# Patient Record
Sex: FEMALE | Race: WHITE | NOT HISPANIC OR LATINO
[De-identification: names, ages, dates, MRNs, and addresses within clinical notes are randomized per-mention and may not be internally consistent; named-entity substitution may affect disease eponyms.]

---

## 2018-05-07 PROBLEM — Z00.00 ENCOUNTER FOR PREVENTIVE HEALTH EXAMINATION: Status: ACTIVE | Noted: 2018-05-07

## 2018-06-06 ENCOUNTER — APPOINTMENT (OUTPATIENT)
Dept: ORTHOPEDIC SURGERY | Facility: CLINIC | Age: 63
End: 2018-06-06
Payer: COMMERCIAL

## 2018-06-06 VITALS
DIASTOLIC BLOOD PRESSURE: 85 MMHG | BODY MASS INDEX: 46.95 KG/M2 | WEIGHT: 265 LBS | HEART RATE: 109 BPM | SYSTOLIC BLOOD PRESSURE: 123 MMHG | HEIGHT: 63 IN

## 2018-06-06 DIAGNOSIS — Z87.39 PERSONAL HISTORY OF OTHER DISEASES OF THE MUSCULOSKELETAL SYSTEM AND CONNECTIVE TISSUE: ICD-10-CM

## 2018-06-06 DIAGNOSIS — Z78.9 OTHER SPECIFIED HEALTH STATUS: ICD-10-CM

## 2018-06-06 DIAGNOSIS — Z82.61 FAMILY HISTORY OF ARTHRITIS: ICD-10-CM

## 2018-06-06 PROCEDURE — 73502 X-RAY EXAM HIP UNI 2-3 VIEWS: CPT | Mod: RT

## 2018-06-06 PROCEDURE — 99204 OFFICE O/P NEW MOD 45 MIN: CPT

## 2018-08-08 ENCOUNTER — TRANSCRIPTION ENCOUNTER (OUTPATIENT)
Age: 63
End: 2018-08-08

## 2018-08-08 ENCOUNTER — OUTPATIENT (OUTPATIENT)
Dept: OUTPATIENT SERVICES | Facility: HOSPITAL | Age: 63
LOS: 1 days | End: 2018-08-08
Payer: COMMERCIAL

## 2018-08-08 VITALS
DIASTOLIC BLOOD PRESSURE: 80 MMHG | RESPIRATION RATE: 18 BRPM | WEIGHT: 268.96 LBS | TEMPERATURE: 96 F | HEART RATE: 96 BPM | SYSTOLIC BLOOD PRESSURE: 130 MMHG | HEIGHT: 62 IN

## 2018-08-08 DIAGNOSIS — Z01.818 ENCOUNTER FOR OTHER PREPROCEDURAL EXAMINATION: ICD-10-CM

## 2018-08-08 DIAGNOSIS — M25.559 PAIN IN UNSPECIFIED HIP: ICD-10-CM

## 2018-08-08 DIAGNOSIS — Z98.890 OTHER SPECIFIED POSTPROCEDURAL STATES: Chronic | ICD-10-CM

## 2018-08-08 DIAGNOSIS — E66.9 OBESITY, UNSPECIFIED: ICD-10-CM

## 2018-08-08 DIAGNOSIS — Z29.9 ENCOUNTER FOR PROPHYLACTIC MEASURES, UNSPECIFIED: ICD-10-CM

## 2018-08-08 LAB
ALBUMIN SERPL ELPH-MCNC: 4.1 G/DL — SIGNIFICANT CHANGE UP (ref 3.3–5.2)
ALP SERPL-CCNC: 73 U/L — SIGNIFICANT CHANGE UP (ref 40–120)
ALT FLD-CCNC: 7 U/L — SIGNIFICANT CHANGE UP
ANION GAP SERPL CALC-SCNC: 13 MMOL/L — SIGNIFICANT CHANGE UP (ref 5–17)
APTT BLD: 32.1 SEC — SIGNIFICANT CHANGE UP (ref 27.5–37.4)
AST SERPL-CCNC: 12 U/L — SIGNIFICANT CHANGE UP
BASOPHILS # BLD AUTO: 0 K/UL — SIGNIFICANT CHANGE UP (ref 0–0.2)
BASOPHILS NFR BLD AUTO: 0.2 % — SIGNIFICANT CHANGE UP (ref 0–2)
BILIRUB SERPL-MCNC: 0.2 MG/DL — LOW (ref 0.4–2)
BLD GP AB SCN SERPL QL: SIGNIFICANT CHANGE UP
BUN SERPL-MCNC: 17 MG/DL — SIGNIFICANT CHANGE UP (ref 8–20)
CALCIUM SERPL-MCNC: 9.8 MG/DL — SIGNIFICANT CHANGE UP (ref 8.6–10.2)
CHLORIDE SERPL-SCNC: 106 MMOL/L — SIGNIFICANT CHANGE UP (ref 98–107)
CO2 SERPL-SCNC: 27 MMOL/L — SIGNIFICANT CHANGE UP (ref 22–29)
CREAT SERPL-MCNC: 0.69 MG/DL — SIGNIFICANT CHANGE UP (ref 0.5–1.3)
EOSINOPHIL # BLD AUTO: 0.2 K/UL — SIGNIFICANT CHANGE UP (ref 0–0.5)
EOSINOPHIL NFR BLD AUTO: 2.7 % — SIGNIFICANT CHANGE UP (ref 0–6)
GLUCOSE SERPL-MCNC: 121 MG/DL — HIGH (ref 70–115)
HCT VFR BLD CALC: 38.7 % — SIGNIFICANT CHANGE UP (ref 37–47)
HGB BLD-MCNC: 11.8 G/DL — LOW (ref 12–16)
INR BLD: 1.04 RATIO — SIGNIFICANT CHANGE UP (ref 0.88–1.16)
LYMPHOCYTES # BLD AUTO: 1.5 K/UL — SIGNIFICANT CHANGE UP (ref 1–4.8)
LYMPHOCYTES # BLD AUTO: 17.5 % — LOW (ref 20–55)
MCHC RBC-ENTMCNC: 26.5 PG — LOW (ref 27–31)
MCHC RBC-ENTMCNC: 30.5 G/DL — LOW (ref 32–36)
MCV RBC AUTO: 87 FL — SIGNIFICANT CHANGE UP (ref 81–99)
MONOCYTES # BLD AUTO: 0.5 K/UL — SIGNIFICANT CHANGE UP (ref 0–0.8)
MONOCYTES NFR BLD AUTO: 5.6 % — SIGNIFICANT CHANGE UP (ref 3–10)
MRSA PCR RESULT.: SIGNIFICANT CHANGE UP
NEUTROPHILS # BLD AUTO: 6.2 K/UL — SIGNIFICANT CHANGE UP (ref 1.8–8)
NEUTROPHILS NFR BLD AUTO: 73.8 % — HIGH (ref 37–73)
PLATELET # BLD AUTO: 286 K/UL — SIGNIFICANT CHANGE UP (ref 150–400)
POTASSIUM SERPL-MCNC: 3.6 MMOL/L — SIGNIFICANT CHANGE UP (ref 3.5–5.3)
POTASSIUM SERPL-SCNC: 3.6 MMOL/L — SIGNIFICANT CHANGE UP (ref 3.5–5.3)
PROT SERPL-MCNC: 7.7 G/DL — SIGNIFICANT CHANGE UP (ref 6.6–8.7)
PROTHROM AB SERPL-ACNC: 11.5 SEC — SIGNIFICANT CHANGE UP (ref 9.8–12.7)
RBC # BLD: 4.45 M/UL — SIGNIFICANT CHANGE UP (ref 4.4–5.2)
RBC # FLD: 16.1 % — HIGH (ref 11–15.6)
S AUREUS DNA NOSE QL NAA+PROBE: SIGNIFICANT CHANGE UP
SODIUM SERPL-SCNC: 146 MMOL/L — HIGH (ref 135–145)
TYPE + AB SCN PNL BLD: SIGNIFICANT CHANGE UP
WBC # BLD: 8.5 K/UL — SIGNIFICANT CHANGE UP (ref 4.8–10.8)
WBC # FLD AUTO: 8.5 K/UL — SIGNIFICANT CHANGE UP (ref 4.8–10.8)

## 2018-08-08 PROCEDURE — 86850 RBC ANTIBODY SCREEN: CPT

## 2018-08-08 PROCEDURE — 85027 COMPLETE CBC AUTOMATED: CPT

## 2018-08-08 PROCEDURE — 85610 PROTHROMBIN TIME: CPT

## 2018-08-08 PROCEDURE — G0463: CPT

## 2018-08-08 PROCEDURE — 80053 COMPREHEN METABOLIC PANEL: CPT

## 2018-08-08 PROCEDURE — 85730 THROMBOPLASTIN TIME PARTIAL: CPT

## 2018-08-08 PROCEDURE — 86901 BLOOD TYPING SEROLOGIC RH(D): CPT

## 2018-08-08 PROCEDURE — 36415 COLL VENOUS BLD VENIPUNCTURE: CPT

## 2018-08-08 PROCEDURE — 87640 STAPH A DNA AMP PROBE: CPT

## 2018-08-08 PROCEDURE — 86900 BLOOD TYPING SEROLOGIC ABO: CPT

## 2018-08-08 PROCEDURE — 87641 MR-STAPH DNA AMP PROBE: CPT

## 2018-08-08 NOTE — H&P PST ADULT - NSANTHOSAYNRD_GEN_A_CORE
No. NAHOMI screening performed.  STOP BANG Legend: 0-2 = LOW Risk; 3-4 = INTERMEDIATE Risk; 5-8 = HIGH Risk

## 2018-08-08 NOTE — H&P PST ADULT - HISTORY OF PRESENT ILLNESS
63 year old female presents with c/o right hip pain / groin pain over past year. Went to pain management a year ago , right leg sciatica. no improvement . Injection in JUne 2018 to right hip with no relief. Takes motrin as needed with mild relief . uses walker and cane as needed. Pain with sleep and sitting to standing., xrays done in June and right hip replacement needed.

## 2018-08-08 NOTE — H&P PST ADULT - ASSESSMENT
pleasant 63 year old female presents with c/o right hip pain , scheduled for right hip replacement.  CAPRINI SCORE [CLOT]    AGE RELATED RISK FACTORS                                                       MOBILITY RELATED FACTORS  [ ] Age 41-60 years                                            (1 Point)                  [ ] Bed rest                                                        (1 Point)  [X ] Age: 61-74 years                                           (2 Points)                 [ ] Plaster cast                                                   (2 Points)  [ ] Age= 75 years                                              (3 Points)                 [ ] Bed bound for more than 72 hours                 (2 Points)    DISEASE RELATED RISK FACTORS                                               GENDER SPECIFIC FACTORS  [XX] Varicose veins                                               (1 Point)                  [ ] Post-partum < 6 weeks                                   (1 Point)             [ X] BMI > 25 Kg/m2                                            (1 Point)                  [ ] Hormonal therapy  or oral contraception          (1 Point)                 [ ] Sepsis (in the previous month)                        (1 Point)                  [ ] History of pregnancy complications                 (1 point)  [ ] Pneumonia or serious lung disease                                               [ ] Unexplained or recurrent                     (1 Point)           (in the previous month)                               (1 Point)  [ ] Abnormal pulmonary function test                     (1 Point)                 SURGERY RELATED RISK FACTORS  [ ] Acute myocardial infarction                              (1 Point)                 [ ]  Section                                             (1 Point)  [ ] Congestive heart failure (in the previous month)  (1 Point)               [ ] Minor surgery                                                  (1 Point)   [ ] Inflammatory bowel disease                             (1 Point)                 [ ] Arthroscopic surgery                                        (2 Points)  [ ] Central venous access                                      (2 Points)                [ ] General surgery lasting more than 45 minutes   (2 Points)       [ ] Stroke (in the previous month)                          (5 Points)               [X ] Elective arthroplasty                                         (5 Points)                                                                                                                                               HEMATOLOGY RELATED FACTORS                                                 TRAUMA RELATED RISK FACTORS  [ ] Prior episodes of VTE                                     (3 Points)                 [ ] Fracture of the hip, pelvis, or leg                       (5 Points)  [ ] Positive family history for VTE                         (3 Points)                 [ ] Acute spinal cord injury (in the previous month)  (5 Points)  [ ] Prothrombin 70124 A                                     (3 Points)                 [ ] Paralysis  (less than 1 month)                             (5 Points)  [ ] Factor V Leiden                                             (3 Points)                  [ ] Multiple Trauma within 1 month                        (5 Points)  [ ] Lupus anticoagulants                                     (3 Points)                                                           [ ] Anticardiolipin antibodies                               (3 Points)                                                       [ ] High homocysteine in the blood                      (3 Points)                                             [ ] Other congenital or acquired thrombophilia      (3 Points)                                                [ ] Heparin induced thrombocytopenia                  (3 Points)                                          Total Score [    10      ]

## 2018-08-08 NOTE — H&P PST ADULT - FAMILY HISTORY
Father  Still living? No  Family history of COPD (chronic obstructive pulmonary disease), Age at diagnosis: Age Unknown     Mother  Still living? Yes, Estimated age:   Family history of heart disease, Age at diagnosis: Age Unknown

## 2018-08-22 ENCOUNTER — TRANSCRIPTION ENCOUNTER (OUTPATIENT)
Age: 63
End: 2018-08-22

## 2018-08-23 ENCOUNTER — RESULT REVIEW (OUTPATIENT)
Age: 63
End: 2018-08-23

## 2018-08-23 ENCOUNTER — INPATIENT (INPATIENT)
Facility: HOSPITAL | Age: 63
LOS: 3 days | Discharge: NURSING, REHAB OR LONG-TERM CARE | DRG: 470 | End: 2018-08-27
Attending: ORTHOPAEDIC SURGERY | Admitting: ORTHOPAEDIC SURGERY
Payer: COMMERCIAL

## 2018-08-23 ENCOUNTER — TRANSCRIPTION ENCOUNTER (OUTPATIENT)
Age: 63
End: 2018-08-23

## 2018-08-23 ENCOUNTER — APPOINTMENT (OUTPATIENT)
Dept: ORTHOPEDIC SURGERY | Facility: HOSPITAL | Age: 63
End: 2018-08-23

## 2018-08-23 VITALS
HEIGHT: 63 IN | OXYGEN SATURATION: 100 % | WEIGHT: 266.1 LBS | SYSTOLIC BLOOD PRESSURE: 153 MMHG | TEMPERATURE: 98 F | DIASTOLIC BLOOD PRESSURE: 69 MMHG | HEART RATE: 117 BPM | RESPIRATION RATE: 17 BRPM

## 2018-08-23 DIAGNOSIS — Z98.890 OTHER SPECIFIED POSTPROCEDURAL STATES: Chronic | ICD-10-CM

## 2018-08-23 DIAGNOSIS — M16.11 UNILATERAL PRIMARY OSTEOARTHRITIS, RIGHT HIP: ICD-10-CM

## 2018-08-23 LAB
ABO RH CONFIRMATION: SIGNIFICANT CHANGE UP
GLUCOSE BLDC GLUCOMTR-MCNC: 107 MG/DL — HIGH (ref 70–99)
GLUCOSE BLDC GLUCOMTR-MCNC: 158 MG/DL — HIGH (ref 70–99)
GLUCOSE BLDC GLUCOMTR-MCNC: 99 MG/DL — SIGNIFICANT CHANGE UP (ref 70–99)

## 2018-08-23 PROCEDURE — 88304 TISSUE EXAM BY PATHOLOGIST: CPT | Mod: 26

## 2018-08-23 PROCEDURE — 73501 X-RAY EXAM HIP UNI 1 VIEW: CPT | Mod: 26,RT

## 2018-08-23 PROCEDURE — 27130 TOTAL HIP ARTHROPLASTY: CPT | Mod: RT

## 2018-08-23 PROCEDURE — 88311 DECALCIFY TISSUE: CPT | Mod: 26

## 2018-08-23 RX ORDER — OXYCODONE HYDROCHLORIDE 5 MG/1
10 TABLET ORAL EVERY 4 HOURS
Qty: 0 | Refills: 0 | Status: DISCONTINUED | OUTPATIENT
Start: 2018-08-24 | End: 2018-08-27

## 2018-08-23 RX ORDER — DOCUSATE SODIUM 100 MG
100 CAPSULE ORAL THREE TIMES A DAY
Qty: 0 | Refills: 0 | Status: DISCONTINUED | OUTPATIENT
Start: 2018-08-24 | End: 2018-08-27

## 2018-08-23 RX ORDER — ACETAMINOPHEN 500 MG
975 TABLET ORAL EVERY 8 HOURS
Qty: 0 | Refills: 0 | Status: COMPLETED | OUTPATIENT
Start: 2018-08-23 | End: 2018-08-24

## 2018-08-23 RX ORDER — ACETAMINOPHEN 500 MG
1000 TABLET ORAL ONCE
Qty: 0 | Refills: 0 | Status: DISCONTINUED | OUTPATIENT
Start: 2018-08-23 | End: 2018-08-23

## 2018-08-23 RX ORDER — CELECOXIB 200 MG/1
200 CAPSULE ORAL
Qty: 0 | Refills: 0 | Status: DISCONTINUED | OUTPATIENT
Start: 2018-08-24 | End: 2018-08-27

## 2018-08-23 RX ORDER — SENNA PLUS 8.6 MG/1
2 TABLET ORAL AT BEDTIME
Qty: 0 | Refills: 0 | Status: DISCONTINUED | OUTPATIENT
Start: 2018-08-23 | End: 2018-08-27

## 2018-08-23 RX ORDER — VANCOMYCIN HCL 1 G
1500 VIAL (EA) INTRAVENOUS ONCE
Qty: 0 | Refills: 0 | Status: COMPLETED | OUTPATIENT
Start: 2018-08-23 | End: 2018-08-23

## 2018-08-23 RX ORDER — ACETAMINOPHEN 500 MG
650 TABLET ORAL EVERY 6 HOURS
Qty: 0 | Refills: 0 | Status: DISCONTINUED | OUTPATIENT
Start: 2018-08-24 | End: 2018-08-27

## 2018-08-23 RX ORDER — ONDANSETRON 8 MG/1
4 TABLET, FILM COATED ORAL EVERY 6 HOURS
Qty: 0 | Refills: 0 | Status: DISCONTINUED | OUTPATIENT
Start: 2018-08-24 | End: 2018-08-27

## 2018-08-23 RX ORDER — GABAPENTIN 400 MG/1
600 CAPSULE ORAL ONCE
Qty: 0 | Refills: 0 | Status: COMPLETED | OUTPATIENT
Start: 2018-08-23 | End: 2018-08-23

## 2018-08-23 RX ORDER — OXYCODONE HYDROCHLORIDE 5 MG/1
5 TABLET ORAL EVERY 4 HOURS
Qty: 0 | Refills: 0 | Status: DISCONTINUED | OUTPATIENT
Start: 2018-08-24 | End: 2018-08-27

## 2018-08-23 RX ORDER — SODIUM CHLORIDE 9 MG/ML
1000 INJECTION, SOLUTION INTRAVENOUS
Qty: 0 | Refills: 0 | Status: DISCONTINUED | OUTPATIENT
Start: 2018-08-23 | End: 2018-08-24

## 2018-08-23 RX ORDER — OXYCODONE HYDROCHLORIDE 5 MG/1
20 TABLET ORAL ONCE
Qty: 0 | Refills: 0 | Status: DISCONTINUED | OUTPATIENT
Start: 2018-08-23 | End: 2018-08-23

## 2018-08-23 RX ORDER — ONDANSETRON 8 MG/1
4 TABLET, FILM COATED ORAL ONCE
Qty: 0 | Refills: 0 | Status: DISCONTINUED | OUTPATIENT
Start: 2018-08-23 | End: 2018-08-24

## 2018-08-23 RX ORDER — HYDROMORPHONE HYDROCHLORIDE 2 MG/ML
2 INJECTION INTRAMUSCULAR; INTRAVENOUS; SUBCUTANEOUS
Qty: 0 | Refills: 0 | Status: DISCONTINUED | OUTPATIENT
Start: 2018-08-23 | End: 2018-08-27

## 2018-08-23 RX ORDER — POLYETHYLENE GLYCOL 3350 17 G/17G
17 POWDER, FOR SOLUTION ORAL DAILY
Qty: 0 | Refills: 0 | Status: DISCONTINUED | OUTPATIENT
Start: 2018-08-24 | End: 2018-08-27

## 2018-08-23 RX ORDER — OXYCODONE HYDROCHLORIDE 5 MG/1
10 TABLET ORAL EVERY 12 HOURS
Qty: 0 | Refills: 0 | Status: DISCONTINUED | OUTPATIENT
Start: 2018-08-23 | End: 2018-08-27

## 2018-08-23 RX ORDER — VANCOMYCIN HCL 1 G
1500 VIAL (EA) INTRAVENOUS
Qty: 0 | Refills: 0 | Status: COMPLETED | OUTPATIENT
Start: 2018-08-23 | End: 2018-08-23

## 2018-08-23 RX ORDER — PANTOPRAZOLE SODIUM 20 MG/1
40 TABLET, DELAYED RELEASE ORAL DAILY
Qty: 0 | Refills: 0 | Status: DISCONTINUED | OUTPATIENT
Start: 2018-08-24 | End: 2018-08-27

## 2018-08-23 RX ORDER — CEFAZOLIN SODIUM 1 G
3000 VIAL (EA) INJECTION ONCE
Qty: 0 | Refills: 0 | Status: DISCONTINUED | OUTPATIENT
Start: 2018-08-23 | End: 2018-08-23

## 2018-08-23 RX ORDER — SODIUM CHLORIDE 9 MG/ML
1000 INJECTION INTRAMUSCULAR; INTRAVENOUS; SUBCUTANEOUS
Qty: 0 | Refills: 0 | Status: DISCONTINUED | OUTPATIENT
Start: 2018-08-23 | End: 2018-08-24

## 2018-08-23 RX ORDER — CEFAZOLIN SODIUM 1 G
2000 VIAL (EA) INJECTION
Qty: 0 | Refills: 0 | Status: COMPLETED | OUTPATIENT
Start: 2018-08-23 | End: 2018-08-24

## 2018-08-23 RX ORDER — ENOXAPARIN SODIUM 100 MG/ML
40 INJECTION SUBCUTANEOUS DAILY
Qty: 0 | Refills: 0 | Status: DISCONTINUED | OUTPATIENT
Start: 2018-08-24 | End: 2018-08-27

## 2018-08-23 RX ORDER — MAGNESIUM HYDROXIDE 400 MG/1
30 TABLET, CHEWABLE ORAL DAILY
Qty: 0 | Refills: 0 | Status: DISCONTINUED | OUTPATIENT
Start: 2018-08-24 | End: 2018-08-27

## 2018-08-23 RX ORDER — TRANEXAMIC ACID 100 MG/ML
1200 INJECTION, SOLUTION INTRAVENOUS ONCE
Qty: 0 | Refills: 0 | Status: DISCONTINUED | OUTPATIENT
Start: 2018-08-23 | End: 2018-08-23

## 2018-08-23 RX ORDER — CELECOXIB 200 MG/1
400 CAPSULE ORAL ONCE
Qty: 0 | Refills: 0 | Status: COMPLETED | OUTPATIENT
Start: 2018-08-23 | End: 2018-08-23

## 2018-08-23 RX ORDER — FENTANYL CITRATE 50 UG/ML
50 INJECTION INTRAVENOUS
Qty: 0 | Refills: 0 | Status: DISCONTINUED | OUTPATIENT
Start: 2018-08-23 | End: 2018-08-24

## 2018-08-23 RX ORDER — SODIUM CHLORIDE 9 MG/ML
3 INJECTION INTRAMUSCULAR; INTRAVENOUS; SUBCUTANEOUS EVERY 8 HOURS
Qty: 0 | Refills: 0 | Status: DISCONTINUED | OUTPATIENT
Start: 2018-08-23 | End: 2018-08-23

## 2018-08-23 RX ADMIN — Medication 100 MILLIGRAM(S): at 22:17

## 2018-08-23 RX ADMIN — GABAPENTIN 600 MILLIGRAM(S): 400 CAPSULE ORAL at 11:00

## 2018-08-23 RX ADMIN — Medication 300 MILLIGRAM(S): at 23:11

## 2018-08-23 RX ADMIN — OXYCODONE HYDROCHLORIDE 10 MILLIGRAM(S): 5 TABLET ORAL at 22:17

## 2018-08-23 RX ADMIN — Medication 975 MILLIGRAM(S): at 22:18

## 2018-08-23 RX ADMIN — OXYCODONE HYDROCHLORIDE 10 MILLIGRAM(S): 5 TABLET ORAL at 23:11

## 2018-08-23 RX ADMIN — Medication 300 MILLIGRAM(S): at 12:17

## 2018-08-23 RX ADMIN — CELECOXIB 400 MILLIGRAM(S): 200 CAPSULE ORAL at 11:00

## 2018-08-23 RX ADMIN — OXYCODONE HYDROCHLORIDE 20 MILLIGRAM(S): 5 TABLET ORAL at 11:00

## 2018-08-23 NOTE — DISCHARGE NOTE ADULT - HOSPITAL COURSE
The patient underwent a RIGHT POSTERIOR TOTAL HIP REPLACEMENT on 8/23/18. The patient received antibiotics consistent with SCIP guidelines. The patient underwent the procedure and had no intra-operative complications. Post-operatively, the patient was seen by medicine and PT. The patient received Lovenox for DVTP. The patient received pain medications per orthopedic pain management protocol and the pain was appropriately controlled. Patient was instructed on posterior total hip precautions by PT. The patient did not have any post-operative medical complications. The patient was discharged in stable condition.

## 2018-08-23 NOTE — DISCHARGE NOTE ADULT - ADDITIONAL INSTRUCTIONS
The patient will be seen in the office between 2-3 weeks for wound check. PLEASE CONTACT OFFICE TO ARRANGE FOLLOW-UP APPOINTMENT DATE. Tape will be removed at that time. Patient may shower after post-op day #5. The dressing is to be removed on 7. IF THE DRESSING BECOMES SOILED BEFORE THE REMOVAL DATE, CHANGE WITH A SIMILAR DRESSING. IF THE DRESSING BECOMES STAINED WITH DISCHARGE, CONTACT THE OFFICE FOR FURTHER DIRECTIONS.  The patient will contact the office if the wound becomes red, has increasing pain, develops bleeding or discharge, an injury occurs, or has other concerns. The patient will continue PT consistent with posterior total hip replacement protocol. The patient will continue to practice posterior total hip precautions for a minimum of 6 week. The patient will continue LOVENOX for 2 weeks and then begin ASPIRIN for blood clot prevention. Patient will take Duricef twice a day for 7 days for infection prevention. The patient will take OXYCODONE AND TYLENOL for pain control and titrate according to prescription and patient needs. The patient will take Colace while taking oxycodone to prevent narcotic associated constipation.  Additionally, increase water intake (drink at least 8 glasses of water daily) and try adding fiber to the diet by eating fruits, vegetables and foods that are rich in grains. If constipation is experienced, contact the medical/primary care provider to discuss further treatment options. The patient is FULL weight bearing. The patient will be seen in the office between 2-3 weeks for wound check. PLEASE CONTACT OFFICE TO ARRANGE FOLLOW-UP APPOINTMENT DATE. Tape will be removed at that time. Patient may shower after post-op day #5. The dressing is to be removed on 9/1/18. IF THE DRESSING BECOMES SOILED BEFORE THE REMOVAL DATE, CHANGE WITH A SIMILAR DRESSING. IF THE DRESSING BECOMES STAINED WITH DISCHARGE, CONTACT THE OFFICE FOR FURTHER DIRECTIONS.  The patient will contact the office if the wound becomes red, has increasing pain, develops bleeding or discharge, an injury occurs, or has other concerns. The patient will continue PT consistent with posterior total hip replacement protocol. The patient will continue to practice posterior total hip precautions for a minimum of 6 week. The patient will continue LOVENOX for 2 weeks and then begin ASPIRIN for blood clot prevention. Patient will take Duricef twice a day for 7 days for infection prevention. The patient will take OXYCODONE AND TYLENOL for pain control and titrate according to prescription and patient needs. The patient will take Colace while taking oxycodone to prevent narcotic associated constipation.  Additionally, increase water intake (drink at least 8 glasses of water daily) and try adding fiber to the diet by eating fruits, vegetables and foods that are rich in grains. If constipation is experienced, contact the medical/primary care provider to discuss further treatment options. The patient is FULL weight bearing.

## 2018-08-23 NOTE — PROGRESS NOTE ADULT - SUBJECTIVE AND OBJECTIVE BOX
Ortho Post Op Check    Name: MADHAVI JOHNSON    MR #: [id number]    Procedure: right total hip arthroplasty posterior  Surgeon: Nett    Pt comfortable without complaints, pain controlled  Denies CP, SOB, N/V, numbness/tingling     General Exam:  Vital Signs Last 24 Hrs  T(C): 36.7 (08-23-18 @ 19:15), Max: 36.7 (08-23-18 @ 19:15)  T(F): 98.1 (08-23-18 @ 19:15), Max: 98.1 (08-23-18 @ 19:15)  HR: 88 (08-23-18 @ 21:00) (88 - 99)  BP: 99/52 (08-23-18 @ 21:00) (87/44 - 127/74)  BP(mean): --  RR: 12 (08-23-18 @ 21:00) (12 - 21)  SpO2: 99% (08-23-18 @ 21:00) (94% - 100%)    General: Pt Alert and oriented, NAD, controlled pain.  Dressings C/D/I. No bleeding.  Pulses: 2+ dorsalis pedis pulse. Cap refill < 2 sec.  Sensation: Grossly intact to light touch without deficit.  Motor: + EHL/FHL/TA/GS    Post-op X-Ray:    Pelvis & hip films reviewed. Implants are in appropriate position. No fracture or dislocation noted. Patient is WBAT of the surgical extremity.     A/P: 63yFemale POD#0 s/p right total hip arthroplasty posterior   - Stable  - Pain Control  - DVT ppx: Lovenox  - Post op abx: Ancef, Vanco, Duricef  - PT eval done  - Weight bearing status: WBAT Ortho Post Op Check    Name: MADHAVI JOHNSON    MR #: [id number]    Procedure: right total hip arthroplasty posterior  Surgeon: Nett    Pt comfortable without complaints, pain controlled  Denies CP, SOB, N/V, numbness/tingling     General Exam:  Vital Signs Last 24 Hrs  T(C): 36.7 (08-23-18 @ 19:15), Max: 36.7 (08-23-18 @ 19:15)  T(F): 98.1 (08-23-18 @ 19:15), Max: 98.1 (08-23-18 @ 19:15)  HR: 88 (08-23-18 @ 21:00) (88 - 99)  BP: 99/52 (08-23-18 @ 21:00) (87/44 - 127/74)  BP(mean): --  RR: 12 (08-23-18 @ 21:00) (12 - 21)  SpO2: 99% (08-23-18 @ 21:00) (94% - 100%)    General: Pt Alert and oriented, NAD, controlled pain.  Dressings C/D/I. No bleeding.  Pulses: 2+ dorsalis pedis pulse. Cap refill < 2 sec.  Sensation: Grossly intact to light touch without deficit.  Motor: + EHL/FHL/TA/GS    Post-op X-Ray:    Pelvis & hip films reviewed. Implants are in appropriate position. No fracture or dislocation noted. Patient is WBAT of the surgical extremity.     A/P: 63yFemale POD#0 s/p right total hip arthroplasty posterior   - Stable  - Pain Control  - DVT ppx: Lovenox  - Post op abx: Ancef, Vanco, Duricef  - PT eval pending  - Weight bearing status: WBAT

## 2018-08-23 NOTE — DISCHARGE NOTE ADULT - MEDICATION SUMMARY - MEDICATIONS TO TAKE
I will START or STAY ON the medications listed below when I get home from the hospital:    oxyCODONE 5 mg oral tablet  -- 1 tab(s) by mouth every 4 hours, As needed, Mild Pain (1 - 3)  -- Indication: For Pain    oxyCODONE 10 mg oral tablet  -- 1 tab(s) by mouth every 4 hours, As needed, Moderate Pain (4 - 6)  -- Indication: For Pain    aspirin 325 mg oral tablet  -- 1 tab(s) by mouth 2 times a day   -- Take with food or milk.    -- Indication: For DVTP, to begin after completion of Lovenox    enoxaparin  -- 40 milligram(s) subcutaneous once a day  -- Indication: For DVTP    cefadroxil 500 mg oral capsule  -- 1 cap(s) by mouth 2 times a day  -- Indication: For infection prophylaxis

## 2018-08-23 NOTE — DISCHARGE NOTE ADULT - MEDICATION SUMMARY - MEDICATIONS TO STOP TAKING
I will STOP taking the medications listed below when I get home from the hospital:    Motrin 600 mg oral tablet  -- 1 tab(s) by mouth every 6 hours    traMADol 50 mg oral tablet  -- 1 tab(s) by mouth every 4 hours

## 2018-08-23 NOTE — DISCHARGE NOTE ADULT - CARE PLAN
Goal:	resume ADLs  Assessment and plan of treatment:	pain control, physical therapy Principal Discharge DX:	Primary osteoarthritis of right hip  Goal:	resume ADLs  Assessment and plan of treatment:	pain control, physical therapy

## 2018-08-23 NOTE — DISCHARGE NOTE ADULT - CARE PROVIDER_API CALL
Maninder Dumont (MD), Orthopaedic Surgery  [address]  [place] [address]  Phone: [phone]  Fax: [phone]

## 2018-08-23 NOTE — DISCHARGE NOTE ADULT - PATIENT PORTAL LINK FT
You can access the [facility] Patient Portal, offered by [facility], by registering with the following website: [email or web address]

## 2018-08-23 NOTE — BRIEF OPERATIVE NOTE - PROCEDURE
<<-----Click on this checkbox to enter Procedure JACQUES - total hip arthroplasty  08/23/2018    Active  MNETT

## 2018-08-24 DIAGNOSIS — I99.8 OTHER DISORDER OF CIRCULATORY SYSTEM: ICD-10-CM

## 2018-08-24 DIAGNOSIS — E66.01 MORBID (SEVERE) OBESITY DUE TO EXCESS CALORIES: ICD-10-CM

## 2018-08-24 DIAGNOSIS — M16.11 UNILATERAL PRIMARY OSTEOARTHRITIS, RIGHT HIP: ICD-10-CM

## 2018-08-24 LAB
ANION GAP SERPL CALC-SCNC: 10 MMOL/L — SIGNIFICANT CHANGE UP (ref 5–17)
BASOPHILS # BLD AUTO: 0 K/UL — SIGNIFICANT CHANGE UP (ref 0–0.2)
BASOPHILS NFR BLD AUTO: 0.1 % — SIGNIFICANT CHANGE UP (ref 0–2)
BUN SERPL-MCNC: 16 MG/DL — SIGNIFICANT CHANGE UP (ref 8–20)
CALCIUM SERPL-MCNC: 8.8 MG/DL — SIGNIFICANT CHANGE UP (ref 8.6–10.2)
CHLORIDE SERPL-SCNC: 101 MMOL/L — SIGNIFICANT CHANGE UP (ref 98–107)
CO2 SERPL-SCNC: 28 MMOL/L — SIGNIFICANT CHANGE UP (ref 22–29)
CREAT SERPL-MCNC: 0.54 MG/DL — SIGNIFICANT CHANGE UP (ref 0.5–1.3)
EOSINOPHIL # BLD AUTO: 0.1 K/UL — SIGNIFICANT CHANGE UP (ref 0–0.5)
EOSINOPHIL NFR BLD AUTO: 1.3 % — SIGNIFICANT CHANGE UP (ref 0–6)
GLUCOSE SERPL-MCNC: 116 MG/DL — HIGH (ref 70–115)
HCT VFR BLD CALC: 30.2 % — LOW (ref 37–47)
HGB BLD-MCNC: 9.2 G/DL — LOW (ref 12–16)
LYMPHOCYTES # BLD AUTO: 1 K/UL — SIGNIFICANT CHANGE UP (ref 1–4.8)
LYMPHOCYTES # BLD AUTO: 11.9 % — LOW (ref 20–55)
MCHC RBC-ENTMCNC: 26.5 PG — LOW (ref 27–31)
MCHC RBC-ENTMCNC: 30.5 G/DL — LOW (ref 32–36)
MCV RBC AUTO: 87 FL — SIGNIFICANT CHANGE UP (ref 81–99)
MONOCYTES # BLD AUTO: 0.6 K/UL — SIGNIFICANT CHANGE UP (ref 0–0.8)
MONOCYTES NFR BLD AUTO: 7.1 % — SIGNIFICANT CHANGE UP (ref 3–10)
NEUTROPHILS # BLD AUTO: 6.9 K/UL — SIGNIFICANT CHANGE UP (ref 1.8–8)
NEUTROPHILS NFR BLD AUTO: 79.3 % — HIGH (ref 37–73)
PLATELET # BLD AUTO: 208 K/UL — SIGNIFICANT CHANGE UP (ref 150–400)
POTASSIUM SERPL-MCNC: 3.9 MMOL/L — SIGNIFICANT CHANGE UP (ref 3.5–5.3)
POTASSIUM SERPL-SCNC: 3.9 MMOL/L — SIGNIFICANT CHANGE UP (ref 3.5–5.3)
RBC # BLD: 3.47 M/UL — LOW (ref 4.4–5.2)
RBC # FLD: 16.2 % — HIGH (ref 11–15.6)
SODIUM SERPL-SCNC: 139 MMOL/L — SIGNIFICANT CHANGE UP (ref 135–145)
WBC # BLD: 8.8 K/UL — SIGNIFICANT CHANGE UP (ref 4.8–10.8)
WBC # FLD AUTO: 8.8 K/UL — SIGNIFICANT CHANGE UP (ref 4.8–10.8)

## 2018-08-24 PROCEDURE — 99223 1ST HOSP IP/OBS HIGH 75: CPT

## 2018-08-24 RX ORDER — ENOXAPARIN SODIUM 100 MG/ML
40 INJECTION SUBCUTANEOUS
Qty: 0 | Refills: 0 | DISCHARGE
Start: 2018-08-24 | End: 2018-09-07

## 2018-08-24 RX ADMIN — Medication 975 MILLIGRAM(S): at 14:01

## 2018-08-24 RX ADMIN — CELECOXIB 200 MILLIGRAM(S): 200 CAPSULE ORAL at 18:16

## 2018-08-24 RX ADMIN — Medication 975 MILLIGRAM(S): at 22:15

## 2018-08-24 RX ADMIN — Medication 100 MILLIGRAM(S): at 13:32

## 2018-08-24 RX ADMIN — SODIUM CHLORIDE 100 MILLILITER(S): 9 INJECTION, SOLUTION INTRAVENOUS at 01:53

## 2018-08-24 RX ADMIN — SENNA PLUS 2 TABLET(S): 8.6 TABLET ORAL at 01:54

## 2018-08-24 RX ADMIN — OXYCODONE HYDROCHLORIDE 10 MILLIGRAM(S): 5 TABLET ORAL at 05:44

## 2018-08-24 RX ADMIN — Medication 975 MILLIGRAM(S): at 13:33

## 2018-08-24 RX ADMIN — Medication 100 MILLIGRAM(S): at 05:45

## 2018-08-24 RX ADMIN — Medication 975 MILLIGRAM(S): at 06:15

## 2018-08-24 RX ADMIN — Medication 975 MILLIGRAM(S): at 00:38

## 2018-08-24 RX ADMIN — Medication 975 MILLIGRAM(S): at 21:37

## 2018-08-24 RX ADMIN — Medication 500 MILLIGRAM(S): at 17:56

## 2018-08-24 RX ADMIN — OXYCODONE HYDROCHLORIDE 5 MILLIGRAM(S): 5 TABLET ORAL at 14:01

## 2018-08-24 RX ADMIN — PANTOPRAZOLE SODIUM 40 MILLIGRAM(S): 20 TABLET, DELAYED RELEASE ORAL at 13:32

## 2018-08-24 RX ADMIN — Medication 100 MILLIGRAM(S): at 21:37

## 2018-08-24 RX ADMIN — POLYETHYLENE GLYCOL 3350 17 GRAM(S): 17 POWDER, FOR SOLUTION ORAL at 13:32

## 2018-08-24 RX ADMIN — CELECOXIB 200 MILLIGRAM(S): 200 CAPSULE ORAL at 17:56

## 2018-08-24 RX ADMIN — Medication 500 MILLIGRAM(S): at 05:44

## 2018-08-24 RX ADMIN — OXYCODONE HYDROCHLORIDE 10 MILLIGRAM(S): 5 TABLET ORAL at 17:58

## 2018-08-24 RX ADMIN — OXYCODONE HYDROCHLORIDE 10 MILLIGRAM(S): 5 TABLET ORAL at 18:16

## 2018-08-24 RX ADMIN — ENOXAPARIN SODIUM 40 MILLIGRAM(S): 100 INJECTION SUBCUTANEOUS at 13:34

## 2018-08-24 RX ADMIN — OXYCODONE HYDROCHLORIDE 10 MILLIGRAM(S): 5 TABLET ORAL at 06:15

## 2018-08-24 RX ADMIN — Medication 975 MILLIGRAM(S): at 05:44

## 2018-08-24 RX ADMIN — OXYCODONE HYDROCHLORIDE 5 MILLIGRAM(S): 5 TABLET ORAL at 13:33

## 2018-08-24 RX ADMIN — SODIUM CHLORIDE 100 MILLILITER(S): 9 INJECTION INTRAMUSCULAR; INTRAVENOUS; SUBCUTANEOUS at 08:15

## 2018-08-24 NOTE — OCCUPATIONAL THERAPY INITIAL EVALUATION ADULT - ADDITIONAL COMMENTS
Pt has tub with curtain and grab bars  Pt owns a rollator, RW, commode, cane, and tub rail  Pt is right handed

## 2018-08-24 NOTE — CONSULT NOTE ADULT - SUBJECTIVE AND OBJECTIVE BOX
Patient is a 63y old  Female who presents with a chief complaint of hip pain   post op day # 1 , pain controlled in the hip       HPI:  63 year old female presents with c/o right hip pain / groin pain over past year. Went to pain management a year ago , right leg sciatica. no improvement . Injection in June 2018 to right hip with no relief. Takes motrin as needed with mild relief .She  uses walker and cane as needed. Pain with sleep and sitting to standing, xrays done in June and right hip replacement needed.       PAST MEDICAL & SURGICAL HISTORY:  Vascular insufficiency: left leg  Sciatic leg pain: right  Hip pain  Arthritis  History of tonsillectomy and adenoidectomy  H/O arthroscopy of left knee: 1994      Social History:  Tabacco - denies smoking   ETOH - no alcohol use   Illicit drug abuse - denies    FAMILY HISTORY:  Family history of heart disease (Mother)  Family history of COPD (chronic obstructive pulmonary disease) (Father)      Allergies    No Known Allergies    Intolerances        HOME MEDICATIONS : reviewed in the chart     REVIEW OF SYSTEMS:    CONSTITUTIONAL: No fever, weight loss, or fatigue  EYES: No eye pain, visual disturbances, or discharge  NECK: No pain or stiffness  RESPIRATORY: No cough, wheezing, chills or hemoptysis; No shortness of breath  CARDIOVASCULAR: No chest pain, palpitations, dizziness, or leg swelling  GASTROINTESTINAL: No abdominal or epigastric pain. No nausea, vomiting, or hematemesis; No diarrhea or constipation. No melena or hematochezia.  GENITOURINARY: No dysuria, frequency, hematuria, or incontinence  NEUROLOGICAL: No headaches, memory loss, loss of strength, numbness, or tremors  SKIN: No itching, burning, rashes, or lesions   LYMPH NODES: No enlarged glands  ENDOCRINE: No heat or cold intolerance; No hair loss  MUSCULOSKELETAL: hip pain   PSYCHIATRIC: No depression, anxiety, mood swings, or difficulty sleeping  HEME/LYMPH: No easy bruising, or bleeding gums  ALLERGY AND IMMUNOLOGIC: No hives or eczema    MEDICATIONS  (STANDING):  acetaminophen   Tablet. 975 milliGRAM(s) Oral every 8 hours  cefadroxil 500 milliGRAM(s) Oral two times a day  celecoxib 200 milliGRAM(s) Oral two times a day  docusate sodium 100 milliGRAM(s) Oral three times a day  enoxaparin Injectable 40 milliGRAM(s) SubCutaneous daily  oxyCODONE  ER Tablet 10 milliGRAM(s) Oral every 12 hours  pantoprazole    Tablet 40 milliGRAM(s) Oral daily  polyethylene glycol 3350 17 Gram(s) Oral daily  senna 2 Tablet(s) Oral at bedtime  sodium chloride 0.9%. 1000 milliLiter(s) (100 mL/Hr) IV Continuous <Continuous>    MEDICATIONS  (PRN):  acetaminophen   Tablet 650 milliGRAM(s) Oral every 6 hours PRN For Temp over 38.3 C (100.94 F)  aluminum hydroxide/magnesium hydroxide/simethicone Suspension 30 milliLiter(s) Oral four times a day PRN Indigestion  HYDROmorphone   Tablet 2 milliGRAM(s) Oral every 3 hours PRN Severe Pain (7 - 10)  magnesium hydroxide Suspension 30 milliLiter(s) Oral daily PRN Constipation  ondansetron Injectable 4 milliGRAM(s) IV Push every 6 hours PRN Nausea and/or Vomiting  oxyCODONE    IR 5 milliGRAM(s) Oral every 4 hours PRN Mild Pain (1 - 3)  oxyCODONE    IR 10 milliGRAM(s) Oral every 4 hours PRN Moderate Pain (4 - 6)      Vital Signs Last 24 Hrs  T(C): 36.3 (24 Aug 2018 05:23), Max: 36.8 (24 Aug 2018 01:00)  T(F): 97.3 (24 Aug 2018 05:23), Max: 98.2 (24 Aug 2018 01:00)  HR: 93 (24 Aug 2018 05:23) (78 - 117)  BP: 85/59 (24 Aug 2018 05:23) (85/59 - 153/69)  BP(mean): --  RR: 18 (24 Aug 2018 05:23) (12 - 21)  SpO2: 100% (24 Aug 2018 05:23) (94% - 100%)    PHYSICAL EXAM:    GENERAL: NAD, well-groomed, well-developed, obese   HEAD:  Atraumatic, Normocephalic  EYES: EOMI, PERRLA, conjunctiva and sclera clear  NECK: Supple, No JVD, Normal thyroid  NERVOUS SYSTEM:  Alert & Oriented X3, Good concentration; no motor deficit   CHEST/LUNG: CTA  b/l,  no rales, rhonchi, wheezing, or rubs  HEART: Regular rate and rhythm; No murmurs, rubs, or gallops  ABDOMEN: Soft, Nontender, Nondistended; Bowel sounds present  EXTREMITIES:  2+ Peripheral Pulses, No clubbing, cyanosis, or edema ,   SKIN: No rash    LABS:      preop labs reviewed             RADIOLOGY & ADDITIONAL STUDIES:

## 2018-08-24 NOTE — CONSULT NOTE ADULT - CONSULT REASON
s/p hip surgery on the R , morbid obesity   s/p surgery medical evaluation required   chart reviewed

## 2018-08-24 NOTE — PHYSICAL THERAPY INITIAL EVALUATION ADULT - GENERAL OBSERVATIONS, REHAB EVAL
Pt received lying in bed on 3 tower, (+) IV left UE, (+) SCDs bilateral LE's, NAD. Agreeable to PT evaluation.

## 2018-08-24 NOTE — PROGRESS NOTE ADULT - SUBJECTIVE AND OBJECTIVE BOX
63y Female s/p       T(C): 36.3 (08-24-18 @ 05:23), Max: 36.8 (08-24-18 @ 01:00)  HR: 93 (08-24-18 @ 05:23) (78 - 99)  BP: 85/59 (08-24-18 @ 05:23) (85/59 - 127/74)  RR: 18 (08-24-18 @ 05:23) (12 - 21)  SpO2: 100% (08-24-18 @ 05:23) (94% - 100%)  Wt(kg): --    Pt seen, doing well, no anesthesia complications or complaints noted or reported.   No Nausea  Pain well controlled

## 2018-08-24 NOTE — PHYSICAL THERAPY INITIAL EVALUATION ADULT - ACTIVE RANGE OF MOTION EXAMINATION, REHAB EVAL
bilateral  lower extremity Active ROM was WFL (within functional limits)/bilateral upper extremity Active ROM was WFL (within functional limits)/posterior hip precautions maintained

## 2018-08-24 NOTE — OCCUPATIONAL THERAPY INITIAL EVALUATION ADULT - PLANNED THERAPY INTERVENTIONS, OT EVAL
balance training/ADL retraining/transfer training/IADL retraining/bed mobility training/strengthening

## 2018-08-24 NOTE — PROGRESS NOTE ADULT - SUBJECTIVE AND OBJECTIVE BOX
MADHAVI JOHNSON    [id number]    History: Patient is status post right posterior total hip arthroplasty on 8/23/18, POD # 1. Patient is doing well. The patient's pain is controlled using the prescribed pain medications. The patient has not participated in physical therapy yet. Denies nausea, vomiting, chest pain, shortness of breath, abdominal pain or fever. No new complaints.    MEDICATIONS  (STANDING):  acetaminophen   Tablet. 975 milliGRAM(s) Oral every 8 hours  cefadroxil 500 milliGRAM(s) Oral two times a day  celecoxib 200 milliGRAM(s) Oral two times a day  docusate sodium 100 milliGRAM(s) Oral three times a day  enoxaparin Injectable 40 milliGRAM(s) SubCutaneous daily  oxyCODONE  ER Tablet 10 milliGRAM(s) Oral every 12 hours  pantoprazole    Tablet 40 milliGRAM(s) Oral daily  polyethylene glycol 3350 17 Gram(s) Oral daily  senna 2 Tablet(s) Oral at bedtime  sodium chloride 0.9%. 1000 milliLiter(s) (100 mL/Hr) IV Continuous <Continuous>    MEDICATIONS  (PRN):  acetaminophen   Tablet 650 milliGRAM(s) Oral every 6 hours PRN For Temp over 38.3 C (100.94 F)  aluminum hydroxide/magnesium hydroxide/simethicone Suspension 30 milliLiter(s) Oral four times a day PRN Indigestion  HYDROmorphone   Tablet 2 milliGRAM(s) Oral every 3 hours PRN Severe Pain (7 - 10)  magnesium hydroxide Suspension 30 milliLiter(s) Oral daily PRN Constipation  ondansetron Injectable 4 milliGRAM(s) IV Push every 6 hours PRN Nausea and/or Vomiting  oxyCODONE    IR 5 milliGRAM(s) Oral every 4 hours PRN Mild Pain (1 - 3)  oxyCODONE    IR 10 milliGRAM(s) Oral every 4 hours PRN Moderate Pain (4 - 6)    AM labs pending    Physical exam: The right hip dressing is clean, dry and intact. No drainage or discharge. No erythema is noted. No blistering. No ecchymosis. Hip Abduction pillow in place. The calf is supple nontender. Passive range of motion is acceptable to due postoperative pain. Sensation to light touch is grossly intact distally. Motor function distally is 5/5. No foot drop. 2+ dorsalis pedis pulse. Capillary refill is less than 2 seconds. No cyanosis.    Primary Orthopedic Assessment:  • s/p RIGHT POSTERIOR total hip replacement    Plan:   • F/U AM labs  • DVT prophylaxis as prescribed, including use of compression devices and ankle pumps  • Continue physical therapy  • Weightbearing as tolerated of the right lower extremity with assistance of a walker  • Incentive spirometry encouraged  • Pain control as clinically indicated  • Posterior hip precautions   • Discharge planning – anticipated discharge is Home Vs. subacute rehabilitation

## 2018-08-24 NOTE — PHYSICAL THERAPY INITIAL EVALUATION ADULT - ADDITIONAL COMMENTS
Pt reports that she lives in an apartment with her mother. 1 step to enter. 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 Ambulates with a rollator. Also owns a rolling walker, tub rail, commode, and straight cane.

## 2018-08-25 DIAGNOSIS — D50.0 IRON DEFICIENCY ANEMIA SECONDARY TO BLOOD LOSS (CHRONIC): ICD-10-CM

## 2018-08-25 LAB
ANION GAP SERPL CALC-SCNC: 10 MMOL/L — SIGNIFICANT CHANGE UP (ref 5–17)
BUN SERPL-MCNC: 13 MG/DL — SIGNIFICANT CHANGE UP (ref 8–20)
CALCIUM SERPL-MCNC: 9.1 MG/DL — SIGNIFICANT CHANGE UP (ref 8.6–10.2)
CHLORIDE SERPL-SCNC: 104 MMOL/L — SIGNIFICANT CHANGE UP (ref 98–107)
CO2 SERPL-SCNC: 29 MMOL/L — SIGNIFICANT CHANGE UP (ref 22–29)
CREAT SERPL-MCNC: 0.56 MG/DL — SIGNIFICANT CHANGE UP (ref 0.5–1.3)
GLUCOSE SERPL-MCNC: 110 MG/DL — SIGNIFICANT CHANGE UP (ref 70–115)
HCT VFR BLD CALC: 29.6 % — LOW (ref 37–47)
HGB BLD-MCNC: 8.7 G/DL — LOW (ref 12–16)
MCHC RBC-ENTMCNC: 25.9 PG — LOW (ref 27–31)
MCHC RBC-ENTMCNC: 29.4 G/DL — LOW (ref 32–36)
MCV RBC AUTO: 88.1 FL — SIGNIFICANT CHANGE UP (ref 81–99)
PLATELET # BLD AUTO: 226 K/UL — SIGNIFICANT CHANGE UP (ref 150–400)
POTASSIUM SERPL-MCNC: 4 MMOL/L — SIGNIFICANT CHANGE UP (ref 3.5–5.3)
POTASSIUM SERPL-SCNC: 4 MMOL/L — SIGNIFICANT CHANGE UP (ref 3.5–5.3)
RBC # BLD: 3.36 M/UL — LOW (ref 4.4–5.2)
RBC # FLD: 16.3 % — HIGH (ref 11–15.6)
SODIUM SERPL-SCNC: 143 MMOL/L — SIGNIFICANT CHANGE UP (ref 135–145)
WBC # BLD: 10.3 K/UL — SIGNIFICANT CHANGE UP (ref 4.8–10.8)
WBC # FLD AUTO: 10.3 K/UL — SIGNIFICANT CHANGE UP (ref 4.8–10.8)

## 2018-08-25 PROCEDURE — 99233 SBSQ HOSP IP/OBS HIGH 50: CPT

## 2018-08-25 RX ADMIN — ENOXAPARIN SODIUM 40 MILLIGRAM(S): 100 INJECTION SUBCUTANEOUS at 13:03

## 2018-08-25 RX ADMIN — Medication 100 MILLIGRAM(S): at 13:04

## 2018-08-25 RX ADMIN — CELECOXIB 200 MILLIGRAM(S): 200 CAPSULE ORAL at 05:00

## 2018-08-25 RX ADMIN — SENNA PLUS 2 TABLET(S): 8.6 TABLET ORAL at 22:56

## 2018-08-25 RX ADMIN — OXYCODONE HYDROCHLORIDE 10 MILLIGRAM(S): 5 TABLET ORAL at 11:00

## 2018-08-25 RX ADMIN — CELECOXIB 200 MILLIGRAM(S): 200 CAPSULE ORAL at 04:30

## 2018-08-25 RX ADMIN — OXYCODONE HYDROCHLORIDE 10 MILLIGRAM(S): 5 TABLET ORAL at 05:15

## 2018-08-25 RX ADMIN — Medication 500 MILLIGRAM(S): at 17:55

## 2018-08-25 RX ADMIN — Medication 500 MILLIGRAM(S): at 04:30

## 2018-08-25 RX ADMIN — CELECOXIB 200 MILLIGRAM(S): 200 CAPSULE ORAL at 17:59

## 2018-08-25 RX ADMIN — OXYCODONE HYDROCHLORIDE 5 MILLIGRAM(S): 5 TABLET ORAL at 05:15

## 2018-08-25 RX ADMIN — Medication 100 MILLIGRAM(S): at 22:56

## 2018-08-25 RX ADMIN — OXYCODONE HYDROCHLORIDE 10 MILLIGRAM(S): 5 TABLET ORAL at 17:56

## 2018-08-25 RX ADMIN — OXYCODONE HYDROCHLORIDE 10 MILLIGRAM(S): 5 TABLET ORAL at 04:29

## 2018-08-25 RX ADMIN — OXYCODONE HYDROCHLORIDE 10 MILLIGRAM(S): 5 TABLET ORAL at 10:00

## 2018-08-25 RX ADMIN — PANTOPRAZOLE SODIUM 40 MILLIGRAM(S): 20 TABLET, DELAYED RELEASE ORAL at 13:03

## 2018-08-25 RX ADMIN — POLYETHYLENE GLYCOL 3350 17 GRAM(S): 17 POWDER, FOR SOLUTION ORAL at 13:03

## 2018-08-25 RX ADMIN — OXYCODONE HYDROCHLORIDE 5 MILLIGRAM(S): 5 TABLET ORAL at 04:28

## 2018-08-25 RX ADMIN — Medication 100 MILLIGRAM(S): at 04:30

## 2018-08-25 NOTE — PROGRESS NOTE ADULT - SUBJECTIVE AND OBJECTIVE BOX
Internal Medicine Hospitalist Progress Note                    ROS: as above, all remaining ROS are negative.       BACKGROUND:  MEDICATIONS  (STANDING):  cefadroxil 500 milliGRAM(s) Oral two times a day  celecoxib 200 milliGRAM(s) Oral two times a day  docusate sodium 100 milliGRAM(s) Oral three times a day  enoxaparin Injectable 40 milliGRAM(s) SubCutaneous daily  oxyCODONE  ER Tablet 10 milliGRAM(s) Oral every 12 hours  pantoprazole    Tablet 40 milliGRAM(s) Oral daily  polyethylene glycol 3350 17 Gram(s) Oral daily  senna 2 Tablet(s) Oral at bedtime    MEDICATIONS  (PRN):  acetaminophen   Tablet 650 milliGRAM(s) Oral every 6 hours PRN For Temp over 38.3 C (100.94 F)  aluminum hydroxide/magnesium hydroxide/simethicone Suspension 30 milliLiter(s) Oral four times a day PRN Indigestion  HYDROmorphone   Tablet 2 milliGRAM(s) Oral every 3 hours PRN Severe Pain (7 - 10)  magnesium hydroxide Suspension 30 milliLiter(s) Oral daily PRN Constipation  ondansetron Injectable 4 milliGRAM(s) IV Push every 6 hours PRN Nausea and/or Vomiting  oxyCODONE    IR 5 milliGRAM(s) Oral every 4 hours PRN Mild Pain (1 - 3)  oxyCODONE    IR 10 milliGRAM(s) Oral every 4 hours PRN Moderate Pain (4 - 6)    Allergies    No Known Allergies    Intolerances            VITALS:  Vital Signs Last 24 Hrs  T(C): 37 (25 Aug 2018 08:10), Max: 37.4 (24 Aug 2018 16:07)  T(F): 98.6 (25 Aug 2018 08:10), Max: 99.3 (24 Aug 2018 16:07)  HR: 103 (25 Aug 2018 08:10) (96 - 103)  BP: 108/67 (25 Aug 2018 08:10) (86/53 - 108/67)  BP(mean): --  RR: 19 (25 Aug 2018 08:10) (18 - 19)  SpO2: 96% (25 Aug 2018 08:10) (90% - 98%) Daily     Daily   CAPILLARY BLOOD GLUCOSE        I&O's Summary    24 Aug 2018 07:01  -  25 Aug 2018 07:00  --------------------------------------------------------  IN: 1520 mL / OUT: 0 mL / NET: 1520 mL        PHYSICAL EXAM:      Constitutional:    Eyes:    ENMT:    Neck:    Breasts:    Back:    Respiratory:    Cardiovascular:    Gastrointestinal:    Genitourinary:    Rectal:    Extremities:    Vascular:    Neurological:    Skin:    Lymph Nodes:    Musculoskeletal:    Psychiatric:          LABS:                        9.2    8.8   )-----------( 208      ( 24 Aug 2018 09:31 )             30.2     08-24    139  |  101  |  16.0  ----------------------------<  116<H>  3.9   |  28.0  |  0.54    Ca    8.8      24 Aug 2018 09:31          Radiology : Internal Medicine Hospitalist Progress Note    CC : hip pian , well controlled with pain meds   s/p hip arthroplasty post op day # 2 , tolerating PT   plan discharge to [facility] pending insurnace approval  pt is on oximetry at night , hypoxic, she is reluctant to use oxygen         ROS: as above, all remaining ROS are negative.       BACKGROUND:  MEDICATIONS  (STANDING):  cefadroxil 500 milliGRAM(s) Oral two times a day  celecoxib 200 milliGRAM(s) Oral two times a day  docusate sodium 100 milliGRAM(s) Oral three times a day  enoxaparin Injectable 40 milliGRAM(s) SubCutaneous daily  oxyCODONE  ER Tablet 10 milliGRAM(s) Oral every 12 hours  pantoprazole    Tablet 40 milliGRAM(s) Oral daily  polyethylene glycol 3350 17 Gram(s) Oral daily  senna 2 Tablet(s) Oral at bedtime    MEDICATIONS  (PRN):  acetaminophen   Tablet 650 milliGRAM(s) Oral every 6 hours PRN For Temp over 38.3 C (100.94 F)  aluminum hydroxide/magnesium hydroxide/simethicone Suspension 30 milliLiter(s) Oral four times a day PRN Indigestion  HYDROmorphone   Tablet 2 milliGRAM(s) Oral every 3 hours PRN Severe Pain (7 - 10)  magnesium hydroxide Suspension 30 milliLiter(s) Oral daily PRN Constipation  ondansetron Injectable 4 milliGRAM(s) IV Push every 6 hours PRN Nausea and/or Vomiting  oxyCODONE    IR 5 milliGRAM(s) Oral every 4 hours PRN Mild Pain (1 - 3)  oxyCODONE    IR 10 milliGRAM(s) Oral every 4 hours PRN Moderate Pain (4 - 6)    Allergies    No Known Allergies    Intolerances            VITALS:  Vital Signs Last 24 Hrs  T(C): 37 (25 Aug 2018 08:10), Max: 37.4 (24 Aug 2018 16:07)  T(F): 98.6 (25 Aug 2018 08:10), Max: 99.3 (24 Aug 2018 16:07)  HR: 103 (25 Aug 2018 08:10) (96 - 103)  BP: 108/67 (25 Aug 2018 08:10) (86/53 - 108/67)  BP(mean): --  RR: 19 (25 Aug 2018 08:10) (18 - 19)  SpO2: 96% (25 Aug 2018 08:10) (90% - 98%) Daily     Daily   CAPILLARY BLOOD GLUCOSE        I&O's Summary    24 Aug 2018 07:01  -  25 Aug 2018 07:00  --------------------------------------------------------  IN: 1520 mL / OUT: 0 mL / NET: 1520 mL        PHYSICAL EXAM:      Constitutional: obese sitting in the chair comfortable       Respiratory: clear to auscultation     Cardiovascular: regular  rate rythm S1 /S2     Gastrointestinal: soft no tenderness , BS positive     Extremities: no edema , hip dressing in place           LABS:                        9.2    8.8   )-----------( 208      ( 24 Aug 2018 09:31 )             30.2     08-24    139  |  101  |  16.0  ----------------------------<  116<H>  3.9   |  28.0  |  0.54    Ca    8.8      24 Aug 2018 09:31          Radiology :

## 2018-08-25 NOTE — PROGRESS NOTE ADULT - SUBJECTIVE AND OBJECTIVE BOX
MADHAVI JOHNSON    [id number]    Patient seen and examined status post right posterior total hip arthroplasty POD #2. Patient is doing well. The patient's pain is controlled using the prescribed pain medications. The patient is participating in physical therapy. Denies nausea, vomiting, chest pain, shortness of breath, abdominal pain or fever. No new complaints.      Vital Signs Last 24 Hrs  T(C): 37 (25 Aug 2018 04:28), Max: 37.4 (24 Aug 2018 16:07)  T(F): 98.6 (25 Aug 2018 04:28), Max: 99.3 (24 Aug 2018 16:07)  HR: 96 (25 Aug 2018 04:28) (96 - 102)  BP: 104/66 (25 Aug 2018 04:28) (86/53 - 104/66)  BP(mean): --  RR: 18 (25 Aug 2018 04:28) (18 - 18)  SpO2: 93% (25 Aug 2018 08:00) (90% - 98%)                        9.2    8.8   )-----------( 208      ( 24 Aug 2018 09:31 )             30.2     08-24    139  |  101  |  16.0  ----------------------------<  116<H>  3.9   |  28.0  |  0.54    Ca    8.8      24 Aug 2018 09:31                   MEDICATIONS  (STANDING):  cefadroxil 500 milliGRAM(s) Oral two times a day  celecoxib 200 milliGRAM(s) Oral two times a day  docusate sodium 100 milliGRAM(s) Oral three times a day  enoxaparin Injectable 40 milliGRAM(s) SubCutaneous daily  oxyCODONE  ER Tablet 10 milliGRAM(s) Oral every 12 hours  pantoprazole    Tablet 40 milliGRAM(s) Oral daily  polyethylene glycol 3350 17 Gram(s) Oral daily  senna 2 Tablet(s) Oral at bedtime    MEDICATIONS  (PRN):  acetaminophen   Tablet 650 milliGRAM(s) Oral every 6 hours PRN For Temp over 38.3 C (100.94 F)  aluminum hydroxide/magnesium hydroxide/simethicone Suspension 30 milliLiter(s) Oral four times a day PRN Indigestion  HYDROmorphone   Tablet 2 milliGRAM(s) Oral every 3 hours PRN Severe Pain (7 - 10)  magnesium hydroxide Suspension 30 milliLiter(s) Oral daily PRN Constipation  ondansetron Injectable 4 milliGRAM(s) IV Push every 6 hours PRN Nausea and/or Vomiting  oxyCODONE    IR 5 milliGRAM(s) Oral every 4 hours PRN Mild Pain (1 - 3)  oxyCODONE    IR 10 milliGRAM(s) Oral every 4 hours PRN Moderate Pain (4 - 6)      Physical exam: The right hip dressing changed, incision is clean, dry and intact. No drainage or discharge. No erythema is noted. No blistering. No ecchymosis. The calf is supple nontender. Passive range of motion is acceptable to due postoperative pain. No calf tenderness. Sensation to light touch is grossly intact distally. Motor function distally is 5/5. No foot drop. 2+ dorsalis pedis pulse. Capillary refill is less than 2 seconds. No cyanosis.    Primary Orthopedic Assessment:  • s/p RIGHT POSTERIOR total hip replacement    Secondary  Orthopedic Assessment(s):   •     Secondary  Medical Assessment(s):   •     Plan:   • DVT prophylaxis: lovenox 40 qd,   use of compression devices and ankle pumps  • Continue physical therapy  • Weightbearing as tolerated of the right lower extremity with assistance of a walker  • Incentive spirometry encouraged  • Pain control as clinically indicated  • Posterior hip precautions reviewed with patient  • Discharge planning – anticipated discharge is Home vs subacute rehabilitation

## 2018-08-26 PROCEDURE — 99232 SBSQ HOSP IP/OBS MODERATE 35: CPT

## 2018-08-26 RX ORDER — ACETAMINOPHEN 500 MG
2 TABLET ORAL
Qty: 0 | Refills: 0 | COMMUNITY

## 2018-08-26 RX ORDER — GABAPENTIN 400 MG/1
1 CAPSULE ORAL
Qty: 0 | Refills: 0 | COMMUNITY

## 2018-08-26 RX ORDER — SENNOSIDES/DOCUSATE SODIUM 8.6MG-50MG
2 TABLET ORAL
Qty: 20 | Refills: 0
Start: 2018-08-26 | End: 2018-09-04

## 2018-08-26 RX ORDER — OXYCODONE HYDROCHLORIDE 5 MG/1
1 TABLET ORAL
Qty: 0 | Refills: 0 | DISCHARGE
Start: 2018-08-26

## 2018-08-26 RX ADMIN — Medication 500 MILLIGRAM(S): at 17:32

## 2018-08-26 RX ADMIN — Medication 100 MILLIGRAM(S): at 05:37

## 2018-08-26 RX ADMIN — CELECOXIB 200 MILLIGRAM(S): 200 CAPSULE ORAL at 18:09

## 2018-08-26 RX ADMIN — OXYCODONE HYDROCHLORIDE 10 MILLIGRAM(S): 5 TABLET ORAL at 17:31

## 2018-08-26 RX ADMIN — POLYETHYLENE GLYCOL 3350 17 GRAM(S): 17 POWDER, FOR SOLUTION ORAL at 12:22

## 2018-08-26 RX ADMIN — Medication 500 MILLIGRAM(S): at 05:36

## 2018-08-26 RX ADMIN — CELECOXIB 200 MILLIGRAM(S): 200 CAPSULE ORAL at 17:32

## 2018-08-26 RX ADMIN — PANTOPRAZOLE SODIUM 40 MILLIGRAM(S): 20 TABLET, DELAYED RELEASE ORAL at 12:22

## 2018-08-26 RX ADMIN — OXYCODONE HYDROCHLORIDE 10 MILLIGRAM(S): 5 TABLET ORAL at 18:10

## 2018-08-26 RX ADMIN — CELECOXIB 200 MILLIGRAM(S): 200 CAPSULE ORAL at 05:36

## 2018-08-26 RX ADMIN — OXYCODONE HYDROCHLORIDE 10 MILLIGRAM(S): 5 TABLET ORAL at 05:37

## 2018-08-26 RX ADMIN — ENOXAPARIN SODIUM 40 MILLIGRAM(S): 100 INJECTION SUBCUTANEOUS at 12:22

## 2018-08-26 NOTE — PROGRESS NOTE ADULT - SUBJECTIVE AND OBJECTIVE BOX
Internal Medicine Hospitalist Progress Note    CC : hip pain follow up post right hip surgery   pain controlled at present with pain meds , post op day # 3   no overnight events reported       ROS: as above, all remaining ROS are negative.   Vital Signs Last 24 Hrs  T(C): 36.9 (26 Aug 2018 07:00), Max: 37.2 (25 Aug 2018 19:49)  T(F): 98.5 (26 Aug 2018 07:00), Max: 98.9 (25 Aug 2018 19:49)  HR: 106 (26 Aug 2018 07:00) (102 - 106)  BP: 115/88 (26 Aug 2018 07:00) (96/59 - 121/75)  BP(mean): --  RR: 19 (26 Aug 2018 07:00) (18 - 19)  SpO2: 97% (26 Aug 2018 07:00) (93% - 97%)          PHYSICAL EXAM:      Constitutional: obese sitting in the chair comfortable , no distress       Respiratory: clear to auscultation     Cardiovascular: regular  rate rythm S1 /S2     Gastrointestinal: soft no tenderness , BS positive     Extremities: no edema , hip dressing in place           LABS:                                 8.7    10.3  )-----------( 226      ( 25 Aug 2018 06:54 )             29.6   08-25    143  |  104  |  13.0  ----------------------------<  110  4.0   |  29.0  |  0.56    Ca    9.1      25 Aug 2018 06:54

## 2018-08-26 NOTE — PROGRESS NOTE ADULT - SUBJECTIVE AND OBJECTIVE BOX
MADHAVI JOHNSON    [id number]    History: Patient is status post right posterior total hip arthroplasty on 8/23/2018, pod #3. Patient is doing well. The patient's pain is controlled using the prescribed pain medications. The patient is participating in physical therapy. Denies nausea, vomiting, chest pain, shortness of breath, abdominal pain or fever.  Pt c/o right 1/2/3 digit tingling since surgery.  hasnt changed    Vital Signs Last 24 Hrs  T(C): 36.4 (26 Aug 2018 04:08), Max: 37.2 (25 Aug 2018 19:49)  T(F): 97.5 (26 Aug 2018 04:08), Max: 98.9 (25 Aug 2018 19:49)  HR: 105 (26 Aug 2018 04:08) (102 - 106)  BP: 121/75 (26 Aug 2018 04:08) (96/59 - 121/75)  BP(mean): --  RR: 18 (26 Aug 2018 04:08) (18 - 19)  SpO2: 94% (26 Aug 2018 04:08) (93% - 96%)    Physical exam: The right hip dressing is clean, dry and intact. No drainage or discharge. No erythema is noted. No blistering. No ecchymosis. The calf is supple nontender.  No calf tenderness. Sensation to light touch is grossly intact distally. Motor function distally is 5/5. No foot drop. 2+ dorsalis pedis pulse. Capillary refill is less than 2 seconds. No cyanosis.  right UE altered sensation to 1/2/3rd digits no tenderness, no swelling, no ecchymosis.      Primary Orthopedic Assessment:  • s/p RIGHT POSTERIOR total hip replacement pod #3        Plan:   right digit numbness:  discussed with pt numbness probably from positioning during surgery, should resolve   • DVT prophylaxis lovenox, including use of compression devices and ankle pumps  • Continue physical therapy  • Weightbearing as tolerated of the right lower extremity with assistance of a walker  • Incentive spirometry encouraged  • Pain control as clinically indicated  • Posterior hip precautions reviewed with patient  • Discharge planning – anticipated discharge is subacute rehabilitation Monday

## 2018-08-26 NOTE — PROGRESS NOTE ADULT - ASSESSMENT
64 yo female with morbid obesity, hip osteoarthritis s/p hip arthroaplsty , hypoxia overnight likely due to obesity risk for NAHOMI , she had refused oxygen and  discontinued yesterday

## 2018-08-26 NOTE — PROGRESS NOTE ADULT - PROBLEM SELECTOR PLAN 3
pt is not motivated to loose , she said tried in the past multiple times  understand the need for sleep studies risk of NAHOMI as well, does not want pulse ox or use oxygen at night

## 2018-08-27 VITALS — DIASTOLIC BLOOD PRESSURE: 65 MMHG | HEART RATE: 103 BPM | SYSTOLIC BLOOD PRESSURE: 112 MMHG | TEMPERATURE: 98 F

## 2018-08-27 PROCEDURE — 88304 TISSUE EXAM BY PATHOLOGIST: CPT

## 2018-08-27 PROCEDURE — 80048 BASIC METABOLIC PNL TOTAL CA: CPT

## 2018-08-27 PROCEDURE — 97110 THERAPEUTIC EXERCISES: CPT

## 2018-08-27 PROCEDURE — 99232 SBSQ HOSP IP/OBS MODERATE 35: CPT

## 2018-08-27 PROCEDURE — 97167 OT EVAL HIGH COMPLEX 60 MIN: CPT

## 2018-08-27 PROCEDURE — C1713: CPT

## 2018-08-27 PROCEDURE — 97535 SELF CARE MNGMENT TRAINING: CPT

## 2018-08-27 PROCEDURE — 88311 DECALCIFY TISSUE: CPT

## 2018-08-27 PROCEDURE — 73501 X-RAY EXAM HIP UNI 1 VIEW: CPT

## 2018-08-27 PROCEDURE — 85027 COMPLETE CBC AUTOMATED: CPT

## 2018-08-27 PROCEDURE — 97530 THERAPEUTIC ACTIVITIES: CPT

## 2018-08-27 PROCEDURE — 82962 GLUCOSE BLOOD TEST: CPT

## 2018-08-27 PROCEDURE — 36415 COLL VENOUS BLD VENIPUNCTURE: CPT

## 2018-08-27 PROCEDURE — 97116 GAIT TRAINING THERAPY: CPT

## 2018-08-27 PROCEDURE — C1776: CPT

## 2018-08-27 RX ADMIN — CELECOXIB 200 MILLIGRAM(S): 200 CAPSULE ORAL at 06:30

## 2018-08-27 RX ADMIN — Medication 500 MILLIGRAM(S): at 06:05

## 2018-08-27 RX ADMIN — OXYCODONE HYDROCHLORIDE 10 MILLIGRAM(S): 5 TABLET ORAL at 07:00

## 2018-08-27 RX ADMIN — CELECOXIB 200 MILLIGRAM(S): 200 CAPSULE ORAL at 06:05

## 2018-08-27 RX ADMIN — OXYCODONE HYDROCHLORIDE 10 MILLIGRAM(S): 5 TABLET ORAL at 06:05

## 2018-08-27 RX ADMIN — OXYCODONE HYDROCHLORIDE 10 MILLIGRAM(S): 5 TABLET ORAL at 06:30

## 2018-08-27 RX ADMIN — OXYCODONE HYDROCHLORIDE 10 MILLIGRAM(S): 5 TABLET ORAL at 16:11

## 2018-08-27 RX ADMIN — PANTOPRAZOLE SODIUM 40 MILLIGRAM(S): 20 TABLET, DELAYED RELEASE ORAL at 12:54

## 2018-08-27 RX ADMIN — ENOXAPARIN SODIUM 40 MILLIGRAM(S): 100 INJECTION SUBCUTANEOUS at 12:54

## 2018-08-27 RX ADMIN — CELECOXIB 200 MILLIGRAM(S): 200 CAPSULE ORAL at 07:00

## 2018-08-27 NOTE — PROGRESS NOTE ADULT - ATTENDING COMMENTS
Medically stable to d/c once cleared by physical therapy stable to d/c once cleared by physical therapy .

## 2018-08-27 NOTE — PROGRESS NOTE ADULT - SUBJECTIVE AND OBJECTIVE BOX
MADHAVI JOHNSON    [id number]    History: Patient is status post right posterior total hip arthroplasty on POD #4. Patient is doing well. The patient's pain is controlled using the prescribed pain medications. The patient is participating in physical therapy. Denies nausea, vomiting, chest pain, shortness of breath, abdominal pain or fever. No new complaints.      MEDICATIONS  (STANDING):  cefadroxil 500 milliGRAM(s) Oral two times a day  celecoxib 200 milliGRAM(s) Oral two times a day  docusate sodium 100 milliGRAM(s) Oral three times a day  enoxaparin Injectable 40 milliGRAM(s) SubCutaneous daily  oxyCODONE  ER Tablet 10 milliGRAM(s) Oral every 12 hours  pantoprazole    Tablet 40 milliGRAM(s) Oral daily  polyethylene glycol 3350 17 Gram(s) Oral daily  senna 2 Tablet(s) Oral at bedtime    MEDICATIONS  (PRN):  acetaminophen   Tablet 650 milliGRAM(s) Oral every 6 hours PRN For Temp over 38.3 C (100.94 F)  aluminum hydroxide/magnesium hydroxide/simethicone Suspension 30 milliLiter(s) Oral four times a day PRN Indigestion  bisacodyl Suppository 10 milliGRAM(s) Rectal daily PRN If no bowel movement by postoperative day #2  HYDROmorphone   Tablet 2 milliGRAM(s) Oral every 3 hours PRN Severe Pain (7 - 10)  magnesium hydroxide Suspension 30 milliLiter(s) Oral daily PRN Constipation  ondansetron Injectable 4 milliGRAM(s) IV Push every 6 hours PRN Nausea and/or Vomiting  oxyCODONE    IR 5 milliGRAM(s) Oral every 4 hours PRN Mild Pain (1 - 3)  oxyCODONE    IR 10 milliGRAM(s) Oral every 4 hours PRN Moderate Pain (4 - 6)      Physical exam: The right hip dressing is clean, dry and intact. No drainage or discharge. No erythema is noted. No blistering. No ecchymosis. The calf is supple nontender. Passive range of motion is acceptable to due postoperative pain. Sensation to light touch is grossly intact distally. Motor function distally is 5/5. No foot drop. 2+ dorsalis pedis pulse. Capillary refill is less than 2 seconds. No cyanosis.    Primary Orthopedic Assessment:  • s/p RIGHT POSTERIOR total hip replacement    Secondary  Orthopedic Assessment(s):   •     Secondary  Medical Assessment(s):   •     Plan:   • DVT prophylaxis as prescribed, including use of compression devices and ankle pumps  • Continue physical therapy  • Weightbearing as tolerated of the right lower extremity with assistance of a walker  • Incentive spirometry encouraged  • Pain control as clinically indicated  • Posterior hip precautions reviewed with patient  • Discharge planning – anticipated discharge is subacute rehabilitation

## 2018-08-27 NOTE — PROGRESS NOTE ADULT - ASSESSMENT
62 yo female with morbid obesity, hip osteoarthritis s/p R RHA , POD # 4  , hypoxia overnight likely due to obesity risk for NAHOMI , she had refused oxygen and  discontinued yesterday      Problem/Plan - 1:  ·  Problem: Primary osteoarthritis of right hip.  Plan: s/p hip surgery   PT/OT/pain mgmt  DVT prophylaxis- as per ortho  Abx as per SCIP  Incentive spirometry  Prophylaxis of opioid  induced constipation       Problem/Plan - 2:  ·  Problem: Anemia due to blood loss.  Plan: asymptomatic , no indication for blood tx at present.      Problem/Plan - 3:  ·  Problem: Morbid obesity with BMI of 45.0-49.9, adult.  Plan: pt is not motivated to loose , she said tried in the past multiple times  understand the need for sleep studies risk of NAHOMI as well, does not want pulse ox or use oxygen at night.      Problem/Plan - 4:  ·  Problem: Need for prophylactic measure.  Plan: cont lovenox for DVT prophylaxis   pt is with high risk for blood clot. 62 yo female with morbid obesity, hip osteoarthritis s/p R RHA , POD # 4 . Doing well , pain well controlled .       Problem/Plan - 1:  ·  Problem: Primary osteoarthritis of right hip.  Plan: s/p R JACQUES   PT/OT/pain mgmt  DVT prophylaxis- as per ortho  Abx as per SCIP  Incentive spirometry  Prophylaxis of opioid  induced constipation         Problem/Plan - 2:  ·  Problem: Anemia due to blood loss.  Plan: asymptomatic , no indication for blood tx at present.      Problem/Plan - 3:  ·  Problem: Morbid obesity with BMI of 45.0-49.9, adult.  Plan: pt is not motivated to loose , she said tried in the past multiple times  understand the need for sleep studies risk of NAHOMI as well, does not want pulse ox or use oxygen at night.      Problem/Plan - 4:  ·  Problem: Need for prophylactic measure.  Plan: cont lovenox for DVT prophylaxis as per ortho team     Problem / Plan - 5: Nocturnal hypoxia observed - likely presumed NAHOMI - advised to have sleep study as outpatient

## 2018-08-27 NOTE — PROGRESS NOTE ADULT - SUBJECTIVE AND OBJECTIVE BOX
Patient seen and examined . S/p R JACQUES   , POD # 4    CC : R hip pain         PAST MEDICAL & SURGICAL HISTORY:  Vascular insufficiency: left leg  Sciatic leg pain: right  Hip pain  Arthritis  History of tonsillectomy and adenoidectomy  H/O arthroscopy of left knee: 1994      MEDICATIONS  (STANDING):  cefadroxil 500 milliGRAM(s) Oral two times a day  celecoxib 200 milliGRAM(s) Oral two times a day  docusate sodium 100 milliGRAM(s) Oral three times a day  enoxaparin Injectable 40 milliGRAM(s) SubCutaneous daily  oxyCODONE  ER Tablet 10 milliGRAM(s) Oral every 12 hours  pantoprazole    Tablet 40 milliGRAM(s) Oral daily  polyethylene glycol 3350 17 Gram(s) Oral daily  senna 2 Tablet(s) Oral at bedtime    MEDICATIONS  (PRN):  acetaminophen   Tablet 650 milliGRAM(s) Oral every 6 hours PRN For Temp over 38.3 C (100.94 F)  aluminum hydroxide/magnesium hydroxide/simethicone Suspension 30 milliLiter(s) Oral four times a day PRN Indigestion  bisacodyl Suppository 10 milliGRAM(s) Rectal daily PRN If no bowel movement by postoperative day #2  HYDROmorphone   Tablet 2 milliGRAM(s) Oral every 3 hours PRN Severe Pain (7 - 10)  magnesium hydroxide Suspension 30 milliLiter(s) Oral daily PRN Constipation  ondansetron Injectable 4 milliGRAM(s) IV Push every 6 hours PRN Nausea and/or Vomiting  oxyCODONE    IR 5 milliGRAM(s) Oral every 4 hours PRN Mild Pain (1 - 3)  oxyCODONE    IR 10 milliGRAM(s) Oral every 4 hours PRN Moderate Pain (4 - 6 )      RADIOLOGY & ADDITIONAL TESTS:  < from: Xray Hip w/ Pelvis 1 View, Right (08.23.18 @ 17:19) >     EXAM:  XR HIP WITH PELV 1V RT                          PROCEDURE DATE:  08/23/2018          INTERPRETATION:  Clinical information: Intraoperative right hip   replacement.    2 views of the right hip.    Findings/  impression:    Patient is status post noncemented right total hip arthroplasty with   acetabular screw fixation.    Component alignment appears within normal limits.    Diffuse joint space narrowing is noted left hip with flattening of the   femoral head.    Coarse calcification midline pelvis, may reflect fibroid uterus.                SANDRO ECHEVERRIA M.D., ATTENDING RADIOLOGIST  This document has been electronically signed. Aug 24 2018 11:43AM    < end of copied text >        REVIEW OF SYSTEMS:    CONSTITUTIONAL: No fever, weight loss, or fatigue  EYES: No eye pain, visual disturbances, or discharge  ENMT:  No difficulty hearing, tinnitus, vertigo; No sinus or throat pain  NECK: No pain or stiffness  RESPIRATORY: No cough, wheezing, chills or hemoptysis; No shortness of breath  CARDIOVASCULAR: No chest pain, palpitations, dizziness, or leg swelling  GASTROINTESTINAL: No abdominal or epigastric pain. No nausea, vomiting, or hematemesis; No diarrhea or constipation. No melena or hematochezia.  GENITOURINARY: No dysuria, frequency, hematuria, or incontinence  NEUROLOGICAL: No headaches, memory loss, loss of strength, numbness, or tremors  SKIN: No itching, burning, rashes, or lesions   LYMPH NODES: No enlarged glands  ENDOCRINE: No heat or cold intolerance; No hair loss  MUSCULOSKELETAL: R hip pain well controlled   PSYCHIATRIC: No depression, anxiety, mood swings, or difficulty sleeping  HEME/LYMPH: No easy bruising, or bleeding gums  ALLERGY AND IMMUNOLOGIC: No hives or eczema    Vital Signs Last 24 Hrs  T(C): 36.8 (27 Aug 2018 08:10), Max: 37.3 (26 Aug 2018 15:51)  T(F): 98.3 (27 Aug 2018 08:10), Max: 99.2 (26 Aug 2018 15:51)  HR: 93 (27 Aug 2018 08:10) (93 - 103)  BP: 111/70 (27 Aug 2018 08:10) (106/59 - 111/70)  BP(mean): --  RR: 19 (27 Aug 2018 08:10) (18 - 19)  SpO2: 95% (27 Aug 2018 08:10) (95% - 96%)  PHYSICAL EXAM:    GENERAL: NAD, well-groomed, well-developed  HEAD:  Atraumatic, Normocephalic  EYES: EOMI, PERRLA, conjunctiva and sclera clear  NECK: Supple, No JVD, Normal thyroid  NERVOUS SYSTEM:  Alert & Oriented X3, no focal deficit  CHEST/LUNG: CTA b/l ,  no  rales, rhonchi, wheezing, or rubs  HEART: Regular rate and rhythm; No murmurs, rubs, or gallops  ABDOMEN: Soft, Nontender, Nondistended; Bowel sounds present  EXTREMITIES:  2+ Peripheral Pulses, No clubbing, cyanosis, or edema , R hip dressing + , clean and dry   LYMPH: No lymphadenopathy noted  SKIN: No rashes or lesions Patient seen and examined . S/p R JACQUES   , POD # 4 . Doing well , pain well controlled , had BM yesterday , no complaints .    CC : R hip pain well controlled         PAST MEDICAL & SURGICAL HISTORY:  Vascular insufficiency: left leg  Sciatic leg pain: right  Hip pain  Arthritis  History of tonsillectomy and adenoidectomy  H/O arthroscopy of left knee: 1994      MEDICATIONS  (STANDING):  cefadroxil 500 milliGRAM(s) Oral two times a day  celecoxib 200 milliGRAM(s) Oral two times a day  docusate sodium 100 milliGRAM(s) Oral three times a day  enoxaparin Injectable 40 milliGRAM(s) SubCutaneous daily  oxyCODONE  ER Tablet 10 milliGRAM(s) Oral every 12 hours  pantoprazole    Tablet 40 milliGRAM(s) Oral daily  polyethylene glycol 3350 17 Gram(s) Oral daily  senna 2 Tablet(s) Oral at bedtime    MEDICATIONS  (PRN):  acetaminophen   Tablet 650 milliGRAM(s) Oral every 6 hours PRN For Temp over 38.3 C (100.94 F)  aluminum hydroxide/magnesium hydroxide/simethicone Suspension 30 milliLiter(s) Oral four times a day PRN Indigestion  bisacodyl Suppository 10 milliGRAM(s) Rectal daily PRN If no bowel movement by postoperative day #2  HYDROmorphone   Tablet 2 milliGRAM(s) Oral every 3 hours PRN Severe Pain (7 - 10)  magnesium hydroxide Suspension 30 milliLiter(s) Oral daily PRN Constipation  ondansetron Injectable 4 milliGRAM(s) IV Push every 6 hours PRN Nausea and/or Vomiting  oxyCODONE    IR 5 milliGRAM(s) Oral every 4 hours PRN Mild Pain (1 - 3)  oxyCODONE    IR 10 milliGRAM(s) Oral every 4 hours PRN Moderate Pain (4 - 6 )      RADIOLOGY & ADDITIONAL TESTS:  < from: Xray Hip w/ Pelvis 1 View, Right (08.23.18 @ 17:19) >     EXAM:  XR HIP WITH PELV 1V RT                          PROCEDURE DATE:  08/23/2018          INTERPRETATION:  Clinical information: Intraoperative right hip   replacement.    2 views of the right hip.    Findings/  impression:    Patient is status post noncemented right total hip arthroplasty with   acetabular screw fixation.    Component alignment appears within normal limits.    Diffuse joint space narrowing is noted left hip with flattening of the   femoral head.    Coarse calcification midline pelvis, may reflect fibroid uterus.                SANDRO ECHEVERRIA M.D., ATTENDING RADIOLOGIST  This document has been electronically signed. Aug 24 2018 11:43AM    < end of copied text >        REVIEW OF SYSTEMS:    CONSTITUTIONAL: No fever, weight loss, or fatigue  EYES: No eye pain, visual disturbances, or discharge  ENMT:  No difficulty hearing, tinnitus, vertigo; No sinus or throat pain  NECK: No pain or stiffness  RESPIRATORY: No cough, wheezing, chills or hemoptysis; No shortness of breath  CARDIOVASCULAR: No chest pain, palpitations, dizziness, or leg swelling  GASTROINTESTINAL: No abdominal or epigastric pain. No nausea, vomiting, or hematemesis; No diarrhea or constipation. No melena or hematochezia.  GENITOURINARY: No dysuria, frequency, hematuria, or incontinence  NEUROLOGICAL: No headaches, memory loss, loss of strength, numbness, or tremors  SKIN: No itching, burning, rashes, or lesions   LYMPH NODES: No enlarged glands  ENDOCRINE: No heat or cold intolerance; No hair loss  MUSCULOSKELETAL: R hip pain well controlled   PSYCHIATRIC: No depression, anxiety, mood swings, or difficulty sleeping  HEME/LYMPH: No easy bruising, or bleeding gums  ALLERGY AND IMMUNOLOGIC: No hives or eczema    Vital Signs Last 24 Hrs  T(C): 36.8 (27 Aug 2018 08:10), Max: 37.3 (26 Aug 2018 15:51)  T(F): 98.3 (27 Aug 2018 08:10), Max: 99.2 (26 Aug 2018 15:51)  HR: 93 (27 Aug 2018 08:10) (93 - 103)  BP: 111/70 (27 Aug 2018 08:10) (106/59 - 111/70)  BP(mean): --  RR: 19 (27 Aug 2018 08:10) (18 - 19)  SpO2: 95% (27 Aug 2018 08:10) (95% - 96%)  PHYSICAL EXAM:    GENERAL: NAD, well-groomed, obese   HEAD:  Atraumatic, Normocephalic  EYES: EOMI, PERRLA, conjunctiva and sclera clear  NECK: Supple, No JVD, Normal thyroid  NERVOUS SYSTEM:  Alert & Oriented X3, no focal deficit  CHEST/LUNG: CTA b/l ,  no  rales, rhonchi, wheezing, or rubs  HEART: Regular rate and rhythm; No murmurs, rubs, or gallops  ABDOMEN: Soft, Nontender, Nondistended; Bowel sounds present  EXTREMITIES:  2+ Peripheral Pulses, No clubbing, cyanosis, or edema , R hip dressing + , clean and dry   LYMPH: No lymphadenopathy noted  SKIN: No rashes or lesions

## 2018-08-30 LAB — SURGICAL PATHOLOGY FINAL REPORT - CH: SIGNIFICANT CHANGE UP

## 2018-09-08 RX ORDER — ASPIRIN/CALCIUM CARB/MAGNESIUM 324 MG
1 TABLET ORAL
Qty: 60 | Refills: 0
Start: 2018-09-08 | End: 2018-10-07

## 2018-09-14 ENCOUNTER — APPOINTMENT (OUTPATIENT)
Dept: ORTHOPEDIC SURGERY | Facility: CLINIC | Age: 63
End: 2018-09-14
Payer: COMMERCIAL

## 2018-09-14 VITALS
SYSTOLIC BLOOD PRESSURE: 125 MMHG | DIASTOLIC BLOOD PRESSURE: 78 MMHG | HEART RATE: 112 BPM | HEIGHT: 63 IN | BODY MASS INDEX: 46.95 KG/M2 | WEIGHT: 265 LBS

## 2018-09-14 PROCEDURE — 99214 OFFICE O/P EST MOD 30 MIN: CPT | Mod: 24

## 2018-09-14 PROCEDURE — 73502 X-RAY EXAM HIP UNI 2-3 VIEWS: CPT | Mod: RT

## 2018-09-14 RX ORDER — IBUPROFEN 200 MG/1
TABLET, FILM COATED ORAL
Refills: 0 | Status: DISCONTINUED | COMMUNITY
End: 2018-09-14

## 2018-09-14 RX ORDER — AMOXICILLIN AND CLAVULANATE POTASSIUM 500; 125 MG/1; 1/1
TABLET, FILM COATED ORAL
Refills: 0 | Status: DISCONTINUED | COMMUNITY
End: 2018-09-14

## 2018-09-18 ENCOUNTER — OTHER (OUTPATIENT)
Age: 63
End: 2018-09-18

## 2018-09-18 PROBLEM — M25.559 PAIN IN UNSPECIFIED HIP: Chronic | Status: ACTIVE | Noted: 2018-08-08

## 2018-09-18 PROBLEM — M19.90 UNSPECIFIED OSTEOARTHRITIS, UNSPECIFIED SITE: Chronic | Status: ACTIVE | Noted: 2018-08-08

## 2018-09-18 PROBLEM — I99.8 OTHER DISORDER OF CIRCULATORY SYSTEM: Chronic | Status: ACTIVE | Noted: 2018-08-08

## 2018-09-18 PROBLEM — M54.30 SCIATICA, UNSPECIFIED SIDE: Chronic | Status: ACTIVE | Noted: 2018-08-08

## 2018-09-24 ENCOUNTER — APPOINTMENT (OUTPATIENT)
Dept: ORTHOPEDIC SURGERY | Facility: CLINIC | Age: 63
End: 2018-09-24

## 2018-10-01 ENCOUNTER — APPOINTMENT (OUTPATIENT)
Dept: ORTHOPEDIC SURGERY | Facility: CLINIC | Age: 63
End: 2018-10-01

## 2018-10-08 ENCOUNTER — APPOINTMENT (OUTPATIENT)
Dept: ORTHOPEDIC SURGERY | Facility: CLINIC | Age: 63
End: 2018-10-08

## 2018-10-16 ENCOUNTER — APPOINTMENT (OUTPATIENT)
Dept: ORTHOPEDIC SURGERY | Facility: CLINIC | Age: 63
End: 2018-10-16

## 2018-10-16 ENCOUNTER — APPOINTMENT (OUTPATIENT)
Dept: ORTHOPEDIC SURGERY | Facility: CLINIC | Age: 63
End: 2018-10-16
Payer: COMMERCIAL

## 2018-10-16 VITALS
DIASTOLIC BLOOD PRESSURE: 74 MMHG | HEIGHT: 63 IN | WEIGHT: 265 LBS | SYSTOLIC BLOOD PRESSURE: 119 MMHG | BODY MASS INDEX: 46.95 KG/M2 | HEART RATE: 103 BPM

## 2018-10-16 PROCEDURE — 99024 POSTOP FOLLOW-UP VISIT: CPT

## 2018-10-16 PROCEDURE — 73502 X-RAY EXAM HIP UNI 2-3 VIEWS: CPT | Mod: RT

## 2018-10-30 ENCOUNTER — OTHER (OUTPATIENT)
Age: 63
End: 2018-10-30

## 2018-11-14 ENCOUNTER — APPOINTMENT (OUTPATIENT)
Dept: ORTHOPEDIC SURGERY | Facility: CLINIC | Age: 63
End: 2018-11-14

## 2018-11-14 ENCOUNTER — OUTPATIENT (OUTPATIENT)
Dept: OUTPATIENT SERVICES | Facility: HOSPITAL | Age: 63
LOS: 1 days | End: 2018-11-14
Payer: COMMERCIAL

## 2018-11-14 VITALS
TEMPERATURE: 99 F | DIASTOLIC BLOOD PRESSURE: 79 MMHG | HEART RATE: 96 BPM | RESPIRATION RATE: 20 BRPM | WEIGHT: 266.1 LBS | SYSTOLIC BLOOD PRESSURE: 127 MMHG | HEIGHT: 63 IN

## 2018-11-14 DIAGNOSIS — M16.12 UNILATERAL PRIMARY OSTEOARTHRITIS, LEFT HIP: ICD-10-CM

## 2018-11-14 DIAGNOSIS — Z98.890 OTHER SPECIFIED POSTPROCEDURAL STATES: Chronic | ICD-10-CM

## 2018-11-14 DIAGNOSIS — Z01.818 ENCOUNTER FOR OTHER PREPROCEDURAL EXAMINATION: ICD-10-CM

## 2018-11-14 LAB
ANION GAP SERPL CALC-SCNC: 14 MMOL/L — SIGNIFICANT CHANGE UP (ref 5–17)
BASOPHILS # BLD AUTO: 0 K/UL — SIGNIFICANT CHANGE UP (ref 0–0.2)
BASOPHILS NFR BLD AUTO: 0.2 % — SIGNIFICANT CHANGE UP (ref 0–2)
BLD GP AB SCN SERPL QL: SIGNIFICANT CHANGE UP
BUN SERPL-MCNC: 15 MG/DL — SIGNIFICANT CHANGE UP (ref 8–20)
CALCIUM SERPL-MCNC: 9.4 MG/DL — SIGNIFICANT CHANGE UP (ref 8.6–10.2)
CHLORIDE SERPL-SCNC: 102 MMOL/L — SIGNIFICANT CHANGE UP (ref 98–107)
CO2 SERPL-SCNC: 26 MMOL/L — SIGNIFICANT CHANGE UP (ref 22–29)
CREAT SERPL-MCNC: 0.51 MG/DL — SIGNIFICANT CHANGE UP (ref 0.5–1.3)
EOSINOPHIL # BLD AUTO: 0.2 K/UL — SIGNIFICANT CHANGE UP (ref 0–0.5)
EOSINOPHIL NFR BLD AUTO: 3.1 % — SIGNIFICANT CHANGE UP (ref 0–6)
GLUCOSE SERPL-MCNC: 95 MG/DL — SIGNIFICANT CHANGE UP (ref 70–115)
HCT VFR BLD CALC: 35.6 % — LOW (ref 37–47)
HGB BLD-MCNC: 10.8 G/DL — LOW (ref 12–16)
INR BLD: 1.11 RATIO — SIGNIFICANT CHANGE UP (ref 0.88–1.16)
LYMPHOCYTES # BLD AUTO: 1.2 K/UL — SIGNIFICANT CHANGE UP (ref 1–4.8)
LYMPHOCYTES # BLD AUTO: 25.2 % — SIGNIFICANT CHANGE UP (ref 20–55)
MCHC RBC-ENTMCNC: 25.5 PG — LOW (ref 27–31)
MCHC RBC-ENTMCNC: 30.3 G/DL — LOW (ref 32–36)
MCV RBC AUTO: 84 FL — SIGNIFICANT CHANGE UP (ref 81–99)
MONOCYTES # BLD AUTO: 0.6 K/UL — SIGNIFICANT CHANGE UP (ref 0–0.8)
MONOCYTES NFR BLD AUTO: 12.9 % — HIGH (ref 3–10)
MRSA PCR RESULT.: SIGNIFICANT CHANGE UP
NEUTROPHILS # BLD AUTO: 2.8 K/UL — SIGNIFICANT CHANGE UP (ref 1.8–8)
NEUTROPHILS NFR BLD AUTO: 58.4 % — SIGNIFICANT CHANGE UP (ref 37–73)
PLATELET # BLD AUTO: 256 K/UL — SIGNIFICANT CHANGE UP (ref 150–400)
POTASSIUM SERPL-MCNC: 3.5 MMOL/L — SIGNIFICANT CHANGE UP (ref 3.5–5.3)
POTASSIUM SERPL-SCNC: 3.5 MMOL/L — SIGNIFICANT CHANGE UP (ref 3.5–5.3)
PROTHROM AB SERPL-ACNC: 12.8 SEC — SIGNIFICANT CHANGE UP (ref 10–12.9)
RBC # BLD: 4.24 M/UL — LOW (ref 4.4–5.2)
RBC # FLD: 16.1 % — HIGH (ref 11–15.6)
S AUREUS DNA NOSE QL NAA+PROBE: SIGNIFICANT CHANGE UP
SODIUM SERPL-SCNC: 142 MMOL/L — SIGNIFICANT CHANGE UP (ref 135–145)
TYPE + AB SCN PNL BLD: SIGNIFICANT CHANGE UP
WBC # BLD: 4.8 K/UL — SIGNIFICANT CHANGE UP (ref 4.8–10.8)
WBC # FLD AUTO: 4.8 K/UL — SIGNIFICANT CHANGE UP (ref 4.8–10.8)

## 2018-11-14 PROCEDURE — G0463: CPT

## 2018-11-14 PROCEDURE — 86850 RBC ANTIBODY SCREEN: CPT

## 2018-11-14 PROCEDURE — 85027 COMPLETE CBC AUTOMATED: CPT

## 2018-11-14 PROCEDURE — 80048 BASIC METABOLIC PNL TOTAL CA: CPT

## 2018-11-14 PROCEDURE — 36415 COLL VENOUS BLD VENIPUNCTURE: CPT

## 2018-11-14 PROCEDURE — 86901 BLOOD TYPING SEROLOGIC RH(D): CPT

## 2018-11-14 PROCEDURE — 87640 STAPH A DNA AMP PROBE: CPT

## 2018-11-14 PROCEDURE — 86900 BLOOD TYPING SEROLOGIC ABO: CPT

## 2018-11-14 PROCEDURE — 85610 PROTHROMBIN TIME: CPT

## 2018-11-14 RX ORDER — INFLUENZA VIRUS VACCINE 15; 15; 15; 15 UG/.5ML; UG/.5ML; UG/.5ML; UG/.5ML
0.5 SUSPENSION INTRAMUSCULAR ONCE
Qty: 0 | Refills: 0 | Status: DISCONTINUED | OUTPATIENT
Start: 2018-11-14 | End: 2018-11-29

## 2018-11-14 RX ORDER — SODIUM CHLORIDE 9 MG/ML
3 INJECTION INTRAMUSCULAR; INTRAVENOUS; SUBCUTANEOUS EVERY 8 HOURS
Qty: 0 | Refills: 0 | Status: DISCONTINUED | OUTPATIENT
Start: 2018-12-06 | End: 2018-12-08

## 2018-11-14 NOTE — H&P PST ADULT - PROBLEM SELECTOR PLAN 3
caprini score 10   surgical team please  assess need for dvt prophylaxis left hip total joint replacement

## 2018-11-14 NOTE — PATIENT PROFILE ADULT - NSPROEDALEARNPREF_GEN_A_NUR
written material/individual instruction/verbal instruction individual instruction/verbal instruction/written material/Instructed pt on pre-op instructions, tips for safer surgery, pain management scale, pre-surgical infection prevention instructions, mrsa/mssa instructions, medical clearance- Dr Sam - pending, cardiace clearance - Dr Brooks-Efrem - done, stop Motrin 7 days prior to surgery, understanding of all instructions verbalized. individual instruction/Instructed pt on pre-op instructions, tips for safer surgery, pain management scale, pre-surgical infection prevention instructions, mrsa/mssa instructions, medical clearance- Dr Sam - pending, cardiace clearance - Dr Brooks-Topete - done, stop Motrin 7 days prior to surgery, understanding of all instructions verbalized.    12-3-18 updated via phone - denies any changes . Reviewed pre-op instructions ./verbal instruction/written material

## 2018-11-14 NOTE — H&P PST ADULT - PROBLEM SELECTOR PLAN 1
right hip replacement with DR. Dumont left hip replacement with DR. Dumont medical clearance   cardiac clearance

## 2018-11-14 NOTE — PATIENT PROFILE ADULT - NSPROCHRONICPAINLOC_GEN_A_NUR
pain @ rest - 2/10  pain with activity - 10/10  Constant pain, sharp, aching   > 1 year in duration/Bilateral:/knee/Left:/hip

## 2018-11-14 NOTE — H&P PST ADULT - ASSESSMENT
pleasant 63 year old female presents with c/o right hip pain , scheduled for right hip replacement.  CAPRINI SCORE [CLOT]    AGE RELATED RISK FACTORS                                                       MOBILITY RELATED FACTORS  [ ] Age 41-60 years                                            (1 Point)                  [ ] Bed rest                                                        (1 Point)  [X ] Age: 61-74 years                                           (2 Points)                 [ ] Plaster cast                                                   (2 Points)  [ ] Age= 75 years                                              (3 Points)                 [ ] Bed bound for more than 72 hours                 (2 Points)    DISEASE RELATED RISK FACTORS                                               GENDER SPECIFIC FACTORS  [XX] Varicose veins                                               (1 Point)                  [ ] Post-partum < 6 weeks                                   (1 Point)             [ X] BMI > 25 Kg/m2                                            (1 Point)                  [ ] Hormonal therapy  or oral contraception          (1 Point)                 [ ] Sepsis (in the previous month)                        (1 Point)                  [ ] History of pregnancy complications                 (1 point)  [ ] Pneumonia or serious lung disease                                               [ ] Unexplained or recurrent                     (1 Point)           (in the previous month)                               (1 Point)  [ ] Abnormal pulmonary function test                     (1 Point)                 SURGERY RELATED RISK FACTORS  [ ] Acute myocardial infarction                              (1 Point)                 [ ]  Section                                             (1 Point)  [ ] Congestive heart failure (in the previous month)  (1 Point)               [ ] Minor surgery                                                  (1 Point)   [ ] Inflammatory bowel disease                             (1 Point)                 [ ] Arthroscopic surgery                                        (2 Points)  [ ] Central venous access                                      (2 Points)                [ ] General surgery lasting more than 45 minutes   (2 Points)       [ ] Stroke (in the previous month)                          (5 Points)               [X ] Elective arthroplasty                                         (5 Points)                                                                                                                                               HEMATOLOGY RELATED FACTORS                                                 TRAUMA RELATED RISK FACTORS  [ ] Prior episodes of VTE                                     (3 Points)                 [ ] Fracture of the hip, pelvis, or leg                       (5 Points)  [ ] Positive family history for VTE                         (3 Points)                 [ ] Acute spinal cord injury (in the previous month)  (5 Points)  [ ] Prothrombin 64429 A                                     (3 Points)                 [ ] Paralysis  (less than 1 month)                             (5 Points)  [ ] Factor V Leiden                                             (3 Points)                  [ ] Multiple Trauma within 1 month                        (5 Points)  [ ] Lupus anticoagulants                                     (3 Points)                                                           [ ] Anticardiolipin antibodies                               (3 Points)                                                       [ ] High homocysteine in the blood                      (3 Points)                                             [ ] Other congenital or acquired thrombophilia      (3 Points)                                                [ ] Heparin induced thrombocytopenia                  (3 Points)                                          Total Score [    10      ] pleasant 63 year old female presents with c/o leftt hip pain , scheduled for left hip replacement.  CAPRINI SCORE [CLOT]    AGE RELATED RISK FACTORS                                                       MOBILITY RELATED FACTORS  [ ] Age 41-60 years                                            (1 Point)                  [ ] Bed rest                                                        (1 Point)  [X ] Age: 61-74 years                                           (2 Points)                 [ ] Plaster cast                                                   (2 Points)  [ ] Age= 75 years                                              (3 Points)                 [ ] Bed bound for more than 72 hours                 (2 Points)    DISEASE RELATED RISK FACTORS                                               GENDER SPECIFIC FACTORS  [XX] Varicose veins                                               (1 Point)                  [ ] Post-partum < 6 weeks                                   (1 Point)             [ X] BMI > 25 Kg/m2                                            (1 Point)                  [ ] Hormonal therapy  or oral contraception          (1 Point)                 [ ] Sepsis (in the previous month)                        (1 Point)                  [ ] History of pregnancy complications                 (1 point)  [ ] Pneumonia or serious lung disease                                               [ ] Unexplained or recurrent                     (1 Point)           (in the previous month)                               (1 Point)  [ ] Abnormal pulmonary function test                     (1 Point)                 SURGERY RELATED RISK FACTORS  [ ] Acute myocardial infarction                              (1 Point)                 [ ]  Section                                             (1 Point)  [ ] Congestive heart failure (in the previous month)  (1 Point)               [ ] Minor surgery                                                  (1 Point)   [ ] Inflammatory bowel disease                             (1 Point)                 [ ] Arthroscopic surgery                                        (2 Points)  [ ] Central venous access                                      (2 Points)                [ ] General surgery lasting more than 45 minutes   (2 Points)       [ ] Stroke (in the previous month)                          (5 Points)               [X ] Elective arthroplasty                                         (5 Points)                                                                                                                                               HEMATOLOGY RELATED FACTORS                                                 TRAUMA RELATED RISK FACTORS  [ ] Prior episodes of VTE                                     (3 Points)                 [ ] Fracture of the hip, pelvis, or leg                       (5 Points)  [ ] Positive family history for VTE                         (3 Points)                 [ ] Acute spinal cord injury (in the previous month)  (5 Points)  [ ] Prothrombin 05140 A                                     (3 Points)                 [ ] Paralysis  (less than 1 month)                             (5 Points)  [ ] Factor V Leiden                                             (3 Points)                  [ ] Multiple Trauma within 1 month                        (5 Points)  [ ] Lupus anticoagulants                                     (3 Points)                                                           [ ] Anticardiolipin antibodies                               (3 Points)                                                       [ ] High homocysteine in the blood                      (3 Points)                                             [ ] Other congenital or acquired thrombophilia      (3 Points)                                                [ ] Heparin induced thrombocytopenia                  (3 Points)                                          Total Score [    10      ]    OPIOID RISK TOOL    DEBBY EACH BOX THAT APPLIES AND ADD TOTALS AT THE END    FAMILY HISTORY OF SUBSTANCE ABUSE                 FEMALE         MALE                                                Alcohol                             [  ]1 pt          [  ]3pts                                               Illegal Durgs                     [  ]2 pts        [  ]3pts                                               Rx Drugs                           [  ]4 pts        [  ]4 pts    PERSONAL HISTORY OF SUBSTANCE ABUSE                                                                                          Alcohol                             [  ]3 pts       [  ]3 pts                                               Illegal Drugs                     [  ]4 pts        [  ]4 pts                                               Rx Drugs                           [  ]5 pts        [  ]5 pts    AGE BETWEEN 16-45 YEARS                                      [  ]1 pt         [  ]1 pt    HISTORY OF PREADOLESCENT   SEXUAL ABUSE                                                             [  ]3 pts        [  ]0pts    PSYCHOLOGICAL DISEASE                     ADD, OCD, Bipolar, Schizophrenia        [  ]2 pts         [  ]2 pts                      Depression                                               [  ]1 pt           [  ]1 pt           SCORING TOTAL   (Zero)    A score of 3 or lower indicated LOW risk for future opioid abuse  A score of 4 to 7 indicated moderate risk for future opioid abuse  A score of 8 or higher indicates a high risk for opioid abuse

## 2018-11-14 NOTE — H&P PST ADULT - VENOUS THROMBOEMBOLISM CURRENT STATUS
(1) swollen legs (current)/(1) varicose veins/(1) other risk factor (includes escalating BMI, pack-years of smoking, diabetes requiring insulin, chemotherapy, female gender and length of surgery)

## 2018-11-14 NOTE — H&P PST ADULT - HISTORY OF PRESENT ILLNESS
63 year old female presents with c/oleft hip pain / groin pain over past year that has failed conservative treatment. uses walker and cane as needed. Pain with sleep and sitting to standing., xrays done in June and right hip replacement needed. 63 year old female presents with c/o left hip pain / groin pain over past year that has failed conservative treatment.  Repots use of walker and cane as needed.  recent right hip surgery tolerated well now schedule for left hip total joint replacement.

## 2018-11-14 NOTE — H&P PST ADULT - PROBLEM SELECTOR PLAN 2
medical clearance   cardiac clearance caprini score 10   surgical team please  assess need for dvt prophylaxis

## 2018-11-21 ENCOUNTER — FORM ENCOUNTER (OUTPATIENT)
Age: 63
End: 2018-11-21

## 2018-11-23 RX ORDER — OXYCODONE HYDROCHLORIDE 5 MG/1
20 TABLET ORAL ONCE
Qty: 0 | Refills: 0 | Status: DISCONTINUED | OUTPATIENT
Start: 2018-12-06 | End: 2018-12-08

## 2018-11-23 RX ORDER — TRANEXAMIC ACID 100 MG/ML
1200 INJECTION, SOLUTION INTRAVENOUS ONCE
Qty: 0 | Refills: 0 | Status: DISCONTINUED | OUTPATIENT
Start: 2018-12-06 | End: 2018-12-08

## 2018-11-23 RX ORDER — CELECOXIB 200 MG/1
400 CAPSULE ORAL ONCE
Qty: 0 | Refills: 0 | Status: DISCONTINUED | OUTPATIENT
Start: 2018-12-06 | End: 2018-12-08

## 2018-11-28 ENCOUNTER — OTHER (OUTPATIENT)
Age: 63
End: 2018-11-28

## 2018-11-29 ENCOUNTER — APPOINTMENT (OUTPATIENT)
Dept: ORTHOPEDIC SURGERY | Facility: HOSPITAL | Age: 63
End: 2018-11-29

## 2018-12-05 ENCOUNTER — TRANSCRIPTION ENCOUNTER (OUTPATIENT)
Age: 63
End: 2018-12-05

## 2018-12-06 ENCOUNTER — APPOINTMENT (OUTPATIENT)
Dept: ORTHOPEDIC SURGERY | Facility: HOSPITAL | Age: 63
End: 2018-12-06

## 2018-12-06 ENCOUNTER — INPATIENT (INPATIENT)
Facility: HOSPITAL | Age: 63
LOS: 1 days | Discharge: HOME | DRG: 470 | End: 2018-12-08
Attending: ORTHOPAEDIC SURGERY | Admitting: ORTHOPAEDIC SURGERY
Payer: COMMERCIAL

## 2018-12-06 ENCOUNTER — TRANSCRIPTION ENCOUNTER (OUTPATIENT)
Age: 63
End: 2018-12-06

## 2018-12-06 VITALS
SYSTOLIC BLOOD PRESSURE: 123 MMHG | RESPIRATION RATE: 18 BRPM | WEIGHT: 266.1 LBS | HEIGHT: 63 IN | OXYGEN SATURATION: 100 % | DIASTOLIC BLOOD PRESSURE: 74 MMHG | HEART RATE: 103 BPM | TEMPERATURE: 98 F

## 2018-12-06 DIAGNOSIS — M16.12 UNILATERAL PRIMARY OSTEOARTHRITIS, LEFT HIP: ICD-10-CM

## 2018-12-06 DIAGNOSIS — Z98.890 OTHER SPECIFIED POSTPROCEDURAL STATES: Chronic | ICD-10-CM

## 2018-12-06 LAB
BLD GP AB SCN SERPL QL: SIGNIFICANT CHANGE UP
GLUCOSE BLDC GLUCOMTR-MCNC: 116 MG/DL — HIGH (ref 70–99)
GLUCOSE BLDC GLUCOMTR-MCNC: 94 MG/DL — SIGNIFICANT CHANGE UP (ref 70–99)
TYPE + AB SCN PNL BLD: SIGNIFICANT CHANGE UP

## 2018-12-06 PROCEDURE — 27130 TOTAL HIP ARTHROPLASTY: CPT | Mod: AS,LT

## 2018-12-06 PROCEDURE — 73501 X-RAY EXAM HIP UNI 1 VIEW: CPT | Mod: 26,LT

## 2018-12-06 PROCEDURE — 27130 TOTAL HIP ARTHROPLASTY: CPT | Mod: LT

## 2018-12-06 RX ORDER — CELECOXIB 200 MG/1
400 CAPSULE ORAL ONCE
Qty: 0 | Refills: 0 | Status: COMPLETED | OUTPATIENT
Start: 2018-12-06 | End: 2018-12-06

## 2018-12-06 RX ORDER — ACETAMINOPHEN 500 MG
1000 TABLET ORAL ONCE
Qty: 0 | Refills: 0 | Status: COMPLETED | OUTPATIENT
Start: 2018-12-06 | End: 2018-12-07

## 2018-12-06 RX ORDER — CEFAZOLIN SODIUM 1 G
3000 VIAL (EA) INJECTION ONCE
Qty: 0 | Refills: 0 | Status: DISCONTINUED | OUTPATIENT
Start: 2018-12-06 | End: 2018-12-06

## 2018-12-06 RX ORDER — HYDROMORPHONE HYDROCHLORIDE 2 MG/ML
2 INJECTION INTRAMUSCULAR; INTRAVENOUS; SUBCUTANEOUS EVERY 4 HOURS
Qty: 0 | Refills: 0 | Status: DISCONTINUED | OUTPATIENT
Start: 2018-12-06 | End: 2018-12-08

## 2018-12-06 RX ORDER — CEPHALEXIN 500 MG
500 CAPSULE ORAL EVERY 6 HOURS
Qty: 0 | Refills: 0 | Status: DISCONTINUED | OUTPATIENT
Start: 2018-12-07 | End: 2018-12-08

## 2018-12-06 RX ORDER — KETOROLAC TROMETHAMINE 30 MG/ML
15 SYRINGE (ML) INJECTION EVERY 6 HOURS
Qty: 0 | Refills: 0 | Status: DISCONTINUED | OUTPATIENT
Start: 2018-12-06 | End: 2018-12-08

## 2018-12-06 RX ORDER — HYDROMORPHONE HYDROCHLORIDE 2 MG/ML
0.5 INJECTION INTRAMUSCULAR; INTRAVENOUS; SUBCUTANEOUS EVERY 4 HOURS
Qty: 0 | Refills: 0 | Status: DISCONTINUED | OUTPATIENT
Start: 2018-12-06 | End: 2018-12-08

## 2018-12-06 RX ORDER — SODIUM CHLORIDE 9 MG/ML
1000 INJECTION, SOLUTION INTRAVENOUS
Qty: 0 | Refills: 0 | Status: DISCONTINUED | OUTPATIENT
Start: 2018-12-06 | End: 2018-12-06

## 2018-12-06 RX ORDER — VANCOMYCIN HCL 1 G
1500 VIAL (EA) INTRAVENOUS
Qty: 0 | Refills: 0 | Status: COMPLETED | OUTPATIENT
Start: 2018-12-06 | End: 2018-12-07

## 2018-12-06 RX ORDER — OXYCODONE HYDROCHLORIDE 5 MG/1
5 TABLET ORAL
Qty: 0 | Refills: 0 | Status: DISCONTINUED | OUTPATIENT
Start: 2018-12-06 | End: 2018-12-08

## 2018-12-06 RX ORDER — GABAPENTIN 400 MG/1
600 CAPSULE ORAL ONCE
Qty: 0 | Refills: 0 | Status: COMPLETED | OUTPATIENT
Start: 2018-12-06 | End: 2018-12-06

## 2018-12-06 RX ORDER — ONDANSETRON 8 MG/1
4 TABLET, FILM COATED ORAL EVERY 6 HOURS
Qty: 0 | Refills: 0 | Status: DISCONTINUED | OUTPATIENT
Start: 2018-12-06 | End: 2018-12-08

## 2018-12-06 RX ORDER — SENNA PLUS 8.6 MG/1
2 TABLET ORAL AT BEDTIME
Qty: 0 | Refills: 0 | Status: DISCONTINUED | OUTPATIENT
Start: 2018-12-06 | End: 2018-12-08

## 2018-12-06 RX ORDER — ENOXAPARIN SODIUM 100 MG/ML
40 INJECTION SUBCUTANEOUS DAILY
Qty: 0 | Refills: 0 | Status: DISCONTINUED | OUTPATIENT
Start: 2018-12-07 | End: 2018-12-08

## 2018-12-06 RX ORDER — TRANEXAMIC ACID 100 MG/ML
1200 INJECTION, SOLUTION INTRAVENOUS ONCE
Qty: 0 | Refills: 0 | Status: DISCONTINUED | OUTPATIENT
Start: 2018-12-06 | End: 2018-12-06

## 2018-12-06 RX ORDER — FENTANYL CITRATE 50 UG/ML
50 INJECTION INTRAVENOUS
Qty: 0 | Refills: 0 | Status: DISCONTINUED | OUTPATIENT
Start: 2018-12-06 | End: 2018-12-06

## 2018-12-06 RX ORDER — OXYCODONE HYDROCHLORIDE 5 MG/1
10 TABLET ORAL
Qty: 0 | Refills: 0 | Status: DISCONTINUED | OUTPATIENT
Start: 2018-12-06 | End: 2018-12-08

## 2018-12-06 RX ORDER — ONDANSETRON 8 MG/1
4 TABLET, FILM COATED ORAL ONCE
Qty: 0 | Refills: 0 | Status: DISCONTINUED | OUTPATIENT
Start: 2018-12-06 | End: 2018-12-06

## 2018-12-06 RX ORDER — POLYETHYLENE GLYCOL 3350 17 G/17G
17 POWDER, FOR SOLUTION ORAL DAILY
Qty: 0 | Refills: 0 | Status: DISCONTINUED | OUTPATIENT
Start: 2018-12-06 | End: 2018-12-08

## 2018-12-06 RX ORDER — ACETAMINOPHEN 500 MG
1000 TABLET ORAL ONCE
Qty: 0 | Refills: 0 | Status: DISCONTINUED | OUTPATIENT
Start: 2018-12-06 | End: 2018-12-06

## 2018-12-06 RX ORDER — ACETAMINOPHEN 500 MG
975 TABLET ORAL EVERY 8 HOURS
Qty: 0 | Refills: 0 | Status: DISCONTINUED | OUTPATIENT
Start: 2018-12-06 | End: 2018-12-08

## 2018-12-06 RX ORDER — OXYCODONE HYDROCHLORIDE 5 MG/1
20 TABLET ORAL ONCE
Qty: 0 | Refills: 0 | Status: DISCONTINUED | OUTPATIENT
Start: 2018-12-06 | End: 2018-12-06

## 2018-12-06 RX ORDER — CEFAZOLIN SODIUM 1 G
3000 VIAL (EA) INJECTION
Qty: 0 | Refills: 0 | Status: COMPLETED | OUTPATIENT
Start: 2018-12-06 | End: 2018-12-07

## 2018-12-06 RX ORDER — SODIUM CHLORIDE 9 MG/ML
1000 INJECTION, SOLUTION INTRAVENOUS
Qty: 0 | Refills: 0 | Status: DISCONTINUED | OUTPATIENT
Start: 2018-12-06 | End: 2018-12-08

## 2018-12-06 RX ORDER — GABAPENTIN 400 MG/1
600 CAPSULE ORAL ONCE
Qty: 0 | Refills: 0 | Status: COMPLETED | OUTPATIENT
Start: 2018-12-06 | End: 2018-12-07

## 2018-12-06 RX ORDER — VANCOMYCIN HCL 1 G
1500 VIAL (EA) INTRAVENOUS ONCE
Qty: 0 | Refills: 0 | Status: COMPLETED | OUTPATIENT
Start: 2018-12-06 | End: 2018-12-06

## 2018-12-06 RX ORDER — MAGNESIUM HYDROXIDE 400 MG/1
30 TABLET, CHEWABLE ORAL DAILY
Qty: 0 | Refills: 0 | Status: DISCONTINUED | OUTPATIENT
Start: 2018-12-06 | End: 2018-12-08

## 2018-12-06 RX ORDER — DOCUSATE SODIUM 100 MG
100 CAPSULE ORAL THREE TIMES A DAY
Qty: 0 | Refills: 0 | Status: DISCONTINUED | OUTPATIENT
Start: 2018-12-06 | End: 2018-12-08

## 2018-12-06 RX ADMIN — OXYCODONE HYDROCHLORIDE 20 MILLIGRAM(S): 5 TABLET ORAL at 12:58

## 2018-12-06 RX ADMIN — GABAPENTIN 600 MILLIGRAM(S): 400 CAPSULE ORAL at 12:58

## 2018-12-06 RX ADMIN — CELECOXIB 400 MILLIGRAM(S): 200 CAPSULE ORAL at 12:58

## 2018-12-06 RX ADMIN — Medication 300 MILLIGRAM(S): at 12:58

## 2018-12-06 NOTE — DISCHARGE NOTE ADULT - NS AS ACTIVITY OBS
No Heavy lifting/straining/Showering allowed/Walking-Outdoors allowed/Stairs allowed/Walking-Indoors allowed/Do not make important decisions/Do not drive or operate machinery

## 2018-12-06 NOTE — DISCHARGE NOTE ADULT - PLAN OF CARE
Decrease Pain, Improve Ambulation and Activities of Daily Living The patient will be seen in the office between 2-3 weeks for wound check. PLEASE CONTACT OFFICE TO ARRANGE FOLLOW-UP APPOINTMENT DATE. Tape will be removed at that time. Patient may shower after post-op day #5. The dressing is to be removed on post-op day #9(12/15/2018).  IF THE DRESSING BECOMES SOILED BEFORE THE REMOVAL DATE, CHANGE WITH A SIMILAR DRESSING. IF THE DRESSING BECOMES STAINED WITH DISCHARGE, CONTACT THE OFFICE FOR FURTHER DIRECTIONS.  The patient will contact the office if the wound becomes red, has increasing pain, develops bleeding or discharge, an injury occurs, or has other concerns. The patient will continue PT consistent with posterior total hip replacement protocol. The patient will continue to practice posterior total hip precautions for a minimum of 6 week. The patient will continue LOVENOX for 2 weeks and then begin ASPIRIN for blood clot prevention. The patient will take OXYCODONE AND TYLENOL for pain control and titrate according to prescription and patient needs. The patient will take SENNA-S while taking oxycodone to prevent narcotic associated constipation.  DURICEF was prescribed to the patient for them to take for 7 days following surgery.   Additionally, increase water intake (drink at least 8 glasses of water daily) and try adding fiber to the diet by eating fruits, vegetables and foods that are rich in grains. If constipation is experienced, contact the medical/primary care provider to discuss further treatment options. The patient is FULL weight bearing. The patient will be seen in the office between 2-3 weeks for wound check. PLEASE CONTACT OFFICE TO ARRANGE FOLLOW-UP APPOINTMENT DATE. Tape will be removed at that time. Patient may shower after post-op day #5. The dressing is to be removed on post-op day #9(12/15/2018).  IF THE DRESSING BECOMES SOILED BEFORE THE REMOVAL DATE, CHANGE WITH A SIMILAR DRESSING. IF THE DRESSING BECOMES STAINED WITH DISCHARGE, CONTACT THE OFFICE FOR FURTHER DIRECTIONS.  The patient will contact the office if the wound becomes red, has increasing pain, develops bleeding or discharge, an injury occurs, or has other concerns. The patient will continue PT consistent with posterior total hip replacement protocol. The patient will continue to practice posterior total hip precautions for a minimum of 6 week. The patient will continue LOVENOX for 2 weeks and then begin ASPIRIN 325mg twice daily x 4 weeks for blood clot prevention. The patient will take OXYCODONE AND TYLENOL for pain control and titrate according to prescription and patient needs. The patient will take SENNA-S while taking oxycodone to prevent narcotic associated constipation.  DURICEF was prescribed to the patient for them to take for 7 days following surgery.   Additionally, increase water intake (drink at least 8 glasses of water daily) and try adding fiber to the diet by eating fruits, vegetables and foods that are rich in grains. If constipation is experienced, contact the medical/primary care provider to discuss further treatment options. The patient is FULL weight bearing.

## 2018-12-06 NOTE — DISCHARGE NOTE ADULT - PATIENT PORTAL LINK FT
You can access the "[employer] Patient Portal, offered by [facility], by registering with the following website: [email or web address]

## 2018-12-06 NOTE — DISCHARGE NOTE ADULT - CARE PLAN
Principal Discharge DX:	Unilateral primary osteoarthritis, left hip  Goal:	Decrease Pain, Improve Ambulation and Activities of Daily Living  Assessment and plan of treatment:	The patient will be seen in the office between 2-3 weeks for wound check. PLEASE CONTACT OFFICE TO ARRANGE FOLLOW-UP APPOINTMENT DATE. Tape will be removed at that time. Patient may shower after post-op day #5. The dressing is to be removed on post-op day #9(12/15/2018).  IF THE DRESSING BECOMES SOILED BEFORE THE REMOVAL DATE, CHANGE WITH A SIMILAR DRESSING. IF THE DRESSING BECOMES STAINED WITH DISCHARGE, CONTACT THE OFFICE FOR FURTHER DIRECTIONS.  The patient will contact the office if the wound becomes red, has increasing pain, develops bleeding or discharge, an injury occurs, or has other concerns. The patient will continue PT consistent with posterior total hip replacement protocol. The patient will continue to practice posterior total hip precautions for a minimum of 6 week. The patient will continue LOVENOX for 2 weeks and then begin ASPIRIN for blood clot prevention. The patient will take OXYCODONE AND TYLENOL for pain control and titrate according to prescription and patient needs. The patient will take SENNA-S while taking oxycodone to prevent narcotic associated constipation.  DURICEF was prescribed to the patient for them to take for 7 days following surgery.   Additionally, increase water intake (drink at least 8 glasses of water daily) and try adding fiber to the diet by eating fruits, vegetables and foods that are rich in grains. If constipation is experienced, contact the medical/primary care provider to discuss further treatment options. The patient is FULL weight bearing. Principal Discharge DX:	Unilateral primary osteoarthritis, left hip  Goal:	Decrease Pain, Improve Ambulation and Activities of Daily Living  Assessment and plan of treatment:	The patient will be seen in the office between 2-3 weeks for wound check. PLEASE CONTACT OFFICE TO ARRANGE FOLLOW-UP APPOINTMENT DATE. Tape will be removed at that time. Patient may shower after post-op day #5. The dressing is to be removed on post-op day #9(12/15/2018).  IF THE DRESSING BECOMES SOILED BEFORE THE REMOVAL DATE, CHANGE WITH A SIMILAR DRESSING. IF THE DRESSING BECOMES STAINED WITH DISCHARGE, CONTACT THE OFFICE FOR FURTHER DIRECTIONS.  The patient will contact the office if the wound becomes red, has increasing pain, develops bleeding or discharge, an injury occurs, or has other concerns. The patient will continue PT consistent with posterior total hip replacement protocol. The patient will continue to practice posterior total hip precautions for a minimum of 6 week. The patient will continue LOVENOX for 2 weeks and then begin ASPIRIN 325mg twice daily x 4 weeks for blood clot prevention. The patient will take OXYCODONE AND TYLENOL for pain control and titrate according to prescription and patient needs. The patient will take SENNA-S while taking oxycodone to prevent narcotic associated constipation.  DURICEF was prescribed to the patient for them to take for 7 days following surgery.   Additionally, increase water intake (drink at least 8 glasses of water daily) and try adding fiber to the diet by eating fruits, vegetables and foods that are rich in grains. If constipation is experienced, contact the medical/primary care provider to discuss further treatment options. The patient is FULL weight bearing.

## 2018-12-06 NOTE — DISCHARGE NOTE ADULT - ADDITIONAL INSTRUCTIONS
Continue with iron supplement available over the counter, get your CBC checked in 1 week along with further workup for her anemia by he PMD as out patient.   Please show this paper to your PMD.   Drink plenty of fluids.

## 2018-12-06 NOTE — DISCHARGE NOTE ADULT - HOSPITAL COURSE
The patient underwent a LEFT POSTERIOR TOTAL HIP REPLACEMENT on 12/6/2018. The patient received antibiotics consistent with SCIP guidelines. The patient underwent the procedure and had no intra-operative complications. Post-operatively, the patient was seen by medicine and PT. The patient received LOVENOX for DVTP. The patient received pain medications per orthopedic pain management protocol and the pain was appropriately controlled. Patient was instructed on posterior total hip precautions by PT. The patient did not have any post-operative medical complications. The patient was discharged in stable condition.

## 2018-12-06 NOTE — DISCHARGE NOTE ADULT - MEDICATION SUMMARY - MEDICATIONS TO TAKE
I will START or STAY ON the medications listed below when I get home from the hospital:    oxyCODONE 5 mg oral tablet  -- 1-2 tab(s) by mouth every 4 to 6 hours, As Needed MDD:8  -- Indication: For Pain    Aspirin Enteric Coated 325 mg oral delayed release tablet  -- 1 tab(s) by mouth 2 times a day x 4 weeks, begin after completion of lovenox  -- Swallow whole.  Do not crush.  Take with food or milk.    -- Indication: For DVTP    Lovenox 40 mg/0.4 mL injectable solution  -- 40 milligram(s) subcutaneously once a day   -- It is very important that you take or use this exactly as directed.  Do not skip doses or discontinue unless directed by your doctor.    -- Indication: For DVTP    cefadroxil 500 mg oral capsule  -- 1 cap(s) by mouth 2 times a day   -- Finish all this medication unless otherwise directed by prescriber.    -- Indication: For Prophylaxis    Senna S 50 mg-8.6 mg oral tablet  -- 2 tab(s) by mouth once a day (at bedtime)   -- Medication should be taken with plenty of water.    -- Indication: For constipation I will START or STAY ON the medications listed below when I get home from the hospital:    oxyCODONE 5 mg oral tablet  -- 1-2 tab(s) by mouth every 4 to 6 hours, As Needed MDD:8  -- Indication: For Pain    Aspirin Enteric Coated 325 mg oral delayed release tablet  -- 1 tab(s) by mouth 2 times a day x 4 weeks, begin after completion of lovenox  -- Swallow whole.  Do not crush.  Take with food or milk.    -- Indication: For DVTP    Lovenox 40 mg/0.4 mL injectable solution  -- 40 milligram(s) subcutaneously once a day   -- It is very important that you take or use this exactly as directed.  Do not skip doses or discontinue unless directed by your doctor.    -- Indication: For DVTP    cefadroxil 500 mg oral capsule  -- 1 cap(s) by mouth 2 times a day   -- Finish all this medication unless otherwise directed by prescriber.    -- Indication: For Prophylaxis    ferrous sulfate 325 mg (65 mg elemental iron) oral delayed release tablet  -- 1 tab(s) by mouth once a day   -- May discolor urine or feces.  Swallow whole.  Do not crush.    -- Indication: For As per medicine for anemia    Senna S 50 mg-8.6 mg oral tablet  -- 2 tab(s) by mouth once a day (at bedtime)   -- Medication should be taken with plenty of water.    -- Indication: For constipation

## 2018-12-06 NOTE — DISCHARGE NOTE ADULT - CARE PROVIDER_API CALL
Maninder Dumont (MD), Orthopaedic Surgery  [address]  [place] [address]  Phone: [phone]  Fax: [phone]

## 2018-12-07 DIAGNOSIS — M19.90 UNSPECIFIED OSTEOARTHRITIS, UNSPECIFIED SITE: ICD-10-CM

## 2018-12-07 DIAGNOSIS — I95.81 POSTPROCEDURAL HYPOTENSION: ICD-10-CM

## 2018-12-07 LAB
ANION GAP SERPL CALC-SCNC: 10 MMOL/L — SIGNIFICANT CHANGE UP (ref 5–17)
BUN SERPL-MCNC: 15 MG/DL — SIGNIFICANT CHANGE UP (ref 8–20)
CALCIUM SERPL-MCNC: 8.4 MG/DL — LOW (ref 8.6–10.2)
CHLORIDE SERPL-SCNC: 102 MMOL/L — SIGNIFICANT CHANGE UP (ref 98–107)
CO2 SERPL-SCNC: 26 MMOL/L — SIGNIFICANT CHANGE UP (ref 22–29)
CREAT SERPL-MCNC: 0.8 MG/DL — SIGNIFICANT CHANGE UP (ref 0.5–1.3)
GLUCOSE SERPL-MCNC: 117 MG/DL — HIGH (ref 70–115)
HCT VFR BLD CALC: 29.3 % — LOW (ref 37–47)
HGB BLD-MCNC: 8.6 G/DL — LOW (ref 12–16)
MCHC RBC-ENTMCNC: 24.7 PG — LOW (ref 27–31)
MCHC RBC-ENTMCNC: 29.4 G/DL — LOW (ref 32–36)
MCV RBC AUTO: 84.2 FL — SIGNIFICANT CHANGE UP (ref 81–99)
PLATELET # BLD AUTO: 214 K/UL — SIGNIFICANT CHANGE UP (ref 150–400)
POTASSIUM SERPL-MCNC: 4.2 MMOL/L — SIGNIFICANT CHANGE UP (ref 3.5–5.3)
POTASSIUM SERPL-SCNC: 4.2 MMOL/L — SIGNIFICANT CHANGE UP (ref 3.5–5.3)
RBC # BLD: 3.48 M/UL — LOW (ref 4.4–5.2)
RBC # FLD: 17.3 % — HIGH (ref 11–15.6)
SODIUM SERPL-SCNC: 138 MMOL/L — SIGNIFICANT CHANGE UP (ref 135–145)
WBC # BLD: 9.3 K/UL — SIGNIFICANT CHANGE UP (ref 4.8–10.8)
WBC # FLD AUTO: 9.3 K/UL — SIGNIFICANT CHANGE UP (ref 4.8–10.8)

## 2018-12-07 PROCEDURE — 99223 1ST HOSP IP/OBS HIGH 75: CPT

## 2018-12-07 RX ORDER — SODIUM CHLORIDE 9 MG/ML
1000 INJECTION INTRAMUSCULAR; INTRAVENOUS; SUBCUTANEOUS
Qty: 0 | Refills: 0 | Status: DISCONTINUED | OUTPATIENT
Start: 2018-12-07 | End: 2018-12-08

## 2018-12-07 RX ORDER — SODIUM CHLORIDE 9 MG/ML
500 INJECTION INTRAMUSCULAR; INTRAVENOUS; SUBCUTANEOUS ONCE
Qty: 0 | Refills: 0 | Status: COMPLETED | OUTPATIENT
Start: 2018-12-07 | End: 2018-12-07

## 2018-12-07 RX ORDER — ENOXAPARIN SODIUM 100 MG/ML
40 INJECTION SUBCUTANEOUS
Qty: 14 | Refills: 0
Start: 2018-12-07

## 2018-12-07 RX ORDER — ASPIRIN/CALCIUM CARB/MAGNESIUM 324 MG
1 TABLET ORAL
Qty: 60 | Refills: 0
Start: 2018-12-07

## 2018-12-07 RX ORDER — OXYCODONE HYDROCHLORIDE 5 MG/1
1 TABLET ORAL
Qty: 56 | Refills: 0
Start: 2018-12-07

## 2018-12-07 RX ORDER — SENNOSIDES/DOCUSATE SODIUM 8.6MG-50MG
2 TABLET ORAL
Qty: 20 | Refills: 0
Start: 2018-12-07

## 2018-12-07 RX ADMIN — HYDROMORPHONE HYDROCHLORIDE 2 MILLIGRAM(S): 2 INJECTION INTRAMUSCULAR; INTRAVENOUS; SUBCUTANEOUS at 00:59

## 2018-12-07 RX ADMIN — Medication 975 MILLIGRAM(S): at 22:06

## 2018-12-07 RX ADMIN — Medication 1000 MILLIGRAM(S): at 05:55

## 2018-12-07 RX ADMIN — SODIUM CHLORIDE 1000 MILLILITER(S): 9 INJECTION INTRAMUSCULAR; INTRAVENOUS; SUBCUTANEOUS at 08:25

## 2018-12-07 RX ADMIN — Medication 975 MILLIGRAM(S): at 12:42

## 2018-12-07 RX ADMIN — HYDROMORPHONE HYDROCHLORIDE 2 MILLIGRAM(S): 2 INJECTION INTRAMUSCULAR; INTRAVENOUS; SUBCUTANEOUS at 22:29

## 2018-12-07 RX ADMIN — Medication 100 MILLIGRAM(S): at 12:41

## 2018-12-07 RX ADMIN — SODIUM CHLORIDE 3 MILLILITER(S): 9 INJECTION INTRAMUSCULAR; INTRAVENOUS; SUBCUTANEOUS at 12:37

## 2018-12-07 RX ADMIN — OXYCODONE HYDROCHLORIDE 10 MILLIGRAM(S): 5 TABLET ORAL at 12:54

## 2018-12-07 RX ADMIN — Medication 200 MILLIGRAM(S): at 08:33

## 2018-12-07 RX ADMIN — Medication 500 MILLIGRAM(S): at 05:40

## 2018-12-07 RX ADMIN — Medication 975 MILLIGRAM(S): at 21:29

## 2018-12-07 RX ADMIN — SODIUM CHLORIDE 1000 MILLILITER(S): 9 INJECTION INTRAMUSCULAR; INTRAVENOUS; SUBCUTANEOUS at 12:32

## 2018-12-07 RX ADMIN — HYDROMORPHONE HYDROCHLORIDE 2 MILLIGRAM(S): 2 INJECTION INTRAMUSCULAR; INTRAVENOUS; SUBCUTANEOUS at 01:59

## 2018-12-07 RX ADMIN — POLYETHYLENE GLYCOL 3350 17 GRAM(S): 17 POWDER, FOR SOLUTION ORAL at 12:37

## 2018-12-07 RX ADMIN — SODIUM CHLORIDE 3 MILLILITER(S): 9 INJECTION INTRAMUSCULAR; INTRAVENOUS; SUBCUTANEOUS at 22:06

## 2018-12-07 RX ADMIN — GABAPENTIN 600 MILLIGRAM(S): 400 CAPSULE ORAL at 05:40

## 2018-12-07 RX ADMIN — Medication 500 MILLIGRAM(S): at 12:34

## 2018-12-07 RX ADMIN — Medication 100 MILLIGRAM(S): at 05:40

## 2018-12-07 RX ADMIN — Medication 200 MILLIGRAM(S): at 00:54

## 2018-12-07 RX ADMIN — Medication 500 MILLIGRAM(S): at 18:24

## 2018-12-07 RX ADMIN — Medication 400 MILLIGRAM(S): at 05:40

## 2018-12-07 RX ADMIN — Medication 300 MILLIGRAM(S): at 00:54

## 2018-12-07 RX ADMIN — ENOXAPARIN SODIUM 40 MILLIGRAM(S): 100 INJECTION SUBCUTANEOUS at 12:34

## 2018-12-07 RX ADMIN — OXYCODONE HYDROCHLORIDE 10 MILLIGRAM(S): 5 TABLET ORAL at 13:25

## 2018-12-07 RX ADMIN — HYDROMORPHONE HYDROCHLORIDE 2 MILLIGRAM(S): 2 INJECTION INTRAMUSCULAR; INTRAVENOUS; SUBCUTANEOUS at 21:29

## 2018-12-07 RX ADMIN — SODIUM CHLORIDE 3 MILLILITER(S): 9 INJECTION INTRAMUSCULAR; INTRAVENOUS; SUBCUTANEOUS at 05:39

## 2018-12-07 RX ADMIN — Medication 500 MILLIGRAM(S): at 00:55

## 2018-12-07 NOTE — OCCUPATIONAL THERAPY INITIAL EVALUATION ADULT - GENERAL OBSERVATIONS, REHAB EVAL
Pt received supine in bed, +IV, +abduction pillow, +bilateral compression boots.  Pt agreeable to OT gavin. She understands transfers will be assessed later today if appropriate.

## 2018-12-07 NOTE — OCCUPATIONAL THERAPY INITIAL EVALUATION ADULT - NS ASR OT EQUIP NEEDS DISCH
pt may need sock aide, dressing stick.  All other needs in place/toileting/bathing/dressing/rolling walker (5 inch wheels)

## 2018-12-07 NOTE — PHYSICAL THERAPY INITIAL EVALUATION ADULT - ADDITIONAL COMMENTS
Pt lives in an APt with  1 steps to enter with  a wall to hold onto and 0  stairs inside.  Pt owns medical equipment: RW, SAC, elevated toilet seat   Pt lives with: mother   Her mother is elderly and will have some difficulty providing care

## 2018-12-07 NOTE — OCCUPATIONAL THERAPY INITIAL EVALUATION ADULT - BED MOBILITY/TRANSFERS, PREVIOUS LEVEL OF FUNCTION, OT EVAL
Pt gets into bed by scooting on knees then lying prone.  She plans to use recliner to maintain hip precautions./independent

## 2018-12-07 NOTE — OCCUPATIONAL THERAPY INITIAL EVALUATION ADULT - FINE MOTOR COORDINATION, RIGHT HAND, MANIPULATE OBJECTS, OT EVAL
mild impairment/pt reports numbness right digits 1-3 - occasional dropping of objects and difficulty managing fine motor tasks but pt compensates

## 2018-12-07 NOTE — CONSULT NOTE ADULT - PROBLEM SELECTOR RECOMMENDATION 9
hip osteoarthritis s/p left hip arthroplasty  PT OT pain control , adjust pain medication for low BP avoid ER oxycodone

## 2018-12-07 NOTE — OCCUPATIONAL THERAPY INITIAL EVALUATION ADULT - LEVEL OF INDEPENDENCE: DRESS LOWER BODY, OT EVAL
moderate assist (50% patients effort)/supine.  Pt reports if at edge of bed she'd be able to manage with long handled equipment.

## 2018-12-07 NOTE — PHYSICAL THERAPY INITIAL EVALUATION ADULT - GAIT TRAINING, PT EVAL
Time Frame:   2-3  days   Goal:   Modified Independent with RW x 300  feet / Stairs: pt will navigate  1 stairs with  0  rail independently

## 2018-12-07 NOTE — OCCUPATIONAL THERAPY INITIAL EVALUATION ADULT - ADDITIONAL COMMENTS
Pt lives in private home with 1 step to enter, no stairs inside.  She lives with her 90 year old mother and 20 year old cat.  She only assists her mom as needed, basically supervision/setup for bathing her.  Pt has a RW, rollator, cane, raised toilet seat and reacher.  She has compensated for hip pain and manages home tasks with modified independence.  She drives but needs assistance in/out of car due to pain.

## 2018-12-07 NOTE — BRIEF OPERATIVE NOTE - PROCEDURE
<<-----Click on this checkbox to enter Procedure JACQUES - total hip arthroplasty  12/07/2018    Active  ROCIO

## 2018-12-07 NOTE — PROGRESS NOTE ADULT - SUBJECTIVE AND OBJECTIVE BOX
Patient seen and examined at bedside. Comfortable in bed, pain controlled. Denies fever/chills, SOB/chest pain, abdominal pain, numbness/tingling. no other complaints.    Vital Signs Last 24 Hrs  T(C): 36.2 (07 Dec 2018 04:29), Max: 37 (06 Dec 2018 19:00)  T(F): 97.2 (07 Dec 2018 04:29), Max: 98.6 (06 Dec 2018 19:00)  HR: 87 (07 Dec 2018 07:31) (77 - 107)  BP: 90/50 (07 Dec 2018 07:31) (90/50 - 130/78)  BP(mean): --  RR: 18 (07 Dec 2018 04:29) (11 - 19)  SpO2: 95% (07 Dec 2018 04:29) (90% - 100%)    LLE: Dressing C/D/I. Abduction pillow noted, in place. SILT. + dorsi/plantarflexion. + EHL/FHL. Calf soft NT B/L.                          8.6    9.3   )-----------( 214      ( 07 Dec 2018 06:57 )             29.3     12-07    138  |  102  |  15.0  ----------------------------<  117<H>  4.2   |  26.0  |  0.80    Ca    8.4<L>      07 Dec 2018 06:57    A/P: 63 y.o F s/p left JACQUES POD #1  - WBAT, posterior hip precautions  - DVTP  - 1x bolus ordered for hypotension, Dr. Badillo aware  - d/c planning - home likely tomorrow if cleared by PT and medicine

## 2018-12-07 NOTE — CONSULT NOTE ADULT - SUBJECTIVE AND OBJECTIVE BOX
PMD :  Cardio :     Patient is a 63y old  Female who presents with a chief complaint of left hip pain   pt is seen examined , she feels weak dizzy , BP is low   no shortness of breath , no chest pain     HPI:  63 year old female presents with c/o left hip pain / groin pain over past year that has failed conservative treatment.  Repots use of walker and cane as needed.She had  right hip surgery in August  tolerated well now schedule for left hip total joint replacement. Post op seen examined pain controlled , + dizziness       PAST MEDICAL & SURGICAL HISTORY:  Vascular insufficiency: left leg  Sciatic leg pain: right  Hip pain  Arthritis  History of tonsillectomy and adenoidectomy  H/O arthroscopy of left knee: 1994      Social History:  Tabacco - denies smoking   ETOH - no alcohol use   Illicit drug abuse - denies    FAMILY HISTORY:  Family history of heart disease (Mother)  Family history of COPD (chronic obstructive pulmonary disease) (Father)      Allergies    No Known Allergies    Intolerances        HOME MEDICATIONS : reviewed     REVIEW OF SYSTEMS:    CONSTITUTIONAL: No fever, weight loss, or fatigue, dizziness   EYES: No eye pain, visual disturbances, or discharge  NECK: No pain or stiffness  RESPIRATORY: No cough, wheezing, chills or hemoptysis; No shortness of breath  CARDIOVASCULAR: No chest pain, palpitations, dizziness, or leg swelling  GASTROINTESTINAL: No abdominal or epigastric pain. No nausea, vomiting, or hematemesis; No diarrhea or constipation. No melena or hematochezia.  GENITOURINARY: No dysuria, frequency, hematuria, or incontinence  NEUROLOGICAL: No headaches, memory loss, loss of strength, numbness, or tremors  SKIN: No itching, burning, rashes, or lesions   LYMPH NODES: No enlarged glands  ENDOCRINE: No heat or cold intolerance; No hair loss  MUSCULOSKELETAL: hip pain on the left   PSYCHIATRIC: No depression, anxiety, mood swings, or difficulty sleeping  HEME/LYMPH: No easy bruising, or bleeding gums  ALLERGY AND IMMUNOLOGIC: No hives or eczema    MEDICATIONS  (STANDING):  acetaminophen   Tablet .. 975 milliGRAM(s) Oral every 8 hours  celecoxib 400 milliGRAM(s) Oral once  cephalexin 500 milliGRAM(s) Oral every 6 hours  docusate sodium 100 milliGRAM(s) Oral three times a day  enoxaparin Injectable 40 milliGRAM(s) SubCutaneous daily  influenza   Vaccine 0.5 milliLiter(s) IntraMuscular once  lactated ringers. 1000 milliLiter(s) (100 mL/Hr) IV Continuous <Continuous>  oxyCODONE  ER Tablet 20 milliGRAM(s) Oral once  polyethylene glycol 3350 17 Gram(s) Oral daily  sodium chloride 0.9% lock flush 3 milliLiter(s) IV Push every 8 hours  tranexamic acid IVPB 1200 milliGRAM(s) IV Intermittent once  tranexamic acid IVPB 1200 milliGRAM(s) IV Intermittent once    MEDICATIONS  (PRN):  aluminum hydroxide/magnesium hydroxide/simethicone Suspension 30 milliLiter(s) Oral four times a day PRN Indigestion  HYDROmorphone   Tablet 2 milliGRAM(s) Oral every 4 hours PRN Severe Pain (7 - 10)  HYDROmorphone  Injectable 0.5 milliGRAM(s) IV Push every 4 hours PRN breakthrough  ketorolac   Injectable 15 milliGRAM(s) IV Push every 6 hours PRN Moderate Pain (4 - 6)  magnesium hydroxide Suspension 30 milliLiter(s) Oral daily PRN Constipation  ondansetron Injectable 4 milliGRAM(s) IV Push every 6 hours PRN Nausea and/or Vomiting  oxyCODONE    IR 5 milliGRAM(s) Oral every 3 hours PRN Mild Pain (1 - 3)  oxyCODONE    IR 10 milliGRAM(s) Oral every 3 hours PRN Moderate Pain (4 - 6)  senna 2 Tablet(s) Oral at bedtime PRN Constipation      Vital Signs Last 24 Hrs  T(C): 36.7 (07 Dec 2018 08:28), Max: 37 (06 Dec 2018 19:00)  T(F): 98.1 (07 Dec 2018 08:28), Max: 98.6 (06 Dec 2018 19:00)  HR: 90 (07 Dec 2018 08:28) (77 - 107)  BP: 87/57 (07 Dec 2018 08:28) (87/57 - 130/78)  BP(mean): --  RR: 18 (07 Dec 2018 08:28) (11 - 19)  SpO2: 95% (07 Dec 2018 08:28) (90% - 100%)    PHYSICAL EXAM:    GENERAL: NAD, well-groomed, well-developed  HEAD:  Atraumatic, Normocephalic  EYES: EOMI, PERRLA, conjunctiva and sclera clear  NECK: Supple, No JVD, Normal thyroid  NERVOUS SYSTEM:  Alert & Oriented X3, Good concentration; no motor deficits   CHEST/LUNG: CTA  b/l,  no rales, rhonchi, wheezing, or rubs  HEART: Regular rate and rhythm; S1 /S2  No murmurs  ABDOMEN: Soft, Nontender, Nondistended; Bowel sounds present  EXTREMITIES:  No clubbing, cyanosis, or edema ,   SKIN: No rash    LABS:                        8.6    9.3   )-----------( 214      ( 07 Dec 2018 06:57 )             29.3     12-07    138  |  102  |  15.0  ----------------------------<  117<H>  4.2   |  26.0  |  0.80    Ca    8.4<L>      07 Dec 2018 06:57              RADIOLOGY & ADDITIONAL STUDIES:

## 2018-12-07 NOTE — CONSULT NOTE ADULT - ASSESSMENT
64 yo female obese with osteoarthritis admitted for elective  left hip surgery , post op hypotension

## 2018-12-08 VITALS
TEMPERATURE: 97 F | RESPIRATION RATE: 20 BRPM | DIASTOLIC BLOOD PRESSURE: 71 MMHG | SYSTOLIC BLOOD PRESSURE: 110 MMHG | OXYGEN SATURATION: 95 % | HEART RATE: 113 BPM

## 2018-12-08 PROCEDURE — 80048 BASIC METABOLIC PNL TOTAL CA: CPT

## 2018-12-08 PROCEDURE — 97110 THERAPEUTIC EXERCISES: CPT

## 2018-12-08 PROCEDURE — 97163 PT EVAL HIGH COMPLEX 45 MIN: CPT

## 2018-12-08 PROCEDURE — 86900 BLOOD TYPING SEROLOGIC ABO: CPT

## 2018-12-08 PROCEDURE — 97167 OT EVAL HIGH COMPLEX 60 MIN: CPT

## 2018-12-08 PROCEDURE — 86901 BLOOD TYPING SEROLOGIC RH(D): CPT

## 2018-12-08 PROCEDURE — 97530 THERAPEUTIC ACTIVITIES: CPT

## 2018-12-08 PROCEDURE — 97116 GAIT TRAINING THERAPY: CPT

## 2018-12-08 PROCEDURE — 82962 GLUCOSE BLOOD TEST: CPT

## 2018-12-08 PROCEDURE — 97535 SELF CARE MNGMENT TRAINING: CPT

## 2018-12-08 PROCEDURE — 86850 RBC ANTIBODY SCREEN: CPT

## 2018-12-08 PROCEDURE — 99232 SBSQ HOSP IP/OBS MODERATE 35: CPT

## 2018-12-08 PROCEDURE — 73501 X-RAY EXAM HIP UNI 1 VIEW: CPT

## 2018-12-08 PROCEDURE — C1713: CPT

## 2018-12-08 PROCEDURE — 85027 COMPLETE CBC AUTOMATED: CPT

## 2018-12-08 PROCEDURE — C1776: CPT

## 2018-12-08 PROCEDURE — 36415 COLL VENOUS BLD VENIPUNCTURE: CPT

## 2018-12-08 RX ORDER — FERROUS SULFATE 325(65) MG
1 TABLET ORAL
Qty: 30 | Refills: 0
Start: 2018-12-08

## 2018-12-08 RX ADMIN — SODIUM CHLORIDE 3 MILLILITER(S): 9 INJECTION INTRAMUSCULAR; INTRAVENOUS; SUBCUTANEOUS at 05:52

## 2018-12-08 RX ADMIN — Medication 500 MILLIGRAM(S): at 00:40

## 2018-12-08 RX ADMIN — Medication 975 MILLIGRAM(S): at 05:52

## 2018-12-08 RX ADMIN — Medication 975 MILLIGRAM(S): at 05:50

## 2018-12-08 RX ADMIN — OXYCODONE HYDROCHLORIDE 10 MILLIGRAM(S): 5 TABLET ORAL at 10:40

## 2018-12-08 RX ADMIN — Medication 500 MILLIGRAM(S): at 05:50

## 2018-12-08 RX ADMIN — POLYETHYLENE GLYCOL 3350 17 GRAM(S): 17 POWDER, FOR SOLUTION ORAL at 11:24

## 2018-12-08 RX ADMIN — OXYCODONE HYDROCHLORIDE 10 MILLIGRAM(S): 5 TABLET ORAL at 10:03

## 2018-12-08 RX ADMIN — Medication 500 MILLIGRAM(S): at 11:26

## 2018-12-08 RX ADMIN — ENOXAPARIN SODIUM 40 MILLIGRAM(S): 100 INJECTION SUBCUTANEOUS at 11:24

## 2018-12-08 NOTE — PROGRESS NOTE ADULT - SUBJECTIVE AND OBJECTIVE BOX
MADHAVI JOHNSON    [id number]    63y      Female    Patient is a 63y old  Female who presents with a chief complaint of elective hip surgery (07 Dec 2018 09:24)      INTERVAL HPI/OVERNIGHT EVENTS:    Patient is feeling ok, her pain is well controlled, denies chest pain, sob, dizziness, nausea, vomiting, pain BP is better now and she feels more energetic.     REVIEW OF SYSTEMS:    CONSTITUTIONAL: No fever, some fatigue  RESPIRATORY: No cough, No shortness of breath  CARDIOVASCULAR: No chest pain, palpitations  GASTROINTESTINAL: No abdominal, No nausea, vomiting  NEUROLOGICAL: No headaches,  loss of strength.  MISCELLANEOUS: Left hip pain is controlled.       Vital Signs Last 24 Hrs  T(C): 36.2 (08 Dec 2018 07:24), Max: 37.2 (07 Dec 2018 23:53)  T(F): 97.2 (08 Dec 2018 07:24), Max: 99 (08 Dec 2018 05:12)  HR: 113 (08 Dec 2018 07:24) (88 - 113)  BP: 110/71 (08 Dec 2018 07:24) (90/60 - 112/70)  RR: 20 (08 Dec 2018 07:24) (18 - 20)  SpO2: 95% (08 Dec 2018 07:24) (92% - 96%)    PHYSICAL EXAM:    GENERAL: Middle age Obese  female looking comfortable  HEENT: PERRL, +EOMI  NECK: soft, Supple, No JVD,   CHEST/LUNG: Clear to auscultate bilaterally; No wheezing  HEART: S1S2+, Regular rate and rhythm; No murmurs  ABDOMEN: Soft, Nontender, Nondistended; Bowel sounds present  EXTREMITIES:  2+ Peripheral Pulses, No edema, left hip surgical wound with dressings on, no bleeding or soaking.  SKIN: No rashes or lesions  NEURO: AAOX3, no focal deficits, no motor r sensory loss  PSYCH: normal mood      LABS:                        8.6    9.3   )-----------( 214      ( 07 Dec 2018 06:57 )             29.3     12-07    138  |  102  |  15.0  ----------------------------<  117<H>  4.2   |  26.0  |  0.80    Ca    8.4<L>      07 Dec 2018 06:57              I&O's Summary    07 Dec 2018 07:01  -  08 Dec 2018 07:00  --------------------------------------------------------  IN: 1000 mL / OUT: 0 mL / NET: 1000 mL        MEDICATIONS  (STANDING):  acetaminophen   Tablet .. 975 milliGRAM(s) Oral every 8 hours  celecoxib 400 milliGRAM(s) Oral once  cephalexin 500 milliGRAM(s) Oral every 6 hours  docusate sodium 100 milliGRAM(s) Oral three times a day  enoxaparin Injectable 40 milliGRAM(s) SubCutaneous daily  influenza   Vaccine 0.5 milliLiter(s) IntraMuscular once  lactated ringers. 1000 milliLiter(s) (100 mL/Hr) IV Continuous <Continuous>  oxyCODONE  ER Tablet 20 milliGRAM(s) Oral once  polyethylene glycol 3350 17 Gram(s) Oral daily  sodium chloride 0.9% lock flush 3 milliLiter(s) IV Push every 8 hours  sodium chloride 0.9%. 1000 milliLiter(s) (100 mL/Hr) IV Continuous <Continuous>  tranexamic acid IVPB 1200 milliGRAM(s) IV Intermittent once  tranexamic acid IVPB 1200 milliGRAM(s) IV Intermittent once    MEDICATIONS  (PRN):  aluminum hydroxide/magnesium hydroxide/simethicone Suspension 30 milliLiter(s) Oral four times a day PRN Indigestion  bisacodyl Suppository 10 milliGRAM(s) Rectal daily PRN If no bowel movement by POD#2  HYDROmorphone   Tablet 2 milliGRAM(s) Oral every 4 hours PRN Severe Pain (7 - 10)  HYDROmorphone  Injectable 0.5 milliGRAM(s) IV Push every 4 hours PRN breakthrough  ketorolac   Injectable 15 milliGRAM(s) IV Push every 6 hours PRN Moderate Pain (4 - 6)  magnesium hydroxide Suspension 30 milliLiter(s) Oral daily PRN Constipation  ondansetron Injectable 4 milliGRAM(s) IV Push every 6 hours PRN Nausea and/or Vomiting  oxyCODONE    IR 5 milliGRAM(s) Oral every 3 hours PRN Mild Pain (1 - 3)  oxyCODONE    IR 10 milliGRAM(s) Oral every 3 hours PRN Moderate Pain (4 - 6)  senna 2 Tablet(s) Oral at bedtime PRN Constipation

## 2018-12-08 NOTE — PROGRESS NOTE ADULT - ASSESSMENT
64 yo female obese with osteoarthritis admitted for elective  left hip surgery , post op hypotension    Plan:     Problem/Recommendation - 1:  Problem: Arthritis. Recommendation: hip osteoarthritis s/p left hip arthroplasty  PT OT pain control , adjusted pain medications, BP is better now'    Problem/Recommendation - 2:  ·  Problem: Postprocedural hypotension: iv fluid bolus given her BP improved, now better and has been stable.     Acute blood loss anemia on chronic anemia: Patient presurgical blood test showed she has Hx of anemia as her Hb was noted to be around 11, she has some loss post procedure as well, she has been started on iron supplement, patient needed to continue with iron supplement and get  her CBC checked in 1 week along with further workup for her anemia by he PMD as out patient, patient has no dizziness, denies any chest pain.     patient is stable from the medical point of view for discharge, pending Ortho and PT clearence.

## 2018-12-08 NOTE — PROGRESS NOTE ADULT - SUBJECTIVE AND OBJECTIVE BOX
Pt Name: MADHAVI JOHNSON    MRN: [id number]      Patient is a 63 year old Female status post Left hip arthroplasty on 12/6/18, POD #2. Patient seen and examined this morning. Patient doing well, resting comfortably in bed. No significant events overnight reported. Patient's pain is well managed with prescribed medications. Patient is participating in physical therapy. No acute motor or sensory changes. Denies chest pain, shortness of breath, abdominal pain, fever, chills.       PAST MEDICAL & SURGICAL HISTORY:  PAST MEDICAL & SURGICAL HISTORY:  Vascular insufficiency: left leg  Sciatic leg pain: right  Hip pain  Arthritis  History of tonsillectomy and adenoidectomy  H/O arthroscopy of left knee: 1994      Allergies: No Known Allergies      Medications: acetaminophen   Tablet .. 975 milliGRAM(s) Oral every 8 hours  aluminum hydroxide/magnesium hydroxide/simethicone Suspension 30 milliLiter(s) Oral four times a day PRN  bisacodyl Suppository 10 milliGRAM(s) Rectal daily PRN  celecoxib 400 milliGRAM(s) Oral once  cephalexin 500 milliGRAM(s) Oral every 6 hours  docusate sodium 100 milliGRAM(s) Oral three times a day  enoxaparin Injectable 40 milliGRAM(s) SubCutaneous daily  HYDROmorphone   Tablet 2 milliGRAM(s) Oral every 4 hours PRN  HYDROmorphone  Injectable 0.5 milliGRAM(s) IV Push every 4 hours PRN  influenza   Vaccine 0.5 milliLiter(s) IntraMuscular once  ketorolac   Injectable 15 milliGRAM(s) IV Push every 6 hours PRN  lactated ringers. 1000 milliLiter(s) IV Continuous <Continuous>  magnesium hydroxide Suspension 30 milliLiter(s) Oral daily PRN  ondansetron Injectable 4 milliGRAM(s) IV Push every 6 hours PRN  oxyCODONE    IR 5 milliGRAM(s) Oral every 3 hours PRN  oxyCODONE    IR 10 milliGRAM(s) Oral every 3 hours PRN  oxyCODONE  ER Tablet 20 milliGRAM(s) Oral once  polyethylene glycol 3350 17 Gram(s) Oral daily  senna 2 Tablet(s) Oral at bedtime PRN  sodium chloride 0.9% lock flush 3 milliLiter(s) IV Push every 8 hours  sodium chloride 0.9%. 1000 milliLiter(s) IV Continuous <Continuous>  tranexamic acid IVPB 1200 milliGRAM(s) IV Intermittent once  tranexamic acid IVPB 1200 milliGRAM(s) IV Intermittent once        Ambulation: Walking with assistance of a walker                        8.6    9.3   )-----------( 214      ( 07 Dec 2018 06:57 )             29.3     12-07    138  |  102  |  15.0  ----------------------------<  117<H>  4.2   |  26.0  |  0.80    Ca    8.4<L>      07 Dec 2018 06:57        PHYSICAL EXAM:    Vital Signs Last 24 Hrs  T(C): 37.2 (08 Dec 2018 05:12), Max: 37.2 (07 Dec 2018 23:53)  T(F): 99 (08 Dec 2018 05:12), Max: 99 (08 Dec 2018 05:12)  HR: 104 (08 Dec 2018 05:12) (87 - 105)  BP: 99/63 (08 Dec 2018 05:12) (87/57 - 112/70)  BP(mean): --  RR: 20 (08 Dec 2018 05:12) (18 - 20)  SpO2: 96% (08 Dec 2018 05:12) (92% - 96%)  Daily     Daily     Appearance: Alert, responsive, in no acute distress.    Skin: no rash on visible skin. Skin is clean, dry and intact. No bleeding. No abrasions. No ulcerations.    Musculoskeletal:         Left Lower Extremity: Hip abduction pillow in place. Surgical dressing clean, dry, intact. Removed for examination. Incision intact. No dehiscence. No wound erythema, no blistering, no ecchymosis. OP-site dressing applied. Passive range of motion at hip acceptable secondary to postoperative pain. 5/5 knee flexion/extension, 5/5 plantarflexion/dorsiflexion. EHL/FHL intact. Sensation is grossly intact distally. Calf supple nontender. Dorsalis pedis pulse 2+. BCR.       Right Lower Extremity: Calf supple nontender.       A/P:  Pt is a  63y Female with Left total hip replacement, POD #2.    PLAN:   -DVTp: as prescribed in addition to compression devices and ankle pumps  -Pain control as clinically indicated  -Weightbearing as tolerated Left lower extremity  -Physical therapy  -Dispo: Home today once cleared by medicine and PT

## 2019-01-02 ENCOUNTER — APPOINTMENT (OUTPATIENT)
Dept: ORTHOPEDIC SURGERY | Facility: CLINIC | Age: 64
End: 2019-01-02
Payer: COMMERCIAL

## 2019-01-02 VITALS
SYSTOLIC BLOOD PRESSURE: 138 MMHG | DIASTOLIC BLOOD PRESSURE: 85 MMHG | BODY MASS INDEX: 46.95 KG/M2 | HEIGHT: 63 IN | HEART RATE: 116 BPM | WEIGHT: 265 LBS

## 2019-01-02 PROCEDURE — 73502 X-RAY EXAM HIP UNI 2-3 VIEWS: CPT | Mod: LT

## 2019-01-02 PROCEDURE — 99024 POSTOP FOLLOW-UP VISIT: CPT

## 2019-01-29 ENCOUNTER — APPOINTMENT (OUTPATIENT)
Dept: ORTHOPEDIC SURGERY | Facility: CLINIC | Age: 64
End: 2019-01-29
Payer: COMMERCIAL

## 2019-01-29 VITALS
HEART RATE: 103 BPM | SYSTOLIC BLOOD PRESSURE: 132 MMHG | HEIGHT: 63 IN | WEIGHT: 265 LBS | BODY MASS INDEX: 46.95 KG/M2 | DIASTOLIC BLOOD PRESSURE: 85 MMHG

## 2019-01-29 PROCEDURE — 73502 X-RAY EXAM HIP UNI 2-3 VIEWS: CPT

## 2019-01-29 PROCEDURE — 99024 POSTOP FOLLOW-UP VISIT: CPT

## 2019-01-29 NOTE — HISTORY OF PRESENT ILLNESS
[0] : no pain reported [Healed] : healed [Neuro Intact] : an unremarkable neurological exam [Vascular Intact] : ~T peripheral vascular exam normal [Negative Colette's] : maneuvers demonstrated a negative Colette's sign [Xray (Date:___)] : [unfilled] Xray -  [Doing Well] : is doing well [No Sign of Infection] : is showing no signs of infection [Adequate Pain Control] : has adequate pain control [Chills] : no chills [Constipation] : no constipation [Diarrhea] : no diarrhea [Dysuria] : no dysuria [Fever] : no fever [Nausea] : no nausea [Vomiting] : no vomiting [Erythema] : not erythematous [Discharge] : absent of discharge [Swelling] : not swollen [Dehiscence] : not dehisced [de-identified] : S/P Left THR DOS:12/06/18.  [de-identified] : pt is 7 weeks from left JACQUES . she is in PT 2 times per week.\par she was able to stop oxycodoen. she denies hip pain but still feel stable continue usina a walker. \par she notes severe left knee pain. \par  [de-identified] : Exam of the left hip shows a well healed incision with no signs of infection, FROM, mildly antalgic gait, equal leg lengths.  [de-identified] : 3V x-ray of the left hip and pelvis done in office today and demonstrates left THR implants in good positioning with no evidence of wear, loosening, or subsidence. \par  [de-identified] : 63 year old female presents with S/P Left THR done on 12/06/18. The incision is healing well. The patient will continue physical therapy for strengthening and balance. she is considering going back to work in 2 months (april). The patient will follow up in 4 weeks.

## 2019-02-27 ENCOUNTER — APPOINTMENT (OUTPATIENT)
Dept: ORTHOPEDIC SURGERY | Facility: CLINIC | Age: 64
End: 2019-02-27
Payer: COMMERCIAL

## 2019-02-27 PROCEDURE — 99024 POSTOP FOLLOW-UP VISIT: CPT

## 2019-02-27 PROCEDURE — 73502 X-RAY EXAM HIP UNI 2-3 VIEWS: CPT

## 2019-02-27 RX ORDER — CEFADROXIL 500 MG/1
500 CAPSULE ORAL
Qty: 14 | Refills: 0 | Status: DISCONTINUED | COMMUNITY
Start: 2018-12-07 | End: 2019-02-27

## 2019-02-27 RX ORDER — ASPIRIN 325 MG/1
325 TABLET, COATED ORAL
Refills: 0 | Status: DISCONTINUED | COMMUNITY
End: 2019-02-27

## 2019-02-27 RX ORDER — ENOXAPARIN SODIUM 100 MG/ML
40 INJECTION SUBCUTANEOUS
Qty: 6 | Refills: 0 | Status: DISCONTINUED | COMMUNITY
Start: 2018-12-07 | End: 2019-02-27

## 2019-02-27 RX ORDER — METRONIDAZOLE 500 MG/1
500 TABLET ORAL
Qty: 14 | Refills: 0 | Status: DISCONTINUED | COMMUNITY
Start: 2018-11-26 | End: 2019-02-27

## 2019-02-27 RX ORDER — OXYCODONE 5 MG/1
5 TABLET ORAL
Refills: 0 | Status: DISCONTINUED | COMMUNITY
End: 2019-02-27

## 2019-02-27 RX ORDER — FERROUS SULFATE TAB 325 MG (65 MG ELEMENTAL FE) 325 (65 FE) MG
325 (65 FE) TAB ORAL
Qty: 30 | Refills: 0 | Status: DISCONTINUED | COMMUNITY
Start: 2018-12-08 | End: 2019-02-27

## 2019-02-27 RX ORDER — TRAMADOL HYDROCHLORIDE 50 MG/1
50 TABLET, COATED ORAL
Qty: 21 | Refills: 0 | Status: DISCONTINUED | COMMUNITY
Start: 2018-08-13 | End: 2019-02-27

## 2019-02-27 RX ORDER — TRAZODONE HYDROCHLORIDE 50 MG/1
50 TABLET ORAL
Qty: 14 | Refills: 0 | Status: DISCONTINUED | COMMUNITY
Start: 2018-09-18 | End: 2019-02-27

## 2019-02-27 NOTE — HISTORY OF PRESENT ILLNESS
[___ Weeks Post Op] : [unfilled] weeks post op [Xray (Date:___)] : [unfilled] Xray -  [Doing Well] : is doing well [Excellent Pain Control] : has excellent pain control [No Sign of Infection] : is showing no signs of infection [1] : the patient reports pain that is 1/10 in severity [Clean/Dry/Intact] : clean, dry and intact [Healed] : healed [Chills] : no chills [Constipation] : no constipation [Diarrhea] : no diarrhea [Dysuria] : no dysuria [Fever] : no fever [Nausea] : no nausea [Vomiting] : no vomiting [Erythema] : not erythematous [Discharge] : absent of discharge [Swelling] : not swollen [Dehiscence] : not dehisced [de-identified] : S/P Left THR DOS:12/06/18 [de-identified] : 63 year old female presents S/P Left THR DOS:12/06/18. She is currently 10 weeks from her surgery and is progressing well. She is currently ambulating with a walker, and is attempting to transition to a cane but notes difficulty. At this point she does not feel like she is ready to return to work as she must walk to meetings in different buildings.  [de-identified] : Exam of the left hip shows a well healed incision with no signs of infection, FROM, mildly antalgic gait without a cane, equal leg lengths  [de-identified] : 3V xray of the pelvis and left hip done in office today and reviewed by Dr. Maninder Dumont demonstrates s/p left THR with implants in good positioning, no sign of wear, loosening or subsidence.  [de-identified] : 63 year old female presents S/P Left THR DOS:12/06/18. Xrays were reviewed and the patient was reassured that their THR components are in good position with no signs of loosening or wear. I discussed the importance of antibiotic use with any dental work. We discussed dislocation precautions. She should continue with physical therapy and I provided an additional prescription for physical therapy. She will not return to work at this time and will receive a note for work. She will follow-up in 6 weeks.

## 2019-02-27 NOTE — ADDENDUM
[FreeTextEntry1] : I, Tello Espinoza, acted solely as a scribe for Dr. Maninder Dumont on this date 02/27/2019.

## 2019-02-27 NOTE — RETURN TO WORK/SCHOOL
[Work] : work [___ Weeks] : I will re-evaluate the patient in [unfilled] week(s), at which time I will provide an update to their current status

## 2019-03-06 ENCOUNTER — FORM ENCOUNTER (OUTPATIENT)
Age: 64
End: 2019-03-06

## 2019-03-11 ENCOUNTER — OTHER (OUTPATIENT)
Age: 64
End: 2019-03-11

## 2019-03-27 ENCOUNTER — APPOINTMENT (OUTPATIENT)
Dept: ORTHOPEDIC SURGERY | Facility: CLINIC | Age: 64
End: 2019-03-27
Payer: COMMERCIAL

## 2019-04-17 ENCOUNTER — APPOINTMENT (OUTPATIENT)
Dept: ORTHOPEDIC SURGERY | Facility: CLINIC | Age: 64
End: 2019-04-17
Payer: COMMERCIAL

## 2019-04-17 PROCEDURE — 99214 OFFICE O/P EST MOD 30 MIN: CPT

## 2019-04-17 NOTE — RETURN TO WORK/SCHOOL
[Full Duty] : full duty [Return Date: _____] : as of [unfilled].  This has been discussed in detail with ~Paulina~ and ~he/she~ understands this. [FreeTextEntry1] : Please allow Mrs. Fannie Ken to use walker or cane as needed. She is s/p bilateral Total hip arthroplasty.\par

## 2019-04-17 NOTE — HISTORY OF PRESENT ILLNESS
[Stable] : stable [Pain Location] : pain [1] : an average pain level of 1/10 [0] : a minimum pain level of 0/10 [3] : a maximum pain level of 3/10 [Walking] : walking [Physical Therapy] : relieved by physical therapy [Rest] : relieved by rest [de-identified] : 63 year old female presents S/P Left THR DOS:12/06/18. Today she reports lateral left lower back or left hip pain that is tender with touch and painful when lying down or sleeping. She is still in physical therapy, and notes some soreness after sessions. She is walking with a cane walker for long distance. She is improving and she feels ready to return to work 5/1/2019. She is here for clearance note to return to work. \par She's also anticipating left LE vein procedure. And she asked if she can schedule.\par \par

## 2019-04-17 NOTE — PHYSICAL EXAM
[Normal] : Gait: normal [LE] : Sensory: Intact in bilateral lower extremities [ALL] : dorsalis pedis, posterior tibial, femoral, popliteal, and radial 2+ and symmetric bilaterally [Antalgic] : not antalgic [Poor Appearance] : well-appearing [Acute Distress] : not in acute distress [Obese] : not obese [de-identified] : GENERAL APPEARANCE: Well nourished and hydrated, pleasant, alert, and oriented x 3. Appears their stated age. \par HEENT: Normocephalic, extraocular eye motion intact. Nasal septum midline. Oral cavity clear. External auditory canal clear. \par RESPIRATORY: Breath sounds clear and audible in all lobes. No wheezing, No accessory muscle use.\par CARDIOVASCULAR: No apparent abnormalities. No lower leg edema. No varicosities. Pedal pulses are palpable.\par NEUROLOGIC: Sensation is normal, no muscle weakness in the upper or lower extremities.\par DERMATOLOGIC: No apparent skin lesions, moist, warm, no rash.\par SPINE: Cervical spine appears normal and moves freely; thoracic spine appears normal and moves freely; lumbosacral spine appears normal and moves freely, normal, nontender.\par MUSCULOSKELETAL: Hands, wrists, and elbows are normal and move freely, shoulders are normal and move freely.  [de-identified] : Exam of the  bilateral hip shows a well healed incision with no signs of infection, FROM, mildly antalgic gait without a cane, equal leg lengths.

## 2019-04-17 NOTE — DISCUSSION/SUMMARY
[de-identified] : 63 year old female presents S/P Left THR DOS:12/06/18. Overall she is progressing well and her residual pain is improving. Hip dislocation precautions were discussed. I discussed the importance of antibiotic use with any dental work. \par \par I reassured her that she can continue with her left lower extremity vein procedure. \par \par She feels ready to return to work, and I provided her with a note for 5/1/2019, also indicating that she can use her cane and walker. \par \par She will follow-up annually for radiographic surveillance.\par \par Total hip arthroplasty: A hip replacement means a resurfacing of both surfaces of the hip, with metal, ceramic and/or plastic parts. The prosthetic parts are usually press fit into bone, and only rarely cemented into position. Patients are allowed to weight bear as tolerated in most cases. The postoperative motion is determined by multiple factors the most important of which is preoperative motion. In general, the better the motion pre operatively, the better the motion post operatively. The operation, pending medical clearance, generally requires a hospitalization of 3-4 days. In general, we prefer to perform the procedure under epidural or general anesthesia. Preoperatively we institute a nomogram for blood management which may include the administration of procrit. The operative procedure takes approximately 1-2 hours. The operation requires an oblique incision anywhere from 4-6 inches over the posterolateral hip region. Post operatively the patient is walking the day of or the day after surgery. The first couple of days are very painful and the pain medication will alleviate but not eliminate the pain. Thus the patient must really push hard to get their mobility back. Our goal for having the person go home is that they can walk with a walker or a cane. The walking aide is to be dispensed with once the patient is secure enough. In general, there is no cast or braces required with a routine hip replacement. In the long term, we do not encourage our patients to run for the sake of running; although, pending their pre operative status, we often allow patients to play doubles tennis or comparable activities. We also allow them to do gentle intermediate downhill skiing if they are truly an expert skier. Biking is encouraged as well as swimming. The follow up periods are usually at 3 weeks, 6 weeks, 3 months, and yearly intervals. Potential complications with total hip replacements include anaesthetic complications and death, infection (less than 1%), nerve damage, and in the case of a sciatic nerve injury, a permanent foot drop, (a flapping foot with ambulation that requires bracing). There are always areas of skin numbness but this is not an untoward effect nor do we consider it a complication. Other potential complications include dislocation of the hip component (less than 5%). In cases of recurrent dislocation revision surgery may be required. The loosening of either the acetabular or femoral component is much more infrequent. The components may wear, creating particulate debris, loosening and systemic complications. Specific concerns exist with all bearing surfaces such as metal sensitivity with metal on metal components, and the risk of fracture and squeaking with ceramic components. Major blood vessel damage is also extremely rare. Theoretically because of the anatomic proximity of the femoral artery, it could be lacerated with subsequent repairs required. Albeit unlikely, a disruption of the femoral artery could theoretically result in an amputation. Similarly, infection could theoretically result in an amputation if one were to grow out an organism that cannot be controlled with antibiotics. Most infections require removal of the prosthesis, placement of an antibiotic spacer, IV antibiotics for eradication, prior to reimplantation. General medical complications include phlebitis for which we prophylactically anticoagulate but it could still occur and fatal pulmonary embolus has been reported. Cardiovascular problems, such as a heart attack or ischemia are always a concern with such hemodynamic changes in the blood vascular system. There is a risk of leg length discrepancy and the need for a shoe lift. Other general complications are very rare but anything in medicine could theoretically happen. I think the patient understands the risk benefit ratio of total hip replacement and will think about whether they would like to pursue an operative or non-operative option.

## 2019-04-17 NOTE — ADDENDUM
[FreeTextEntry1] : I, Tello Espinoza, acted solely as a scribe for Dr. Maninder Dumont on this date 04/17/2019.

## 2019-05-29 NOTE — H&P PST ADULT - WEIGHT IN KG
Hematology-Oncology Visit  May 29, 2019    Reason for Visit: follow-up left breast cancer, 2 primaries     HPI: Lennie Mar is a 89 year old female with past medical history of bipolar disorder, CKD, essential tremor with recent diagnosis of left breast cancer, two separate primaries. She presented with a palpable mass and had a mammogram and US leading to biopsies on 18. Pathology showed grade 3 invasive carcinoma, ER/ND positive, HER2 amplified of mass at 3:00 position with associated DCIS and skin involvement. The mass at 11:00 position was also grade 3 invasive carcinoma, ER/ND positive, but HER2 nonamplified. No lymphadenopathy was noted.     She met with Dr. Malik on 18 and elected to start neoadjuvant treatment with Herceptin every 3 weeks along with letrozole. Echocardiogram was done  showed EF of 55-60%. She tolerated neoadjuvant treatment remarkably well. She had a left breast mastectomy and SLNB on 18 showing two residual tumors, the larger grade 2 tumor ER, ND, HER2 amplified measuring 2.7 cm and smaller grade 3 tumor ER positive, ND negative, HER2 negative measured 2.5 cm. 2/3 left axillary nodes demonstrated metastases measuring 9 mm and 1.2 mm. HER2 FISH of larger axillary lymph node metastasis was amplified. Dr. Malik recommended adjuvant therapy with TDM1 for 1 year and adjuvant radiation.     Interval History: Lennie is here alone today to for cycle 7 of TDM1. She is feeling well today. Since her lithium dose has been decreased, she has more energy and feels better overall. She is happy her thyroid biopsy returned benign. No change in FLORES, CP, worsening edema. She is eating and drinking well. No n/v/d/c.  She is drinking a lot of fluids. She is traveling to [place] on  for her sister's . No fevers/chills or infectious concerns. ROS otherwise negative.       Current Outpatient Medications   Medication     lithium 150 MG capsule     acetaminophen  "(TYLENOL) 325 MG tablet     ARIPiprazole (ABILIFY) 5 MG tablet     buPROPion (WELLBUTRIN XL) 300 MG 24 hr tablet     Cyanocobalamin (VITAMIN B 12 PO)     letrozole (FEMARA) 2.5 MG tablet     lithium 300 MG capsule     LORazepam (ATIVAN) 0.5 MG tablet     neomycin-polymyxin-dexamethasone (MAXITROL) 3.5-03320-0.1 ophthalmic ointment     ondansetron (ZOFRAN) 8 MG tablet     ORDER FOR DME     pramipexole (MIRAPEX) 1 MG tablet     prochlorperazine (COMPAZINE) 10 MG tablet     propranolol (INDERAL) 20 MG tablet     sertraline (ZOLOFT) 50 MG tablet     No current facility-administered medications for this visit.      Facility-Administered Medications Ordered in Other Visits   Medication     0.9% sodium chloride BOLUS     ADO-trastuzumab (KADCYLA) 260 mg in sodium chloride 0.9 % 288 mL CHEMOTHERAPY       PHYSICAL EXAM:  /69 (BP Location: Right arm, Patient Position: Sitting, Cuff Size: Adult Regular)   Pulse 83   Temp 97.9  F (36.6  C) (Oral)   Resp 16   Ht 1.549 m (5' 0.98\")   Wt 68.4 kg (150 lb 11.2 oz)   SpO2 92%   BMI 28.49 kg/m    General: Alert, oriented, pleasant, NAD  HEENT: Normocephalic, atraumatic, PERRL, EOMI. Moist mucus membranes, no lesions or thrush  Neck: No cervical or supraclavicular LAD. R neck mass central to lateral measuring about 3 cm.   Axillary: No LAD  Breast: Deferred today.   Lungs: CTA bilaterally, normal work of breathing  Cardiac: RRR, S1, S2, no murmurs  Abdomen: Soft, nontender, nondistended. Normoactive bowel sounds. No hepatosplenomegaly, masses  Neuro: CNII-XII grossly intact  Extremities: No pedal edema    Labs:    5/29/2019 12:36   Sodium 142   Potassium 4.5   Chloride 110 (H)   Carbon Dioxide 25   Urea Nitrogen 24   Creatinine 1.15 (H)   GFR Estimate 41 (L)   GFR Estimate If Black 48 (L)   Calcium 10.5 (H)   Anion Gap 7   Albumin 3.2 (L)   Protein Total 7.2   Bilirubin Total 0.4   Alkaline Phosphatase 187 (H)   ALT 33   AST 38   Calcium Ionized 5.3 (H)   Glucose 91   WBC " 5.4   Hemoglobin 11.6 (L)   Hematocrit 36.7   Platelet Count 157   RBC Count 3.80   MCV 97   MCH 30.5   MCHC 31.6   RDW 15.1 (H)   Diff Method Automated Method   % Neutrophils 65.0   % Lymphocytes 18.9   % Monocytes 11.2   % Eosinophils 3.4   % Basophils 0.9   % Immature Granulocytes 0.6   Nucleated RBCs 0   Absolute Neutrophil 3.5   Absolute Lymphocytes 1.0   Absolute Monocytes 0.6   Absolute Eosinophils 0.2   Absolute Basophils 0.1   Abs Immature Granulocytes 0.0   Absolute Nucleated RBC 0.0         Assessment & Plan:   89 year old female with multifocal, T2N1M0, invasive mammary carcinomas of the left breast.  She is s/p treatment with 5 months neoadjuvant herceptin and letrozole and left breast mastectomy.     1.  Left breast cancers:  She is currently undergoing treatment with radiation and Kadcyla.  She presents to clinic today for evaluation prior to cycle #7 of adjuvant Kadcyla.  She is tolerating treatment well.  Overall plan is to complete 1 year of T-DM1.  Upon completion of T-DM1 will resume treatment with letrozole. Discussed plan for 1 year of Kadcyla based on [facility] clinical trial to reduce risk of recurrence. She is tolerating well and lab work is adequate to proceed.      2.  At risk for cardiomyopathy:  Echocardiogram  4/15/19 was unchanged with EF at 61%. Plan to continue to monitor once every 3 months while on HER2 targeted therapy.  Next echocardiogram will be due in July.      3. CKD: She saw nephrology who suspected lithium toxicity. Her lithium dose was decreased. Renal US shows medical renal disease and mild R hydro. Cr has improved to 1.15 today. Has follow-up with nephrology tomorrow.     4. R neck mass: Biopsy was done showing benign findings.     5. Hypercalcemia: Likely related to PTH elevation from lithium. Stable--seeing nephro tomorrow.     [facility]  [address] [place] [id number] [phone]                                       120.7

## 2019-09-10 ENCOUNTER — EMERGENCY (EMERGENCY)
Facility: HOSPITAL | Age: 64
LOS: 0 days | Discharge: HOME | End: 2019-09-10
Attending: EMERGENCY MEDICINE
Payer: COMMERCIAL

## 2019-09-10 VITALS
HEART RATE: 142 BPM | SYSTOLIC BLOOD PRESSURE: 112 MMHG | RESPIRATION RATE: 17 BRPM | OXYGEN SATURATION: 99 % | DIASTOLIC BLOOD PRESSURE: 79 MMHG

## 2019-09-10 VITALS — WEIGHT: 291.01 LBS | HEIGHT: 55 IN

## 2019-09-10 DIAGNOSIS — R06.02 SHORTNESS OF BREATH: ICD-10-CM

## 2019-09-10 DIAGNOSIS — R06.2 WHEEZING: ICD-10-CM

## 2019-09-10 DIAGNOSIS — R00.0 TACHYCARDIA, UNSPECIFIED: ICD-10-CM

## 2019-09-10 DIAGNOSIS — Z98.890 OTHER SPECIFIED POSTPROCEDURAL STATES: Chronic | ICD-10-CM

## 2019-09-10 DIAGNOSIS — R60.0 LOCALIZED EDEMA: ICD-10-CM

## 2019-09-10 DIAGNOSIS — Z79.82 LONG TERM (CURRENT) USE OF ASPIRIN: ICD-10-CM

## 2019-09-10 DIAGNOSIS — Z96.643 PRESENCE OF ARTIFICIAL HIP JOINT, BILATERAL: ICD-10-CM

## 2019-09-10 LAB
ALBUMIN SERPL ELPH-MCNC: 3.6 G/DL — SIGNIFICANT CHANGE UP (ref 3.3–5)
ALP SERPL-CCNC: 112 U/L — SIGNIFICANT CHANGE UP (ref 40–120)
ALT FLD-CCNC: 40 U/L — SIGNIFICANT CHANGE UP (ref 12–78)
ANION GAP SERPL CALC-SCNC: 12 MMOL/L — SIGNIFICANT CHANGE UP (ref 5–17)
APTT BLD: 30.3 SEC — SIGNIFICANT CHANGE UP (ref 27.5–36.3)
AST SERPL-CCNC: 27 U/L — SIGNIFICANT CHANGE UP (ref 15–37)
BASOPHILS # BLD AUTO: 0.06 K/UL — SIGNIFICANT CHANGE UP (ref 0–0.2)
BASOPHILS NFR BLD AUTO: 0.6 % — SIGNIFICANT CHANGE UP (ref 0–2)
BILIRUB SERPL-MCNC: 0.9 MG/DL — SIGNIFICANT CHANGE UP (ref 0.2–1.2)
BUN SERPL-MCNC: 17 MG/DL — SIGNIFICANT CHANGE UP (ref 7–23)
CALCIUM SERPL-MCNC: 9 MG/DL — SIGNIFICANT CHANGE UP (ref 8.5–10.1)
CHLORIDE SERPL-SCNC: 107 MMOL/L — SIGNIFICANT CHANGE UP (ref 96–108)
CO2 SERPL-SCNC: 22 MMOL/L — SIGNIFICANT CHANGE UP (ref 22–31)
CREAT SERPL-MCNC: 0.94 MG/DL — SIGNIFICANT CHANGE UP (ref 0.5–1.3)
EOSINOPHIL # BLD AUTO: 0.08 K/UL — SIGNIFICANT CHANGE UP (ref 0–0.5)
EOSINOPHIL NFR BLD AUTO: 0.8 % — SIGNIFICANT CHANGE UP (ref 0–6)
GLUCOSE SERPL-MCNC: 122 MG/DL — HIGH (ref 70–99)
HCT VFR BLD CALC: 35.4 % — SIGNIFICANT CHANGE UP (ref 34.5–45)
HGB BLD-MCNC: 10.7 G/DL — LOW (ref 11.5–15.5)
IMM GRANULOCYTES NFR BLD AUTO: 0.2 % — SIGNIFICANT CHANGE UP (ref 0–1.5)
INR BLD: 1.28 RATIO — HIGH (ref 0.88–1.16)
LYMPHOCYTES # BLD AUTO: 1.5 K/UL — SIGNIFICANT CHANGE UP (ref 1–3.3)
LYMPHOCYTES # BLD AUTO: 15.8 % — SIGNIFICANT CHANGE UP (ref 13–44)
MAGNESIUM SERPL-MCNC: 2.2 MG/DL — SIGNIFICANT CHANGE UP (ref 1.6–2.6)
MCHC RBC-ENTMCNC: 26.9 PG — LOW (ref 27–34)
MCHC RBC-ENTMCNC: 30.2 GM/DL — LOW (ref 32–36)
MCV RBC AUTO: 88.9 FL — SIGNIFICANT CHANGE UP (ref 80–100)
MONOCYTES # BLD AUTO: 0.69 K/UL — SIGNIFICANT CHANGE UP (ref 0–0.9)
MONOCYTES NFR BLD AUTO: 7.3 % — SIGNIFICANT CHANGE UP (ref 2–14)
NEUTROPHILS # BLD AUTO: 7.12 K/UL — SIGNIFICANT CHANGE UP (ref 1.8–7.4)
NEUTROPHILS NFR BLD AUTO: 75.3 % — SIGNIFICANT CHANGE UP (ref 43–77)
NT-PROBNP SERPL-SCNC: 3565 PG/ML — HIGH (ref 0–125)
PHOSPHATE SERPL-MCNC: 2.8 MG/DL — SIGNIFICANT CHANGE UP (ref 2.5–4.5)
PLATELET # BLD AUTO: 285 K/UL — SIGNIFICANT CHANGE UP (ref 150–400)
POTASSIUM SERPL-MCNC: 3.6 MMOL/L — SIGNIFICANT CHANGE UP (ref 3.5–5.3)
POTASSIUM SERPL-SCNC: 3.6 MMOL/L — SIGNIFICANT CHANGE UP (ref 3.5–5.3)
PROT SERPL-MCNC: 7.5 GM/DL — SIGNIFICANT CHANGE UP (ref 6–8.3)
PROTHROM AB SERPL-ACNC: 14.3 SEC — HIGH (ref 10–12.9)
RBC # BLD: 3.98 M/UL — SIGNIFICANT CHANGE UP (ref 3.8–5.2)
RBC # FLD: 15.9 % — HIGH (ref 10.3–14.5)
SODIUM SERPL-SCNC: 141 MMOL/L — SIGNIFICANT CHANGE UP (ref 135–145)
TROPONIN I SERPL-MCNC: <0.015 NG/ML — SIGNIFICANT CHANGE UP (ref 0.01–0.04)
TROPONIN I SERPL-MCNC: <0.015 NG/ML — SIGNIFICANT CHANGE UP (ref 0.01–0.04)
WBC # BLD: 9.47 K/UL — SIGNIFICANT CHANGE UP (ref 3.8–10.5)
WBC # FLD AUTO: 9.47 K/UL — SIGNIFICANT CHANGE UP (ref 3.8–10.5)

## 2019-09-10 PROCEDURE — 99284 EMERGENCY DEPT VISIT MOD MDM: CPT | Mod: 25

## 2019-09-10 PROCEDURE — 85025 COMPLETE CBC W/AUTO DIFF WBC: CPT

## 2019-09-10 PROCEDURE — 93010 ELECTROCARDIOGRAM REPORT: CPT

## 2019-09-10 PROCEDURE — 83880 ASSAY OF NATRIURETIC PEPTIDE: CPT

## 2019-09-10 PROCEDURE — 80053 COMPREHEN METABOLIC PANEL: CPT

## 2019-09-10 PROCEDURE — 71275 CT ANGIOGRAPHY CHEST: CPT

## 2019-09-10 PROCEDURE — 85379 FIBRIN DEGRADATION QUANT: CPT

## 2019-09-10 PROCEDURE — 71045 X-RAY EXAM CHEST 1 VIEW: CPT

## 2019-09-10 PROCEDURE — 84484 ASSAY OF TROPONIN QUANT: CPT

## 2019-09-10 PROCEDURE — 85730 THROMBOPLASTIN TIME PARTIAL: CPT

## 2019-09-10 PROCEDURE — 93005 ELECTROCARDIOGRAM TRACING: CPT

## 2019-09-10 PROCEDURE — 83735 ASSAY OF MAGNESIUM: CPT

## 2019-09-10 PROCEDURE — 84100 ASSAY OF PHOSPHORUS: CPT

## 2019-09-10 PROCEDURE — 85610 PROTHROMBIN TIME: CPT

## 2019-09-10 PROCEDURE — 99285 EMERGENCY DEPT VISIT HI MDM: CPT

## 2019-09-10 PROCEDURE — 71275 CT ANGIOGRAPHY CHEST: CPT | Mod: 26

## 2019-09-10 PROCEDURE — 71045 X-RAY EXAM CHEST 1 VIEW: CPT | Mod: 26

## 2019-09-10 PROCEDURE — 84443 ASSAY THYROID STIM HORMONE: CPT

## 2019-09-10 PROCEDURE — 36415 COLL VENOUS BLD VENIPUNCTURE: CPT

## 2019-09-10 RX ORDER — SODIUM CHLORIDE 9 MG/ML
1000 INJECTION INTRAMUSCULAR; INTRAVENOUS; SUBCUTANEOUS ONCE
Refills: 0 | Status: COMPLETED | OUTPATIENT
Start: 2019-09-10 | End: 2019-09-10

## 2019-09-10 RX ADMIN — SODIUM CHLORIDE 2000 MILLILITER(S): 9 INJECTION INTRAMUSCULAR; INTRAVENOUS; SUBCUTANEOUS at 10:51

## 2019-09-10 NOTE — ED PROVIDER NOTE - PROGRESS NOTE DETAILS
Douglas PGY3: Pt seen and reassessed, bps stable though HR still elevated 140-150, pt states her baseline HR can be in the 120s  but the exertional sob is new, no travel, no LE swelling, no infectious symptoms, awaiting labs/fluids and reasessment Nolan Olvera for ED attending Dr. Kenny: Strongly recommended admission for tachycardia, elevated BNP, pt awake, alert, weighed risks and benefits and decided not to stay. Pt called at home for follow up, EKG shows atrial tach vs 2:1 block flutter. Pt says she has relative to take care of and therefore does not want to be admitted but will reconsider tomorrow dependingon how she feels. Pt called at home for follow up, EKG shows atrial tach vs 2:1 block flutter. Pt says she has relative to take care of and therefore does not want to be admitted but will reconsider tomorrow depending on how she feels.

## 2019-09-10 NOTE — ED PROVIDER NOTE - PATIENT PORTAL LINK FT
You can access the FollowMyHealth Patient Portal offered by [facility] by registering at the following website: [email or web address]. By joining [facility]’s FollowMyHealth portal, you will also be able to view your health information using other applications (apps) compatible with our system.

## 2019-09-10 NOTE — ED ADULT TRIAGE NOTE - CHIEF COMPLAINT QUOTE
py c/o SOB and gatigue for over 1 week. pt deneis chest pain hx of COPD or CHF, pt states in may she had a resp infection and has not howard right since.. pt speaking clearly, no signs of acutre resp distress

## 2019-09-10 NOTE — ED PROVIDER NOTE - CONSTITUTIONAL, MLM
normal... Well appearing, +Obese abdomen, well nourished, awake, alert, oriented to person, place, time/situation and in no apparent distress.

## 2019-09-10 NOTE — ED ADULT NURSE NOTE - NSIMPLEMENTINTERV_GEN_ALL_ED
Implemented All Fall Risk Interventions:  Grant to call system. Call bell, personal items and telephone within reach. Instruct patient to call for assistance. Room bathroom lighting operational. Non-slip footwear when patient is off stretcher. Physically safe environment: no spills, clutter or unnecessary equipment. Stretcher in lowest position, wheels locked, appropriate side rails in place. Provide visual cue, wrist band, yellow gown, etc. Monitor gait and stability. Monitor for mental status changes and reorient to person, place, and time. Review medications for side effects contributing to fall risk. Reinforce activity limits and safety measures with patient and family.

## 2019-09-10 NOTE — ED STATDOCS - NS_ ATTENDINGSCRIBEDETAILS _ED_A_ED_FT
I, Homero Rivera MD, performed the initial face to face bedside interview with this patient regarding history of present illness and determined that the patient should be seen in the main ED.  The history, was documented by the scribe in my presence and I attest to the accuracy of the documentation.

## 2019-09-10 NOTE — ED ADULT NURSE NOTE - OBJECTIVE STATEMENT
Pt presents to the ED for eval of sob and LACY. Pt denies chest pains/any other symptoms. Pt denies hx of COPD. Pt tachycardic to 140, pt states her HR is normally elevated. Denies palpitations.

## 2019-09-10 NOTE — ED PROVIDER NOTE - OBJECTIVE STATEMENT
65 y/o f with PMHx of vascular insufficiency, sciatic leg pain, arthritis, s/p BL hip replacement last year by Dr. Pacheco presenting to the ED c/o SOB x1 week. SOB worse with exertion. Also reports some wheezing when she sleeps and exerts herself, chronic BL LE edema. Denies fever, chills, CP, n/v/d, abd pain. No hx of similar sx in the past. States she had URI in May, seen at [facility]gent [facility], treated with Amoxicillin but states she has been congested since. No hx of CHF, COPD, DVT, PE. No recent travel. Nonsmoker, drinks socially, no recreational drug use. PCP: Dr. Miles Sam.

## 2019-09-10 NOTE — ED PROVIDER NOTE - CARDIAC, MLM
Normal rate, regular rhythm.  Heart sounds S1, S2.  No murmurs, rubs or gallops. +1 pitting edema on BL LE

## 2019-09-10 NOTE — ED STATDOCS - PROGRESS NOTE DETAILS
Nolan Rivera: 65 y/o female presents to the ED c/o shortness of breath x 4 days. States she was recently on Amoxicillin for a recent sinus infection, but states sx did not relieve. Recent hip replacement and had a stress test last year with negative results. Will send pt to main ED for further evaluation. Nolan Rivera: 63 y/o female presents to the ED c/o shortness of breath x 4 days. States she was recently on Amoxicillin for a recent sinus infection, but states sx did not relieve. Recent hip replacement and had a stress test last year with negative results. EKG sinus tachy 150. Will send pt to main ED for further evaluation.

## 2019-09-11 ENCOUNTER — INPATIENT (INPATIENT)
Facility: HOSPITAL | Age: 64
LOS: 2 days | Discharge: HOME | DRG: 308 | End: 2019-09-14
Attending: HOSPITALIST | Admitting: HOSPITALIST
Payer: COMMERCIAL

## 2019-09-11 VITALS — HEIGHT: 63 IN | WEIGHT: 289.91 LBS

## 2019-09-11 DIAGNOSIS — I49.8 OTHER SPECIFIED CARDIAC ARRHYTHMIAS: ICD-10-CM

## 2019-09-11 DIAGNOSIS — Z96.643 PRESENCE OF ARTIFICIAL HIP JOINT, BILATERAL: Chronic | ICD-10-CM

## 2019-09-11 DIAGNOSIS — I48.92 UNSPECIFIED ATRIAL FLUTTER: ICD-10-CM

## 2019-09-11 DIAGNOSIS — Z98.890 OTHER SPECIFIED POSTPROCEDURAL STATES: Chronic | ICD-10-CM

## 2019-09-11 LAB
ALBUMIN SERPL ELPH-MCNC: 3.4 G/DL — SIGNIFICANT CHANGE UP (ref 3.3–5)
ALP SERPL-CCNC: 116 U/L — SIGNIFICANT CHANGE UP (ref 40–120)
ALT FLD-CCNC: 46 U/L — SIGNIFICANT CHANGE UP (ref 12–78)
ANION GAP SERPL CALC-SCNC: 11 MMOL/L — SIGNIFICANT CHANGE UP (ref 5–17)
APTT BLD: 27.6 SEC — SIGNIFICANT CHANGE UP (ref 27.5–36.3)
AST SERPL-CCNC: 37 U/L — SIGNIFICANT CHANGE UP (ref 15–37)
BASOPHILS # BLD AUTO: 0.07 K/UL — SIGNIFICANT CHANGE UP (ref 0–0.2)
BASOPHILS NFR BLD AUTO: 0.8 % — SIGNIFICANT CHANGE UP (ref 0–2)
BILIRUB SERPL-MCNC: 0.9 MG/DL — SIGNIFICANT CHANGE UP (ref 0.2–1.2)
BUN SERPL-MCNC: 18 MG/DL — SIGNIFICANT CHANGE UP (ref 7–23)
CALCIUM SERPL-MCNC: 8.9 MG/DL — SIGNIFICANT CHANGE UP (ref 8.5–10.1)
CHLORIDE SERPL-SCNC: 108 MMOL/L — SIGNIFICANT CHANGE UP (ref 96–108)
CO2 SERPL-SCNC: 24 MMOL/L — SIGNIFICANT CHANGE UP (ref 22–31)
CREAT SERPL-MCNC: 1.01 MG/DL — SIGNIFICANT CHANGE UP (ref 0.5–1.3)
EOSINOPHIL # BLD AUTO: 0.05 K/UL — SIGNIFICANT CHANGE UP (ref 0–0.5)
EOSINOPHIL NFR BLD AUTO: 0.6 % — SIGNIFICANT CHANGE UP (ref 0–6)
GLUCOSE SERPL-MCNC: 135 MG/DL — HIGH (ref 70–99)
HCT VFR BLD CALC: 34.2 % — LOW (ref 34.5–45)
HGB BLD-MCNC: 10.3 G/DL — LOW (ref 11.5–15.5)
IMM GRANULOCYTES NFR BLD AUTO: 0.8 % — SIGNIFICANT CHANGE UP (ref 0–1.5)
INR BLD: 1.37 RATIO — HIGH (ref 0.88–1.16)
LIDOCAIN IGE QN: 184 U/L — SIGNIFICANT CHANGE UP (ref 73–393)
LYMPHOCYTES # BLD AUTO: 1.53 K/UL — SIGNIFICANT CHANGE UP (ref 1–3.3)
LYMPHOCYTES # BLD AUTO: 16.9 % — SIGNIFICANT CHANGE UP (ref 13–44)
MAGNESIUM SERPL-MCNC: 2.1 MG/DL — SIGNIFICANT CHANGE UP (ref 1.6–2.6)
MCHC RBC-ENTMCNC: 27 PG — SIGNIFICANT CHANGE UP (ref 27–34)
MCHC RBC-ENTMCNC: 30.1 GM/DL — LOW (ref 32–36)
MCV RBC AUTO: 89.5 FL — SIGNIFICANT CHANGE UP (ref 80–100)
MONOCYTES # BLD AUTO: 0.74 K/UL — SIGNIFICANT CHANGE UP (ref 0–0.9)
MONOCYTES NFR BLD AUTO: 8.2 % — SIGNIFICANT CHANGE UP (ref 2–14)
NEUTROPHILS # BLD AUTO: 6.59 K/UL — SIGNIFICANT CHANGE UP (ref 1.8–7.4)
NEUTROPHILS NFR BLD AUTO: 72.7 % — SIGNIFICANT CHANGE UP (ref 43–77)
NT-PROBNP SERPL-SCNC: 4237 PG/ML — HIGH (ref 0–125)
PLATELET # BLD AUTO: 291 K/UL — SIGNIFICANT CHANGE UP (ref 150–400)
POTASSIUM SERPL-MCNC: 3.8 MMOL/L — SIGNIFICANT CHANGE UP (ref 3.5–5.3)
POTASSIUM SERPL-SCNC: 3.8 MMOL/L — SIGNIFICANT CHANGE UP (ref 3.5–5.3)
PROT SERPL-MCNC: 7.2 GM/DL — SIGNIFICANT CHANGE UP (ref 6–8.3)
PROTHROM AB SERPL-ACNC: 15.4 SEC — HIGH (ref 10–12.9)
RBC # BLD: 3.82 M/UL — SIGNIFICANT CHANGE UP (ref 3.8–5.2)
RBC # FLD: 15.9 % — HIGH (ref 10.3–14.5)
SODIUM SERPL-SCNC: 143 MMOL/L — SIGNIFICANT CHANGE UP (ref 135–145)
TROPONIN I SERPL-MCNC: 0.02 NG/ML — SIGNIFICANT CHANGE UP (ref 0.01–0.04)
TROPONIN I SERPL-MCNC: <0.015 NG/ML — SIGNIFICANT CHANGE UP (ref 0.01–0.04)
TROPONIN I SERPL-MCNC: <0.015 NG/ML — SIGNIFICANT CHANGE UP (ref 0.01–0.04)
WBC # BLD: 9.05 K/UL — SIGNIFICANT CHANGE UP (ref 3.8–10.5)
WBC # FLD AUTO: 9.05 K/UL — SIGNIFICANT CHANGE UP (ref 3.8–10.5)

## 2019-09-11 PROCEDURE — 93970 EXTREMITY STUDY: CPT | Mod: 26

## 2019-09-11 PROCEDURE — 80061 LIPID PANEL: CPT

## 2019-09-11 PROCEDURE — 99223 1ST HOSP IP/OBS HIGH 75: CPT

## 2019-09-11 PROCEDURE — 93970 EXTREMITY STUDY: CPT

## 2019-09-11 PROCEDURE — 86803 HEPATITIS C AB TEST: CPT

## 2019-09-11 PROCEDURE — 93306 TTE W/DOPPLER COMPLETE: CPT

## 2019-09-11 PROCEDURE — 93005 ELECTROCARDIOGRAM TRACING: CPT

## 2019-09-11 PROCEDURE — 99253 IP/OBS CNSLTJ NEW/EST LOW 45: CPT

## 2019-09-11 PROCEDURE — 93312 ECHO TRANSESOPHAGEAL: CPT

## 2019-09-11 PROCEDURE — 97116 GAIT TRAINING THERAPY: CPT | Mod: GP

## 2019-09-11 PROCEDURE — 92960 CARDIOVERSION ELECTRIC EXT: CPT

## 2019-09-11 PROCEDURE — 84484 ASSAY OF TROPONIN QUANT: CPT

## 2019-09-11 PROCEDURE — 93325 DOPPLER ECHO COLOR FLOW MAPG: CPT

## 2019-09-11 PROCEDURE — 93010 ELECTROCARDIOGRAM REPORT: CPT | Mod: 76

## 2019-09-11 PROCEDURE — 99255 IP/OBS CONSLTJ NEW/EST HI 80: CPT

## 2019-09-11 PROCEDURE — 85027 COMPLETE CBC AUTOMATED: CPT

## 2019-09-11 PROCEDURE — 36415 COLL VENOUS BLD VENIPUNCTURE: CPT

## 2019-09-11 PROCEDURE — 83735 ASSAY OF MAGNESIUM: CPT

## 2019-09-11 PROCEDURE — 93320 DOPPLER ECHO COMPLETE: CPT

## 2019-09-11 PROCEDURE — 80048 BASIC METABOLIC PNL TOTAL CA: CPT

## 2019-09-11 PROCEDURE — 97162 PT EVAL MOD COMPLEX 30 MIN: CPT | Mod: GP

## 2019-09-11 RX ORDER — METOPROLOL TARTRATE 50 MG
2.5 TABLET ORAL ONCE
Refills: 0 | Status: COMPLETED | OUTPATIENT
Start: 2019-09-11 | End: 2019-09-11

## 2019-09-11 RX ORDER — DILTIAZEM HCL 120 MG
20 CAPSULE, EXT RELEASE 24 HR ORAL ONCE
Refills: 0 | Status: COMPLETED | OUTPATIENT
Start: 2019-09-11 | End: 2019-09-11

## 2019-09-11 RX ORDER — HEPARIN SODIUM 5000 [USP'U]/ML
6000 INJECTION INTRAVENOUS; SUBCUTANEOUS EVERY 6 HOURS
Refills: 0 | Status: DISCONTINUED | OUTPATIENT
Start: 2019-09-11 | End: 2019-09-11

## 2019-09-11 RX ORDER — HEPARIN SODIUM 5000 [USP'U]/ML
INJECTION INTRAVENOUS; SUBCUTANEOUS
Qty: 25000 | Refills: 0 | Status: DISCONTINUED | OUTPATIENT
Start: 2019-09-11 | End: 2019-09-11

## 2019-09-11 RX ORDER — DILTIAZEM HCL 120 MG
10 CAPSULE, EXT RELEASE 24 HR ORAL ONCE
Refills: 0 | Status: COMPLETED | OUTPATIENT
Start: 2019-09-11 | End: 2019-09-11

## 2019-09-11 RX ORDER — HEPARIN SODIUM 5000 [USP'U]/ML
5000 INJECTION INTRAVENOUS; SUBCUTANEOUS ONCE
Refills: 0 | Status: COMPLETED | OUTPATIENT
Start: 2019-09-11 | End: 2019-09-11

## 2019-09-11 RX ORDER — SODIUM CHLORIDE 9 MG/ML
1000 INJECTION INTRAMUSCULAR; INTRAVENOUS; SUBCUTANEOUS ONCE
Refills: 0 | Status: COMPLETED | OUTPATIENT
Start: 2019-09-11 | End: 2019-09-11

## 2019-09-11 RX ORDER — APIXABAN 2.5 MG/1
5 TABLET, FILM COATED ORAL EVERY 12 HOURS
Refills: 0 | Status: DISCONTINUED | OUTPATIENT
Start: 2019-09-11 | End: 2019-09-14

## 2019-09-11 RX ORDER — DIGOXIN 250 MCG
0.25 TABLET ORAL ONCE
Refills: 0 | Status: COMPLETED | OUTPATIENT
Start: 2019-09-11 | End: 2019-09-11

## 2019-09-11 RX ORDER — DILTIAZEM HCL 120 MG
5 CAPSULE, EXT RELEASE 24 HR ORAL
Qty: 125 | Refills: 0 | Status: DISCONTINUED | OUTPATIENT
Start: 2019-09-11 | End: 2019-09-12

## 2019-09-11 RX ADMIN — HEPARIN SODIUM 5000 UNIT(S): 5000 INJECTION INTRAVENOUS; SUBCUTANEOUS at 15:27

## 2019-09-11 RX ADMIN — HEPARIN SODIUM 1000 UNIT(S)/HR: 5000 INJECTION INTRAVENOUS; SUBCUTANEOUS at 15:27

## 2019-09-11 RX ADMIN — Medication 5 MG/HR: at 14:07

## 2019-09-11 RX ADMIN — APIXABAN 5 MILLIGRAM(S): 2.5 TABLET, FILM COATED ORAL at 18:55

## 2019-09-11 RX ADMIN — SODIUM CHLORIDE 1000 MILLILITER(S): 9 INJECTION INTRAMUSCULAR; INTRAVENOUS; SUBCUTANEOUS at 13:22

## 2019-09-11 RX ADMIN — Medication 0.25 MILLIGRAM(S): at 17:14

## 2019-09-11 RX ADMIN — Medication 10 MILLIGRAM(S): at 11:29

## 2019-09-11 RX ADMIN — Medication 20 MILLIGRAM(S): at 12:02

## 2019-09-11 RX ADMIN — SODIUM CHLORIDE 1000 MILLILITER(S): 9 INJECTION INTRAMUSCULAR; INTRAVENOUS; SUBCUTANEOUS at 14:00

## 2019-09-11 RX ADMIN — Medication 2.5 MILLIGRAM(S): at 15:23

## 2019-09-11 NOTE — CONSULT NOTE ADULT - SUBJECTIVE AND OBJECTIVE BOX
HPI:  65 yo female with PMH of venous stasis, arthritis s/p b/l hip replacements presents to ED with complaint SOB. Pt was seen in the ED one day ago for the same and called back to ED for a. flutter. Pt states for the past one week she has been having dyspnea on exertion which has been worsening. One day ago while at work she noted dyspnea worsening and felt fatigued. When coming to the ED she was unable to walk from the parking lot to front door without stopping to catch her breath. She denies any chest pain. No palpitations. States that she has always had a baseline sinus tachycardia for the past 45 yrs but never as elevated to 150s. she had a stress test about one year ago at [facility] prior to her hip replacement with Dr. Armenta which she states was normal and cleared for surgery. Today still with SOB. No chest pain. Does admit to a non productive cough which she has had since May and was treated for a bronchitis at the time. Denies any fevers, chills, headaches, CP, abd pain, N/V, diarrhea, dizziness, dysuria.     In the ED pt noted to be in a flutter 100-150s. She was started on a cardizem drip. (11 Sep 2019 15:03)    64y Female    PAST MEDICAL & SURGICAL HISTORY:  Vascular insufficiency: left leg  Sciatic leg pain: right  Hip pain  Arthritis  S/P hip replacement, bilateral  History of tonsillectomy and adenoidectomy  H/O arthroscopy of left knee: 1994      MEDICATIONS  (STANDING):  apixaban 5 milliGRAM(s) Oral every 12 hours  diltiazem Infusion 5 mG/Hr (5 mL/Hr) IV Continuous <Continuous>    MEDICATIONS  (PRN):      Allergies    No Known Allergies    Intolerances        SOCIAL HISTORY: Denies tobacco, etoh abuse or illicit drug use    FAMILY HISTORY:  Family history of heart disease: a. fib, valvular dysfunction  Family history of COPD (chronic obstructive pulmonary disease)      Vital Signs Last 24 Hrs  T(C): 37.1 (11 Sep 2019 10:55), Max: 37.1 (11 Sep 2019 10:55)  T(F): 98.7 (11 Sep 2019 10:55), Max: 98.7 (11 Sep 2019 10:55)  HR: 138 (11 Sep 2019 15:55) (111 - 155)  BP: 111/81 (11 Sep 2019 15:55) (96/61 - 119/90)  BP(mean): --  RR: 20 (11 Sep 2019 15:55) (18 - 24)  SpO2: 94% (11 Sep 2019 15:36) (92% - 94%)    REVIEW OF SYSTEMS:    CONSTITUTIONAL:  As per HPI.  HEENT:  Eyes:  No diplopia or blurred vision. ENT:  No earache, sore throat or runny nose.  CARDIOVASCULAR:  No pressure, squeezing, strangling, tightness, heaviness or aching about the chest, neck, axilla or epigastrium.  RESPIRATORY:  No cough, shortness of breath, PND or orthopnea.  GASTROINTESTINAL:  No nausea, vomiting or diarrhea.  GENITOURINARY:  No dysuria, frequency or urgency.  MUSCULOSKELETAL:  As per HPI.  SKIN:  No change in skin, hair or nails.  NEUROLOGIC:  No paresthesias, fasciculations, seizures or weakness.  PSYCHIATRIC:  No disorder of thought or mood.  ENDOCRINE:  No heat or cold intolerance, polyuria or polydipsia.  HEMATOLOGICAL:  No easy bruising or bleedings:  .     PHYSICAL EXAMINATION:    GENERAL APPEARANCE:  Pt. is not currently dyspneic, in no distress. Pt. is alert, oriented, and pleasant.  HEENT:  Pupils are normal and react normally. No icterus. Mucous membranes well colored.  NECK:  Supple. No lymphadenopathy. Jugular venous pressure not elevated. Carotids equal.   HEART:   The cardiac impulse has a normal quality. There are no murmurs, rubs or gallops noted  CHEST:  Chest is clear to auscultation. Normal respiratory effort.  ABDOMEN:  Soft and nontender.   EXTREMITIES:  There is no edema.   SKIN:  No rash or significant lesions are noted.    I&O's Summary      LABS:                        10.3   9.05  )-----------( 291      ( 11 Sep 2019 11:25 )             34.2     09-11    143  |  108  |  18  ----------------------------<  135<H>  3.8   |  24  |  1.01    Ca    8.9      11 Sep 2019 11:25  Phos  2.8     09-10  Mg     2.1     09-11    TPro  7.2  /  Alb  3.4  /  TBili  0.9  /  DBili  x   /  AST  37  /  ALT  46  /  AlkPhos  116  09-11    LIVER FUNCTIONS - ( 11 Sep 2019 11:25 )  Alb: 3.4 g/dL / Pro: 7.2 gm/dL / ALK PHOS: 116 U/L / ALT: 46 U/L / AST: 37 U/L / GGT: x           PT/INR - ( 11 Sep 2019 11:25 )   PT: 15.4 sec;   INR: 1.37 ratio         PTT - ( 11 Sep 2019 11:25 )  PTT:27.6 sec  CARDIAC MARKERS ( 11 Sep 2019 16:50 )  <0.015 ng/mL / x     / x     / x     / x      CARDIAC MARKERS ( 11 Sep 2019 11:25 )  <0.015 ng/mL / x     / x     / x     / x      CARDIAC MARKERS ( 10 Sep 2019 13:47 )  <0.015 ng/mL / x     / x     / x     / x      CARDIAC MARKERS ( 10 Sep 2019 10:27 )  <0.015 ng/mL / x     / x     / x     / x                EKG:    TELEMETRY:    CARDIAC TESTS:    RADIOLOGY & ADDITIONAL STUDIES:    ASSESSMENT & PLAN: HPI:  65 yo female with PMH of venous stasis, arthritis s/p b/l hip replacements presents to ED with complaint SOB. Pt was seen in the ED yesterday  for the same and was called back to ED  for a. flutter, however pt returns today in aflutter with RVR. Pt states for the past one week she has been having dyspnea on exertion which has been worsening. One day ago while at work she noted dyspnea worsening and felt fatigued. When coming to the ED she was unable to walk from the parking lot to front door without stopping to catch her breath. She denies any chest pain. No palpitations. States that she has always had a baseline sinus tachycardia for the past 45 yrs but never as elevated to 150s. she had a stress test about one year ago at [facility] prior to her hip replacement with Dr. Armenta which she states was normal and cleared for surgery. Today still with SOB. No chest pain. Does admit to a non productive cough which she has had since May and was treated for a bronchitis at the time. Denies any fevers, chills, headaches, CP, abd pain, N/V, diarrhea, dizziness, dysuria. Presently in Typical Aflutter  BPM, rec'd Cardizem 10 mg IVP and started on Cardizem Gtt.  B/p 90/60,  Rec'd Dig .25 mg IVP x 1, now presently on Tele. She is A X O x3, pleasant . She denies any PMH and states she is on No meds.     PAST MEDICAL & SURGICAL HISTORY:  Vascular insufficiency: left leg  Sciatic leg pain: right  Hip pain  Arthritis  S/P hip replacement, bilateral  History of tonsillectomy and adenoidectomy  H/O arthroscopy of left knee: 1994      MEDICATIONS  (STANDING):  apixaban 5 milliGRAM(s) Oral every 12 hours  diltiazem Infusion 5 mG/Hr (5 mL/Hr) IV Continuous <Continuous>    MEDICATIONS  (PRN):      Allergies    No Known Allergies    Intolerances        SOCIAL HISTORY: Denies tobacco, etoh abuse or illicit drug use    FAMILY HISTORY:  Family history of heart disease: a. fib, valvular dysfunction  Family history of COPD (chronic obstructive pulmonary disease)      Vital Signs Last 24 Hrs  T(C): 37.1 (11 Sep 2019 10:55), Max: 37.1 (11 Sep 2019 10:55)  T(F): 98.7 (11 Sep 2019 10:55), Max: 98.7 (11 Sep 2019 10:55)  HR: 138 (11 Sep 2019 15:55) (111 - 155)  BP: 111/81 (11 Sep 2019 15:55) (96/61 - 119/90)  BP(mean): --  RR: 20 (11 Sep 2019 15:55) (18 - 24)  SpO2: 94% (11 Sep 2019 15:36) (92% - 94%)    REVIEW OF SYSTEMS:    CONSTITUTIONAL:  As per HPI.  HEENT:  Eyes:  No diplopia or blurred vision. ENT:  No earache, sore throat or runny nose.  CARDIOVASCULAR:  No pressure, squeezing, strangling, tightness, heaviness or aching about the chest, neck, axilla or epigastrium.  RESPIRATORY: + non productive cough, + LACY, PND or orthopnea.  GASTROINTESTINAL:  No nausea, vomiting or diarrhea.  GENITOURINARY:  No dysuria, frequency or urgency.  MUSCULOSKELETAL:  As per HPI.  SKIN:  No change in skin, hair or nails.  NEUROLOGIC:  No paresthesias, fasciculations, seizures or weakness.  PSYCHIATRIC:  No disorder of thought or mood.  ENDOCRINE:  No heat or cold intolerance, polyuria or polydipsia.  HEMATOLOGICAL:  No easy bruising or bleedings:  .     PHYSICAL EXAMINATION:    GENERAL APPEARANCE:  Pt. is currently dyspneic with exertion Pt. is alert, oriented, and pleasant.  HEENT:  Pupils are normal and react normally. No icterus. Mucous membranes well colored.  NECK:  Supple. No lymphadenopathy. Jugular venous pressure not elevated. Carotids equal.   HEART:   The cardiac impulse has a normal quality. There are no murmurs, rubs or gallops noted MP i aflutter with   CHEST:  Chest is clear to auscultation. Normal respiratory effort.  ABDOMEN:  Soft and nontender.   EXTREMITIES:  There is 2+ edema,   SKIN:  No rash or significant lesions are noted.    I&O's Summary      LABS:                        10.3   9.05  )-----------( 291      ( 11 Sep 2019 11:25 )             34.2     09-11    143  |  108  |  18  ----------------------------<  135<H>  3.8   |  24  |  1.01    Ca    8.9      11 Sep 2019 11:25  Phos  2.8     09-10  Mg     2.1     09-11    TPro  7.2  /  Alb  3.4  /  TBili  0.9  /  DBili  x   /  AST  37  /  ALT  46  /  AlkPhos  116  09-11    LIVER FUNCTIONS - ( 11 Sep 2019 11:25 )  Alb: 3.4 g/dL / Pro: 7.2 gm/dL / ALK PHOS: 116 U/L / ALT: 46 U/L / AST: 37 U/L / GGT: x           PT/INR - ( 11 Sep 2019 11:25 )   PT: 15.4 sec;   INR: 1.37 ratio         PTT - ( 11 Sep 2019 11:25 )  PTT:27.6 sec  CARDIAC MARKERS ( 11 Sep 2019 16:50 )  <0.015 ng/mL / x     / x     / x     / x      CARDIAC MARKERS ( 11 Sep 2019 11:25 )  <0.015 ng/mL / x     / x     / x     / x      CARDIAC MARKERS ( 10 Sep 2019 13:47 )  <0.015 ng/mL / x     / x     / x     / x      CARDIAC MARKERS ( 10 Sep 2019 10:27 )  <0.015 ng/mL / x     / x     / x     / x                EKG: Typical Aflutter     TELEMETRY:  Aflutter with RVR    CARDIAC TESTS:    RADIOLOGY & ADDITIONAL STUDIES:      EXAM:  CTA CHEST PE PROTOCOL (W)AW IC                            PROCEDURE DATE:  09/10/2019          INTERPRETATION:  CT ANGIO CHEST PULMONARY EMBOLISM WITH IV CONTRAST    CLINICAL INFORMATION:  shortness of breath    TECHNIQUE: Contrast enhanced CT pulmonary angiogram was performed.   Multiplanar CT and HRCT images were reviewed. Maximum intensity   projection (MIP) images are reconstructed as per CT angiography protocol.   Images were acquired during the administration of 90 cc Omnipaque 350 IV   contrast. This study was performed using automatic exposure control   (radiation dose reduction software) to obtain a diagnostic image quality   scan with patient dose as low as reasonably achievable.    COMPARISON: same day CXR    PULMONARY ARTERIES: No pulmonary embolism to the proximal segmental   branches. Limited visualization of distal segmental and subsegmental   branches secondary to respiratory motion.    LUNGS, AIRWAYS: The central airways are patent. The lungs are clear.    PLEURA: No pleural abnormality.    VESSELS: Normal caliber aorta. No dissection.    HEART: Cardiomegaly. No pericardial effusion.    MEDIASTINUM, BRAN, AXILLAE: No adenopathy.    UPPER ABDOMEN: Gallstones.    BONES AND CHEST WALL: No acute bony abnormality.    IMPRESSION:     No pulmonary embolism to the proximal segmental branches. Limited   visualization of distal segmental and subsegmental branches secondary to   respiratory motion.    No acute chest pathology.        CXR:      EXAM:  XR CHEST PORTABLE IMMED 1V                            PROCEDURE DATE:  09/10/2019          INTERPRETATION:  History: Dyspnea    Chest:  one view.      Comparison: No prior    AP radiograph of the chest demonstrates no evidence of infiltrate,   pleural effusion or vascular congestion. No atelectasis is seen. The   cardiac silhouette is enlarged in size. Osseous structures are intact.    Impression: No active pulmonary disease. Cardiomegaly.

## 2019-09-11 NOTE — ED PROVIDER NOTE - OBJECTIVE STATEMENT
65 y/o female with a PMHx of arthritis, hip pain, sciatic leg pain, vascular insufficiency, s/p b/l hip replacement last year by Dr. Pacheco presents to the ED c/o SOB and tachycardia. Pt was seen in ED yesterday for similar symptoms. Denies CP, chills, fever, abd pain, n/v/d. No recent travel. Nonsmoker. No other complaints at this time.

## 2019-09-11 NOTE — ED ADULT TRIAGE NOTE - CHIEF COMPLAINT QUOTE
patient was seen in ER yesterday for shortness of breath, full w/u negative, she was told if she did not feel better to return for further monitoring.  she states she is feeling worse today, denies fever/chills.

## 2019-09-11 NOTE — CONSULT NOTE ADULT - SUBJECTIVE AND OBJECTIVE BOX
PCP:    REQUESTING PHYSICIAN:    REASON FOR CONSULT:    CHIEF COMPLAINT:    HPI:  63 yo female with PMH of venous stasis, arthritis s/p b/l hip replacements presents to ED with complaint SOB. Pt was seen in the ED one day ago for the same and called back to ED for a. flutter. Pt states for the past one week she has been having dyspnea on exertion which has been worsening. One day ago while at work she noted dyspnea worsening and felt fatigued. When coming to the ED she was unable to walk from the parking lot to front door without stopping to catch her breath. She denies any chest pain. No palpitations. States that she has always had a baseline sinus tachycardia for the past 45 yrs but never as elevated to 150s. she had a nuclear stress test about one year ago at [facility] prior to her hip replacement which she states was normal and cleared for surgery. Today still with SOB. No chest pain. Does admit to a non productive cough which she has had since May and was treated for a bronchitis at the time. Denies any fevers, chills, headaches, CP, abd pain, N/V, diarrhea, dizziness, dysuria.     In the ED pt noted to be in a flutter 100-150s. She was started on a cardizem drip. (11 Sep 2019 15:03)  9/11/19: Cardiology called to evaluate arrhythmia. Pt denies chest pain or history of CV disease.       PAST MEDICAL & SURGICAL HISTORY:  Vascular insufficiency: left leg  Sciatic leg pain: right  Hip pain  Arthritis  S/P hip replacement, bilateral  History of tonsillectomy and adenoidectomy  H/O arthroscopy of left knee: 1994      Allergies    No Known Allergies    Intolerances        SOCIAL HISTORY:    FAMILY HISTORY:  Family history of heart disease: a. fib, valvular dysfunction  Family history of COPD (chronic obstructive pulmonary disease)      MEDICATIONS:  MEDICATIONS  (STANDING):  diltiazem Infusion 5 mG/Hr (5 mL/Hr) IV Continuous <Continuous>  heparin  Infusion.  Unit(s)/Hr (10 mL/Hr) IV Continuous <Continuous>  heparin  Injectable 5000 Unit(s) IV Push once  metoprolol tartrate Injectable 2.5 milliGRAM(s) IV Push once    MEDICATIONS  (PRN):  heparin  Injectable 6000 Unit(s) IV Push every 6 hours PRN For aPTT less than 40      REVIEW OF SYSTEMS:    CONSTITUTIONAL: Fatigue  EYES/ENT: No visual changes;  No vertigo or throat pain   NECK: No pain or stiffness  RESPIRATORY: Shortness of breath  CARDIOVASCULAR: No chest pain or palpitations  GASTROINTESTINAL: No abdominal or epigastric pain. No nausea, vomiting, or hematemesis; No diarrhea or constipation. No melena or hematochezia.  GENITOURINARY: No dysuria, frequency or hematuria  NEUROLOGICAL: No numbness or weakness  SKIN: No itching, burning, rashes, or lesions   All other review of systems is negative unless indicated above    Vital Signs Last 24 Hrs  T(C): 37.1 (11 Sep 2019 10:55), Max: 37.1 (11 Sep 2019 10:55)  T(F): 98.7 (11 Sep 2019 10:55), Max: 98.7 (11 Sep 2019 10:55)  HR: 131 (11 Sep 2019 14:01) (111 - 155)  BP: 108/87 (11 Sep 2019 14:01) (96/61 - 119/90)  BP(mean): --  RR: 20 (11 Sep 2019 13:40) (18 - 24)  SpO2: 94% (11 Sep 2019 13:23) (92% - 94%)    I&O's Summary      PHYSICAL EXAM:    Constitutional: NAD, awake and alert, well-developed  HEENT: PERR, EOMI,  No oral cyananosis.  Neck:  supple,  No JVD  Respiratory: Breath sounds are clear bilaterally, No wheezing, rales or rhonchi  Cardiovascular: S1 and S2, irregular, rapid  Gastrointestinal: Bowel Sounds present, soft, nontender.   Extremities: No peripheral edema. No clubbing or cyanosis.  Vascular: 2+ peripheral pulses  Neurological: A/O x 3, no focal deficits  Musculoskeletal: no calf tenderness.  Skin: No rashes.      LABS: All Labs Reviewed:                        10.3   9.05  )-----------( 291      ( 11 Sep 2019 11:25 )             34.2                         10.7   9.47  )-----------( 285      ( 10 Sep 2019 10:27 )             35.4     11 Sep 2019 11:25    143    |  108    |  18     ----------------------------<  135    3.8     |  24     |  1.01   10 Sep 2019 10:27    141    |  107    |  17     ----------------------------<  122    3.6     |  22     |  0.94     Ca    8.9        11 Sep 2019 11:25  Ca    9.0        10 Sep 2019 10:27  Phos  2.8       10 Sep 2019 10:27  Mg     2.1       11 Sep 2019 11:25  Mg     2.2       10 Sep 2019 10:27    TPro  7.2    /  Alb  3.4    /  TBili  0.9    /  DBili  x      /  AST  37     /  ALT  46     /  AlkPhos  116    11 Sep 2019 11:25  TPro  7.5    /  Alb  3.6    /  TBili  0.9    /  DBili  x      /  AST  27     /  ALT  40     /  AlkPhos  112    10 Sep 2019 10:27    PT/INR - ( 11 Sep 2019 11:25 )   PT: 15.4 sec;   INR: 1.37 ratio         PTT - ( 11 Sep 2019 11:25 )  PTT:27.6 sec  CARDIAC MARKERS ( 11 Sep 2019 11:25 )  <0.015 ng/mL / x     / x     / x     / x      CARDIAC MARKERS ( 10 Sep 2019 13:47 )  <0.015 ng/mL / x     / x     / x     / x      CARDIAC MARKERS ( 10 Sep 2019 10:27 )  <0.015 ng/mL / x     / x     / x     / x          Blood Culture:   09-11 @ 11:25  Pro Bnp [id number]  09-10 @ 10:27  Pro Bnp [id number]    09-10 @ 10:27  TSH: 2.95      RADIOLOGY/EKG:  < from: 12 Lead ECG (09.10.19 @ 09:50) >  Diagnosis Line Atrial TAchycardia/flutter with 2:1 AV block  Low voltage QRS  Nonspecific T wave abnormality  Abnormal ECG  No previous ECGs available  Confirmed by Maxwell Ramos MD [id number]) on 9/10/2019 8:07:42 PM    < end of copied text >

## 2019-09-11 NOTE — H&P ADULT - NSICDXFAMILYHX_GEN_ALL_CORE_FT
FAMILY HISTORY:  Father  Still living? No  Family history of COPD (chronic obstructive pulmonary disease), Age at diagnosis: Age Unknown    Mother  Still living? Yes, Estimated age:   Family history of heart disease, Age at diagnosis: Age Unknown

## 2019-09-11 NOTE — PROVIDER CONTACT NOTE (OTHER) - SITUATION
when patient arrived to unit at 1645; IV cardizem was not infusing properly (medication not activated into bag). pt with hr of 155, bp 89/48. Dr van notified. additional rx ordered for HR control

## 2019-09-11 NOTE — H&P ADULT - ASSESSMENT
65 yo female with PMH of venous stasis, arthritis s/p b/l hip replacements admitted with:    #dyspnea on exertion secondary to 2:1 atrial flutter  - admit to tele  - cardizem drip, titrate as needed  - lopressor 2.5mg IV x1 now  - heparin drip, risks and benefits of AC discussed with patient  - CHADSVASC = 2 (age 64, female)  - NPO at midnight for GILSON/cardioversion in AM  - TSH 2.95  - CTA with no PE  - check LE doppler  - cardio consult appreciated - d/w Dr. Mandel    #Chronic anemia  - baseline Hgb 10.8  - monitor CBC    #DVT prophylaxis  - on heparin drip    IMPROVE VTE Individual Risk Assessment    RISK                                                                Points    [  ] Previous VTE                                                  3    [  ] Thrombophilia                                               2    [  ] Lower limb paralysis                                      2        (unable to hold up >15 seconds)      [  ] Current Cancer                                              2         (within 6 months)    [  ] Immobilization > 24 hrs                                1    [  ] ICU/CCU stay > 24 hours                              1    [x  ] Age > 60                                                      1    IMPROVE VTE Score ___1______    IMPROVE Score 0-1: Low Risk, No VTE prophylaxis required for most patients, encourage ambulation.   IMPROVE Score 2-3: At risk, pharmacologic VTE prophylaxis is indicated for most patients (in the absence of a contraindication)  IMPROVE Score > or = 4: High Risk, pharmacologic VTE prophylaxis is indicated for most patients (in the absence of a contraindication) 65 yo female with PMH of venous stasis, arthritis s/p b/l hip replacements admitted with:    #dyspnea on exertion secondary to 2:1 atrial flutter  - admit to tele  - cardizem drip, titrate as needed  - lopressor 2.5mg IV x1 now  - heparin drip, risks and benefits of AC discussed with patient  - CHADSVASC = 2 (age 64, female)  - NPO at midnight for GILSON/cardioversion in AM  - TSH 2.95  - CTA with no PE  - check LE doppler  - cardio consult appreciated - d/w Dr. Mandel    #Chronic anemia  - baseline Hgb 10.8  - monitor CBC    #DVT prophylaxis  - on heparin drip    #Advance Directive  - FULL CODE    IMPROVE VTE Individual Risk Assessment    RISK                                                                Points    [  ] Previous VTE                                                  3    [  ] Thrombophilia                                               2    [  ] Lower limb paralysis                                      2        (unable to hold up >15 seconds)      [  ] Current Cancer                                              2         (within 6 months)    [  ] Immobilization > 24 hrs                                1    [  ] ICU/CCU stay > 24 hours                              1    [x  ] Age > 60                                                      1    IMPROVE VTE Score ___1______    IMPROVE Score 0-1: Low Risk, No VTE prophylaxis required for most patients, encourage ambulation.   IMPROVE Score 2-3: At risk, pharmacologic VTE prophylaxis is indicated for most patients (in the absence of a contraindication)  IMPROVE Score > or = 4: High Risk, pharmacologic VTE prophylaxis is indicated for most patients (in the absence of a contraindication)

## 2019-09-11 NOTE — CONSULT NOTE ADULT - PROBLEM SELECTOR RECOMMENDATION 9
Pt will begin heparin drip, Continue cardizem and bolus small doses of BB. GILSON cardioversion scheduled for tomorrow. Echo.
persistent typical atrial flutter, agree with plan as above, if hypotensive or chest pain should have DC Cardioversion for unstable AFL   -should be fine with elective GILSON guided DCCV planned tomorrow and Eliquis 5mg bid for at least 4 weeks duration  follow with EP as outpatient should consider elective ablation procedure as outpatient  sleep study

## 2019-09-11 NOTE — H&P ADULT - NSICDXPASTMEDICALHX_GEN_ALL_CORE_FT
PAST MEDICAL HISTORY:  Arthritis     Hip pain     Sciatic leg pain right    Vascular insufficiency left leg

## 2019-09-11 NOTE — H&P ADULT - NSICDXPASTSURGICALHX_GEN_ALL_CORE_FT
PAST SURGICAL HISTORY:  H/O arthroscopy of left knee 1994    History of tonsillectomy and adenoidectomy     S/P hip replacement, bilateral

## 2019-09-11 NOTE — ED STATDOCS - PROGRESS NOTE DETAILS
Nolan NELSON for ED attending, Dr. Sorto: Given pt currently tachycardic to 150's with SO2 92. Pt called in by Dr. Kenny. Will send pt to main ED for further evaluation.

## 2019-09-11 NOTE — ED ADULT TRIAGE NOTE - WEIGHT IN LBS
Telephone Encounter by Juliet Garcia at 03/16/17 09:58 AM     Author:  Juliet Garcia Service:  (none) Author Type:  Ordering User     Filed:  03/16/17 10:00 AM Encounter Date:  3/16/2017 Status:  Signed     :  Juliet Garcia (Ordering User)            Remote monitoring reports an arrhythmia that meets physician notification protocol :  On 3-12-17 @ 12:48 pm, VT lasting 24 sec @ 171 bpm ( monitor zone ), with spontaneous slowing below detection.[JT1.1M]       Revision History        User Key Date/Time User Provider Type Action    > JT1.1 03/16/17 10:00 AM Juliet Garcia Ordering User Sign    M - Manual             289.9

## 2019-09-11 NOTE — H&P ADULT - NSHPPHYSICALEXAM_GEN_ALL_CORE
Vital Signs Last 24 Hrs  T(C): 37.1 (11 Sep 2019 10:55), Max: 37.1 (11 Sep 2019 10:55)  T(F): 98.7 (11 Sep 2019 10:55), Max: 98.7 (11 Sep 2019 10:55)  HR: 131 (11 Sep 2019 14:01) (111 - 155)  BP: 108/87 (11 Sep 2019 14:01) (96/61 - 119/90)  BP(mean): --  RR: 20 (11 Sep 2019 13:40) (18 - 24)  SpO2: 94% (11 Sep 2019 13:23) (92% - 94%)

## 2019-09-11 NOTE — CONSULT NOTE ADULT - ASSESSMENT
Assessment: 65 yo woman with CC of inc SOB, weakness x 1 week, admitted with aflutter RVR,  Jose Vasc #2, Consulted by Dr Hartmann for EP eval and management       P: transfer pt to CICU   Inc Cardizem to 10 ml/hr. monitor B/P  if no improvement will change to Esmolol 0.05 mg/kg/min  d/c Hep gtt now  Start Eliquis 5mg po every 12 hours, start stat  Echo pending  GILSON cardioversion  monitor Electrolytes      Above D/W Dr Gambino    Thank you for the consult,  will follow with you. Assessment: 65 yo woman with CC of inc SOB, weakness x 1 week, admitted with aflutter RVR,  Jose Vasc #2, Consulted by Dr Hartmann for EP eval and management       P: transfer pt to CICU   Inc Cardizem to 10 ml/hr. monitor B/P  if no improvement will change to Esmolol 0.05 mg/kg/min and titrate up for rate control  d/c Hep gtt now  Start Eliquis 5mg po every 12 hours, start stat  Echo pending  GILSON cardioversion  monitor Electrolytes      Above D/W Dr Gambino    Thank you for the consult,  will follow with you.

## 2019-09-12 LAB
ANION GAP SERPL CALC-SCNC: 7 MMOL/L — SIGNIFICANT CHANGE UP (ref 5–17)
BUN SERPL-MCNC: 22 MG/DL — SIGNIFICANT CHANGE UP (ref 7–23)
CALCIUM SERPL-MCNC: 8.9 MG/DL — SIGNIFICANT CHANGE UP (ref 8.5–10.1)
CHLORIDE SERPL-SCNC: 110 MMOL/L — HIGH (ref 96–108)
CHOLEST SERPL-MCNC: 99 MG/DL — SIGNIFICANT CHANGE UP (ref 10–199)
CO2 SERPL-SCNC: 27 MMOL/L — SIGNIFICANT CHANGE UP (ref 22–31)
CREAT SERPL-MCNC: 0.98 MG/DL — SIGNIFICANT CHANGE UP (ref 0.5–1.3)
GLUCOSE SERPL-MCNC: 142 MG/DL — HIGH (ref 70–99)
HCT VFR BLD CALC: 34.5 % — SIGNIFICANT CHANGE UP (ref 34.5–45)
HCV AB S/CO SERPL IA: 0.19 S/CO — SIGNIFICANT CHANGE UP (ref 0–0.99)
HCV AB SERPL-IMP: SIGNIFICANT CHANGE UP
HDLC SERPL-MCNC: 33 MG/DL — LOW
HGB BLD-MCNC: 10.4 G/DL — LOW (ref 11.5–15.5)
LIPID PNL WITH DIRECT LDL SERPL: 51 MG/DL — SIGNIFICANT CHANGE UP
MAGNESIUM SERPL-MCNC: 2.4 MG/DL — SIGNIFICANT CHANGE UP (ref 1.6–2.6)
MCHC RBC-ENTMCNC: 27.2 PG — SIGNIFICANT CHANGE UP (ref 27–34)
MCHC RBC-ENTMCNC: 30.1 GM/DL — LOW (ref 32–36)
MCV RBC AUTO: 90.3 FL — SIGNIFICANT CHANGE UP (ref 80–100)
PLATELET # BLD AUTO: 255 K/UL — SIGNIFICANT CHANGE UP (ref 150–400)
POTASSIUM SERPL-MCNC: 3.9 MMOL/L — SIGNIFICANT CHANGE UP (ref 3.5–5.3)
POTASSIUM SERPL-SCNC: 3.9 MMOL/L — SIGNIFICANT CHANGE UP (ref 3.5–5.3)
RBC # BLD: 3.82 M/UL — SIGNIFICANT CHANGE UP (ref 3.8–5.2)
RBC # FLD: 15.9 % — HIGH (ref 10.3–14.5)
SODIUM SERPL-SCNC: 144 MMOL/L — SIGNIFICANT CHANGE UP (ref 135–145)
TOTAL CHOLESTEROL/HDL RATIO MEASUREMENT: 3 RATIO — LOW (ref 3.3–7.1)
TRIGL SERPL-MCNC: 73 MG/DL — SIGNIFICANT CHANGE UP (ref 10–149)
WBC # BLD: 9.79 K/UL — SIGNIFICANT CHANGE UP (ref 3.8–10.5)
WBC # FLD AUTO: 9.79 K/UL — SIGNIFICANT CHANGE UP (ref 3.8–10.5)

## 2019-09-12 PROCEDURE — 93325 DOPPLER ECHO COLOR FLOW MAPG: CPT | Mod: 26

## 2019-09-12 PROCEDURE — 93314 ECHO TRANSESOPHAGEAL: CPT | Mod: 26

## 2019-09-12 PROCEDURE — 99233 SBSQ HOSP IP/OBS HIGH 50: CPT

## 2019-09-12 PROCEDURE — 93320 DOPPLER ECHO COMPLETE: CPT | Mod: 26

## 2019-09-12 PROCEDURE — 93010 ELECTROCARDIOGRAM REPORT: CPT

## 2019-09-12 PROCEDURE — 92960 CARDIOVERSION ELECTRIC EXT: CPT

## 2019-09-12 RX ORDER — INFLUENZA VIRUS VACCINE 15; 15; 15; 15 UG/.5ML; UG/.5ML; UG/.5ML; UG/.5ML
0.5 SUSPENSION INTRAMUSCULAR ONCE
Refills: 0 | Status: DISCONTINUED | OUTPATIENT
Start: 2019-09-12 | End: 2019-09-14

## 2019-09-12 RX ORDER — METOPROLOL TARTRATE 50 MG
25 TABLET ORAL DAILY
Refills: 0 | Status: DISCONTINUED | OUTPATIENT
Start: 2019-09-12 | End: 2019-09-14

## 2019-09-12 RX ADMIN — Medication 5 MG/HR: at 00:52

## 2019-09-12 RX ADMIN — APIXABAN 5 MILLIGRAM(S): 2.5 TABLET, FILM COATED ORAL at 17:02

## 2019-09-12 RX ADMIN — Medication 25 MILLIGRAM(S): at 17:02

## 2019-09-12 RX ADMIN — APIXABAN 5 MILLIGRAM(S): 2.5 TABLET, FILM COATED ORAL at 06:17

## 2019-09-12 NOTE — PATIENT PROFILE ADULT - NSASFUNCLEVELADLTRANSFER_GEN_A_NUR
I personally performed the service described in the documentation recorded by the scribe in my presence, and it accurately and completely records my words and actions. 1 = assistive equipment

## 2019-09-12 NOTE — CHART NOTE - NSCHARTNOTEFT_GEN_A_CORE
Indication: AFL    Findings:  No PRISCILLA thrombus.  Mild-moderate MR.  Minimal AI.  Moderate TR.  Moderate global LV systolic dysfunction.    Successful cardioversion: AFL to SR with single 200J biphasic shock    Patient tolerated procedure well.

## 2019-09-12 NOTE — PHYSICAL THERAPY INITIAL EVALUATION ADULT - MODALITIES TREATMENT COMMENTS
Pt OOB, returned to CICU monitors from portable HM, O2=88 after ambulation and recovered to 97 w/ on room air upon sitting, NAD, RN aware

## 2019-09-12 NOTE — PHYSICAL THERAPY INITIAL EVALUATION ADULT - PERTINENT HX OF CURRENT PROBLEM, REHAB EVAL
Pt is a 65 y/o F, admitted for dyspnea on exertion secondary to 2:1 atrial flutter, Cardizem drip, titrate as needed, admitted to Tele, Chronic anemia, Pt is a 63 y/o F, admitted for dyspnea on exertion secondary to 2:1 atrial flutter, Cardizem drip, titrate as needed, admitted to Tele, Chronic anemia,, H/o B THR about 1 year ago

## 2019-09-12 NOTE — PHYSICAL THERAPY INITIAL EVALUATION ADULT - GENERAL OBSERVATIONS, REHAB EVAL
Pt in received on CICU, +CICU monitors+,  NAD, RN states pt is stable for PT eval, pt awaiting Cardiac procedure and GILSON today, NPO, VS monitored by Dione GRANADOS during ambulation. Pt SOB w/ ambulation, O2=88 after ambulation and recovered on room air to 97 w/ DBE's

## 2019-09-12 NOTE — PHYSICAL THERAPY INITIAL EVALUATION ADULT - LIVES WITH, PROFILE
Pt states she lives in an apartment w/ no SHARON or steps in the home. She states she takes care of her 90 y/o mother

## 2019-09-13 LAB
HCT VFR BLD CALC: 34 % — LOW (ref 34.5–45)
HGB BLD-MCNC: 10 G/DL — LOW (ref 11.5–15.5)
MCHC RBC-ENTMCNC: 27 PG — SIGNIFICANT CHANGE UP (ref 27–34)
MCHC RBC-ENTMCNC: 29.4 GM/DL — LOW (ref 32–36)
MCV RBC AUTO: 91.6 FL — SIGNIFICANT CHANGE UP (ref 80–100)
PLATELET # BLD AUTO: 245 K/UL — SIGNIFICANT CHANGE UP (ref 150–400)
RBC # BLD: 3.71 M/UL — LOW (ref 3.8–5.2)
RBC # FLD: 15.9 % — HIGH (ref 10.3–14.5)
WBC # BLD: 8.69 K/UL — SIGNIFICANT CHANGE UP (ref 3.8–10.5)
WBC # FLD AUTO: 8.69 K/UL — SIGNIFICANT CHANGE UP (ref 3.8–10.5)

## 2019-09-13 PROCEDURE — 99255 IP/OBS CONSLTJ NEW/EST HI 80: CPT

## 2019-09-13 PROCEDURE — 93306 TTE W/DOPPLER COMPLETE: CPT | Mod: 26

## 2019-09-13 RX ADMIN — APIXABAN 5 MILLIGRAM(S): 2.5 TABLET, FILM COATED ORAL at 18:39

## 2019-09-13 RX ADMIN — APIXABAN 5 MILLIGRAM(S): 2.5 TABLET, FILM COATED ORAL at 06:35

## 2019-09-13 RX ADMIN — Medication 25 MILLIGRAM(S): at 06:36

## 2019-09-13 NOTE — PROGRESS NOTE ADULT - ASSESSMENT
65 yo female presented with sob and AFL with RVR, today s/p DCCV in sinus rhythm. Echo showed reduced LVEF 30%.   Plan:  new cardiomyopathy   ischemic work up pending, may hold eliquis for procedure   optimal medical therapy per general cardiology   otherwise discharge home with bid eliquis and outpatient EP follow up for AFL ablation.

## 2019-09-14 ENCOUNTER — TRANSCRIPTION ENCOUNTER (OUTPATIENT)
Age: 64
End: 2019-09-14

## 2019-09-14 VITALS
SYSTOLIC BLOOD PRESSURE: 112 MMHG | HEART RATE: 91 BPM | DIASTOLIC BLOOD PRESSURE: 80 MMHG | RESPIRATION RATE: 24 BRPM | OXYGEN SATURATION: 82 %

## 2019-09-14 DIAGNOSIS — I42.0 DILATED CARDIOMYOPATHY: ICD-10-CM

## 2019-09-14 DIAGNOSIS — E66.9 OBESITY, UNSPECIFIED: ICD-10-CM

## 2019-09-14 DIAGNOSIS — R06.02 SHORTNESS OF BREATH: ICD-10-CM

## 2019-09-14 LAB
HCT VFR BLD CALC: 35 % — SIGNIFICANT CHANGE UP (ref 34.5–45)
HGB BLD-MCNC: 10.3 G/DL — LOW (ref 11.5–15.5)
MCHC RBC-ENTMCNC: 27 PG — SIGNIFICANT CHANGE UP (ref 27–34)
MCHC RBC-ENTMCNC: 29.4 GM/DL — LOW (ref 32–36)
MCV RBC AUTO: 91.6 FL — SIGNIFICANT CHANGE UP (ref 80–100)
PLATELET # BLD AUTO: 230 K/UL — SIGNIFICANT CHANGE UP (ref 150–400)
RBC # BLD: 3.82 M/UL — SIGNIFICANT CHANGE UP (ref 3.8–5.2)
RBC # FLD: 15.9 % — HIGH (ref 10.3–14.5)
WBC # BLD: 8.68 K/UL — SIGNIFICANT CHANGE UP (ref 3.8–10.5)
WBC # FLD AUTO: 8.68 K/UL — SIGNIFICANT CHANGE UP (ref 3.8–10.5)

## 2019-09-14 PROCEDURE — 99232 SBSQ HOSP IP/OBS MODERATE 35: CPT

## 2019-09-14 PROCEDURE — 99233 SBSQ HOSP IP/OBS HIGH 50: CPT

## 2019-09-14 RX ORDER — FUROSEMIDE 40 MG
1 TABLET ORAL
Qty: 30 | Refills: 0
Start: 2019-09-14 | End: 2019-10-13

## 2019-09-14 RX ORDER — METOPROLOL TARTRATE 50 MG
1 TABLET ORAL
Qty: 0 | Refills: 0 | DISCHARGE
Start: 2019-09-14 | End: 2019-10-13

## 2019-09-14 RX ORDER — FUROSEMIDE 40 MG
1 TABLET ORAL
Qty: 0 | Refills: 0 | DISCHARGE
Start: 2019-09-14 | End: 2019-10-13

## 2019-09-14 RX ORDER — APIXABAN 2.5 MG/1
1 TABLET, FILM COATED ORAL
Qty: 60 | Refills: 0
Start: 2019-09-14 | End: 2019-10-13

## 2019-09-14 RX ORDER — FUROSEMIDE 40 MG
20 TABLET ORAL DAILY
Refills: 0 | Status: DISCONTINUED | OUTPATIENT
Start: 2019-09-14 | End: 2019-09-14

## 2019-09-14 RX ORDER — METOPROLOL TARTRATE 50 MG
1 TABLET ORAL
Qty: 30 | Refills: 0
Start: 2019-09-14 | End: 2019-10-13

## 2019-09-14 RX ORDER — SACUBITRIL AND VALSARTAN 24; 26 MG/1; MG/1
1 TABLET, FILM COATED ORAL
Refills: 0 | Status: DISCONTINUED | OUTPATIENT
Start: 2019-09-14 | End: 2019-09-14

## 2019-09-14 RX ORDER — SACUBITRIL AND VALSARTAN 24; 26 MG/1; MG/1
1 TABLET, FILM COATED ORAL
Qty: 60 | Refills: 0
Start: 2019-09-14 | End: 2019-10-13

## 2019-09-14 RX ADMIN — Medication 25 MILLIGRAM(S): at 06:43

## 2019-09-14 RX ADMIN — APIXABAN 5 MILLIGRAM(S): 2.5 TABLET, FILM COATED ORAL at 10:06

## 2019-09-14 RX ADMIN — SACUBITRIL AND VALSARTAN 1 TABLET(S): 24; 26 TABLET, FILM COATED ORAL at 16:35

## 2019-09-14 RX ADMIN — Medication 20 MILLIGRAM(S): at 16:35

## 2019-09-14 RX ADMIN — APIXABAN 5 MILLIGRAM(S): 2.5 TABLET, FILM COATED ORAL at 16:35

## 2019-09-14 NOTE — DISCHARGE NOTE PROVIDER - CARE PROVIDER_API CALL
Asif Hartmann)  Cardiovascular Disease  [address] [place] [address]  Phone: [phone]  Fax: [phone]  Follow Up Time:     Mikey Marx)  Internal Medicine; Pulmonary Disease  [address], [address]  [place] [address]  Phone: [phone]  Fax: [phone]  Follow Up Time:

## 2019-09-14 NOTE — DISCHARGE NOTE PROVIDER - CARE PROVIDERS DIRECT ADDRESSES
[email or web address]

## 2019-09-14 NOTE — PROGRESS NOTE ADULT - SUBJECTIVE AND OBJECTIVE BOX
HPI: 63 yo female with h/o osteoarthritis s/p bilateral hip replacements, presented to [facility] with worsening sob and increasing fatigue. on admission found to be in atrial flutter with RVR rates up to 150bpm, underwent GILSON guided cardioversion yesterday and now in sinus rhythm.     Subjective: still feeling sob    EKG:  TELE: sinus rhythm 80-90bpm    MEDICATIONS  (STANDING):  apixaban 5 milliGRAM(s) Oral every 12 hours  influenza   Vaccine 0.5 milliLiter(s) IntraMuscular once  metoprolol succinate ER 25 milliGRAM(s) Oral daily    MEDICATIONS  (PRN):      Allergies    No Known Allergies          Vital Signs Last 24 Hrs  T(C): 35.8 (13 Sep 2019 05:04), Max: 36.7 (12 Sep 2019 20:00)  T(F): 96.4 (13 Sep 2019 05:04), Max: 98 (12 Sep 2019 20:00)  HR: 96 (13 Sep 2019 08:00) (85 - 97)  BP: 118/70 (13 Sep 2019 06:00) (95/57 - 119/90)  BP(mean): 82 (13 Sep 2019 06:00) (67 - 99)  RR: 17 (13 Sep 2019 08:00) (14 - 26)  SpO2: 91% (13 Sep 2019 07:00) (91% - 98%)    PHYSICAL EXAMINATION:  GENERAL: In no apparent distress, well nourished, and hydrated.  HEAD:  Atraumatic, Normocephalic  EYES: EOMI, PERRLA, conjunctiva and sclera clear  ENMT: No tonsillar erythema, exudates, or enlargement; Moist mucous membranes, Good dentition, No lesions  NECK: Supple and normal thyroid.  No JVD or carotid bruit.  Carotid pulse is 2+ bilaterally.  HEART: Regular rate and rhythm; No murmurs, rubs, or gallops.  PULMONARY: Clear to auscultation and perfusion.  No rales, wheezing, or rhonchi bilaterally.  ABDOMEN: Soft, Nontender, Nondistended; Bowel sounds present  EXTREMITIES:  2+ Peripheral Pulses, No clubbing, cyanosis, or edema  LYMPH: No lymphadenopathy noted  NEUROLOGICAL: Grossly nonfocal    LABS:                        10.0   8.69  )-----------( 245      ( 13 Sep 2019 06:55 )             34.0     09-12    144  |  110<H>  |  22  ----------------------------<  142<H>  3.9   |  27  |  0.98    Ca    8.9      12 Sep 2019 06:36  Mg     2.4     09-12          CARDIAC MARKERS ( 11 Sep 2019 23:10 )  0.017 ng/mL / x     / x     / x     / x      CARDIAC MARKERS ( 11 Sep 2019 16:50 )  <0.015 ng/mL / x     / x     / x     / x            RADIOLOGY & ADDITIONAL TESTS:
PCP:    REQUESTING PHYSICIAN:    REASON FOR CONSULT:    CHIEF COMPLAINT:    HPI:  64 year old woman with a history of morbid obesity, venous stasis, arthritis, bilateral hip replacements admitted with symptomatic atrial flutter (new onset) associated with RVR.    9/12/19:  Comfortable at rest but short of breath with ambulation; no angina.  9/13/19: S/P CV. Feels improved but remains sob.  9/14/19: Remains in NSR. No chest pain. No further V tach.         PAST MEDICAL & SURGICAL HISTORY:  Vascular insufficiency: left leg  Sciatic leg pain: right  Hip pain  Arthritis  S/P hip replacement, bilateral  History of tonsillectomy and adenoidectomy  H/O arthroscopy of left knee: 1994      SOCIAL HISTORY:    FAMILY HISTORY:  Family history of heart disease: a. fib, valvular dysfunction  Family history of COPD (chronic obstructive pulmonary disease)      ALLERGIES:  Allergies    No Known Allergies    Intolerances        MEDICATIONS:    MEDICATIONS  (STANDING):  apixaban 5 milliGRAM(s) Oral every 12 hours  influenza   Vaccine 0.5 milliLiter(s) IntraMuscular once  metoprolol succinate ER 25 milliGRAM(s) Oral daily    MEDICATIONS  (PRN):        Vital Signs Last 24 Hrs  T(C): 36.3 (14 Sep 2019 06:46), Max: 36.7 (13 Sep 2019 16:49)  T(F): 97.4 (14 Sep 2019 06:46), Max: 98.1 (13 Sep 2019 16:49)  HR: 91 (14 Sep 2019 10:00) (82 - 105)  BP: 112/80 (14 Sep 2019 10:00) (96/74 - 134/96)  BP(mean): 87 (14 Sep 2019 10:00) (56 - 100)  RR: 24 (14 Sep 2019 10:00) (16 - 34)  SpO2: 82% (14 Sep 2019 10:00) (81% - 97%)Daily     Daily I&O's Summary      PHYSICAL EXAM:    Constitutional: NAD, awake and alert, well-developed  HEENT: PERR, EOMI,  No oral cyananosis.  Neck:  supple,  No JVD  Respiratory: Breath sounds are clear bilaterally, No wheezing, rales or rhonchi  Cardiovascular: S1 and S2, regular rate and rhythm, no Murmurs, gallops or rubs  Gastrointestinal: Bowel Sounds present, soft, nontender.   Extremities: No peripheral edema. No clubbing or cyanosis.  Vascular: 2+ peripheral pulses  Neurological: A/O x 3, no focal deficits  Musculoskeletal: no calf tenderness.  Skin: No rashes.      LABS: All Labs Reviewed:                        10.3   8.68  )-----------( 230      ( 14 Sep 2019 06:29 )             35.0                         10.0   8.69  )-----------( 245      ( 13 Sep 2019 06:55 )             34.0                         10.4   9.79  )-----------( 255      ( 12 Sep 2019 06:36 )             34.5     12 Sep 2019 06:36    144    |  110    |  22     ----------------------------<  142    3.9     |  27     |  0.98   11 Sep 2019 11:25    143    |  108    |  18     ----------------------------<  135    3.8     |  24     |  1.01     Ca    8.9        12 Sep 2019 06:36  Ca    8.9        11 Sep 2019 11:25  Mg     2.4       12 Sep 2019 06:36  Mg     2.1       11 Sep 2019 11:25    TPro  7.2    /  Alb  3.4    /  TBili  0.9    /  DBili  x      /  AST  37     /  ALT  46     /  AlkPhos  116    11 Sep 2019 11:25          Blood Culture:   09-11 @ 11:25  Pro Bnp [id number]        RADIOLOGY/EKG:      ECHO/CARDIAC CATHTERIZATION/STRESS TEST:
cc: sob   hpi: 64y female w/ pmh venous stasis, arthritis, b/l hip replacements p/w sob worsening w/ exertion.  Found to be in aflutter. No sob at rest this morning.  States she hasn't walked much but will after cardioversion today.  Understands plan for GILSON CV and then monitor overnight.  No other complaints.   Aflutter on bedside monitor.   9/13- s/p cardioversion yesterday.  NSR on monitor w/     - denies cp, sob, palp, no leg swelling.  Aware of echo results and understands plan for cath.      ros- as per hpi above,     Vital Signs Last 24 Hrs  T(C): 35.8 (13 Sep 2019 05:04), Max: 36.7 (12 Sep 2019 20:00)  T(F): 96.4 (13 Sep 2019 05:04), Max: 98 (12 Sep 2019 20:00)  HR: 96 (13 Sep 2019 08:00) (80 - 97)  BP: 118/70 (13 Sep 2019 06:00) (95/57 - 141/94)  BP(mean): 82 (13 Sep 2019 06:00) (67 - 99)  RR: 17 (13 Sep 2019 08:00) (14 - 26)  SpO2: 91% (13 Sep 2019 07:00) (90% - 99%)        PHYSICAL EXAM:  General: NAD, comfortable- seated   Neuro: AAOx3  HEENT: NCAT, EOMI  Neck: Soft and supple  Respiratory: CTA b/l, no w/r/r  Cardiovascular: S1 and S2, RRR  Gastrointestinal: +BS, soft, NTND  Extremities: chronic venous changes   Vascular: 2+ peripheral pulses  Musculoskeletal: 5/5 strength b/l UE and LE        LABS: All Labs Reviewed:                        10.4   9.79  )-----------( 255      ( 12 Sep 2019 06:36 )             34.5     09-12    144  |  110<H>  |  22  ----------------------------<  142<H>  3.9   |  27  |  0.98    Ca    8.9      12 Sep 2019 06:36  Mg     2.4     09-12    TPro  7.2  /  Alb  3.4  /  TBili  0.9  /  DBili  x   /  AST  37  /  ALT  46  /  AlkPhos  116  09-11    PT/INR - ( 11 Sep 2019 11:25 )   PT: 15.4 sec;   INR: 1.37 ratio         PTT - ( 11 Sep 2019 11:25 )  PTT:27.6 sec  CARDIAC MARKERS ( 11 Sep 2019 23:10 )  0.017 ng/mL / x     / x     / x     / x      CARDIAC MARKERS ( 11 Sep 2019 16:50 )  <0.015 ng/mL / x     / x     / x     / x      CARDIAC MARKERS ( 11 Sep 2019 11:25 )  <0.015 ng/mL / x     / x     / x     / x      CARDIAC MARKERS ( 10 Sep 2019 13:47 )  <0.015 ng/mL / x     / x     / x     / x            MEDICATIONS  (STANDING):  apixaban 5 milliGRAM(s) Oral every 12 hours  influenza   Vaccine 0.5 milliLiter(s) IntraMuscular once  metoprolol succinate ER 25 milliGRAM(s) Oral daily    MEDICATIONS  (PRN):      Assessment and Plan:   64y female w/     1. rapid aflutter  - s/p GILSON cardioversion  - was on cardizem drip.  Now on metoprolol  - eliquis (CHADS VASC 2)  - CTA negative for PE  - TSH normal  - echo ef ~30%- discussed w/ Cardio, plan for cath   monitor O2 sats     2. dvt px  on eliquis  dopplers negative for dvt
cc: sob   hpi: 64y female w/ pmh venous stasis, arthritis, b/l hip replacements p/w sob worsening w/ exertion.  Found to be in aflutter. No sob at rest this morning.  States she hasn't walked much but will after cardioversion today.  Understands plan for GILSON CV and then monitor overnight.  No other complaints.   Aflutter on bedside monitor.   9/13- s/p cardioversion yesterday.  NSR on monitor w/     - denies cp, sob, palp, no leg swelling.  Aware of echo results and understands plan for cath.    9/14-  nsr on tele.  Still sob, worse w/ exertion.  discussed echo results, plan for future cath (and why delayed) and need for O2 sats w/ exertion at length.   No chest pain.  INtermittent cough, nonproductive, and intermittent wheezing.     ros- as per hpi above,     Vital Signs Last 24 Hrs  T(C): 36.3 (14 Sep 2019 06:46), Max: 36.7 (13 Sep 2019 16:49)  T(F): 97.4 (14 Sep 2019 06:46), Max: 98.1 (13 Sep 2019 16:49)  HR: 91 (14 Sep 2019 10:00) (82 - 105)  BP: 112/80 (14 Sep 2019 10:00) (96/74 - 134/96)  BP(mean): 87 (14 Sep 2019 10:00) (56 - 100)  RR: 24 (14 Sep 2019 10:00) (16 - 34)  SpO2: 82% (14 Sep 2019 10:00) (81% - 97%)  ?  T(C): 35.8 (13 Sep 2019 05:04), Max: 36.7 (12 Sep 2019 20:00)  T(F): 96.4 (13 Sep 2019 05:04), Max: 98 (12 Sep 2019 20:00)  HR: 96 (13 Sep 2019 08:00) (80 - 97)  BP: 118/70 (13 Sep 2019 06:00) (95/57 - 141/94)  BP(mean): 82 (13 Sep 2019 06:00) (67 - 99)  RR: 17 (13 Sep 2019 08:00) (14 - 26)  SpO2: 91% (13 Sep 2019 07:00) (90% - 99%)        PHYSICAL EXAM:  General: NAD, comfortable- seated   Neuro: AAOx3  HEENT: NCAT, EOMI  Neck: Soft and supple  Respiratory: CTA b/l, no w/r/r- breathing labored after returned from restroom or speaking complete sentences   Cardiovascular: S1 and S2, RRR  Gastrointestinal: +BS, soft, NTND  Extremities: chronic venous changes   Vascular: 2+ peripheral pulses  Musculoskeletal: 5/5 strength b/l UE and LE        LABS: All Labs Reviewed:                        10.4   9.79  )-----------( 255      ( 12 Sep 2019 06:36 )             34.5     09-12    144  |  110<H>  |  22  ----------------------------<  142<H>  3.9   |  27  |  0.98    Ca    8.9      12 Sep 2019 06:36  Mg     2.4     09-12    TPro  7.2  /  Alb  3.4  /  TBili  0.9  /  DBili  x   /  AST  37  /  ALT  46  /  AlkPhos  116  09-11    PT/INR - ( 11 Sep 2019 11:25 )   PT: 15.4 sec;   INR: 1.37 ratio         PTT - ( 11 Sep 2019 11:25 )  PTT:27.6 sec  CARDIAC MARKERS ( 11 Sep 2019 23:10 )  0.017 ng/mL / x     / x     / x     / x      CARDIAC MARKERS ( 11 Sep 2019 16:50 )  <0.015 ng/mL / x     / x     / x     / x      CARDIAC MARKERS ( 11 Sep 2019 11:25 )  <0.015 ng/mL / x     / x     / x     / x      CARDIAC MARKERS ( 10 Sep 2019 13:47 )  <0.015 ng/mL / x     / x     / x     / x            MEDICATIONS  (STANDING):  apixaban 5 milliGRAM(s) Oral every 12 hours  influenza   Vaccine 0.5 milliLiter(s) IntraMuscular once  metoprolol succinate ER 25 milliGRAM(s) Oral daily    MEDICATIONS  (PRN):      Assessment and Plan:   64y female w/     1. rapid aflutter  - s/p GILSON cardioversion and remains in NSr.  Episode NSVT resolved.   - was on cardizem drip.  Now on metoprolol  - eliquis (CHADS VASC 2)  - CTA negative for PE  - TSH normal  - echo ef ~30%- discussed w/ Cardio, plan for cath in few weeks (needs to be on eliquis post cardioversion before holding for cath)  - check o2 sats on room air to assess home O2 needs (pt states she would refuse)    2. acute systolic cHF  - possibly due to rapid aflutter but must rule out ischemic cardiomyopathy  - continue med management for few weeks until eliquis can be held for planned cath  - BB  - discussed w/ Cardio - will start entresto and lasix      3. dyspnea  - due to chf, r/o ischemic etiology vs NAHOMI (sleep study outpt) and eventual pfts  - Pulm f/u appreciated       4. dvt px  on eliquis  dopplers negative for dvt
cc: sob   hpi: 64y female w/ pmh venous stasis, arthritis, b/l hip replacements p/w sob worsening w/ exertion.  Found to be in aflutter. No sob at rest this morning.  States she hasn't walked much but will after cardioversion today.  Understands plan for GISLON CV and then monitor overnight.  No other complaints.   Aflutter on bedside monitor.     ros- as per hpi above,     Vital Signs Last 24 Hrs  T(C): 36.2 (12 Sep 2019 06:00), Max: 36.9 (11 Sep 2019 16:45)  T(F): 97.1 (12 Sep 2019 06:00), Max: 98.5 (11 Sep 2019 16:45)  HR: 95 (12 Sep 2019 11:00) (60 - 150)  BP: 96/64 (12 Sep 2019 11:00) (89/64 - 146/78)  BP(mean): 70 (12 Sep 2019 11:00) (70 - 104)  RR: 25 (12 Sep 2019 11:00) (18 - 34)  SpO2: 94% (12 Sep 2019 11:00) (92% - 99%)      PHYSICAL EXAM:  General: NAD, comfortable- seated at bedside  Neuro: AAOx3  HEENT: NCAT, EOMI  Neck: Soft and supple  Respiratory: CTA b/l, no w/r/r  Cardiovascular: S1 and S2, tachy, regular  Gastrointestinal: +BS, soft, NTND  Extremities: chronic venous changes   Vascular: 2+ peripheral pulses  Musculoskeletal: 5/5 strength b/l UE and LE        LABS: All Labs Reviewed:                        10.4   9.79  )-----------( 255      ( 12 Sep 2019 06:36 )             34.5     09-12    144  |  110<H>  |  22  ----------------------------<  142<H>  3.9   |  27  |  0.98    Ca    8.9      12 Sep 2019 06:36  Mg     2.4     09-12    TPro  7.2  /  Alb  3.4  /  TBili  0.9  /  DBili  x   /  AST  37  /  ALT  46  /  AlkPhos  116  09-11    PT/INR - ( 11 Sep 2019 11:25 )   PT: 15.4 sec;   INR: 1.37 ratio         PTT - ( 11 Sep 2019 11:25 )  PTT:27.6 sec  CARDIAC MARKERS ( 11 Sep 2019 23:10 )  0.017 ng/mL / x     / x     / x     / x      CARDIAC MARKERS ( 11 Sep 2019 16:50 )  <0.015 ng/mL / x     / x     / x     / x      CARDIAC MARKERS ( 11 Sep 2019 11:25 )  <0.015 ng/mL / x     / x     / x     / x      CARDIAC MARKERS ( 10 Sep 2019 13:47 )  <0.015 ng/mL / x     / x     / x     / x              MEDICATIONS  (STANDING):  apixaban 5 milliGRAM(s) Oral every 12 hours  diltiazem Infusion 5 mG/Hr (5 mL/Hr) IV Continuous <Continuous>  influenza   Vaccine 0.5 milliLiter(s) IntraMuscular once    MEDICATIONS  (PRN):      Assessment and Plan:   64y female w/     1. rapid aflutter  - cardizem drip  - eliquis (CHADS VASC 2)  - plan for GILSON DCCV today   - CTA negative for PE  - thyroid studies-     2. dvt px   on heparin  dopplers negative for dvt
REASON FOR VISIT: Atrial flutter    HPI:  64 year old woman with a history of morbid obesity, venous stasis, arthritis, bilateral hip replacements admitted with symptomatic atrial flutter (new onset) associated with RVR.    9/12/19:  Comfortable at rest but short of breath with ambulation; no angina.    MEDICATIONS  (STANDING):  apixaban 5 milliGRAM(s) Oral every 12 hours  diltiazem Infusion 5 mG/Hr (5 mL/Hr) IV Continuous <Continuous>  influenza   Vaccine 0.5 milliLiter(s) IntraMuscular once    Vital Signs Last 24 Hrs  T(C): 36.2 (12 Sep 2019 06:00), Max: 37.1 (11 Sep 2019 10:55)  T(F): 97.1 (12 Sep 2019 06:00), Max: 98.7 (11 Sep 2019 10:55)  HR: 89 (12 Sep 2019 06:00) (60 - 155)  BP: 116/78 (12 Sep 2019 06:00) (89/64 - 146/78)  BP(mean): 88 (12 Sep 2019 06:00) (76 - 104)  RR: 21 (12 Sep 2019 06:00) (18 - 34)  SpO2: 97% (12 Sep 2019 06:00) (92% - 99%)    PHYSICAL EXAM:  Constitutional: Seated at edge of bed, obese, no distress  Respiratory: Breath sounds are clear bilaterally, Non-labored  Cardiovascular: Irregular, normal S2  Psych:  Appropriate mood and affect    LABS:   CARDIAC MARKERS ( 11 Sep 2019 23:10 ) 0.017 ng/mL / x     / x     / x     / x      CARDIAC MARKERS ( 11 Sep 2019 16:50 ) <0.015 ng/mL / x     / x     / x     / x      CARDIAC MARKERS ( 11 Sep 2019 11:25 ) <0.015 ng/mL / x     / x     / x     / x      CARDIAC MARKERS ( 10 Sep 2019 13:47 ) <0.015 ng/mL / x     / x     / x     / x      CARDIAC MARKERS ( 10 Sep 2019 10:27 ) <0.015 ng/mL / x     / x     / x     / x                            10.4   9.79  )-----------( 255      ( 12 Sep 2019 06:36 )             34.5     144  |  110<H>  |  22  ----------------------------<  142<H>  3.9   |  27  |  0.98    Tele: AFL    CT Angio Chest PE Protocol w/ IV Cont (09.10.19 @ 12:39):  No pulmonary embolism to the proximal segmental branches. Limited visualization of distal segmental and subsegmental branches secondary to respiratory motion. No acute chest pathology.

## 2019-09-14 NOTE — CONSULT NOTE ADULT - ASSESSMENT
- case d/w Dr. Hartmann'  - patient to be discharged  - would do PSG as outpatient in view of dysrhythmia and low EF  - will f/u as outpatient

## 2019-09-14 NOTE — PROGRESS NOTE ADULT - PROBLEM SELECTOR PLAN 2
Plan cath. Continue Eliquis, start low dose Entresto. Plan cath. Continue Eliquis, start low dose Entresto. Can d/c on low dose diuretics. Lasix 20 mg daily.

## 2019-09-14 NOTE — DISCHARGE NOTE PROVIDER - NSDCCPCAREPLAN_GEN_ALL_CORE_FT
Coleen Reyna is here today for: VF 24-2 OU    The test was performed with good compliance.    Diagnosis: Ocular Hypertension    Patient is using: Xalatan OU    Patient phone number: [phone]    Pending appointment: Today    Prabhakar Castillo 3/6/2019       PRINCIPAL DISCHARGE DIAGNOSIS  Diagnosis: Atrial flutter  Assessment and Plan of Treatment: You had rapid aflutter and were cardioverted to normal sinus rhythm.   - take metoprolol daily and eliquis twice daily as prescribed.   - Follow up with Dr. Hartmann.   - You echo shows and EF (ejection fraction) of 30% which is low- take entresto twice daily and lasix daily as prescribed.  Follow up with Dr. Hartmann to discuss further workup (scheduling your cardiac cath).  - Follow up with Dr. Marx to schedule lung function tests and sleep study.

## 2019-09-14 NOTE — DISCHARGE NOTE PROVIDER - HOSPITAL COURSE
Assessment and Plan:     64y female w/         1. rapid aflutter    - s/p GILSON cardioversion and remains in NSr.  Episode NSVT resolved.     - was on cardizem drip.  Now on metoprolol    - eliquis (CHADS VASC 2)    - CTA negative for PE    - TSH normal    - echo ef ~30%- discussed w/ Cardio, plan for cath in few weeks (needs to be on eliquis post cardioversion before holding for cath)    - check o2 sats on room air to assess home O2 needs (pt states she would refuse)        2. acute systolic cHF    - possibly due to rapid aflutter but must rule out ischemic cardiomyopathy    - continue med management for few weeks until eliquis can be held for planned cath    - BB    - discussed w/ Cardio - will start entresto and lasix          3. dyspnea    - due to chf, r/o ischemic etiology vs NAHOMI (sleep study outpt) and eventual pfts    - Pulm f/u appreciated             4. dvt px    on eliquis    dopplers negative for dvt

## 2019-09-14 NOTE — PROGRESS NOTE ADULT - PROBLEM SELECTOR PLAN 1
Persistent AFL; reasonable rate control at rest with IV cardizem; GILSON / DCCV scheduled for later today; continue Eliquis.
In NSR after CV. Eliquis. D/W patient. wait 2 weeks after CV before suspending Eliquis for cath. Will follow in office.

## 2019-09-14 NOTE — DISCHARGE NOTE NURSING/CASE MANAGEMENT/SOCIAL WORK - PATIENT PORTAL LINK FT
You can access the FollowMyHealth Patient Portal offered by [facility] by registering at the following website: [email or web address]. By joining [facility]’s FollowMyHealth portal, you will also be able to view your health information using other applications (apps) compatible with our system.

## 2019-09-17 ENCOUNTER — NON-APPOINTMENT (OUTPATIENT)
Age: 64
End: 2019-09-17

## 2019-09-17 ENCOUNTER — APPOINTMENT (OUTPATIENT)
Dept: CARDIOLOGY | Facility: CLINIC | Age: 64
End: 2019-09-17
Payer: COMMERCIAL

## 2019-09-17 VITALS
SYSTOLIC BLOOD PRESSURE: 113 MMHG | HEART RATE: 104 BPM | OXYGEN SATURATION: 95 % | HEIGHT: 63 IN | BODY MASS INDEX: 51.21 KG/M2 | WEIGHT: 289 LBS | DIASTOLIC BLOOD PRESSURE: 75 MMHG

## 2019-09-17 DIAGNOSIS — F51.02 ADJUSTMENT INSOMNIA: ICD-10-CM

## 2019-09-17 DIAGNOSIS — Z82.49 FAMILY HISTORY OF ISCHEMIC HEART DISEASE AND OTHER DISEASES OF THE CIRCULATORY SYSTEM: ICD-10-CM

## 2019-09-17 DIAGNOSIS — I87.8 OTHER SPECIFIED DISORDERS OF VEINS: ICD-10-CM

## 2019-09-17 DIAGNOSIS — M54.30 SCIATICA, UNSPECIFIED SIDE: ICD-10-CM

## 2019-09-17 PROCEDURE — 93000 ELECTROCARDIOGRAM COMPLETE: CPT

## 2019-09-17 PROCEDURE — 99215 OFFICE O/P EST HI 40 MIN: CPT | Mod: 25

## 2019-09-17 RX ORDER — AMOXICILLIN 500 MG/1
500 TABLET, FILM COATED ORAL
Qty: 12 | Refills: 2 | Status: DISCONTINUED | COMMUNITY
Start: 2019-03-11 | End: 2019-09-17

## 2019-09-17 RX ORDER — APIXABAN 5 MG/1
5 TABLET, FILM COATED ORAL
Qty: 180 | Refills: 3 | Status: ACTIVE | COMMUNITY
Start: 1900-01-01 | End: 1900-01-01

## 2019-09-17 RX ORDER — METOPROLOL SUCCINATE 25 MG/1
25 TABLET, EXTENDED RELEASE ORAL TWICE DAILY
Qty: 180 | Refills: 3 | Status: ACTIVE | COMMUNITY
Start: 1900-01-01 | End: 1900-01-01

## 2019-09-17 RX ORDER — ACETAMINOPHEN 325 MG/1
TABLET, FILM COATED ORAL
Refills: 0 | Status: DISCONTINUED | COMMUNITY
End: 2019-09-17

## 2019-09-17 NOTE — DISCUSSION/SUMMARY
[FreeTextEntry1] : Aflutter: Controlled but HR still elevated. Increase Metoprolol to 25 mg BID. Continue AC.\par Cardiomyopathy/LV dysfunction: unclear etiology at this juncture but of note she reports having a stress test 2 yrs ago and it was WNL. Recommend repeat echo in weeks to see her response to restoration of NSR and if LV FX improved than this is c/w with a tachycardia induced CM. If LV FX, not changed and then will need an ischemia evaluation via CC for further assessment.  \par Lab work ordered today to monitor her response to diuretics and Entresto.\par Follow up in 1 month.

## 2019-09-17 NOTE — CARDIOLOGY SUMMARY
[___] : [unfilled] [LVEF ___%] : LVEF [unfilled]% [Moderate] : moderate LV dysfunction [Mild] : mild pulmonary hypertension [Enlarged] : enlarged LA size

## 2019-09-17 NOTE — PHYSICAL EXAM
[General Appearance - Well Developed] : well developed [General Appearance - Well Nourished] : well nourished [Normal Conjunctiva] : the conjunctiva exhibited no abnormalities [Normal Oral Mucosa] : normal oral mucosa [JVD Elevated _____cm] : JVD elevated [unfilled] ~U cm above clavicle [Normal Oropharynx] : normal oropharynx [5th Left ICS - MCL] : palpated at the 5th LICS in the midclavicular line [Diffuse] : diffuse [Rhythm Regular] : regular [S3] : no S3 [S4] : no S4 [I] : a grade 1 [Right Carotid Bruit] : no bruit heard over the right carotid [Left Carotid Bruit] : no bruit heard over the left carotid [2+] : left 2+ [___ +] : bilateral [unfilled]U+ pitting edema to the ankles [Bowel Sounds] : normal bowel sounds [Abdomen Soft] : soft [Nail Clubbing] : no clubbing of the fingernails [FreeTextEntry1] : walks with walker for balance assistance. [Cyanosis, Localized] : no localized cyanosis [] : no rash [No Anxiety] : not feeling anxious [Oriented To Time, Place, And Person] : oriented to person, place, and time

## 2019-09-17 NOTE — REASON FOR VISIT
[Follow-Up - From Hospitalization] : follow-up of a recent hospitalization for [Cardiomyopathy] : cardiomyopathy [Atrial Fibrillation] : atrial fibrillation

## 2019-09-17 NOTE — HISTORY OF PRESENT ILLNESS
[FreeTextEntry1] : Presented with new atrial flutter and found to have LV dysfunction.  Underwent GILSON/CV successfully and is feeling better.  Discussed her need to be evaluated for sleep apnea as well. EF by echo noted to be 35%.  Denies chest pain, palpitations, orthopnea, paroxysmal nocturnal dyspnea, claudication, dizziness,  lightheadedness, presyncopal, or syncopal symptoms. Her exertional capacity is improving. Rare cough over the past few months.

## 2019-09-18 DIAGNOSIS — I48.92 UNSPECIFIED ATRIAL FLUTTER: ICD-10-CM

## 2019-09-18 DIAGNOSIS — I08.1 RHEUMATIC DISORDERS OF BOTH MITRAL AND TRICUSPID VALVES: ICD-10-CM

## 2019-09-18 DIAGNOSIS — I50.21 ACUTE SYSTOLIC (CONGESTIVE) HEART FAILURE: ICD-10-CM

## 2019-09-18 DIAGNOSIS — D64.9 ANEMIA, UNSPECIFIED: ICD-10-CM

## 2019-09-18 DIAGNOSIS — I42.0 DILATED CARDIOMYOPATHY: ICD-10-CM

## 2019-09-18 DIAGNOSIS — E66.01 MORBID (SEVERE) OBESITY DUE TO EXCESS CALORIES: ICD-10-CM

## 2019-09-18 DIAGNOSIS — M19.90 UNSPECIFIED OSTEOARTHRITIS, UNSPECIFIED SITE: ICD-10-CM

## 2019-09-18 DIAGNOSIS — Z79.82 LONG TERM (CURRENT) USE OF ASPIRIN: ICD-10-CM

## 2019-09-18 DIAGNOSIS — G47.33 OBSTRUCTIVE SLEEP APNEA (ADULT) (PEDIATRIC): ICD-10-CM

## 2019-09-21 LAB
ALBUMIN SERPL ELPH-MCNC: 4.2 G/DL
ALP BLD-CCNC: 92 U/L
ALT SERPL-CCNC: 19 U/L
ANION GAP SERPL CALC-SCNC: 13 MMOL/L
AST SERPL-CCNC: 15 U/L
BASOPHILS # BLD AUTO: 0.08 K/UL
BASOPHILS NFR BLD AUTO: 0.7 %
BILIRUB SERPL-MCNC: 0.5 MG/DL
BUN SERPL-MCNC: 14 MG/DL
CALCIUM SERPL-MCNC: 9.8 MG/DL
CHLORIDE SERPL-SCNC: 101 MMOL/L
CO2 SERPL-SCNC: 32 MMOL/L
CREAT SERPL-MCNC: 0.92 MG/DL
EOSINOPHIL # BLD AUTO: 0.36 K/UL
EOSINOPHIL NFR BLD AUTO: 3.1 %
GLUCOSE SERPL-MCNC: 92 MG/DL
HCT VFR BLD CALC: 39.6 %
HGB BLD-MCNC: 11.7 G/DL
IMM GRANULOCYTES NFR BLD AUTO: 0.8 %
LYMPHOCYTES # BLD AUTO: 2.12 K/UL
LYMPHOCYTES NFR BLD AUTO: 18.5 %
MAN DIFF?: NORMAL
MCHC RBC-ENTMCNC: 26.3 PG
MCHC RBC-ENTMCNC: 29.5 GM/DL
MCV RBC AUTO: 89 FL
MONOCYTES # BLD AUTO: 0.83 K/UL
MONOCYTES NFR BLD AUTO: 7.2 %
NEUTROPHILS # BLD AUTO: 7.97 K/UL
NEUTROPHILS NFR BLD AUTO: 69.7 %
PLATELET # BLD AUTO: 342 K/UL
POTASSIUM SERPL-SCNC: 4.3 MMOL/L
PROT SERPL-MCNC: 6.9 G/DL
RBC # BLD: 4.45 M/UL
RBC # FLD: 15.8 %
SODIUM SERPL-SCNC: 146 MMOL/L
WBC # FLD AUTO: 11.45 K/UL

## 2019-10-10 ENCOUNTER — APPOINTMENT (OUTPATIENT)
Dept: CARDIOLOGY | Facility: CLINIC | Age: 64
End: 2019-10-10
Payer: COMMERCIAL

## 2019-10-10 PROCEDURE — 93306 TTE W/DOPPLER COMPLETE: CPT

## 2019-10-11 ENCOUNTER — APPOINTMENT (OUTPATIENT)
Dept: ELECTROPHYSIOLOGY | Facility: CLINIC | Age: 64
End: 2019-10-11

## 2019-10-24 ENCOUNTER — NON-APPOINTMENT (OUTPATIENT)
Age: 64
End: 2019-10-24

## 2019-10-24 ENCOUNTER — APPOINTMENT (OUTPATIENT)
Dept: CARDIOLOGY | Facility: CLINIC | Age: 64
End: 2019-10-24
Payer: COMMERCIAL

## 2019-10-24 VITALS
BODY MASS INDEX: 49.26 KG/M2 | HEART RATE: 93 BPM | DIASTOLIC BLOOD PRESSURE: 70 MMHG | SYSTOLIC BLOOD PRESSURE: 104 MMHG | HEIGHT: 63 IN | WEIGHT: 278 LBS | OXYGEN SATURATION: 97 %

## 2019-10-24 LAB
ALBUMIN SERPL ELPH-MCNC: 4.2 G/DL
ALP BLD-CCNC: 73 U/L
ALT SERPL-CCNC: 6 U/L
ANION GAP SERPL CALC-SCNC: 13 MMOL/L
AST SERPL-CCNC: 15 U/L
BASOPHILS # BLD AUTO: 0.04 K/UL
BASOPHILS NFR BLD AUTO: 0.5 %
BILIRUB SERPL-MCNC: 0.5 MG/DL
BUN SERPL-MCNC: 18 MG/DL
CALCIUM SERPL-MCNC: 9.6 MG/DL
CHLORIDE SERPL-SCNC: 102 MMOL/L
CO2 SERPL-SCNC: 27 MMOL/L
CREAT SERPL-MCNC: 0.85 MG/DL
EOSINOPHIL # BLD AUTO: 0.17 K/UL
EOSINOPHIL NFR BLD AUTO: 2.1 %
GLUCOSE SERPL-MCNC: 97 MG/DL
HCT VFR BLD CALC: 40.5 %
HGB BLD-MCNC: 12.1 G/DL
IMM GRANULOCYTES NFR BLD AUTO: 0.4 %
LYMPHOCYTES # BLD AUTO: 1.45 K/UL
LYMPHOCYTES NFR BLD AUTO: 18.2 %
MAN DIFF?: NORMAL
MCHC RBC-ENTMCNC: 26.7 PG
MCHC RBC-ENTMCNC: 29.9 GM/DL
MCV RBC AUTO: 89.4 FL
MONOCYTES # BLD AUTO: 0.56 K/UL
MONOCYTES NFR BLD AUTO: 7 %
NEUTROPHILS # BLD AUTO: 5.72 K/UL
NEUTROPHILS NFR BLD AUTO: 71.8 %
PLATELET # BLD AUTO: 248 K/UL
POTASSIUM SERPL-SCNC: 4.5 MMOL/L
PROT SERPL-MCNC: 7.1 G/DL
RBC # BLD: 4.53 M/UL
RBC # FLD: 17.4 %
SODIUM SERPL-SCNC: 142 MMOL/L
WBC # FLD AUTO: 7.97 K/UL

## 2019-10-24 PROCEDURE — 93000 ELECTROCARDIOGRAM COMPLETE: CPT

## 2019-10-24 PROCEDURE — 99215 OFFICE O/P EST HI 40 MIN: CPT

## 2019-10-24 NOTE — DISCUSSION/SUMMARY
[FreeTextEntry1] : Aflutter: In NSR. Doing well on increased metoprolol. LV FX better. continue AC.\par Cardiomyopathy/LV dysfunction: Discussed ischemia evaluation and she will contemplate this . Dis have negative nuc. 2018.  Continue entresto and QOD diuretic.\par Lab work ordered today.\par Sleep apnea evaluation ordered.\par Follow up in 3 month.

## 2019-10-24 NOTE — HISTORY OF PRESENT ILLNESS
[FreeTextEntry1] : Doing much better and repeat echo does show improvement in LV function but LV still dilated.  EKG shows she is still in NSR.  Discussed evaluation for ischemia and she will contemplate this and also discussed having home sleep study to r/o sleep apnea as she does snore at night. Denies chest pain, shortness of breath, dyspnea on exertion, palpitations, orthopnea, paroxysmal nocturnal dyspnea, claudication, dizziness,  lightheadedness, presyncopal, or syncopal symptoms.\par \par

## 2019-10-24 NOTE — PHYSICAL EXAM
[General Appearance - Well Developed] : well developed [General Appearance - Well Nourished] : well nourished [Normal Oropharynx] : normal oropharynx [Normal Conjunctiva] : the conjunctiva exhibited no abnormalities [Normal Oral Mucosa] : normal oral mucosa [JVD Elevated _____cm] : JVD elevated [unfilled] ~U cm above clavicle [Abdomen Soft] : soft [Bowel Sounds] : normal bowel sounds [FreeTextEntry1] : walks with walker for balance assistance. [Nail Clubbing] : no clubbing of the fingernails [Cyanosis, Localized] : no localized cyanosis [] : no rash [Oriented To Time, Place, And Person] : oriented to person, place, and time [5th Left ICS - MCL] : palpated at the 5th LICS in the midclavicular line [No Anxiety] : not feeling anxious [Diffuse] : diffuse [Rhythm Regular] : regular [S3] : no S3 [I] : a grade 1 [S4] : no S4 [Left Carotid Bruit] : no bruit heard over the left carotid [Right Carotid Bruit] : no bruit heard over the right carotid [2+] : left 2+ [Nonpitting Edema] : nonpitting edema present

## 2020-01-28 ENCOUNTER — APPOINTMENT (OUTPATIENT)
Dept: CARDIOLOGY | Facility: CLINIC | Age: 65
End: 2020-01-28

## 2020-02-09 NOTE — PROGRESS NOTE ADULT - PROBLEM SELECTOR PLAN 4
fractured extremity
cont lovenox for DVT prophylaxis   pt is with high risk for blood clot
pt is not motivated to loose , she said tried in the past multiple times  understand the need for sleep studies risk of NAHOMI as well, does not want pulse ox or use oxygen at night

## 2020-12-02 ENCOUNTER — TRANSCRIPTION ENCOUNTER (OUTPATIENT)
Age: 65
End: 2020-12-02

## 2020-12-23 ENCOUNTER — INPATIENT (INPATIENT)
Facility: HOSPITAL | Age: 65
LOS: 2 days | Discharge: HOME | DRG: 947 | End: 2020-12-26
Attending: FAMILY MEDICINE | Admitting: FAMILY MEDICINE
Payer: COMMERCIAL

## 2020-12-23 VITALS
RESPIRATION RATE: 18 BRPM | DIASTOLIC BLOOD PRESSURE: 123 MMHG | OXYGEN SATURATION: 100 % | SYSTOLIC BLOOD PRESSURE: 163 MMHG | TEMPERATURE: 99 F | WEIGHT: 293 LBS | HEIGHT: 63 IN | HEART RATE: 120 BPM

## 2020-12-23 DIAGNOSIS — R18.8 OTHER ASCITES: ICD-10-CM

## 2020-12-23 DIAGNOSIS — Z96.643 PRESENCE OF ARTIFICIAL HIP JOINT, BILATERAL: Chronic | ICD-10-CM

## 2020-12-23 DIAGNOSIS — Z98.890 OTHER SPECIFIED POSTPROCEDURAL STATES: Chronic | ICD-10-CM

## 2020-12-23 LAB
APTT BLD: 33.2 SEC — SIGNIFICANT CHANGE UP (ref 27.5–35.5)
BASOPHILS # BLD AUTO: 0.07 K/UL — SIGNIFICANT CHANGE UP (ref 0–0.2)
BASOPHILS NFR BLD AUTO: 0.5 % — SIGNIFICANT CHANGE UP (ref 0–2)
EOSINOPHIL # BLD AUTO: 0.07 K/UL — SIGNIFICANT CHANGE UP (ref 0–0.5)
EOSINOPHIL NFR BLD AUTO: 0.5 % — SIGNIFICANT CHANGE UP (ref 0–6)
HCT VFR BLD CALC: 38.3 % — SIGNIFICANT CHANGE UP (ref 34.5–45)
HGB BLD-MCNC: 11.8 G/DL — SIGNIFICANT CHANGE UP (ref 11.5–15.5)
IMM GRANULOCYTES NFR BLD AUTO: 0.6 % — SIGNIFICANT CHANGE UP (ref 0–1.5)
INR BLD: 1.89 RATIO — HIGH (ref 0.88–1.16)
LACTATE SERPL-SCNC: 1.5 MMOL/L — SIGNIFICANT CHANGE UP (ref 0.7–2)
LYMPHOCYTES # BLD AUTO: 1.25 K/UL — SIGNIFICANT CHANGE UP (ref 1–3.3)
LYMPHOCYTES # BLD AUTO: 9 % — LOW (ref 13–44)
MCHC RBC-ENTMCNC: 28.3 PG — SIGNIFICANT CHANGE UP (ref 27–34)
MCHC RBC-ENTMCNC: 30.8 GM/DL — LOW (ref 32–36)
MCV RBC AUTO: 91.8 FL — SIGNIFICANT CHANGE UP (ref 80–100)
MONOCYTES # BLD AUTO: 0.83 K/UL — SIGNIFICANT CHANGE UP (ref 0–0.9)
MONOCYTES NFR BLD AUTO: 6 % — SIGNIFICANT CHANGE UP (ref 2–14)
NEUTROPHILS # BLD AUTO: 11.63 K/UL — HIGH (ref 1.8–7.4)
NEUTROPHILS NFR BLD AUTO: 83.4 % — HIGH (ref 43–77)
NT-PROBNP SERPL-SCNC: 1385 PG/ML — HIGH (ref 0–125)
PLATELET # BLD AUTO: 407 K/UL — HIGH (ref 150–400)
PROTHROM AB SERPL-ACNC: 21.4 SEC — HIGH (ref 10.6–13.6)
RBC # BLD: 4.17 M/UL — SIGNIFICANT CHANGE UP (ref 3.8–5.2)
RBC # FLD: 13.3 % — SIGNIFICANT CHANGE UP (ref 10.3–14.5)
SARS-COV-2 RNA SPEC QL NAA+PROBE: SIGNIFICANT CHANGE UP
TROPONIN I SERPL-MCNC: <0.015 NG/ML — SIGNIFICANT CHANGE UP (ref 0.01–0.04)
WBC # BLD: 13.94 K/UL — HIGH (ref 3.8–10.5)
WBC # FLD AUTO: 13.94 K/UL — HIGH (ref 3.8–10.5)

## 2020-12-23 PROCEDURE — 74176 CT ABD & PELVIS W/O CONTRAST: CPT | Mod: 26

## 2020-12-23 PROCEDURE — 99223 1ST HOSP IP/OBS HIGH 75: CPT

## 2020-12-23 PROCEDURE — 82042 OTHER SOURCE ALBUMIN QUAN EA: CPT

## 2020-12-23 PROCEDURE — 71045 X-RAY EXAM CHEST 1 VIEW: CPT | Mod: 26

## 2020-12-23 PROCEDURE — 80074 ACUTE HEPATITIS PANEL: CPT

## 2020-12-23 PROCEDURE — 88305 TISSUE EXAM BY PATHOLOGIST: CPT

## 2020-12-23 PROCEDURE — 80048 BASIC METABOLIC PNL TOTAL CA: CPT

## 2020-12-23 PROCEDURE — 87070 CULTURE OTHR SPECIMN AEROBIC: CPT

## 2020-12-23 PROCEDURE — 87086 URINE CULTURE/COLONY COUNT: CPT

## 2020-12-23 PROCEDURE — 87102 FUNGUS ISOLATION CULTURE: CPT

## 2020-12-23 PROCEDURE — 85025 COMPLETE CBC W/AUTO DIFF WBC: CPT

## 2020-12-23 PROCEDURE — 89051 BODY FLUID CELL COUNT: CPT

## 2020-12-23 PROCEDURE — 83615 LACTATE (LD) (LDH) ENZYME: CPT

## 2020-12-23 PROCEDURE — 80076 HEPATIC FUNCTION PANEL: CPT

## 2020-12-23 PROCEDURE — 93010 ELECTROCARDIOGRAM REPORT: CPT

## 2020-12-23 PROCEDURE — 83880 ASSAY OF NATRIURETIC PEPTIDE: CPT

## 2020-12-23 PROCEDURE — 81001 URINALYSIS AUTO W/SCOPE: CPT

## 2020-12-23 PROCEDURE — 84157 ASSAY OF PROTEIN OTHER: CPT

## 2020-12-23 PROCEDURE — P9047: CPT

## 2020-12-23 PROCEDURE — C1729: CPT

## 2020-12-23 PROCEDURE — 83690 ASSAY OF LIPASE: CPT

## 2020-12-23 PROCEDURE — 82945 GLUCOSE OTHER FLUID: CPT

## 2020-12-23 PROCEDURE — 36415 COLL VENOUS BLD VENIPUNCTURE: CPT

## 2020-12-23 PROCEDURE — 85027 COMPLETE CBC AUTOMATED: CPT

## 2020-12-23 PROCEDURE — 71045 X-RAY EXAM CHEST 1 VIEW: CPT

## 2020-12-23 PROCEDURE — 88341 IMHCHEM/IMCYTCHM EA ADD ANTB: CPT

## 2020-12-23 PROCEDURE — 84484 ASSAY OF TROPONIN QUANT: CPT

## 2020-12-23 PROCEDURE — 87075 CULTR BACTERIA EXCEPT BLOOD: CPT

## 2020-12-23 PROCEDURE — 49083 ABD PARACENTESIS W/IMAGING: CPT

## 2020-12-23 PROCEDURE — 88342 IMHCHEM/IMCYTCHM 1ST ANTB: CPT

## 2020-12-23 PROCEDURE — 88108 CYTOPATH CONCENTRATE TECH: CPT

## 2020-12-23 RX ORDER — APIXABAN 2.5 MG/1
5 TABLET, FILM COATED ORAL
Refills: 0 | Status: DISCONTINUED | OUTPATIENT
Start: 2020-12-23 | End: 2020-12-24

## 2020-12-23 RX ORDER — CEFTRIAXONE 500 MG/1
1000 INJECTION, POWDER, FOR SOLUTION INTRAMUSCULAR; INTRAVENOUS ONCE
Refills: 0 | Status: DISCONTINUED | OUTPATIENT
Start: 2020-12-23 | End: 2020-12-23

## 2020-12-23 RX ORDER — SODIUM CHLORIDE 9 MG/ML
1000 INJECTION INTRAMUSCULAR; INTRAVENOUS; SUBCUTANEOUS ONCE
Refills: 0 | Status: COMPLETED | OUTPATIENT
Start: 2020-12-23 | End: 2020-12-23

## 2020-12-23 RX ORDER — METOPROLOL TARTRATE 50 MG
25 TABLET ORAL
Refills: 0 | Status: DISCONTINUED | OUTPATIENT
Start: 2020-12-23 | End: 2020-12-26

## 2020-12-23 RX ORDER — SODIUM CHLORIDE 9 MG/ML
1600 INJECTION INTRAMUSCULAR; INTRAVENOUS; SUBCUTANEOUS ONCE
Refills: 0 | Status: DISCONTINUED | OUTPATIENT
Start: 2020-12-23 | End: 2020-12-23

## 2020-12-23 RX ORDER — CEFTRIAXONE 500 MG/1
1000 INJECTION, POWDER, FOR SOLUTION INTRAMUSCULAR; INTRAVENOUS ONCE
Refills: 0 | Status: COMPLETED | OUTPATIENT
Start: 2020-12-23 | End: 2020-12-23

## 2020-12-23 RX ORDER — CYCLOBENZAPRINE HYDROCHLORIDE 10 MG/1
5 TABLET, FILM COATED ORAL THREE TIMES A DAY
Refills: 0 | Status: DISCONTINUED | OUTPATIENT
Start: 2020-12-23 | End: 2020-12-26

## 2020-12-23 RX ORDER — PANTOPRAZOLE SODIUM 20 MG/1
40 TABLET, DELAYED RELEASE ORAL
Refills: 0 | Status: DISCONTINUED | OUTPATIENT
Start: 2020-12-23 | End: 2020-12-26

## 2020-12-23 RX ORDER — SACUBITRIL AND VALSARTAN 24; 26 MG/1; MG/1
1 TABLET, FILM COATED ORAL
Refills: 0 | Status: DISCONTINUED | OUTPATIENT
Start: 2020-12-23 | End: 2020-12-24

## 2020-12-23 RX ADMIN — Medication 30 MILLILITER(S): at 23:17

## 2020-12-23 RX ADMIN — CEFTRIAXONE 1000 MILLIGRAM(S): 500 INJECTION, POWDER, FOR SOLUTION INTRAMUSCULAR; INTRAVENOUS at 19:43

## 2020-12-23 RX ADMIN — SODIUM CHLORIDE 1000 MILLILITER(S): 9 INJECTION INTRAMUSCULAR; INTRAVENOUS; SUBCUTANEOUS at 19:43

## 2020-12-23 RX ADMIN — SODIUM CHLORIDE 1000 MILLILITER(S): 9 INJECTION INTRAMUSCULAR; INTRAVENOUS; SUBCUTANEOUS at 15:11

## 2020-12-23 NOTE — ED PROVIDER NOTE - OBJECTIVE STATEMENT
64 y/o female with a PMHx of Arthritis, HTN presents to the ED for c/o gradual onset of abd distension and abd discomfort for the past month. Pt was seen by her PCP yesterday, had blood work done and was scheduled for an abd ultrasound today. However, pt was called by her PCP today and was told to come in to the ED. On her blood work, pt had WBC (13) elevation, BUN 28, Creatinine was 1.9, which is high for the pt. Pt states she has always had normal kidney numbers. Pt also reports urinary retention, was told by her doctor to double her Lasix in the past three days but still would not urinate. Denies N/V, fevers, cough, SOB.

## 2020-12-23 NOTE — H&P ADULT - ASSESSMENT
64 yo female with Hx. of  Atrial flutter 11/9/19  had GILSON cardioversion, Vascular insufficiency LLE, R sciatica, DJD hips, Obesity , low vignesh pain, Pulm. HTN, NAHOMI on CPAP, who presented  in the ER for abdominal distention. Her symptoms started 2 weeks ago when she noted abdominal distention, SOB, decreased urination. Her PCP Dr. Sam referred her to the ER. for CT.    assessment Dx:                       1. Ascites                       2. CHF                       3. Morbid obesity                       4. Atrial fib on Eliquis                       5. ARF    Plan:    admit to :Medicine               medications: as per med rec, IVF               VTEP: on Eliquis               Labs: cbc,bmp               Radiology: CT abd.               Cardiac diagnostics: Atrial flutter               Consults: Cardiology, GI,                                   66 yo female with Hx. of  Atrial flutter 11/9/19  had GILSON cardioversion, Vascular insufficiency LLE, R sciatica, DJD hips, Obesity , low vignesh pain, Pulm. HTN, NAHOMI on CPAP, who presented  in the ER for abdominal distention. Her symptoms started 2 weeks ago when she noted abdominal distention, SOB, decreased urination. Her PCP Dr. Sam referred her to the ER. for CT.    assessment Dx:                       1. Ascites                       2. CHF                       3. ARF                        4. Atrial fib on Eliquis                       5. ARF                       6: low albumin.                       7. Obesity    Plan:    admit to :Medicine               medications: as per med rec, IVF               VTEP: on Eliquis               Labs: cbc,bmp               Radiology: CT abd.               Cardiac diagnostics: Atrial flutter               Consults: Cardiology, GI, Nephrology, consider IR for drainag.                                  66 yo female with Hx. of  Atrial flutter 11/9/19  had GILSON cardioversion, Vascular insufficiency LLE, R sciatica, DJD hips, Obesity , low vignesh pain, Pulm. HTN, NAHOMI on CPAP, who presented  in the ER for abdominal distention. Her symptoms started 2 weeks ago when she noted abdominal distention, SOB, decreased urination. Her PCP Dr. Sam referred her to the ER. for CT.    assessment Dx:                       1. Ascites                       2. CHF                       3. ARF                        4. Atrial fib on Eliquis                       5. ARF                       6: low albumin.                       7. Obesity    Plan:    admit to :Medicine               medications: as per med rec, IVF               VTEP: on Eliquis               Labs: cbc,bmp               Radiology: CT abd.               Cardiac diagnostics: EKG.               Consults: Cardiology, GI, Nephrology, consider IR for drainage.                                  66 yo female with Hx. of  Atrial flutter 11/9/19  had GILSON cardioversion, Vascular insufficiency LLE, R sciatica, DJD hips, Obesity , low vignesh pain, Pulm. HTN, NAHOMI on CPAP, who presented  in the ER for abdominal distention. Her symptoms started 2 weeks ago when she noted abdominal distention, SOB, decreased urination. Her PCP Dr. Sam referred her to the ER. for CT.    assessment Dx:                       1. Ascites                       2. CHF                       3. ARF                        4. Atrial fib on Eliquis                       5. ARF                       6: low albumin.                       7. Obesity morbid                       8. NAHOMI on CPAP     Plan:    admit to :Medicine               medications: as per med rec, IVF               VTEP: on Eliquis               Labs: cbc,bmp               Radiology: CT abd.               Cardiac diagnostics: EKG.               Consults: Cardiology, GI, Nephrology, consider IR for drainage.

## 2020-12-23 NOTE — ED ADULT TRIAGE NOTE - CHIEF COMPLAINT QUOTE
pt presents to ED with complaints of abdominal discomfort since thanksgiving. pt states symptoms became worse 1 week ago. pt reports constipation with last BP this am.

## 2020-12-23 NOTE — ED PROVIDER NOTE - PROGRESS NOTE DETAILS
disc pt w/hopsitlist dr flannery. no obvious cause of ascites. possible sbp given elev HR and dec BP , likely form otoo much lasix, will give abx to cover for sbp. plan to have therapeutic and diagnostic tap in the AM.  lactate neg and pt afebile without abd pain. continue IVFs. MD MAGY

## 2020-12-23 NOTE — H&P ADULT - HISTORY OF PRESENT ILLNESS
HPI: The patient is a 66 yo female with Hx. of  Atrial flutter 11/9/19  had GILSON cardioversion, Vascular insufficiency LLE, R sciatica, DJD hips, Obesity , low vignesh pain, Pulm. HTN, NAHOMI on CPAP, who presented  in the ER for abdominal distention. Her symptoms started 2 weeks ago when she noted abdominal distention, SOB, decreased urination. Her PCP Dr. Sam referred her to the ER. for CT.    PMHx: Atrial flutter 11/9/19  had GILSON cardioversion, Vascular insufficiency LLE, R sciatica, DJD hips, Obesity , low vignesh pain, Pulm. HTN, NAHOMI on CPAP    PSHx: Bilateral THR, tonsillectomy, L knee arthroscopy in 1994,     Family Hx: . Mother  had Atrial fib and COPD, Fahter had  COPD, CHF,     Social Hx.: not smoking, no alcohol use    ROS:   Eyes: no changes in vision    ENT/Mouth: no changes    Cardiovascular: no chest pain    Respiratory: has mild  SOB    Gastrointestinal: no diarrhea, no nausea, no vomiting    Genitourinary: no dysuria    Breast: no pain    Musculoskeletal: no pain    Integumentary: no itching    Neurological: No Headache, no tremor,    Psychiatric: no suicidal ideations    Endocrine: no excessive thirst,     Hematologic/Lymphatic: no swollen glands    Allergic/Immunologic: no itching      Physical Exam: Vital Signs Last 24 Hrs  T(C): 36.7 (23 Dec 2020 15:26), Max: 37.2 (23 Dec 2020 12:57)  T(F): 98 (23 Dec 2020 15:26), Max: 99 (23 Dec 2020 12:57)  HR: 105 (23 Dec 2020 21:04) (105 - 120)  BP: 100/52 (23 Dec 2020 21:04) (86/51 - 163/123)  BP(mean): 59 (23 Dec 2020 15:26) (59 - 64)  RR: 19 (23 Dec 2020 21:04) (18 - 22)  SpO2: 98% (23 Dec 2020 21:04) (97% - 100%)        HEENT: PRRL EOMI    MOUTH/TEETH/GUMS: Clear    NECK: no JVD    LUNGS: Clear    HEART: S1,S2 RR    ABDOMEN: soft nontender    EXTREMITIES:  trace  pedal edema    MUSCULOSKELETAL: no joint swelling     NEURO: no tremor, no focal signs.    SKIN: no rash    : CVA negative,     Lab:                       11.8   13.94 )-----------( 407      ( 23 Dec 2020 14:16 )             38.3       12-23    140  |  106  |  32<H>  ----------------------------<  102<H>  4.0   |  22  |  2.50<H>    Ca    9.1      23 Dec 2020 14:16    TPro  7.5  /  Alb  2.7<L>  /  TBili  0.5  /  DBili  x   /  AST  29  /  ALT  17  /  AlkPhos  89  12-23    BMP 1385, Trop<0.015,    CTabd. : Moderate ascites  Cholelithiasis with multiple stones  Colonic diverticulosis  Calcified uterine fibroid       HPI: The patient is a 66 yo female with Hx. of  Atrial flutter 11/9/19  had GILSON cardioversion, Vascular insufficiency LLE, R sciatica, DJD hips, Obesity , low vignesh pain, Pulm. HTN, NAHOMI on CPAP, who presented  in the ER for abdominal distention. Her symptoms started 2 weeks ago when she noted abdominal distention, SOB, decreased urination. Her PCP Dr. Sam referred her to the ER. for CT.    PMHx: Atrial flutter 11/9/19  had GILSON cardioversion, Vascular insufficiency LLE, R sciatica, DJD hips, Obesity , low vignesh pain, Pulm. HTN, NAHOMI on CPAP    PSHx: Bilateral THR, tonsillectomy, L knee arthroscopy in 1994,     Family Hx: . Mother  had Atrial fib and COPD, Fahter had  COPD, CHF,     Social Hx.: not smoking, no alcohol use    ROS:   Eyes: no changes in vision    ENT/Mouth: no changes    Cardiovascular: no chest pain    Respiratory: has mild  SOB    Gastrointestinal: no diarrhea, no nausea, no vomiting    Genitourinary: no dysuria    Breast: no pain    Musculoskeletal: no pain    Integumentary: no itching    Neurological: No Headache, no tremor,    Psychiatric: no suicidal ideations    Endocrine: no excessive thirst,     Hematologic/Lymphatic: no swollen glands    Allergic/Immunologic: no itching      Physical Exam: Vital Signs Last 24 Hrs  T(C): 36.7 (23 Dec 2020 15:26), Max: 37.2 (23 Dec 2020 12:57)  T(F): 98 (23 Dec 2020 15:26), Max: 99 (23 Dec 2020 12:57)  HR: 105 (23 Dec 2020 21:04) (105 - 120)  BP: 100/52 (23 Dec 2020 21:04) (86/51 - 163/123)  BP(mean): 59 (23 Dec 2020 15:26) (59 - 64)  RR: 19 (23 Dec 2020 21:04) (18 - 22)  SpO2: 98% (23 Dec 2020 21:04) (97% - 100%)        HEENT: PRRL EOMI    MOUTH/TEETH/GUMS: Clear    NECK: no JVD    LUNGS: Clear    HEART: S1,S2 RR    ABDOMEN: soft nontender    EXTREMITIES:  trace  pedal edema    MUSCULOSKELETAL: no joint swelling     NEURO: no tremor, no focal signs.    SKIN: no rash    : CVA negative,     Lab:                       11.8   13.94 )-----------( 407      ( 23 Dec 2020 14:16 )             38.3       12-23    140  |  106  |  32<H>  ----------------------------<  102<H>  4.0   |  22  |  2.50<H>    Ca    9.1      23 Dec 2020 14:16    TPro  7.5  /  Alb  2.7<L>  /  TBili  0.5  /  DBili  x   /  AST  29  /  ALT  17  /  AlkPhos  89  12-23    BMP 1385, Trop<0.015, EKG sinus tach.    CTabd. : Moderate ascites  Cholelithiasis with multiple stones  Colonic diverticulosis  Calcified uterine fibroid

## 2020-12-23 NOTE — ED ADULT NURSE REASSESSMENT NOTE - NS ED NURSE REASSESS COMMENT FT1
PT bp remains low, 90s/50s, Dr. Cervantes aware. Hr 100-120 bpm. Denies dizziness, chest pain and sob. No distress noted. PT admitted to step down unit.

## 2020-12-23 NOTE — ED ADULT NURSE REASSESSMENT NOTE - NS ED NURSE REASSESS COMMENT FT1
initiated IV, unable to obtain blood, poor blood return noted, Iv flushing without difficulty, pt states "my blood pressure is normally low, hr is normally high, i'm on high dose of lasix, and metoprolol."  md leavitt made aware, dc fluids, called phlebotomist, destiny, to draw blood.  pt aaox4, pt c/o abd distension, having difficulty urinating.

## 2020-12-24 ENCOUNTER — RESULT REVIEW (OUTPATIENT)
Age: 65
End: 2020-12-24

## 2020-12-24 DIAGNOSIS — I27.20 PULMONARY HYPERTENSION, UNSPECIFIED: ICD-10-CM

## 2020-12-24 DIAGNOSIS — R18.8 OTHER ASCITES: ICD-10-CM

## 2020-12-24 DIAGNOSIS — G47.30 SLEEP APNEA, UNSPECIFIED: ICD-10-CM

## 2020-12-24 DIAGNOSIS — N28.9 DISORDER OF KIDNEY AND URETER, UNSPECIFIED: ICD-10-CM

## 2020-12-24 DIAGNOSIS — E66.9 OBESITY, UNSPECIFIED: ICD-10-CM

## 2020-12-24 DIAGNOSIS — I48.92 UNSPECIFIED ATRIAL FLUTTER: ICD-10-CM

## 2020-12-24 DIAGNOSIS — I50.43 ACUTE ON CHRONIC COMBINED SYSTOLIC (CONGESTIVE) AND DIASTOLIC (CONGESTIVE) HEART FAILURE: ICD-10-CM

## 2020-12-24 LAB
ANION GAP SERPL CALC-SCNC: 6 MMOL/L — SIGNIFICANT CHANGE UP (ref 5–17)
APPEARANCE UR: CLEAR — SIGNIFICANT CHANGE UP
BASOPHILS # BLD AUTO: 0.06 K/UL — SIGNIFICANT CHANGE UP (ref 0–0.2)
BASOPHILS NFR BLD AUTO: 0.4 % — SIGNIFICANT CHANGE UP (ref 0–2)
BILIRUB UR-MCNC: ABNORMAL
BUN SERPL-MCNC: 31 MG/DL — HIGH (ref 7–23)
CALCIUM SERPL-MCNC: 8.8 MG/DL — SIGNIFICANT CHANGE UP (ref 8.5–10.1)
CHLORIDE SERPL-SCNC: 108 MMOL/L — SIGNIFICANT CHANGE UP (ref 96–108)
CO2 SERPL-SCNC: 26 MMOL/L — SIGNIFICANT CHANGE UP (ref 22–31)
COLOR SPEC: ABNORMAL
CREAT SERPL-MCNC: 1.73 MG/DL — HIGH (ref 0.5–1.3)
DIFF PNL FLD: NEGATIVE — SIGNIFICANT CHANGE UP
EOSINOPHIL # BLD AUTO: 0.05 K/UL — SIGNIFICANT CHANGE UP (ref 0–0.5)
EOSINOPHIL NFR BLD AUTO: 0.4 % — SIGNIFICANT CHANGE UP (ref 0–6)
GLUCOSE SERPL-MCNC: 123 MG/DL — HIGH (ref 70–99)
GLUCOSE UR QL: NEGATIVE MG/DL — SIGNIFICANT CHANGE UP
GRAM STN FLD: SIGNIFICANT CHANGE UP
HCT VFR BLD CALC: 35.8 % — SIGNIFICANT CHANGE UP (ref 34.5–45)
HGB BLD-MCNC: 10.8 G/DL — LOW (ref 11.5–15.5)
IMM GRANULOCYTES NFR BLD AUTO: 0.6 % — SIGNIFICANT CHANGE UP (ref 0–1.5)
KETONES UR-MCNC: ABNORMAL
LEUKOCYTE ESTERASE UR-ACNC: ABNORMAL
LYMPHOCYTES # BLD AUTO: 1.01 K/UL — SIGNIFICANT CHANGE UP (ref 1–3.3)
LYMPHOCYTES # BLD AUTO: 7.1 % — LOW (ref 13–44)
MCHC RBC-ENTMCNC: 28 PG — SIGNIFICANT CHANGE UP (ref 27–34)
MCHC RBC-ENTMCNC: 30.2 GM/DL — LOW (ref 32–36)
MCV RBC AUTO: 92.7 FL — SIGNIFICANT CHANGE UP (ref 80–100)
MONOCYTES # BLD AUTO: 1.02 K/UL — HIGH (ref 0–0.9)
MONOCYTES NFR BLD AUTO: 7.2 % — SIGNIFICANT CHANGE UP (ref 2–14)
NEUTROPHILS # BLD AUTO: 11.95 K/UL — HIGH (ref 1.8–7.4)
NEUTROPHILS NFR BLD AUTO: 84.3 % — HIGH (ref 43–77)
NITRITE UR-MCNC: NEGATIVE — SIGNIFICANT CHANGE UP
PH UR: 5 — SIGNIFICANT CHANGE UP (ref 5–8)
PLATELET # BLD AUTO: 338 K/UL — SIGNIFICANT CHANGE UP (ref 150–400)
POTASSIUM SERPL-MCNC: 4.1 MMOL/L — SIGNIFICANT CHANGE UP (ref 3.5–5.3)
POTASSIUM SERPL-SCNC: 4.1 MMOL/L — SIGNIFICANT CHANGE UP (ref 3.5–5.3)
PROT UR-MCNC: 30 MG/DL
RBC # BLD: 3.86 M/UL — SIGNIFICANT CHANGE UP (ref 3.8–5.2)
RBC # FLD: 13.5 % — SIGNIFICANT CHANGE UP (ref 10.3–14.5)
SARS-COV-2 IGG SERPL QL IA: NEGATIVE — SIGNIFICANT CHANGE UP
SARS-COV-2 IGM SERPL IA-ACNC: <0.1 INDEX — SIGNIFICANT CHANGE UP
SODIUM SERPL-SCNC: 140 MMOL/L — SIGNIFICANT CHANGE UP (ref 135–145)
SP GR SPEC: 1.03 — HIGH (ref 1.01–1.02)
SPECIMEN SOURCE: SIGNIFICANT CHANGE UP
UROBILINOGEN FLD QL: 1 MG/DL
WBC # BLD: 14.18 K/UL — HIGH (ref 3.8–10.5)
WBC # FLD AUTO: 14.18 K/UL — HIGH (ref 3.8–10.5)

## 2020-12-24 PROCEDURE — 88341 IMHCHEM/IMCYTCHM EA ADD ANTB: CPT | Mod: 26

## 2020-12-24 PROCEDURE — 88305 TISSUE EXAM BY PATHOLOGIST: CPT | Mod: 26

## 2020-12-24 PROCEDURE — 99232 SBSQ HOSP IP/OBS MODERATE 35: CPT

## 2020-12-24 PROCEDURE — 88108 CYTOPATH CONCENTRATE TECH: CPT | Mod: 26

## 2020-12-24 PROCEDURE — 88342 IMHCHEM/IMCYTCHM 1ST ANTB: CPT | Mod: 26

## 2020-12-24 PROCEDURE — 99223 1ST HOSP IP/OBS HIGH 75: CPT

## 2020-12-24 PROCEDURE — 49083 ABD PARACENTESIS W/IMAGING: CPT

## 2020-12-24 RX ORDER — INFLUENZA VIRUS VACCINE 15; 15; 15; 15 UG/.5ML; UG/.5ML; UG/.5ML; UG/.5ML
0.5 SUSPENSION INTRAMUSCULAR ONCE
Refills: 0 | Status: COMPLETED | OUTPATIENT
Start: 2020-12-24 | End: 2020-12-26

## 2020-12-24 RX ORDER — APIXABAN 2.5 MG/1
5 TABLET, FILM COATED ORAL EVERY 12 HOURS
Refills: 0 | Status: DISCONTINUED | OUTPATIENT
Start: 2020-12-25 | End: 2020-12-26

## 2020-12-24 RX ORDER — ALBUMIN HUMAN 25 %
50 VIAL (ML) INTRAVENOUS ONCE
Refills: 0 | Status: COMPLETED | OUTPATIENT
Start: 2020-12-24 | End: 2020-12-24

## 2020-12-24 RX ADMIN — Medication 50 MILLILITER(S): at 12:50

## 2020-12-24 RX ADMIN — PANTOPRAZOLE SODIUM 40 MILLIGRAM(S): 20 TABLET, DELAYED RELEASE ORAL at 05:26

## 2020-12-24 RX ADMIN — Medication 25 MILLIGRAM(S): at 22:35

## 2020-12-24 RX ADMIN — Medication 25 MILLIGRAM(S): at 01:21

## 2020-12-24 RX ADMIN — Medication 25 MILLIGRAM(S): at 10:08

## 2020-12-24 NOTE — CONSULT NOTE ADULT - PROBLEM SELECTOR RECOMMENDATION 2
Hx. of this and was on metoprolol, entresto, and lasix.  Entresto and lasix on hold due to a combination of dehydration and renal insufficiency and superimposed low blood pressure. Will monitor for now on metoprolol. Repeat echo to assess for change in LV FX.

## 2020-12-24 NOTE — CONSULT NOTE ADULT - SUBJECTIVE AND OBJECTIVE BOX
HPI: 64 yo F PMHX: Atrial flutter 19 GILSON/cardioversion, Combined systolic and diastolic HF, Pulmonary hypertension, sleep apnea, Vascular insufficiency LLE, R sciatica, DJD hips, Obesity , low bak pain, NAHOMI on CPAP, who presented  with abdominal distention.  Notes symptoms started 2 weeks ago when she noted abdominal distention, SOB, decreased urination. Referred in for CT and found to have moderate ascites.  I had seen he in office 10/2019 after hospitalization earlier in  and had recommended CC for ischemia evaluation of CHF and she went to Dr. Galvez and had it at [facility].  NL: coronaries noted with mild cardiomyopathy.  Right heart did show pulmonary pressures in 50's.       PAST MEDICAL & SURGICAL HISTORY:  Vascular insufficiency eft leg  Sciatic leg pain ight  Hip pain  Arthritis  S/P hip replacement, bilateral  History of tonsillectomy and adenoidectomy  H/O arthroscopy of left knee    Atrial flutter 19  had GILSON cardioversion  Pulm. HTN, NAHOMI on CPAP  Bilateral THR,   Tonsillectomy  L knee arthroscopy in ,     SOCIAL HISTORY: Non-Smoker/No ETOH/ No Ilicit Drug use.    FAMILY HISTORY:  Family history of heart disease  a. fib, valvular dysfunction  Family history of COPD (chronic obstructive pulmonary disease)    Allergies  No Known Allergies    Intolerances    Home Medications:  cyclobenzaprine 5 mg oral tablet: 1 tab(s) orally 3 times a day, As Needed (23 Dec 2020 22:15)  furosemide 20 mg oral tablet: 1 tab(s) orally every other day  metoprolol succinate 25 mg oral tablet, extended release: 1 tab(s) orally 2 times a day (23 Dec 2020 22:15)  [facility] MEDICATIONS:   MEDICATIONS  (STANDING):  influenza   Vaccine 0.5 milliLiter(s) IntraMuscular once  metoprolol succinate ER 25 milliGRAM(s) Oral two times a day  pantoprazole    Tablet 40 milliGRAM(s) Oral before breakfast    MEDICATIONS  (PRN):  cyclobenzaprine 5 milliGRAM(s) Oral three times a day PRN Muscle Spasm      REVIEW OF SYSTEMS: 13 systems were reviewed and all negative except for comments above.    Vital Signs Last 24 Hrs  T(C): 36.9 (24 Dec 2020 09:09), Max: 37.2 (24 Dec 2020 01:14)  T(F): 98.5 (24 Dec 2020 09:09), Max: 98.9 (24 Dec 2020 01:14)  HR: 109 (24 Dec 2020 09:09) (105 - 120)  BP: 93/61 (24 Dec 2020 09:09) (91/54 - 109/59)  BP(mean): --  RR: 18 (24 Dec 2020 09:09) (18 - 20)  SpO2: 96% (24 Dec 2020 09:09) (96% - 100%)Daily Height in cm: 160.02 (24 Dec 2020 01:14)    Daily Weight in k.7 (24 Dec 2020 01:14)I&O's Summary      PHYSICAL EXAM:  Constitutional: NAD, awake and alert, well-developed  HEENT: PERRLA, EOMI,  No oral cyanosis. Oropharynx Clean and Dry.  Neck:  supple,  JVP 3-4, No Thyroid enlargement. No Carotid Bruits bilaterally.  Respiratory: Breath sounds are clear bilaterally, No wheezing, rales or rhonchi  Cardiovascular: NL S1 and S2, RRR, no s3, no s4,   Gastrointestinal: Bowel Sounds present, +BS   Extremities: No peripheral edema. No clubbing or cyanosis.   Vascular: 1+ peripheral pulses in LE   Neurological: A/O x 3, no gross focal motor deficits  Musculoskeletal: no calf tenderness.  Skin: No rashes.    LABS: All Labs Reviewed:                        10.8   14.18 )-----------( 338      ( 24 Dec 2020 07:24 )             35.8                         11.8   13.94 )-----------( 407      ( 23 Dec 2020 14:16 )             38.3     24 Dec 2020 07:24    140    |  108    |  31     ----------------------------<  123    4.1     |  26     |  1.73   23 Dec 2020 14:16    140    |  106    |  32     ----------------------------<  102    4.0     |  22     |  2.50     Ca    8.8        24 Dec 2020 07:24  Ca    9.1        23 Dec 2020 14:16    TPro  7.5    /  Alb  2.7    /  TBili  0.5    /  DBili  x      /  AST  29     /  ALT  17     /  AlkPhos  89     23 Dec 2020 14:16    PT/INR - ( 23 Dec 2020 14:16 )   PT: 21.4 sec;   INR: 1.89 ratio         PTT - ( 23 Dec 2020 14:16 )  PTT:33.2 sec  CARDIAC MARKERS ( 23 Dec 2020 14:16 )  <0.015 ng/mL / x     / x     / x     / x          12- @ 14:16  Pro Bnp 1385    RADIOLOGY:    < from: Xray Chest 1 View- PORTABLE-Urgent (Xray Chest 1 View- PORTABLE-Urgent .) (20 @ 18:26) >    IMPRESSION:   No evidence of active chest disease.    < end of copied text >  < from: CT Abdomen and Pelvis No Cont (20 @ 16:00) >    IMPRESSION:  Moderate ascites  Cholelithiasis with multiple stones  Colonic diverticulosis  Calcified uterine fibroid    < end of copied text >  EKG:  Sinus tachycardia with low voltage and poor r wave progression.    ECHO:  10/2019 EF 47%, Mild pulm. HTN.  DD.    CARDIAC CATHTERIZATION: 2020: NL Cors.  Moderate Pulm. HTN (PAP 53 mmHg), mild LV Dysfunction.  DD.

## 2020-12-24 NOTE — CONSULT NOTE ADULT - PROBLEM SELECTOR RECOMMENDATION 9
s/p drainage . Work up of etiology in process. The likelyhood that the ascites is from pulmonary hypertension is low, but possible.  Need to assess for other etiologies before we can attribute it to this alone as it is in excess of the severity of her heart issues.

## 2020-12-25 LAB
ADD ON TEST-SPECIMEN IN LAB: SIGNIFICANT CHANGE UP
ALBUMIN FLD-MCNC: 2.8 G/DL — SIGNIFICANT CHANGE UP
ANION GAP SERPL CALC-SCNC: 6 MMOL/L — SIGNIFICANT CHANGE UP (ref 5–17)
B PERT IGG+IGM PNL SER: CLEAR — SIGNIFICANT CHANGE UP
BUN SERPL-MCNC: 20 MG/DL — SIGNIFICANT CHANGE UP (ref 7–23)
CALCIUM SERPL-MCNC: 8.9 MG/DL — SIGNIFICANT CHANGE UP (ref 8.5–10.1)
CHLORIDE SERPL-SCNC: 110 MMOL/L — HIGH (ref 96–108)
CO2 SERPL-SCNC: 26 MMOL/L — SIGNIFICANT CHANGE UP (ref 22–31)
COLOR FLD: YELLOW — SIGNIFICANT CHANGE UP
CREAT SERPL-MCNC: 1.14 MG/DL — SIGNIFICANT CHANGE UP (ref 0.5–1.3)
CULTURE RESULTS: SIGNIFICANT CHANGE UP
EOSINOPHIL # FLD: 1 % — SIGNIFICANT CHANGE UP
FLUID INTAKE SUBSTANCE CLASS: SIGNIFICANT CHANGE UP
FLUID SEGMENTED GRANULOCYTES: 19 % — SIGNIFICANT CHANGE UP
FOLATE+VIT B12 SERBLD-IMP: 0 % — SIGNIFICANT CHANGE UP
GLUCOSE FLD-MCNC: 120 MG/DL — SIGNIFICANT CHANGE UP
GLUCOSE SERPL-MCNC: 104 MG/DL — HIGH (ref 70–99)
HCT VFR BLD CALC: 35 % — SIGNIFICANT CHANGE UP (ref 34.5–45)
HGB BLD-MCNC: 10.5 G/DL — LOW (ref 11.5–15.5)
LDH SERPL L TO P-CCNC: 442 U/L — SIGNIFICANT CHANGE UP
LYMPHOCYTES # FLD: 45 % — SIGNIFICANT CHANGE UP
MCHC RBC-ENTMCNC: 27.6 PG — SIGNIFICANT CHANGE UP (ref 27–34)
MCHC RBC-ENTMCNC: 30 GM/DL — LOW (ref 32–36)
MCV RBC AUTO: 91.9 FL — SIGNIFICANT CHANGE UP (ref 80–100)
MESOTHL CELL # FLD: 1 % — SIGNIFICANT CHANGE UP
MONOS+MACROS # FLD: 34 % — SIGNIFICANT CHANGE UP
NRBC # FLD: 0 — SIGNIFICANT CHANGE UP
NT-PROBNP SERPL-SCNC: 758 PG/ML — HIGH (ref 0–125)
OTHER CELLS FLD MANUAL: 0 % — SIGNIFICANT CHANGE UP
PLATELET # BLD AUTO: 307 K/UL — SIGNIFICANT CHANGE UP (ref 150–400)
POTASSIUM SERPL-MCNC: 4.1 MMOL/L — SIGNIFICANT CHANGE UP (ref 3.5–5.3)
POTASSIUM SERPL-SCNC: 4.1 MMOL/L — SIGNIFICANT CHANGE UP (ref 3.5–5.3)
PROT FLD-MCNC: 4.9 G/DL — SIGNIFICANT CHANGE UP
RBC # BLD: 3.81 M/UL — SIGNIFICANT CHANGE UP (ref 3.8–5.2)
RBC # FLD: 13.4 % — SIGNIFICANT CHANGE UP (ref 10.3–14.5)
RCV VOL RI: 4000 /UL — HIGH (ref 0–0)
SODIUM SERPL-SCNC: 142 MMOL/L — SIGNIFICANT CHANGE UP (ref 135–145)
SPECIMEN SOURCE: SIGNIFICANT CHANGE UP
TOTAL NUCLEATED CELL COUNT, BODY FLUID: 1219 /UL — SIGNIFICANT CHANGE UP
TROPONIN I SERPL-MCNC: <0.015 NG/ML — SIGNIFICANT CHANGE UP (ref 0.01–0.04)
TUBE TYPE: SIGNIFICANT CHANGE UP
WBC # BLD: 11.72 K/UL — HIGH (ref 3.8–10.5)
WBC # FLD AUTO: 11.72 K/UL — HIGH (ref 3.8–10.5)

## 2020-12-25 PROCEDURE — 99232 SBSQ HOSP IP/OBS MODERATE 35: CPT

## 2020-12-25 PROCEDURE — 99233 SBSQ HOSP IP/OBS HIGH 50: CPT

## 2020-12-25 RX ORDER — SACCHAROMYCES BOULARDII 250 MG
250 POWDER IN PACKET (EA) ORAL
Refills: 0 | Status: DISCONTINUED | OUTPATIENT
Start: 2020-12-25 | End: 2020-12-26

## 2020-12-25 RX ADMIN — APIXABAN 5 MILLIGRAM(S): 2.5 TABLET, FILM COATED ORAL at 22:28

## 2020-12-25 RX ADMIN — Medication 250 MILLIGRAM(S): at 22:28

## 2020-12-25 RX ADMIN — Medication 25 MILLIGRAM(S): at 09:14

## 2020-12-25 RX ADMIN — APIXABAN 5 MILLIGRAM(S): 2.5 TABLET, FILM COATED ORAL at 09:15

## 2020-12-25 RX ADMIN — PANTOPRAZOLE SODIUM 40 MILLIGRAM(S): 20 TABLET, DELAYED RELEASE ORAL at 09:14

## 2020-12-25 RX ADMIN — Medication 25 MILLIGRAM(S): at 22:28

## 2020-12-25 NOTE — PROGRESS NOTE ADULT - ASSESSMENT
64 yo female with PMH of  Atrial flutter  on Eliquis 11/9/19  had GILSON cardioversion,  Combined systolic and diastolic HF , Cardiomyopathy , pHTN, Vascular insufficiency LLE, R sciatica, DJD hips, Obesity , low vignesh pain, Pulm. HTN, NAHOMI on CPAP, who presented  in the ER for abdominal distention. Her symptoms started 2 weeks ago when she noted abdominal distention, SOB, decreased urination.     Abdominal distention due to moderate ascites  Cholelithiasis   - s/p paracentesis 12/24 7.5 L drained, fluid analysis -  , unlikely SBP , cultures neg preliminary   - GI consult - pending   - daily weights , fluid restriction  Acute on chronic systolic with diastolic HF   - hold Entresto , lasix  - 2 d echo - pending   - cardiology consult Dr. Ramos   Atrial flutter   - hold eliquis for paracentesis, restart  5 mg bid  Acute renal failure    - hold lasix, Entresto  - monitor Cr, CT abd -no obstruction  - renal consult  Leukocytosis   - CXR clear, check Urine cultures, blood cx - pending   Obesity morbid - daily weights   NAHOMI on CPAP  - O2 supplementation hs     DVT prophylaxis - eliquis     Dispo - 2 d echo, GI work-up                                
  64 yo female with PMH of  Atrial flutter  on Eliquis 11/9/19  had GILSON cardioversion,  Combined systolic and diastolic HF , Cardiomyopathy , pHTN, Vascular insufficiency LLE, R sciatica, DJD hips, Obesity , low vignesh pain, Pulm. HTN, NAHOMI on CPAP, who presented  in the ER for abdominal distention. Her symptoms started 2 weeks ago when she noted abdominal distention, SOB, decreased urination.     Abdominal distention due to moderate ascites  Cholelithiasis   - s/p paracentesis 12/24 7.5 L drained, fluid analysis pending  - GI consult  - daily weights , fluid restriction  Acute on chronic systolic with diastolic HF   - hold Entresto , lasix  - 2 d echo  - cardiology consult  Atrial flutter   - hold eliquis for paracentesis, restart in am lower dose 2.5 mg due to HANANE   Acute renal failure    - hold lasix, Entresto  - monitor Cr, CT abd -no obstruction  - renal consult  Leukocytosis   - CXR clear, check Urine cultures, blood cx - pending   Obesity morbid - daily weights   NAHOMI on CPAP  - O2 supplementation hs     DVT prophylaxis - eliquis

## 2020-12-25 NOTE — PROVIDER CONTACT NOTE (OTHER) - REASON
Consult- Cardiology
Nephrology consult
Consult- Gastroenterology
Consult- Nephrology
Gastroenterology consult
cardiology consult

## 2020-12-25 NOTE — PROVIDER CONTACT NOTE (OTHER) - SITUATION
Left message with service re: consult.
Left message with service re: consult.
spoke with MD regarding consult.
spoke with ans service Julianna regarding consult
Left message with service re: consult.
spoke with ans service Nick regarding consult

## 2020-12-25 NOTE — PROVIDER CONTACT NOTE (OTHER) - DATE AND TIME:
23-Dec-2020 23:52
25-Dec-2020 13:04
23-Dec-2020 23:50
24-Dec-2020 09:56
23-Dec-2020 23:51
25-Dec-2020 13:09
No

## 2020-12-26 ENCOUNTER — TRANSCRIPTION ENCOUNTER (OUTPATIENT)
Age: 65
End: 2020-12-26

## 2020-12-26 VITALS
DIASTOLIC BLOOD PRESSURE: 63 MMHG | HEART RATE: 120 BPM | RESPIRATION RATE: 18 BRPM | TEMPERATURE: 99 F | SYSTOLIC BLOOD PRESSURE: 109 MMHG | OXYGEN SATURATION: 100 %

## 2020-12-26 LAB
ALBUMIN SERPL ELPH-MCNC: 2.1 G/DL — LOW (ref 3.3–5)
ALP SERPL-CCNC: 66 U/L — SIGNIFICANT CHANGE UP (ref 40–120)
ALT FLD-CCNC: 13 U/L — SIGNIFICANT CHANGE UP (ref 12–78)
ANION GAP SERPL CALC-SCNC: 5 MMOL/L — SIGNIFICANT CHANGE UP (ref 5–17)
AST SERPL-CCNC: 19 U/L — SIGNIFICANT CHANGE UP (ref 15–37)
BILIRUB DIRECT SERPL-MCNC: <0.1 MG/DL — SIGNIFICANT CHANGE UP (ref 0–0.2)
BILIRUB INDIRECT FLD-MCNC: >0.2 MG/DL — SIGNIFICANT CHANGE UP (ref 0.2–1)
BILIRUB SERPL-MCNC: 0.3 MG/DL — SIGNIFICANT CHANGE UP (ref 0.2–1.2)
BUN SERPL-MCNC: 17 MG/DL — SIGNIFICANT CHANGE UP (ref 7–23)
CALCIUM SERPL-MCNC: 8.7 MG/DL — SIGNIFICANT CHANGE UP (ref 8.5–10.1)
CHLORIDE SERPL-SCNC: 109 MMOL/L — HIGH (ref 96–108)
CO2 SERPL-SCNC: 23 MMOL/L — SIGNIFICANT CHANGE UP (ref 22–31)
CREAT SERPL-MCNC: 1.27 MG/DL — SIGNIFICANT CHANGE UP (ref 0.5–1.3)
GLUCOSE SERPL-MCNC: 112 MG/DL — HIGH (ref 70–99)
HAV IGM SER-ACNC: SIGNIFICANT CHANGE UP
HBV CORE IGM SER-ACNC: SIGNIFICANT CHANGE UP
HBV SURFACE AG SER-ACNC: SIGNIFICANT CHANGE UP
HCT VFR BLD CALC: 37.1 % — SIGNIFICANT CHANGE UP (ref 34.5–45)
HCV AB S/CO SERPL IA: 0.07 S/CO — SIGNIFICANT CHANGE UP (ref 0–0.99)
HCV AB SERPL-IMP: SIGNIFICANT CHANGE UP
HGB BLD-MCNC: 11 G/DL — LOW (ref 11.5–15.5)
LIDOCAIN IGE QN: 89 U/L — SIGNIFICANT CHANGE UP (ref 73–393)
MCHC RBC-ENTMCNC: 27.6 PG — SIGNIFICANT CHANGE UP (ref 27–34)
MCHC RBC-ENTMCNC: 29.6 GM/DL — LOW (ref 32–36)
MCV RBC AUTO: 93.2 FL — SIGNIFICANT CHANGE UP (ref 80–100)
NT-PROBNP SERPL-SCNC: 896 PG/ML — HIGH (ref 0–125)
PLATELET # BLD AUTO: 210 K/UL — SIGNIFICANT CHANGE UP (ref 150–400)
POTASSIUM SERPL-MCNC: 4.3 MMOL/L — SIGNIFICANT CHANGE UP (ref 3.5–5.3)
POTASSIUM SERPL-SCNC: 4.3 MMOL/L — SIGNIFICANT CHANGE UP (ref 3.5–5.3)
PROT SERPL-MCNC: 6.3 GM/DL — SIGNIFICANT CHANGE UP (ref 6–8.3)
RBC # BLD: 3.98 M/UL — SIGNIFICANT CHANGE UP (ref 3.8–5.2)
RBC # FLD: 13.4 % — SIGNIFICANT CHANGE UP (ref 10.3–14.5)
SODIUM SERPL-SCNC: 137 MMOL/L — SIGNIFICANT CHANGE UP (ref 135–145)
WBC # BLD: 10.4 K/UL — SIGNIFICANT CHANGE UP (ref 3.8–10.5)
WBC # FLD AUTO: 10.4 K/UL — SIGNIFICANT CHANGE UP (ref 3.8–10.5)

## 2020-12-26 PROCEDURE — 99232 SBSQ HOSP IP/OBS MODERATE 35: CPT

## 2020-12-26 PROCEDURE — 99239 HOSP IP/OBS DSCHRG MGMT >30: CPT

## 2020-12-26 RX ORDER — SACCHAROMYCES BOULARDII 250 MG
1 POWDER IN PACKET (EA) ORAL
Qty: 60 | Refills: 0
Start: 2020-12-26 | End: 2021-01-24

## 2020-12-26 RX ORDER — PANTOPRAZOLE SODIUM 20 MG/1
1 TABLET, DELAYED RELEASE ORAL
Qty: 30 | Refills: 0
Start: 2020-12-26 | End: 2021-01-24

## 2020-12-26 RX ADMIN — APIXABAN 5 MILLIGRAM(S): 2.5 TABLET, FILM COATED ORAL at 10:33

## 2020-12-26 RX ADMIN — PANTOPRAZOLE SODIUM 40 MILLIGRAM(S): 20 TABLET, DELAYED RELEASE ORAL at 10:33

## 2020-12-26 RX ADMIN — Medication 25 MILLIGRAM(S): at 10:33

## 2020-12-26 RX ADMIN — Medication 250 MILLIGRAM(S): at 10:33

## 2020-12-26 RX ADMIN — INFLUENZA VIRUS VACCINE 0.5 MILLILITER(S): 15; 15; 15; 15 SUSPENSION INTRAMUSCULAR at 15:19

## 2020-12-26 NOTE — DISCHARGE NOTE PROVIDER - NSDCMRMEDTOKEN_GEN_ALL_CORE_FT
apixaban 5 mg oral tablet: 1 tab(s) orally every 12 hours  cyclobenzaprine 5 mg oral tablet: 1 tab(s) orally 3 times a day, As Needed  metoprolol succinate 25 mg oral tablet, extended release: 1 tab(s) orally 2 times a day  pantoprazole 40 mg oral delayed release tablet: 1 tab(s) orally once a day (before a meal)  saccharomyces boulardii lyo 250 mg oral capsule: 1 cap(s) orally 2 times a day

## 2020-12-26 NOTE — CONSULT NOTE ADULT - ASSESSMENT
64 yo with severe pul htn with german on cpap presents with abd distension.  Admitted with ascites due to passive congestion.  Noted with HANANE with resolution with dc of lasix and entresto     PLAN   - no renal barrier for dc   - pt advised nephro fu in 4-6 weeks when meds are re-introducted   dw dr miranda

## 2020-12-26 NOTE — DISCHARGE NOTE PROVIDER - HOSPITAL COURSE
Patient is a 65y old  Female who presents with a chief complaint of Ascites, SOB     HPI:  64 yo female with PMH of  Atrial flutter  on Eliquis 11/9/19  had GILSON cardioversion,  Combined systolic and diastolic HF , Cardiomyopathy , pHTN, Vascular insufficiency LLE, R sciatica, DJD hips, Obesity , low vignesh pain, Pulm. HTN, NAHOMI on CPAP, who presented  in the ER for abdominal distention. Her symptoms started 2 weeks ago when she noted abdominal distention, SOB, decreased urination. Her PCP Dr. Sam referred her to the ER. for CT.    12/24    Patient seen and examined at bedside earlier today, + abdominal distension, denies cp dyspnea, + epigastric discomfort, afebrile, POC discussed   12/25 - pt seen and examined, abdominal distension improved, denies cp, dyspnea, + poor po intake, afebrile, no palpitations, POC discussed   12/26 - pt seen and examined, denies cp, dyspnea, palpitations, abdominal distension improved, afebrile, POC discussed in length    Review of system- Rest of the review of system are negative except mentioned in HPI    T(C): 37.3 (12-26-20 @ 09:28), Max: 37.3 (12-26-20 @ 09:28)  T(F): 99.1 (12-26-20 @ 09:28), Max: 99.1 (12-26-20 @ 09:28)  HR: 115 (12-26-20 @ 09:28) (112 - 115)  BP: 94/50 (12-26-20 @ 09:28) (94/50 - 111/58)  RR: 18 (12-26-20 @ 09:28) (18 - 18)  SpO2: 95% (12-26-20 @ 09:28) (95% - 96%)  Wt(kg): --    Physical exam:   GENERAL: NAD  NERVOUS SYSTEM:  Alert & Oriented X3, non- focal exam, Motor Strength 5/5 B/L upper and lower extremities; DTRs 2+ intact and symmetric  HEAD:  Atraumatic, Normocephalic  EYES: EOMI, PERRLA, conjunctiva and sclera clear  HEENT: Moist mucous membranes  NECK: Supple, No JVD  CHEST/LUNG: Clear to auscultation bilaterally; No rales, no rhonchi, no wheezing  HEART: Regular rate and rhythm; No murmurs, no rubs or gallops  ABDOMEN: Soft, Non-tender, Non-distended; Bowel sounds present  GENITOURINARY- Voiding, no suprapubic tenderness  EXTREMITIES:  2+ Peripheral Pulses, No clubbing, cyanosis,   edema  MUSCULOSKELETAL:- No muscle tenderness, Muscle tone normal, No joint tenderness, no Joint swelling,  Joint ROM –normal   SKIN-no rash, no lesion    140  |  108  |  31<H>  ----------------------------<  123<H>  4.1   |  26  |  1.73<H>  Basic Metabolic Panel in AM (12.26.20 @ 08:04)    Creatinine, Serum: 1.27 mg/dL  Successful sonographic guided paracentesis with aspiration of 7400 cc's of ascitic fluid.   64 yo female with PMH of  Atrial flutter  on Eliquis 11/9/19  had GILSON cardioversion,  Combined systolic and diastolic HF , Cardiomyopathy , pHTN, Vascular insufficiency LLE, R sciatica, DJD hips, Obesity , low vignesh pain, Pulm. HTN, NAHOMI on CPAP, who presented  in the ER for abdominal distention. Her symptoms started 2 weeks ago when she noted abdominal distention, SOB, decreased urination.     Abdominal distention due to moderate ascites  Cholelithiasis   - s/p paracentesis 12/24 7.5 L drained, fluid analysis -  , unlikely SBP , cultures neg preliminary   - GI consult - o/p follow up with Dr. Baker for further MRI of the liver   - daily weights , fluid restriction  Acute on chronic systolic with diastolic HF   - hold Entresto , lasix  - 2 d echo - can be done as outpatient   - cardiology consult Dr. Ramos   Atrial flutter   - hold eliquis for paracentesis, restart  5 mg bid  Acute renal failure    - hold lasix, Entresto  - monitor Cr, CT abd -no obstruction  - renal consult  Leukocytosis , improved  - CXR clear, check Urine cultures, blood cx -neg   Obesity morbid - daily weights   NAHOMI on CPAP  - O2 supplementation h  CPAP at home  Disposition - medically optimized to be discharged home with close follow up with PCP, GI, cardiology within 1 week   return to ED if fever, abdominal pain, nausea, vomiting, chest pain, dyspnea  Discharge plan discussed with patient, RN  Patient advised to follow up with PCP within 3-7 days  time spend 40 min  Discharge note faxed to PCP with my contact information to call me back   PCP Dr. Sam

## 2020-12-26 NOTE — DISCHARGE NOTE PROVIDER - CARE PROVIDER_API CALL
Miles Sam  FAMILY MEDICINE  [address] [place] [address]  Phone: [phone]  Fax: [phone]  Follow Up Time: 1 week    Justyn Baker  GASTROENTEROLOGY  [address] [facility] [address], [place] [address]  Phone: [phone]  Fax: [phone]  Follow Up Time: 1 week    Maxwell Ramos  CARDIOVASCULAR DISEASE  [address] [place] [address]  Phone: [phone]  Fax: [phone]  Follow Up Time: 1 week    Mikaela Hoang)  Internal Medicine; Nephrology  [address], [address]  [place] [address]  Phone: [phone]  Fax: [phone]  Follow Up Time: 2 weeks

## 2020-12-26 NOTE — DISCHARGE NOTE PROVIDER - NSDCCPCAREPLAN_GEN_ALL_CORE_FT
PRINCIPAL DISCHARGE DIAGNOSIS  Diagnosis: Ascites  Assessment and Plan of Treatment: s/p paracentesis , follow up with GI within 1 week for further workup of ascites and open MRI of the liver      SECONDARY DISCHARGE DIAGNOSES  Diagnosis: Abdominal pain  Assessment and Plan of Treatment: take pantoprasole 40 mg daily, follow up with GI for further monitoring  take probiotics daily    Diagnosis: Atrial flutter  Assessment and Plan of Treatment: take eliquis, follow up with cardiologist    Diagnosis: Heart failure  Assessment and Plan of Treatment: stop lasix , entresto, follow up with cardiologist within 1 week for further care    Diagnosis: Acute renal failure  Assessment and Plan of Treatment: repeat renal function in 1 week follow up with renal specialist within 1 week

## 2020-12-26 NOTE — DISCHARGE NOTE NURSING/CASE MANAGEMENT/SOCIAL WORK - PATIENT PORTAL LINK FT
You can access the FollowMyHealth Patient Portal offered by [facility] by registering at the following website: [email or web address]. By joining [email or web address]’s FollowMyHealth portal, you will also be able to view your health information using other applications (apps) compatible with our system.

## 2020-12-26 NOTE — DISCHARGE NOTE PROVIDER - PROVIDER TOKENS
PROVIDER:[TOKEN:[[id number]],FOLLOWUP:[1 week]],PROVIDER:[TOKEN:[[id number]],FOLLOWUP:[1 week]],PROVIDER:[TOKEN:[[id number]],FOLLOWUP:[1 week]],PROVIDER:[TOKEN:[[id number]],FOLLOWUP:[2 weeks]]

## 2020-12-26 NOTE — DISCHARGE NOTE PROVIDER - CARE PROVIDERS DIRECT ADDRESSES
,[email or web address] ([email or web address]

## 2020-12-26 NOTE — CONSULT NOTE ADULT - SUBJECTIVE AND OBJECTIVE BOX
NEPHROLOGY INTERVAL HPI/OVERNIGHT EVENTS:  MADHAVI [id number]  HPI:  HPI: The patient is a 66 yo female with Hx. of  Atrial flutter 19  had GILSON cardioversion, Vascular insufficiency LLE, R sciatica, DJD hips, Obesity , low bak pain, Pulm. HTN, NAHOMI on CPAP, who presented  in the ER for abdominal distention. Her symptoms started 2 weeks ago when she noted abdominal distention, SOB, decreased urination. Her PCP Dr. Sam referred her to the ER. for CT.  -------------------------------------------------------  pt admitted and noted with ascites.  s/p paracentesis of 7.4 L  removed and    upon arrival to hospital was noted to have scr of 2.4 while on entresto and lasix   when holding the meds, scr improved   for dc today with no diuretics or entresto   to fu with dr marquis from cardiology       PMHx:   Atrial flutter 19  had GILSON cardioversion,   Vascular insufficiency LLE,   R sciatica,   DJD hips,   Obesity ,   low bak pain,   Pulm. HTN,   NAHOMI on CPAP  Bilateral THR,   tonsillectomy,   L knee arthroscopy in ,     Family Hx: . Mother  had Atrial fib and COPD, Father had COPD, CHF,     Social Hx.: not smoking, no alcohol use      FAMILY HISTORY:  Family history of heart disease  a. fib, valvular dysfunction    Family history of COPD (chronic obstructive pulmonary disease)        MEDICATIONS  (STANDING):  apixaban 5 milliGRAM(s) Oral every 12 hours  influenza   Vaccine 0.5 milliLiter(s) IntraMuscular once  metoprolol succinate ER 25 milliGRAM(s) Oral two times a day  pantoprazole    Tablet 40 milliGRAM(s) Oral before breakfast  saccharomyces boulardii 250 milliGRAM(s) Oral two times a day    MEDICATIONS  (PRN):  cyclobenzaprine 5 milliGRAM(s) Oral three times a day PRN Muscle Spasm      Allergies    No Known Allergies    Intolerances        I&O's Summary      Home Medications:  cyclobenzaprine 5 mg oral tablet: 1 tab(s) orally 3 times a day, As Needed (23 Dec 2020 22:15)  furosemide 20 mg oral tablet: 1 tab(s) orally every other day  ***pt stopped taking a few days ago*** (23 Dec 2020 22:15)  metoprolol succinate 25 mg oral tablet, extended release: 1 tab(s) orally 2 times a day (23 Dec 2020 22:15)        Vital Signs Last 24 Hrs  T(C): 37.3 (26 Dec 2020 09:28), Max: 37.3 (26 Dec 2020 09:28)  T(F): 99.1 (26 Dec 2020 09:28), Max: 99.1 (26 Dec 2020 09:28)  HR: 115 (26 Dec 2020 09:28) (112 - 115)  BP: 94/50 (26 Dec 2020 09:28) (94/50 - 111/58)  BP(mean): --  RR: 18 (26 Dec 2020 09:28) (18 - 18)  SpO2: 95% (26 Dec 2020 09:28) (95% - 96%)  Daily     Daily Weight in k.3 (26 Dec 2020 06:12)  I&O's Summary      PHYSICAL EXAM:  GEN: alert awake NAD   HEENT: MMM  NECK supple no jvd  CV: RRR s1s2  LUNGS: b/l CTA  ABD: +BS soft, nt/nd  EXT: + edema    LABS:                        11.0   10.40 )-----------( 210      ( 26 Dec 2020 08:04 )             37.1         137  |  109<H>  |  17  ----------------------------<  112<H>  4.3   |  23  |  1.27    Ca    8.7      26 Dec 2020 08:04    TPro  6.3  /  Alb  2.1<L>  /  TBili  0.3  /  DBili  <0.1  /  AST  19  /  ALT  13  /  AlkPhos  66

## 2020-12-26 NOTE — PROGRESS NOTE ADULT - PROBLEM SELECTOR PLAN 2
Resolved and compensated.  BP low.  Hold diuretic and watch out pt. to see when low dose diuretic is needed.  Hold entresto till outpt. fu.
Stable and improved. Continue metoprolol for now.  Hold Entresto and diuretic.

## 2020-12-26 NOTE — PROGRESS NOTE ADULT - SUBJECTIVE AND OBJECTIVE BOX
HPI: 66 yo F PMHX: Atrial flutter 19 GILSON/cardioversion, Combined systolic and diastolic HF, Pulmonary hypertension, sleep apnea, Vascular insufficiency LLE, R sciatica, DJD hips, Obesity , low vignesh pain, NAHOMI on CPAP, who presented  with abdominal distention.  Notes symptoms started 2 weeks ago when she noted abdominal distention, SOB, decreased urination. Referred in for CT and found to have moderate ascites.  I had seen he in office 10/2019 after hospitalization earlier in  and had recommended CC for ischemia evaluation of CHF and she went to Dr. Galvez and had it at [facility].  NL: coronaries noted with mild cardiomyopathy.  Right heart did show pulmonary pressures in 50's.       2020: Breathing better. Renal function back to normal.  BNP drastically improved.  Awaiting GI and Nephrology consult   2020: feels better. No cp or SOB.  GI Work up negative so far outpt. MRI needed. Echo not done.      PAST MEDICAL & SURGICAL HISTORY:  Vascular insufficiency eft leg  Sciatic leg pain ight  Hip pain  Arthritis  S/P hip replacement, bilateral  History of tonsillectomy and adenoidectomy  H/O arthroscopy of left knee    Atrial flutter 19  had GILSON cardioversion  Pulm. HTN, NAHOMI on CPAP  Bilateral THR,   Tonsillectomy  L knee arthroscopy in ,     Allergies  No Known Allergies    MEDICATIONS  (STANDING):  apixaban 5 milliGRAM(s) Oral every 12 hours  influenza   Vaccine 0.5 milliLiter(s) IntraMuscular once  metoprolol succinate ER 25 milliGRAM(s) Oral two times a day  pantoprazole    Tablet 40 milliGRAM(s) Oral before breakfast  saccharomyces boulardii 250 milliGRAM(s) Oral two times a day    MEDICATIONS  (PRN):  cyclobenzaprine 5 milliGRAM(s) Oral three times a day PRN Muscle Spasm      Vital Signs Last 24 Hrs  T(C): 37.3 (26 Dec 2020 09:28), Max: 37.3 (26 Dec 2020 09:28)  T(F): 99.1 (26 Dec 2020 09:28), Max: 99.1 (26 Dec 2020 09:28)  HR: 115 (26 Dec 2020 09:28) (112 - 115)  BP: 94/50 (26 Dec 2020 09:28) (94/50 - 111/58)  BP(mean): --  RR: 18 (26 Dec 2020 09:28) (18 - 18)  SpO2: 95% (26 Dec 2020 09:28) (95% - 96%)    I&O's Detail      Daily     Daily Weight in k.3 (26 Dec 2020 06:12)  Daily Weight in k.2 (25 Dec 2020 04:46)      PHYSICAL EXAM:  Constitutional: NAD, awake and alert, well-developed  HEENT: PERRLA, EOMI,  No oral cyanosis. Oropharynx Clean and Dry.  Neck:  supple,  JVP 3-4, No Thyroid enlargement. No Carotid Bruits bilaterally.  Respiratory: Breath sounds are clear bilaterally, No wheezing, rales or rhonchi  Cardiovascular: NL S1 and S2, RRR, no s3, no s4,   Gastrointestinal: Bowel Sounds present, +BS . Ascites resolved.   Extremities: No peripheral edema. No clubbing or cyanosis.   Vascular: 1+ peripheral pulses in LE   Neurological: A/O x 3, no gross focal motor deficits  Musculoskeletal: no calf tenderness.  Skin: No rashes.    LABS: All Labs Reviewed:                        11.0   10.40 )-----------( x        ( 26 Dec 2020 08:04 )             37.1     12-26    137  |  109<H>  |  17  ----------------------------<  112<H>  4.3   |  23  |  1.27    Ca    8.7      26 Dec 2020 08:04    TPro  6.3  /  Alb  2.1<L>  /  TBili  0.3  /  DBili  <0.1  /  AST  19  /  ALT  13  /  AlkPhos  66  12-26    CARDIAC MARKERS ( 25 Dec 2020 07:09 )  <0.015 ng/mL / x     / x     / x     / x          LIVER FUNCTIONS - ( 26 Dec 2020 08:04 )  Alb: 2.1 g/dL / Pro: 6.3 gm/dL / ALK PHOS: 66 U/L / ALT: 13 U/L / AST: 19 U/L / GGT: x             Historical Values  Serum Pro-Brain Natriuretic Peptide: 758 pg/mL (.20 @ 07:09)   Serum Pro-Brain Natriuretic Peptide: 1385 pg/mL (.20 @ 14:16)   Serum Pro-Brain Natriuretic Peptide: 4237 pg/mL (19 @ 11:25)   Serum Pro-Brain Natriuretic Peptide: 3565 pg/mL (09.10.19 @ 10:27)   RADIOLOGY:    < from: Xray Chest 1 View- PORTABLE-Urgent (Xray Chest 1 View- PORTABLE-Urgent .) (20 @ 18:26) >    IMPRESSION:   No evidence of active chest disease.    < end of copied text >  < from: CT Abdomen and Pelvis No Cont (20 @ 16:00) >    IMPRESSION:  Moderate ascites  Cholelithiasis with multiple stones  Colonic diverticulosis  Calcified uterine fibroid    < end of copied text >  EKG:  Sinus tachycardia with low voltage and poor r wave progression.    ECHO:  10/2019 EF 47%, Mild pulm. HTN.  DD.    CARDIAC CATHTERIZATION: 2020: NL Cors.  Moderate Pulm. HTN (PAP 53 mmHg), mild LV Dysfunction.  DD.      
Subjective:  Patient is a 65y old  Female who presents with a chief complaint of Ascites, SOB     HPI:  64 yo female with PMH of  Atrial flutter  on Eliquis 19  had GILSON cardioversion,  Combined systolic and diastolic HF , Cardiomyopathy , pHTN, Vascular insufficiency LLE, R sciatica, DJD hips, Obesity , low vignesh pain, Pulm. HTN, NAHOMI on CPAP, who presented  in the ER for abdominal distention. Her symptoms started 2 weeks ago when she noted abdominal distention, SOB, decreased urination. Her PCP Dr. Sam referred her to the ER. for CT.        Patient seen and examined at bedside earlier today, + abdominal distension, denies cp dyspnea, + epigastric discomfort, afebrile, POC discussed    - pt seen and examined, abdominal distension improved, denies cp, dyspnea, + poor po intake, afebrile, no palpitations, POC discussed     Review of system- Rest of the review of system are negative except mentioned in HPI    T(C): 37 (12-25-20 @ 15:34), Max: 37.1 (12-24-20 @ 22:03)  T(F): 98.6 (12-25-20 @ 15:34), Max: 98.8 (12-24-20 @ 22:03)  HR: 112 (12-25-20 @ 15:34) (112 - 116)  BP: 102/52 (12-25-20 @ 15:34) (102/52 - 116/73)  RR: 18 (12-25-20 @ 15:34) (18 - 18)  SpO2: 96% (12-25-20 @ 15:34) (95% - 96%)  Wt(kg): --    Physical exam:   GENERAL: NAD  NERVOUS SYSTEM:  Alert & Oriented X3, non- focal exam, Motor Strength 5/5 B/L upper and lower extremities; DTRs 2+ intact and symmetric  HEAD:  Atraumatic, Normocephalic  EYES: EOMI, PERRLA, conjunctiva and sclera clear  HEENT: Moist mucous membranes  NECK: Supple, No JVD  CHEST/LUNG: Clear to auscultation bilaterally; No rales, no rhonchi, no wheezing  HEART: Regular rate and rhythm; No murmurs, no rubs or gallops  ABDOMEN: Soft, Non-tender, Non-distended; Bowel sounds present  GENITOURINARY- Voiding, no suprapubic tenderness  EXTREMITIES:  2+ Peripheral Pulses, No clubbing, cyanosis,   edema  MUSCULOSKELETAL:- No muscle tenderness, Muscle tone normal, No joint tenderness, no Joint swelling,  Joint ROM –normal   SKIN-no rash, no lesion  LABS: all reviewed                 142  |  110<H>  |  20  ----------------------------<  104<H>  4.1   |  26  |  1.14    Ca    8.9      25 Dec 2020 07:09                          10.5   11.72 )-----------( 307      ( 25 Dec 2020 07:09 )             35.0       CARDIAC MARKERS ( 25 Dec 2020 07:09 )  <0.015 ng/mL / x     / x     / x     / x            Urinalysis Basic - ( 24 Dec 2020 01:15 )    Color: Ambika / Appearance: Clear / S.030 / pH: x  Gluc: x / Ketone: Small  / Bili: Small / Urobili: 1 mg/dL   Blood: x / Protein: 30 mg/dL / Nitrite: Negative   Leuk Esterase: Trace / RBC: 0-2 /HPF / WBC 3-5   Sq Epi: x / Non Sq Epi: Moderate / Bacteria: Moderate                 10.8   14.18 )-----------( 338      ( 24 Dec 2020 07:24 )             35.8         140  |  108  |  31<H>  ----------------------------<  123<H>  4.1   |  26  |  1.73<H>    Ca    8.8      24 Dec 2020 07:24    TPro  7.5  /  Alb  2.7<L>  /  TBili  0.5  /  DBili  x   /  AST  29  /  ALT  17  /  AlkPhos  89  12    PT/INR - ( 23 Dec 2020 14:16 )   PT: 21.4 sec;   INR: 1.89 ratio         PTT - ( 23 Dec 2020 14:16 )  PTT:33.2 sec    CARDIAC MARKERS ( 23 Dec 2020 14:16 )  <0.015 ng/mL / x     / x     / x     / x          Urinalysis Basic - ( 24 Dec 2020 01:15 )    Color: Ambika / Appearance: Clear / S.030 / pH: x  Gluc: x / Ketone: Small  / Bili: Small / Urobili: 1 mg/dL   Blood: x / Protein: 30 mg/dL / Nitrite: Negative   Leuk Esterase: Trace / RBC: 0-2 /HPF / WBC 3-5   Sq Epi: x / Non Sq Epi: Moderate / Bacteria: Moderate    RECENT CULTURES:    RADIOLOGY & ADDITIONAL TESTS:  < from: IR Procedure (20 @ 13:12) >  Impression:  Successful sonographic guided paracentesis with aspiration of 7400 cc's of ascitic fluid.    < end of copied text >  < from: Xray Chest 1 View- PORTABLE-Urgent (Xray Chest 1 View- PORTABLE-Urgent .) (20 @ 18:26) >  FINDINGS:  The lungs are clear of airspace consolidations or effusions. No pneumothorax.    The heart and mediastinum are within normal limits.    Visualized osseous structures are intact.        IMPRESSION:   No evidence of active chest disease.      < end of copied text >    < from: CT Abdomen and Pelvis No Cont (20 @ 16:00) >  FINDINGS:  LOWER CHEST: Mild linear atelectasis or scarring at the lung bases. Coronary artery calcification is appreciated    LIVER: Within normal limits.  BILE DUCTS: Normal caliber.  GALLBLADDER: Cholelithiasis.  SPLEEN: Within normal limits.  PANCREAS: Within normal limits.  ADRENALS: Within normal limits.  KIDNEYS/URETERS: Within normal limits.    BLADDER: Within normal limits.  REPRODUCTIVE ORGANS: Calcified uterine fibroid    BOWEL: No bowel obstruction. Appendix . There is colonic diverticulosis  PERITONEUM: Moderate ascites.  VESSELS: Atherosclerotic changes.  RETROPERITONEUM/LYMPH NODES: Nolymphadenopathy.  ABDOMINAL WALL: Within normal limits.. There is grade 1 anterolisthesis of L4 on L5 likely on a degenerative basis  BONES: Degenerative changes.. Bilateral total hip replacements are also seen    IMPRESSION:  Moderate ascites  Cholelithiasis with multiple stones  Colonic diverticulosis  Calcified uterine fibroid    < end of copied text >    Current medications:  cyclobenzaprine 5 milliGRAM(s) Oral three times a day PRN  influenza   Vaccine 0.5 milliLiter(s) IntraMuscular once  metoprolol succinate ER 25 milliGRAM(s) Oral two times a day  pantoprazole    Tablet 40 milliGRAM(s) Oral before breakfast            
Subjective:  Patient is a 65y old  Female who presents with a chief complaint of Ascites, SOB     HPI:  66 yo female with PMH of  Atrial flutter  on Eliquis 19  had GILSON cardioversion,  Combined systolic and diastolic HF , Cardiomyopathy , pHTN, Vascular insufficiency LLE, R sciatica, DJD hips, Obesity , low vignesh pain, Pulm. HTN, NAHOMI on CPAP, who presented  in the ER for abdominal distention. Her symptoms started 2 weeks ago when she noted abdominal distention, SOB, decreased urination. Her PCP Dr. Sam referred her to the ER. for CT.        Patient seen and examined at bedside earlier today, + abdominal distension, denies cp dyspnea, + epigastric discomfort, afebrile, POC discussed   Review of system- Rest of the review of system are negative except mentioned in HPI    Objective:   T(C): 37.2 (20 @ 15:54), Max: 37.2 (20 @ 01:14)  HR: 117 (20 @ 15:54) (105 - 120)  BP: 125/66 (--20 @ 15:54) (93/61 - 125/66)  RR: 18 (-20 @ 15:54) (18 - 20)  SpO2: 98% (20 @ 15:54) (96% - 100%)  Wt(kg): --  Daily Height in cm: 160.02 (24 Dec 2020 01:14)    Daily Weight in k.7 (24 Dec 2020 01:14)    Physical exam:   GENERAL: NAD  NERVOUS SYSTEM:  Alert & Oriented X3, non- focal exam, Motor Strength 5/5 B/L upper and lower extremities; DTRs 2+ intact and symmetric  HEAD:  Atraumatic, Normocephalic  EYES: EOMI, PERRLA, conjunctiva and sclera clear  HEENT: Moist mucous membranes  NECK: Supple, No JVD  CHEST/LUNG: Clear to auscultation bilaterally; No rales, no rhonchi, no wheezing  HEART: Regular rate and rhythm; No murmurs, no rubs or gallops  ABDOMEN: Soft, Non-tender, Non-distended; Bowel sounds present  GENITOURINARY- Voiding, no suprapubic tenderness  EXTREMITIES:  2+ Peripheral Pulses, No clubbing, cyanosis,   edema  MUSCULOSKELETAL:- No muscle tenderness, Muscle tone normal, No joint tenderness, no Joint swelling,  Joint ROM –normal   SKIN-no rash, no lesion  LABS: all reviewed                        10.8   14.18 )-----------( 338      ( 24 Dec 2020 07:24 )             35.8     12    140  |  108  |  31<H>  ----------------------------<  123<H>  4.1   |  26  |  1.73<H>    Ca    8.8      24 Dec 2020 07:24    TPro  7.5  /  Alb  2.7<L>  /  TBili  0.5  /  DBili  x   /  AST  29  /  ALT  17  /  AlkPhos  89      PT/INR - ( 23 Dec 2020 14:16 )   PT: 21.4 sec;   INR: 1.89 ratio         PTT - ( 23 Dec 2020 14:16 )  PTT:33.2 sec    CARDIAC MARKERS ( 23 Dec 2020 14:16 )  <0.015 ng/mL / x     / x     / x     / x          Urinalysis Basic - ( 24 Dec 2020 01:15 )    Color: Ambika / Appearance: Clear / S.030 / pH: x  Gluc: x / Ketone: Small  / Bili: Small / Urobili: 1 mg/dL   Blood: x / Protein: 30 mg/dL / Nitrite: Negative   Leuk Esterase: Trace / RBC: 0-2 /HPF / WBC 3-5   Sq Epi: x / Non Sq Epi: Moderate / Bacteria: Moderate    RECENT CULTURES:    RADIOLOGY & ADDITIONAL TESTS:  < from: IR Procedure (20 @ 13:12) >  Impression:  Successful sonographic guided paracentesis with aspiration of 7400 cc's of ascitic fluid.    < end of copied text >  < from: Xray Chest 1 View- PORTABLE-Urgent (Xray Chest 1 View- PORTABLE-Urgent .) (20 @ 18:26) >  FINDINGS:  The lungs are clear of airspace consolidations or effusions. No pneumothorax.    The heart and mediastinum are within normal limits.    Visualized osseous structures are intact.        IMPRESSION:   No evidence of active chest disease.      < end of copied text >    < from: CT Abdomen and Pelvis No Cont (20 @ 16:00) >  FINDINGS:  LOWER CHEST: Mild linear atelectasis or scarring at the lung bases. Coronary artery calcification is appreciated    LIVER: Within normal limits.  BILE DUCTS: Normal caliber.  GALLBLADDER: Cholelithiasis.  SPLEEN: Within normal limits.  PANCREAS: Within normal limits.  ADRENALS: Within normal limits.  KIDNEYS/URETERS: Within normal limits.    BLADDER: Within normal limits.  REPRODUCTIVE ORGANS: Calcified uterine fibroid    BOWEL: No bowel obstruction. Appendix . There is colonic diverticulosis  PERITONEUM: Moderate ascites.  VESSELS: Atherosclerotic changes.  RETROPERITONEUM/LYMPH NODES: Nolymphadenopathy.  ABDOMINAL WALL: Within normal limits.. There is grade 1 anterolisthesis of L4 on L5 likely on a degenerative basis  BONES: Degenerative changes.. Bilateral total hip replacements are also seen    IMPRESSION:  Moderate ascites  Cholelithiasis with multiple stones  Colonic diverticulosis  Calcified uterine fibroid    < end of copied text >    Current medications:  cyclobenzaprine 5 milliGRAM(s) Oral three times a day PRN  influenza   Vaccine 0.5 milliLiter(s) IntraMuscular once  metoprolol succinate ER 25 milliGRAM(s) Oral two times a day  pantoprazole    Tablet 40 milliGRAM(s) Oral before breakfast            
HPI: 66 yo F PMHX: Atrial flutter 19 GILSON/cardioversion, Combined systolic and diastolic HF, Pulmonary hypertension, sleep apnea, Vascular insufficiency LLE, R sciatica, DJD hips, Obesity , low vignesh pain, NAHOMI on CPAP, who presented  with abdominal distention.  Notes symptoms started 2 weeks ago when she noted abdominal distention, SOB, decreased urination. Referred in for CT and found to have moderate ascites.  I had seen he in office 10/2019 after hospitalization earlier in  and had recommended CC for ischemia evaluation of CHF and she went to Dr. Galvez and had it at [facility].  NL: coronaries noted with mild cardiomyopathy.  Right heart did show pulmonary pressures in 50's.       2020: Breathing better. Renal function back to normal.  BNP drastically improved.  Awaiting GI and Nephrology consult     PAST MEDICAL & SURGICAL HISTORY:  Vascular insufficiency eft leg  Sciatic leg pain ight  Hip pain  Arthritis  S/P hip replacement, bilateral  History of tonsillectomy and adenoidectomy  H/O arthroscopy of left knee    Atrial flutter 19  had GILSON cardioversion  Pulm. HTN, NAHOMI on CPAP  Bilateral THR,   Tonsillectomy  L knee arthroscopy in ,     Allergies  No Known Allergies    MEDICATIONS  (STANDING):  apixaban 5 milliGRAM(s) Oral every 12 hours  influenza   Vaccine 0.5 milliLiter(s) IntraMuscular once  metoprolol succinate ER 25 milliGRAM(s) Oral two times a day  pantoprazole    Tablet 40 milliGRAM(s) Oral before breakfast    MEDICATIONS  (PRN):  cyclobenzaprine 5 milliGRAM(s) Oral three times a day PRN Muscle Spasm      Vital Signs Last 24 Hrs  T(C): 37 (25 Dec 2020 09:18), Max: 37.2 (24 Dec 2020 15:54)  T(F): 98.6 (25 Dec 2020 09:18), Max: 98.9 (24 Dec 2020 15:54)  HR: 116 (25 Dec 2020 09:18) (115 - 117)  BP: 107/53 (25 Dec 2020 09:18) (107/53 - 125/66)  BP(mean): --  RR: 18 (25 Dec 2020 09:18) (18 - 18)  SpO2: 95% (25 Dec 2020 09:18) (95% - 98%)    I&O's Detail      Daily     Daily Weight in k.2 (25 Dec 2020 04:46)      PHYSICAL EXAM:  Constitutional: NAD, awake and alert, well-developed  HEENT: PERRLA, EOMI,  No oral cyanosis. Oropharynx Clean and Dry.  Neck:  supple,  JVP 3-4, No Thyroid enlargement. No Carotid Bruits bilaterally.  Respiratory: Breath sounds are clear bilaterally, No wheezing, rales or rhonchi  Cardiovascular: NL S1 and S2, RRR, no s3, no s4,   Gastrointestinal: Bowel Sounds present, +BS . Ascites resolved.   Extremities: No peripheral edema. No clubbing or cyanosis.   Vascular: 1+ peripheral pulses in LE   Neurological: A/O x 3, no gross focal motor deficits  Musculoskeletal: no calf tenderness.  Skin: No rashes.    LABS: All Labs Reviewed:                        10.5   11.72 )-----------( 307      ( 25 Dec 2020 07:09 )             35.0     12-25    142  |  110<H>  |  20  ----------------------------<  104<H>  4.1   |  26  |  1.14    Ca    8.9      25 Dec 2020 07:09    TPro  7.5  /  Alb  2.7<L>  /  TBili  0.5  /  DBili  x   /  AST  29  /  ALT  17  /  AlkPhos  89  12-23    CARDIAC MARKERS ( 25 Dec 2020 07:09 )  <0.015 ng/mL / x     / x     / x     / x      CARDIAC MARKERS ( 23 Dec 2020 14:16 )  <0.015 ng/mL / x     / x     / x     / x          LIVER FUNCTIONS - ( 23 Dec 2020 14:16 )  Alb: 2.7 g/dL / Pro: 7.5 gm/dL / ALK PHOS: 89 U/L / ALT: 17 U/L / AST: 29 U/L / GGT: x           PT/INR - ( 23 Dec 2020 14:16 )   PT: 21.4 sec;   INR: 1.89 ratio         PTT - ( 23 Dec 2020 14:16 )  PTT:33.2 sec      Historical Values  Serum Pro-Brain Natriuretic Peptide: 758 pg/mL (20 @ 07:09)   Serum Pro-Brain Natriuretic Peptide: 1385 pg/mL (20 @ 14:16)   Serum Pro-Brain Natriuretic Peptide: 4237 pg/mL (19 @ 11:25)   Serum Pro-Brain Natriuretic Peptide: 3565 pg/mL (09.10.19 @ 10:27)   RADIOLOGY:    < from: Xray Chest 1 View- PORTABLE-Urgent (Xray Chest 1 View- PORTABLE-Urgent .) (20 @ 18:26) >    IMPRESSION:   No evidence of active chest disease.    < end of copied text >  < from: CT Abdomen and Pelvis No Cont (20 @ 16:00) >    IMPRESSION:  Moderate ascites  Cholelithiasis with multiple stones  Colonic diverticulosis  Calcified uterine fibroid    < end of copied text >  EKG:  Sinus tachycardia with low voltage and poor r wave progression.    ECHO:  10/2019 EF 47%, Mild pulm. HTN.  DD.    CARDIAC CATHTERIZATION: 2020: NL Cors.  Moderate Pulm. HTN (PAP 53 mmHg), mild LV Dysfunction.  DD.

## 2020-12-26 NOTE — CONSULT NOTE ADULT - ASSESSMENT
CHF  No evidence of Chronic Liver disease on blood work and CT . No evidence of Carcinomatosis Abdominal masses   REC  Continue Diuretics  Conservative GI management CHF  No evidence of Overt Chronic Liver disease on blood work except for low Albumin .CT done without Contrast : No evidence of Carcinomatosis Abdominal masses   REC  Continue Diuretics  Conservative GI management  Consider MRI-may be done as outpatient  Pt known to Dr Baker-will inform him

## 2020-12-26 NOTE — CONSULT NOTE ADULT - SUBJECTIVE AND OBJECTIVE BOX
HPI:  HPI: The patient is a 64 yo female with Hx. of  Atrial flutter 11/9/19  had GILSON cardioversion, Vascular insufficiency LLE, R sciatica, DJD hips, Obesity , low vignesh pain, Pulm. HTN, NAHOMI on CPAP, who presented  in the ER for abdominal distention. Her symptoms started 2 weeks ago when she noted abdominal distention, SOB, decreased urination. Her PCP Dr. Sam referred her to the ER. for CT.    PMHx: Atrial flutter 11/9/19  had GILSON cardioversion, Vascular insufficiency LLE, R sciatica, DJD hips, Obesity , low vignesh pain, Pulm. HTN, NAHOMI on CPAP    PSHx: Bilateral THR, tonsillectomy, L knee arthroscopy in 1994,     Family Hx: . Mother  had Atrial fib and COPD, Fahter had  COPD, CHF,     Social Hx.: not smoking, no alcohol use    ROS:   Eyes: no changes in vision    ENT/Mouth: no changes    Cardiovascular: no chest pain    Respiratory: has mild  SOB    Gastrointestinal: no diarrhea, no nausea, no vomiting    Genitourinary: no dysuria    Breast: no pain    Musculoskeletal: no pain    Integumentary: no itching    Neurological: No Headache, no tremor,    Psychiatric: no suicidal ideations    Endocrine: no excessive thirst,     Hematologic/Lymphatic: no swollen glands    Allergic/Immunologic: no itching      Physical Exam: Vital Signs Last 24 Hrs  T(C): 36.7 (23 Dec 2020 15:26), Max: 37.2 (23 Dec 2020 12:57)  T(F): 98 (23 Dec 2020 15:26), Max: 99 (23 Dec 2020 12:57)  HR: 105 (23 Dec 2020 21:04) (105 - 120)  BP: 100/52 (23 Dec 2020 21:04) (86/51 - 163/123)  BP(mean): 59 (23 Dec 2020 15:26) (59 - 64)  RR: 19 (23 Dec 2020 21:04) (18 - 22)  SpO2: 98% (23 Dec 2020 21:04) (97% - 100%)        HEENT: PRRL EOMI    MOUTH/TEETH/GUMS: Clear    NECK: no JVD    LUNGS: Clear    HEART: S1,S2 RR    ABDOMEN: soft nontender    EXTREMITIES:  trace  pedal edema    MUSCULOSKELETAL: no joint swelling     NEURO: no tremor, no focal signs.    SKIN: no rash    : CVA negative,     Lab:                       11.8   13.94 )-----------( 407      ( 23 Dec 2020 14:16 )             38.3       12-23    140  |  106  |  32<H>  ----------------------------<  102<H>  4.0   |  22  |  2.50<H>    Ca    9.1      23 Dec 2020 14:16    TPro  7.5  /  Alb  2.7<L>  /  TBili  0.5  /  DBili  x   /  AST  29  /  ALT  17  /  AlkPhos  89  12-23    BMP 1385, Trop<0.015, EKG sinus tach.    CTabd. : Moderate ascites  Cholelithiasis with multiple stones  Colonic diverticulosis  Calcified uterine fibroid       (23 Dec 2020 22:40)      PAST MEDICAL & SURGICAL HISTORY:  Vascular insufficiency  left leg    Sciatic leg pain  right    Hip pain    Arthritis    S/P hip replacement, bilateral    History of tonsillectomy and adenoidectomy    H/O arthroscopy of left knee  1994        MEDICATIONS  (STANDING):  apixaban 5 milliGRAM(s) Oral every 12 hours  influenza   Vaccine 0.5 milliLiter(s) IntraMuscular once  metoprolol succinate ER 25 milliGRAM(s) Oral two times a day  pantoprazole    Tablet 40 milliGRAM(s) Oral before breakfast  saccharomyces boulardii 250 milliGRAM(s) Oral two times a day    MEDICATIONS  (PRN):  cyclobenzaprine 5 milliGRAM(s) Oral three times a day PRN Muscle Spasm      Allergies    No Known Allergies    Intolerances        SOCIAL HISTORY:    FAMILY HISTORY:  Family history of heart disease  a. fib, valvular dysfunction    Family history of COPD (chronic obstructive pulmonary disease)     Non-contributory    REVIEW OF SYSTEMS      General:	    Respiratory and Thorax:  	  Cardiovascular:	    Gastrointestinal:	    Musculoskeletal:	   Vital Signs Last 24 Hrs  T(C): 36.7 (25 Dec 2020 21:45), Max: 37 (25 Dec 2020 15:34)  T(F): 98.1 (25 Dec 2020 21:45), Max: 98.6 (25 Dec 2020 15:34)  HR: 113 (25 Dec 2020 21:45) (112 - 113)  BP: 111/58 (25 Dec 2020 21:45) (102/52 - 111/58)  BP(mean): --  RR: 18 (25 Dec 2020 21:45) (18 - 18)  SpO2: 96% (25 Dec 2020 21:45) (96% - 96%)    HEENT :No Pallor.No icterus. EOMI,PERLAA  Chest : Clear to Auscultation  CVS : S1S2 Normal.No murmurs.  Abdomen: Soft.Non tender .Normal bowel sounds.No Organomegaly.  CNS: Alert.Oriented to Time,Place and Person.No focal deficit.  EXT: Normal Range of motion.No pitting edema.    LABS:                        11.0   10.40 )-----------( x        ( 26 Dec 2020 08:04 )             37.1     12-26    137  |  109<H>  |  17  ----------------------------<  112<H>  4.3   |  23  |  1.27    Ca    8.7      26 Dec 2020 08:04    TPro  6.3  /  Alb  2.1<L>  /  TBili  0.3  /  DBili  <0.1  /  AST  19  /  ALT  13  /  AlkPhos  66  12-26      LIVER FUNCTIONS - ( 26 Dec 2020 08:04 )  Alb: 2.1 g/dL / Pro: 6.3 gm/dL / ALK PHOS: 66 U/L / ALT: 13 U/L / AST: 19 U/L / GGT: x             RADIOLOGY & ADDITIONAL STUDIES: HPI:  HPI: The patient is a 66 yo female with Hx. of  Atrial flutter 11/9/19  had GILSON cardioversion, Vascular insufficiency LLE, R sciatica, DJD hips, Obesity , low vignesh pain, Pulm. HTN, NAHOMI on CPAP, who presented  in the ER for abdominal distention. Her symptoms started 2 weeks ago when she noted abdominal distention, SOB, decreased urination. Her PCP Dr. Sam referred her to the ER. for CT.    PMHx: Atrial flutter 11/9/19  had GILSON cardioversion, Vascular insufficiency LLE, R sciatica, DJD hips, Obesity , low vignesh pain, Pulm. HTN, NAHOMI on CPAP    PSHx: Bilateral THR, tonsillectomy, L knee arthroscopy in 1994,     Family Hx: . Mother  had Atrial fib and COPD, Fahter had  COPD, CHF,     Social Hx.: not smoking, no alcohol use    ROS:   Eyes: no changes in vision    ENT/Mouth: no changes    Cardiovascular: no chest pain    Respiratory: has mild  SOB    Gastrointestinal: no diarrhea, no nausea, no vomiting    Genitourinary: no dysuria    Breast: no pain    Musculoskeletal: no pain    Integumentary: no itching    Neurological: No Headache, no tremor,    Psychiatric: no suicidal ideations    Endocrine: no excessive thirst,     Hematologic/Lymphatic: no swollen glands    Allergic/Immunologic: no itching      Physical Exam: Vital Signs Last 24 Hrs  T(C): 36.7 (23 Dec 2020 15:26), Max: 37.2 (23 Dec 2020 12:57)  T(F): 98 (23 Dec 2020 15:26), Max: 99 (23 Dec 2020 12:57)  HR: 105 (23 Dec 2020 21:04) (105 - 120)  BP: 100/52 (23 Dec 2020 21:04) (86/51 - 163/123)  BP(mean): 59 (23 Dec 2020 15:26) (59 - 64)  RR: 19 (23 Dec 2020 21:04) (18 - 22)  SpO2: 98% (23 Dec 2020 21:04) (97% - 100%)        HEENT: PRRL EOMI    MOUTH/TEETH/GUMS: Clear    NECK: no JVD    LUNGS: Clear    HEART: S1,S2 RR    ABDOMEN: soft nontender    EXTREMITIES:  trace  pedal edema    MUSCULOSKELETAL: no joint swelling     NEURO: no tremor, no focal signs.    SKIN: no rash    : CVA negative,     Lab:                       11.8   13.94 )-----------( 407      ( 23 Dec 2020 14:16 )             38.3       12-23    140  |  106  |  32<H>  ----------------------------<  102<H>  4.0   |  22  |  2.50<H>    Ca    9.1      23 Dec 2020 14:16    TPro  7.5  /  Alb  2.7<L>  /  TBili  0.5  /  DBili  x   /  AST  29  /  ALT  17  /  AlkPhos  89  12-23    BMP 1385, Trop<0.015, EKG sinus tach.    CTabd. : Moderate ascites  Cholelithiasis with multiple stones  Colonic diverticulosis  Calcified uterine fibroid       (23 Dec 2020 22:40)      PAST MEDICAL & SURGICAL HISTORY:  Vascular insufficiency  left leg    Sciatic leg pain  right    Hip pain    Arthritis    S/P hip replacement, bilateral    History of tonsillectomy and adenoidectomy    H/O arthroscopy of left knee  1994        MEDICATIONS  (STANDING):  apixaban 5 milliGRAM(s) Oral every 12 hours  influenza   Vaccine 0.5 milliLiter(s) IntraMuscular once  metoprolol succinate ER 25 milliGRAM(s) Oral two times a day  pantoprazole    Tablet 40 milliGRAM(s) Oral before breakfast  saccharomyces boulardii 250 milliGRAM(s) Oral two times a day    MEDICATIONS  (PRN):  cyclobenzaprine 5 milliGRAM(s) Oral three times a day PRN Muscle Spasm      Allergies    No Known Allergies    Intolerances        SOCIAL HISTORY:    FAMILY HISTORY:  Family history of heart disease  a. fib, valvular dysfunction    Family history of COPD (chronic obstructive pulmonary disease)     Non-contributory    REVIEW OF SYSTEMS      General:	    Respiratory and Thorax:  	  Cardiovascular:	    Gastrointestinal:	    Musculoskeletal:	   Vital Signs Last 24 Hrs  T(C): 36.7 (25 Dec 2020 21:45), Max: 37 (25 Dec 2020 15:34)  T(F): 98.1 (25 Dec 2020 21:45), Max: 98.6 (25 Dec 2020 15:34)  HR: 113 (25 Dec 2020 21:45) (112 - 113)  BP: 111/58 (25 Dec 2020 21:45) (102/52 - 111/58)  BP(mean): --  RR: 18 (25 Dec 2020 21:45) (18 - 18)  SpO2: 96% (25 Dec 2020 21:45) (96% - 96%)    HEENT :No Pallor.No icterus. EOMI,PERLAA  Chest : Clear to Auscultation  CVS : S1S2 Normal.No murmurs.  Abdomen:Obese Soft.Non tender .Normal bowel sounds.No Organomegaly.  CNS: Alert.Oriented to Time,Place and Person.No focal deficit.  EXT: Normal Range of motion.No pitting edema.    LABS:                        11.0   10.40 )-----------( x        ( 26 Dec 2020 08:04 )             37.1     12-26    137  |  109<H>  |  17  ----------------------------<  112<H>  4.3   |  23  |  1.27    Ca    8.7      26 Dec 2020 08:04    TPro  6.3  /  Alb  2.1<L>  /  TBili  0.3  /  DBili  <0.1  /  AST  19  /  ALT  13  /  AlkPhos  66  12-26      LIVER FUNCTIONS - ( 26 Dec 2020 08:04 )  Alb: 2.1 g/dL / Pro: 6.3 gm/dL / ALK PHOS: 66 U/L / ALT: 13 U/L / AST: 19 U/L / GGT: x             RADIOLOGY & ADDITIONAL STUDIES:

## 2020-12-26 NOTE — PROGRESS NOTE ADULT - PROBLEM SELECTOR PLAN 1
Resolved with drainage. GI consult does not show cause of ascites other than low albumin.  MRI as outpt. recommended.
Resolved with drainage. GI consult pending. Degree of ascites is way out ou proportion to CHF and need evaluation for other possible etiologies.

## 2020-12-26 NOTE — DISCHARGE NOTE PROVIDER - NSDCFUADDINST_GEN_ALL_CORE_FT
< from: IR Procedure (12.24.20 @ 13:12) >    Impression:  Successful sonographic guided paracentesis with aspiration of 7400 cc's of ascitic fluid.      < end of copied text >    < from: CT Abdomen and Pelvis No Cont (12.23.20 @ 16:00) >    FINDINGS:  LOWER CHEST: Mild linear atelectasis or scarring at the lung bases. Coronary artery calcification is appreciated    LIVER: Within normal limits.  BILE DUCTS: Normal caliber.  GALLBLADDER: Cholelithiasis.  SPLEEN: Within normal limits.  PANCREAS: Within normal limits.  ADRENALS: Within normal limits.  KIDNEYS/URETERS: Within normal limits.    BLADDER: Within normal limits.  REPRODUCTIVE ORGANS: Calcified uterine fibroid    BOWEL: No bowel obstruction. Appendix . There is colonic diverticulosis  PERITONEUM: Moderate ascites.  VESSELS: Atherosclerotic changes.  RETROPERITONEUM/LYMPH NODES: Nolymphadenopathy.  ABDOMINAL WALL: Within normal limits.. There is grade 1 anterolisthesis of L4 on L5 likely on a degenerative basis  BONES: Degenerative changes.. Bilateral total hip replacements are also seen    IMPRESSION:  Moderate ascites  Cholelithiasis with multiple stones  Colonic diverticulosis  Calcified uterine fibroid    < end of copied text >

## 2020-12-28 LAB
CULTURE RESULTS: SIGNIFICANT CHANGE UP
CULTURE RESULTS: SIGNIFICANT CHANGE UP
SPECIMEN SOURCE: SIGNIFICANT CHANGE UP
SPECIMEN SOURCE: SIGNIFICANT CHANGE UP

## 2020-12-28 NOTE — CHART NOTE - NSCHARTNOTEFT_GEN_A_CORE
Ascitic fluid cytology - positive for malignant cells, adenocarcinoma, more testing  pending  Patient informed - advised to see Dr. Bateman within 1 week for further work-up   pt also will follow up with Dr. Baker

## 2020-12-29 LAB
CULTURE RESULTS: SIGNIFICANT CHANGE UP
SPECIMEN SOURCE: SIGNIFICANT CHANGE UP

## 2020-12-30 ENCOUNTER — OUTPATIENT (OUTPATIENT)
Dept: OUTPATIENT SERVICES | Facility: HOSPITAL | Age: 65
LOS: 1 days | Discharge: HOME | End: 2020-12-30

## 2020-12-30 DIAGNOSIS — Z98.890 OTHER SPECIFIED POSTPROCEDURAL STATES: Chronic | ICD-10-CM

## 2020-12-30 DIAGNOSIS — C80.1 MALIGNANT (PRIMARY) NEOPLASM, UNSPECIFIED: ICD-10-CM

## 2020-12-30 DIAGNOSIS — Z96.643 PRESENCE OF ARTIFICIAL HIP JOINT, BILATERAL: Chronic | ICD-10-CM

## 2020-12-31 ENCOUNTER — APPOINTMENT (OUTPATIENT)
Dept: INTERNAL MEDICINE | Facility: CLINIC | Age: 65
End: 2020-12-31

## 2020-12-31 ENCOUNTER — RESULT REVIEW (OUTPATIENT)
Age: 65
End: 2020-12-31

## 2020-12-31 ENCOUNTER — APPOINTMENT (OUTPATIENT)
Age: 65
End: 2020-12-31
Payer: COMMERCIAL

## 2020-12-31 VITALS
DIASTOLIC BLOOD PRESSURE: 107 MMHG | HEART RATE: 144 BPM | BODY MASS INDEX: 50.32 KG/M2 | TEMPERATURE: 96.7 F | WEIGHT: 284 LBS | SYSTOLIC BLOOD PRESSURE: 155 MMHG | HEIGHT: 63 IN | RESPIRATION RATE: 18 BRPM

## 2020-12-31 DIAGNOSIS — M54.41 LUMBAGO WITH SCIATICA, RIGHT SIDE: ICD-10-CM

## 2020-12-31 DIAGNOSIS — R18.8 OTHER ASCITES: ICD-10-CM

## 2020-12-31 DIAGNOSIS — I48.91 UNSPECIFIED ATRIAL FIBRILLATION: ICD-10-CM

## 2020-12-31 DIAGNOSIS — N17.9 ACUTE KIDNEY FAILURE, UNSPECIFIED: ICD-10-CM

## 2020-12-31 DIAGNOSIS — D72.829 ELEVATED WHITE BLOOD CELL COUNT, UNSPECIFIED: ICD-10-CM

## 2020-12-31 DIAGNOSIS — I50.43 ACUTE ON CHRONIC COMBINED SYSTOLIC (CONGESTIVE) AND DIASTOLIC (CONGESTIVE) HEART FAILURE: ICD-10-CM

## 2020-12-31 DIAGNOSIS — I87.8 OTHER SPECIFIED DISORDERS OF VEINS: ICD-10-CM

## 2020-12-31 DIAGNOSIS — I11.0 HYPERTENSIVE HEART DISEASE WITH HEART FAILURE: ICD-10-CM

## 2020-12-31 DIAGNOSIS — Z96.643 PRESENCE OF ARTIFICIAL HIP JOINT, BILATERAL: ICD-10-CM

## 2020-12-31 DIAGNOSIS — M19.90 UNSPECIFIED OSTEOARTHRITIS, UNSPECIFIED SITE: ICD-10-CM

## 2020-12-31 DIAGNOSIS — K80.80 OTHER CHOLELITHIASIS WITHOUT OBSTRUCTION: ICD-10-CM

## 2020-12-31 DIAGNOSIS — E66.01 MORBID (SEVERE) OBESITY DUE TO EXCESS CALORIES: ICD-10-CM

## 2020-12-31 DIAGNOSIS — Z98.890 OTHER SPECIFIED POSTPROCEDURAL STATES: ICD-10-CM

## 2020-12-31 DIAGNOSIS — Z80.1 FAMILY HISTORY OF MALIGNANT NEOPLASM OF TRACHEA, BRONCHUS AND LUNG: ICD-10-CM

## 2020-12-31 DIAGNOSIS — I42.9 CARDIOMYOPATHY, UNSPECIFIED: ICD-10-CM

## 2020-12-31 DIAGNOSIS — I27.20 PULMONARY HYPERTENSION, UNSPECIFIED: ICD-10-CM

## 2020-12-31 DIAGNOSIS — Z99.81 DEPENDENCE ON SUPPLEMENTAL OXYGEN: ICD-10-CM

## 2020-12-31 DIAGNOSIS — G47.33 OBSTRUCTIVE SLEEP APNEA (ADULT) (PEDIATRIC): ICD-10-CM

## 2020-12-31 DIAGNOSIS — I48.92 UNSPECIFIED ATRIAL FLUTTER: ICD-10-CM

## 2020-12-31 LAB
BASOPHILS # BLD AUTO: 0.08 K/UL — SIGNIFICANT CHANGE UP (ref 0–0.2)
BASOPHILS NFR BLD AUTO: 0.5 % — SIGNIFICANT CHANGE UP (ref 0–2)
EOSINOPHIL # BLD AUTO: 0.07 K/UL — SIGNIFICANT CHANGE UP (ref 0–0.5)
EOSINOPHIL NFR BLD AUTO: 0.5 % — SIGNIFICANT CHANGE UP (ref 0–6)
HCT VFR BLD CALC: 42.3 % — SIGNIFICANT CHANGE UP (ref 34.5–45)
HGB BLD-MCNC: 13.3 G/DL — SIGNIFICANT CHANGE UP (ref 11.5–15.5)
IMM GRANULOCYTES NFR BLD AUTO: 0.7 % — SIGNIFICANT CHANGE UP (ref 0–1.5)
LYMPHOCYTES # BLD AUTO: 1.34 K/UL — SIGNIFICANT CHANGE UP (ref 1–3.3)
LYMPHOCYTES # BLD AUTO: 8.9 % — LOW (ref 13–44)
MCHC RBC-ENTMCNC: 28 PG — SIGNIFICANT CHANGE UP (ref 27–34)
MCHC RBC-ENTMCNC: 31.4 GM/DL — LOW (ref 32–36)
MCV RBC AUTO: 89.1 FL — SIGNIFICANT CHANGE UP (ref 80–100)
MONOCYTES # BLD AUTO: 1.13 K/UL — HIGH (ref 0–0.9)
MONOCYTES NFR BLD AUTO: 7.5 % — SIGNIFICANT CHANGE UP (ref 2–14)
NEUTROPHILS # BLD AUTO: 12.3 K/UL — HIGH (ref 1.8–7.4)
NEUTROPHILS NFR BLD AUTO: 81.9 % — HIGH (ref 43–77)
NRBC # BLD: 0 /100 WBCS — SIGNIFICANT CHANGE UP (ref 0–0)
PLATELET # BLD AUTO: 422 K/UL — HIGH (ref 150–400)
RBC # BLD: 4.75 M/UL — SIGNIFICANT CHANGE UP (ref 3.8–5.2)
RBC # FLD: 13.2 % — SIGNIFICANT CHANGE UP (ref 10.3–14.5)
WBC # BLD: 15.03 K/UL — HIGH (ref 3.8–10.5)
WBC # FLD AUTO: 15.03 K/UL — HIGH (ref 3.8–10.5)

## 2020-12-31 PROCEDURE — 99205 OFFICE O/P NEW HI 60 MIN: CPT

## 2020-12-31 RX ORDER — AMOXICILLIN 500 MG/1
500 TABLET, FILM COATED ORAL
Qty: 8 | Refills: 2 | Status: DISCONTINUED | COMMUNITY
Start: 2020-06-26 | End: 2020-12-31

## 2021-01-01 LAB
CANCER AG125 SERPL-ACNC: 153 U/ML — HIGH
CANCER AG19-9 SERPL-ACNC: 4139 U/ML — HIGH
FERRITIN SERPL-MCNC: 450 NG/ML — HIGH (ref 15–150)

## 2021-01-02 PROBLEM — Z80.1 FAMILY HISTORY OF LUNG CANCER: Status: ACTIVE | Noted: 2021-01-02

## 2021-01-02 PROBLEM — M19.90 OSTEOARTHRITIS: Status: ACTIVE | Noted: 2021-01-02

## 2021-01-02 NOTE — PHYSICAL EXAM
[Ambulatory and capable of all self care but unable to carry out any work activities] : Status 2- Ambulatory and capable of all self care but unable to carry out any work activities. Up and about more than 50% of waking hours [Obese] : obese [Normal] : affect appropriate [de-identified] : Distended by ascites; not tender.  Decreased bowel sounds

## 2021-01-02 NOTE — CONSULT LETTER
[Dear  ___] : Dear  [unfilled], [Consult Letter:] : I had the pleasure of evaluating your patient, [unfilled]. [Please see my note below.] : Please see my note below. [Consult Closing:] : Thank you very much for allowing me to participate in the care of this patient.  If you have any questions, please do not hesitate to contact me. [Sincerely,] : Sincerely, [___] : [unfilled] [FreeTextEntry2] : Miles Sam M.D. [FreeTextEntry3] : PABLO HAAS M.D.\par Medical Oncology\par [facility] [place] [facility]\par [facility]\par [address] [place] [address], [facility]\par [place] [address]\par Telephone: [phone]\par FAX: [phone]\par \par

## 2021-01-02 NOTE — ASSESSMENT
[FreeTextEntry1] : Ms. JOHNSON 's questions were answered to her satisfaction. She  expressed her  understanding and willingness to comply with the above recommendations, and  will return to the office in 1 week.

## 2021-01-02 NOTE — RESULTS/DATA
[FreeTextEntry1] : Blood work results, imaging studies, and pathology reports reviewed in detail with patient .\par \par

## 2021-01-02 NOTE — REVIEW OF SYSTEMS
[Patient Intake Form Reviewed] : Patient intake form was reviewed [Negative] : Allergic/Immunologic [Abdominal Pain] : abdominal pain [Vomiting] : no vomiting [Constipation] : constipation [FreeTextEntry7] : At the site of paracentesis

## 2021-01-02 NOTE — HISTORY OF PRESENT ILLNESS
[Disease: _____________________] : Disease: [unfilled] [de-identified] : 65F with past medical history of atrial flutter, on chronic anticoagulation with Eliquis, CHF, cardiomyopathy, essential hypertension, vascular insufficiency, sciatica, obesity, osteoarthritis, obstructive sleep apnea on CPAP, admitted at [facility] 12/23/2020 with shortness of breath and abdominal distention symptoms that started 2 weeks prior to admission.  CT A/P performed in the emergency room consistent with moderate ascites, cholelithiasis with multiple stones, colonic diverticulosis, and a calcified uterine fibroid. Ultrasound of bilateral lower extremity showed no evidence of DVT. CTA- no pulmonary  embolus. On 12/24/2020 patient underwent paracentesis with removal of 7.4 L ascites.  Cytopathology was positive for malignant cells, favoring adenocarcinoma.  Patient states that she did not follow-up with her doctors in a long time, has not had colonoscopy, mammography or gynecologic examination in a while.  Accompanied by her sister, Stacy.\par \par  [FreeTextEntry1] : Pending clarification of histology

## 2021-01-04 ENCOUNTER — APPOINTMENT (OUTPATIENT)
Dept: CARDIOLOGY | Facility: CLINIC | Age: 66
End: 2021-01-04
Payer: COMMERCIAL

## 2021-01-04 ENCOUNTER — NON-APPOINTMENT (OUTPATIENT)
Age: 66
End: 2021-01-04

## 2021-01-04 VITALS
BODY MASS INDEX: 50.68 KG/M2 | HEART RATE: 116 BPM | OXYGEN SATURATION: 95 % | WEIGHT: 286 LBS | DIASTOLIC BLOOD PRESSURE: 73 MMHG | HEIGHT: 63 IN | SYSTOLIC BLOOD PRESSURE: 107 MMHG

## 2021-01-04 VITALS — DIASTOLIC BLOOD PRESSURE: 68 MMHG | SYSTOLIC BLOOD PRESSURE: 108 MMHG | HEART RATE: 102 BPM | TEMPERATURE: 98.5 F

## 2021-01-04 DIAGNOSIS — E66.01 MORBID (SEVERE) OBESITY DUE TO EXCESS CALORIES: ICD-10-CM

## 2021-01-04 DIAGNOSIS — I50.22 CHRONIC SYSTOLIC (CONGESTIVE) HEART FAILURE: ICD-10-CM

## 2021-01-04 DIAGNOSIS — Z86.79 PERSONAL HISTORY OF OTHER DISEASES OF THE CIRCULATORY SYSTEM: ICD-10-CM

## 2021-01-04 LAB — CANCER AG27-29 SERPL-ACNC: 31.2 U/ML — SIGNIFICANT CHANGE UP (ref 0–38.6)

## 2021-01-04 PROCEDURE — 99214 OFFICE O/P EST MOD 30 MIN: CPT

## 2021-01-04 PROCEDURE — 93000 ELECTROCARDIOGRAM COMPLETE: CPT

## 2021-01-04 PROCEDURE — 99072 ADDL SUPL MATRL&STAF TM PHE: CPT

## 2021-01-04 RX ORDER — SACCHAROMYCES BOULARDII 50 MG
250 CAPSULE ORAL TWICE DAILY
Refills: 0 | Status: ACTIVE | COMMUNITY

## 2021-01-04 RX ORDER — CYCLOBENZAPRINE HYDROCHLORIDE 5 MG/1
5 TABLET, FILM COATED ORAL 3 TIMES DAILY
Qty: 90 | Refills: 1 | Status: ACTIVE | COMMUNITY
Start: 2020-12-10

## 2021-01-04 NOTE — REVIEW OF SYSTEMS
[Negative] : Heme/Lymph [Fever] : no fever [Chills] : no chills [see HPI] : see HPI [Heartburn] : no heartburn [Change in Appetite] : no change in appetite [Dysphagia] : no dysphagia

## 2021-01-04 NOTE — DISCUSSION/SUMMARY
[FreeTextEntry1] : Malignant Ascites: Hemetology work up in process.  Cells c/w adenocarcinoma. Await PET scan before start chemo/immuno therapy.\par Cardiomyopathy/LV dysfunction: Had done well on entresto and diuretic but now that 7.5 liters removed BP low.  Had to place Entresto on hold along with diuretic.  Continue metoprolol.  Echo to evaluate LV FX and given ascites the presence of a pericardial effusion as well. \par F/u next will be dependent on echo results and type of chemo ordered for treatment.

## 2021-01-04 NOTE — HISTORY OF PRESENT ILLNESS
[FreeTextEntry1] : Had not been back in over a year as she switched to a different cardiologist for her fluid retention.  back in 2-3/2020 had cardiac cath in evaluation of this and coronaries were WNL and had mild to moderate pulmonary hypertension.  LV FX was borderline low NL with EF 45-50 %.  Over the past few weeks she began to develop abdominal bloating and enlargement  and LACY and was admitted to hospital and found to have significant ascites. Underwent Paracentesis and 7.4 liters removed which is positive for malignant cells (likely adenocarcinoma).  Due to low BP her Entresto was stopped and she was continued on metoprolol.  Also back on her Eliquis.  She is in the midst now of being evaluated to start chemo/immuno therapy.  Feels better.  Has noted she has began to gain weight again, but her abdomen does not feel bloated or swollen.  Denies chest pain, palpitations, orthopnea, paroxysmal nocturnal dyspnea, claudication, dizziness,  lightheadedness, presyncopal, or syncopal symptoms. Baseline HR in hospital 110-120's on metoprolol. \par

## 2021-01-04 NOTE — PHYSICAL EXAM
[General Appearance - Well Developed] : well developed [General Appearance - Well Nourished] : well nourished [Normal Conjunctiva] : the conjunctiva exhibited no abnormalities [Normal Oral Mucosa] : normal oral mucosa [Normal Oropharynx] : normal oropharynx [JVD Elevated _____cm] : JVD elevated [unfilled] ~U cm above clavicle [Bowel Sounds] : normal bowel sounds [Abdomen Soft] : soft [Nail Clubbing] : no clubbing of the fingernails [Cyanosis, Localized] : no localized cyanosis [] : no rash [Oriented To Time, Place, And Person] : oriented to person, place, and time [No Anxiety] : not feeling anxious [5th Left ICS - MCL] : palpated at the 5th LICS in the midclavicular line [Diffuse] : diffuse [Rhythm Regular] : regular [2+] : left 2+ [Normal Jugular Venous V Waves Present] : normal jugular venous V waves present [FreeTextEntry1] : normal gait. [Skin Color & Pigmentation] : normal skin color and pigmentation [S3] : no S3 [S4] : no S4 [I] : a grade 1 [Right Carotid Bruit] : no bruit heard over the right carotid [Left Carotid Bruit] : no bruit heard over the left carotid [No Pitting Edema] : no pitting edema present

## 2021-01-04 NOTE — CARDIOLOGY SUMMARY
[___] : [unfilled] [LVEF ___%] : LVEF [unfilled]% [Moderate] : moderate LV dysfunction [Mild] : mild pulmonary hypertension [Enlarged] : enlarged LA size [___] : [unfilled]

## 2021-01-04 NOTE — REASON FOR VISIT
[Follow-Up - From Hospitalization] : follow-up of a recent hospitalization for [Atrial Fibrillation] : atrial fibrillation [Cardiomyopathy] : cardiomyopathy

## 2021-01-06 ENCOUNTER — APPOINTMENT (OUTPATIENT)
Dept: CARDIOLOGY | Facility: CLINIC | Age: 66
End: 2021-01-06
Payer: COMMERCIAL

## 2021-01-06 PROCEDURE — 93306 TTE W/DOPPLER COMPLETE: CPT

## 2021-01-08 ENCOUNTER — RESULT REVIEW (OUTPATIENT)
Age: 66
End: 2021-01-08

## 2021-01-08 ENCOUNTER — INPATIENT (INPATIENT)
Facility: HOSPITAL | Age: 66
LOS: 4 days | Discharge: HOME | DRG: 375 | End: 2021-01-13
Attending: INTERNAL MEDICINE | Admitting: HOSPITALIST
Payer: COMMERCIAL

## 2021-01-08 VITALS — HEIGHT: 63 IN | WEIGHT: 283.96 LBS

## 2021-01-08 DIAGNOSIS — Z98.890 OTHER SPECIFIED POSTPROCEDURAL STATES: Chronic | ICD-10-CM

## 2021-01-08 DIAGNOSIS — R18.8 OTHER ASCITES: ICD-10-CM

## 2021-01-08 DIAGNOSIS — N17.9 ACUTE KIDNEY FAILURE, UNSPECIFIED: ICD-10-CM

## 2021-01-08 DIAGNOSIS — Z96.643 PRESENCE OF ARTIFICIAL HIP JOINT, BILATERAL: Chronic | ICD-10-CM

## 2021-01-08 LAB
ANION GAP SERPL CALC-SCNC: 4 MMOL/L — LOW (ref 5–17)
APTT BLD: 30 SEC — SIGNIFICANT CHANGE UP (ref 27.5–35.5)
BASOPHILS # BLD AUTO: 0.08 K/UL — SIGNIFICANT CHANGE UP (ref 0–0.2)
BASOPHILS NFR BLD AUTO: 0.6 % — SIGNIFICANT CHANGE UP (ref 0–2)
BUN SERPL-MCNC: 34 MG/DL — HIGH (ref 7–23)
CALCIUM SERPL-MCNC: 8.8 MG/DL — SIGNIFICANT CHANGE UP (ref 8.5–10.1)
CHLORIDE SERPL-SCNC: 101 MMOL/L — SIGNIFICANT CHANGE UP (ref 96–108)
CO2 SERPL-SCNC: 28 MMOL/L — SIGNIFICANT CHANGE UP (ref 22–31)
CREAT SERPL-MCNC: 2.49 MG/DL — HIGH (ref 0.5–1.3)
EOSINOPHIL # BLD AUTO: 0.1 K/UL — SIGNIFICANT CHANGE UP (ref 0–0.5)
EOSINOPHIL NFR BLD AUTO: 0.8 % — SIGNIFICANT CHANGE UP (ref 0–6)
GLUCOSE SERPL-MCNC: 120 MG/DL — HIGH (ref 70–99)
GRAM STN FLD: SIGNIFICANT CHANGE UP
HCT VFR BLD CALC: 39.2 % — SIGNIFICANT CHANGE UP (ref 34.5–45)
HGB BLD-MCNC: 12.2 G/DL — SIGNIFICANT CHANGE UP (ref 11.5–15.5)
IMM GRANULOCYTES NFR BLD AUTO: 1.1 % — SIGNIFICANT CHANGE UP (ref 0–1.5)
INR BLD: 1.5 RATIO — HIGH (ref 0.88–1.16)
LYMPHOCYTES # BLD AUTO: 0.98 K/UL — LOW (ref 1–3.3)
LYMPHOCYTES # BLD AUTO: 7.6 % — LOW (ref 13–44)
MCHC RBC-ENTMCNC: 27.7 PG — SIGNIFICANT CHANGE UP (ref 27–34)
MCHC RBC-ENTMCNC: 31.1 GM/DL — LOW (ref 32–36)
MCV RBC AUTO: 89.1 FL — SIGNIFICANT CHANGE UP (ref 80–100)
MONOCYTES # BLD AUTO: 1.06 K/UL — HIGH (ref 0–0.9)
MONOCYTES NFR BLD AUTO: 8.2 % — SIGNIFICANT CHANGE UP (ref 2–14)
NEUTROPHILS # BLD AUTO: 10.6 K/UL — HIGH (ref 1.8–7.4)
NEUTROPHILS NFR BLD AUTO: 81.7 % — HIGH (ref 43–77)
PLATELET # BLD AUTO: 201 K/UL — SIGNIFICANT CHANGE UP (ref 150–400)
POTASSIUM SERPL-MCNC: 4.7 MMOL/L — SIGNIFICANT CHANGE UP (ref 3.5–5.3)
POTASSIUM SERPL-SCNC: 4.7 MMOL/L — SIGNIFICANT CHANGE UP (ref 3.5–5.3)
PROTHROM AB SERPL-ACNC: 17.2 SEC — HIGH (ref 10.6–13.6)
RBC # BLD: 4.4 M/UL — SIGNIFICANT CHANGE UP (ref 3.8–5.2)
RBC # FLD: 13.7 % — SIGNIFICANT CHANGE UP (ref 10.3–14.5)
SARS-COV-2 RNA SPEC QL NAA+PROBE: SIGNIFICANT CHANGE UP
SODIUM SERPL-SCNC: 133 MMOL/L — LOW (ref 135–145)
SPECIMEN SOURCE: SIGNIFICANT CHANGE UP
WBC # BLD: 12.96 K/UL — HIGH (ref 3.8–10.5)
WBC # FLD AUTO: 12.96 K/UL — HIGH (ref 3.8–10.5)

## 2021-01-08 PROCEDURE — C1788: CPT

## 2021-01-08 PROCEDURE — 49083 ABD PARACENTESIS W/IMAGING: CPT

## 2021-01-08 PROCEDURE — U0005: CPT

## 2021-01-08 PROCEDURE — 36561 INSERT TUNNELED CV CATH: CPT

## 2021-01-08 PROCEDURE — 86769 SARS-COV-2 COVID-19 ANTIBODY: CPT

## 2021-01-08 PROCEDURE — 83615 LACTATE (LD) (LDH) ENZYME: CPT

## 2021-01-08 PROCEDURE — 99223 1ST HOSP IP/OBS HIGH 75: CPT

## 2021-01-08 PROCEDURE — 84157 ASSAY OF PROTEIN OTHER: CPT

## 2021-01-08 PROCEDURE — 76770 US EXAM ABDO BACK WALL COMP: CPT | Mod: 26

## 2021-01-08 PROCEDURE — C1769: CPT

## 2021-01-08 PROCEDURE — 89051 BODY FLUID CELL COUNT: CPT

## 2021-01-08 PROCEDURE — C1894: CPT

## 2021-01-08 PROCEDURE — 85027 COMPLETE CBC AUTOMATED: CPT

## 2021-01-08 PROCEDURE — 81001 URINALYSIS AUTO W/SCOPE: CPT

## 2021-01-08 PROCEDURE — 76770 US EXAM ABDO BACK WALL COMP: CPT

## 2021-01-08 PROCEDURE — 83735 ASSAY OF MAGNESIUM: CPT

## 2021-01-08 PROCEDURE — 93308 TTE F-UP OR LMTD: CPT

## 2021-01-08 PROCEDURE — P9047: CPT

## 2021-01-08 PROCEDURE — 88108 CYTOPATH CONCENTRATE TECH: CPT | Mod: 26

## 2021-01-08 PROCEDURE — 93005 ELECTROCARDIOGRAM TRACING: CPT

## 2021-01-08 PROCEDURE — C1729: CPT

## 2021-01-08 PROCEDURE — 82272 OCCULT BLD FECES 1-3 TESTS: CPT

## 2021-01-08 PROCEDURE — 77001 FLUOROGUIDE FOR VEIN DEVICE: CPT

## 2021-01-08 PROCEDURE — 87075 CULTR BACTERIA EXCEPT BLOOD: CPT

## 2021-01-08 PROCEDURE — 84443 ASSAY THYROID STIM HORMONE: CPT

## 2021-01-08 PROCEDURE — 84300 ASSAY OF URINE SODIUM: CPT

## 2021-01-08 PROCEDURE — 87102 FUNGUS ISOLATION CULTURE: CPT

## 2021-01-08 PROCEDURE — 82042 OTHER SOURCE ALBUMIN QUAN EA: CPT

## 2021-01-08 PROCEDURE — 85025 COMPLETE CBC W/AUTO DIFF WBC: CPT

## 2021-01-08 PROCEDURE — 82570 ASSAY OF URINE CREATININE: CPT

## 2021-01-08 PROCEDURE — 88305 TISSUE EXAM BY PATHOLOGIST: CPT | Mod: 26

## 2021-01-08 PROCEDURE — 87086 URINE CULTURE/COLONY COUNT: CPT

## 2021-01-08 PROCEDURE — 88108 CYTOPATH CONCENTRATE TECH: CPT

## 2021-01-08 PROCEDURE — 87070 CULTURE OTHR SPECIMN AEROBIC: CPT

## 2021-01-08 PROCEDURE — 80048 BASIC METABOLIC PNL TOTAL CA: CPT

## 2021-01-08 PROCEDURE — 88305 TISSUE EXAM BY PATHOLOGIST: CPT

## 2021-01-08 PROCEDURE — 82945 GLUCOSE OTHER FLUID: CPT

## 2021-01-08 PROCEDURE — U0003: CPT

## 2021-01-08 PROCEDURE — 36415 COLL VENOUS BLD VENIPUNCTURE: CPT

## 2021-01-08 RX ORDER — TRAMADOL HYDROCHLORIDE 50 MG/1
1 TABLET ORAL
Qty: 0 | Refills: 0 | DISCHARGE

## 2021-01-08 RX ORDER — METOPROLOL TARTRATE 50 MG
12.5 TABLET ORAL
Refills: 0 | Status: DISCONTINUED | OUTPATIENT
Start: 2021-01-08 | End: 2021-01-13

## 2021-01-08 RX ORDER — METOPROLOL TARTRATE 50 MG
25 TABLET ORAL
Refills: 0 | Status: DISCONTINUED | OUTPATIENT
Start: 2021-01-08 | End: 2021-01-08

## 2021-01-08 RX ORDER — IBUPROFEN 200 MG
1 TABLET ORAL
Qty: 0 | Refills: 0 | DISCHARGE

## 2021-01-08 RX ORDER — ONDANSETRON 8 MG/1
4 TABLET, FILM COATED ORAL EVERY 6 HOURS
Refills: 0 | Status: DISCONTINUED | OUTPATIENT
Start: 2021-01-08 | End: 2021-01-13

## 2021-01-08 RX ORDER — GABAPENTIN 400 MG/1
300 CAPSULE ORAL
Qty: 0 | Refills: 0 | DISCHARGE

## 2021-01-08 RX ORDER — APIXABAN 2.5 MG/1
5 TABLET, FILM COATED ORAL EVERY 12 HOURS
Refills: 0 | Status: DISCONTINUED | OUTPATIENT
Start: 2021-01-08 | End: 2021-01-11

## 2021-01-08 RX ADMIN — APIXABAN 5 MILLIGRAM(S): 2.5 TABLET, FILM COATED ORAL at 20:54

## 2021-01-08 NOTE — ED ADULT TRIAGE NOTE - CHIEF COMPLAINT QUOTE
Pt reports hx of ascites secondary to abdominal ca that has not been fully diagnosed at this time, but they "think" is pancreatic.  Pt reports prior paracentesis and reports returning abdominal distention.

## 2021-01-08 NOTE — H&P ADULT - NSHPLABSRESULTS_GEN_ALL_CORE
LABS: All Labs Reviewed:                        12.2   12.96 )-----------( 201      ( 08 Jan 2021 09:13 )             39.2     01-08    134<L>  |  102  |  33<H>  ----------------------------<  125<H>  4.9   |  26  |  2.46<H>    Ca    9.1      08 Jan 2021 10:04    TPro  6.9  /  Alb  2.3<L>  /  TBili  0.4  /  DBili  x   /  AST  21  /  ALT  16  /  AlkPhos  106  01-08    PT/INR - ( 08 Jan 2021 09:49 )   PT: 17.2 sec;   INR: 1.50 ratio         PTT - ( 08 Jan 2021 09:49 )  PTT:30.0 sec          Blood Culture:

## 2021-01-08 NOTE — CONSULT NOTE ADULT - SUBJECTIVE AND OBJECTIVE BOX
Chief Complaint  Patient is a 65y old  Female who presents with a chief complaint of needs paracentesis    HPI:  64yo F with hx of ascites s/p drainage on 12/24. Presents today by recommendation of oncology for repeat therapeutic paracentesis due to abdominal distension.     Allergies  No Known Allergies    PAST MEDICAL & SURGICAL HISTORY:  Vascular insufficiency  left leg    Sciatic leg pain  right    Hip pain    Arthritis    S/P hip replacement, bilateral    History of tonsillectomy and adenoidectomy    H/O arthroscopy of left knee  1994        FAMILY HISTORY:  Family history of heart disease  a. fib, valvular dysfunction    Family history of COPD (chronic obstructive pulmonary disease)        Vital Signs Last 24 Hrs  T(C): 36.9 (08 Jan 2021 08:37), Max: 36.9 (08 Jan 2021 08:37)  T(F): 98.4 (08 Jan 2021 08:37), Max: 98.4 (08 Jan 2021 08:37)  HR: 121 (08 Jan 2021 08:37) (121 - 121)  BP: 125/71 (08 Jan 2021 08:37) (125/71 - 125/71)  BP(mean): 86 (08 Jan 2021 08:37) (86 - 86)  RR: 19 (08 Jan 2021 08:37) (19 - 19)  SpO2: 100% (08 Jan 2021 08:37) (100% - 100%)

## 2021-01-08 NOTE — H&P ADULT - NSHPSOCIALHISTORY_GEN_ALL_CORE
as above
range of motion is not limited and there is no muscle tenderness. Tenderness to ant R knee, lateral R ankle, dorsal R foot. No gross deformity

## 2021-01-08 NOTE — ED PROVIDER NOTE - OBJECTIVE STATEMENT
66 y/o female with a PMHx of arthritis, hip pain, left sciatic pain, vascular insufficiency of left leg, s/p tonsillectomy, s/p adenoidectomy, s/p b/l hip replacement presents to the ED for therapeutic paracentesis. Pt with newly diagnosed yet undifferentiated adenosarcoma with recurrent ascites. No abdominal pain, fever, N/V/D. Pt sent to the ED by oncologist. 64 y/o female with a PMHx of arthritis, hip pain, left sciatic pain, vascular insufficiency of left leg, s/p tonsillectomy, s/p adenoidectomy, s/p b/l hip replacement presents to the ED for therapeutic paracentesis. Pt with newly diagnosed yet undifferentiated adenocarcinoma with recurrent ascites. No abdominal pain, fever, N/V/D. Pt sent to the ED by oncologist.

## 2021-01-08 NOTE — CONSULT NOTE ADULT - ASSESSMENT
66yo F with ascites referred to IR for therapeutic paracentesis  - Plan for IR therapeutic paracentesis pending labs/COVID results

## 2021-01-08 NOTE — H&P ADULT - HISTORY OF PRESENT ILLNESS
66 yo female with PMH of  Atrial flutter  on Eliquis 11/9/19  had GILSON cardioversion,  Combined systolic and diastolic HF , Cardiomyopathy , pHTN, Vascular insufficiency LLE, R sciatica, DJD hips, morbid Obesity , low vignesh pain, Pulm. HTN, NAHOMI on CPAP, who presented  in the ER for abdominal distention. 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  Discharged in kate december due to distention and louis.   she otherwise denies sob. no LE Edema. No chest pain.         Allergies  No Known Allergies    PAST MEDICAL & SURGICAL HISTORY:  Vascular insufficiency  left leg    Sciatic leg pain  right    Hip pain    Arthritis    S/P hip replacement, bilateral    History of tonsillectomy and adenoidectomy    H/O arthroscopy of left knee  1994        FAMILY HISTORY:  Family history of heart disease  a. fib, valvular dysfunction    Family history of COPD (chronic obstructive pulmonary disease)       64 yo female with PMH of  Atrial flutter  on Eliquis 11/9/19  had GILSON cardioversion,  Combined systolic and diastolic HF , Cardiomyopathy , pHTN, Vascular insufficiency LLE, R sciatica, DJD hips, morbid Obesity , low vignesh pain, Pulm. HTN, NAHOMI on CPAP, who presented  in the ER for abdominal distention. 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  Discharged in kate december due to distention and louis.   she otherwise did not endorse sob. no LE Edema. No chest pain to Ed provider. Pt at IR for tx paracentesis. Will complete H&P at later time        Allergies  No Known Allergies    PAST MEDICAL & SURGICAL HISTORY:  Vascular insufficiency  left leg    Sciatic leg pain  right    Hip pain    Arthritis    S/P hip replacement, bilateral    History of tonsillectomy and adenoidectomy    H/O arthroscopy of left knee  1994        FAMILY HISTORY:  Family history of heart disease  a. fib, valvular dysfunction    Family history of COPD (chronic obstructive pulmonary disease)

## 2021-01-08 NOTE — ED PROVIDER NOTE - CLINICAL SUMMARY MEDICAL DECISION MAKING FREE TEXT BOX
Plan: labs, IR consult, likely d/c home. Plan: labs, IR consult, likely d/c home-->found to have AC, admit to medicine for further care and evaluation

## 2021-01-08 NOTE — H&P ADULT - NSHPPHYSICALEXAM_GEN_ALL_CORE
ICU Vital Signs Last 24 Hrs  T(C): 36.9 (08 Jan 2021 11:28), Max: 36.9 (08 Jan 2021 08:37)  T(F): 98.5 (08 Jan 2021 11:28), Max: 98.5 (08 Jan 2021 11:28)  HR: 107 (08 Jan 2021 11:28) (107 - 121)  BP: 93/68 (08 Jan 2021 11:28) (93/68 - 125/71)  BP(mean): 74 (08 Jan 2021 11:28) (74 - 86)  ABP: --  ABP(mean): --  RR: 18 (08 Jan 2021 11:28) (18 - 19)  SpO2: 97% (08 Jan 2021 11:28) (97% - 100%)      PHYSICAL EXAM:    Constitutional: NAD, awake and alert, well-developed  HEENT: PERR, EOMI, Normal Hearing, MMM  Neck: Soft and supple, No LAD, No JVD  Respiratory: Breath sounds are clear bilaterally, No wheezing, rales or rhonchi  Cardiovascular: S1 and S2, regular rate and rhythm, no Murmurs, gallops or rubs  Gastrointestinal: Bowel Sounds present, soft, nontender, nondistended, no guarding, no rebound  Extremities: No peripheral edema  Vascular: 2+ peripheral pulses  Neurological: A/O x 3, no focal deficits  Musculoskeletal: 5/5 strength b/l upper and lower extremities  Skin: No rashes ICU Vital Signs Last 24 Hrs  T(C): 36.9 (08 Jan 2021 11:28), Max: 36.9 (08 Jan 2021 08:37)  T(F): 98.5 (08 Jan 2021 11:28), Max: 98.5 (08 Jan 2021 11:28)  HR: 107 (08 Jan 2021 11:28) (107 - 121)  BP: 93/68 (08 Jan 2021 11:28) (93/68 - 125/71)  BP(mean): 74 (08 Jan 2021 11:28) (74 - 86)  ABP: --  ABP(mean): --  RR: 18 (08 Jan 2021 11:28) (18 - 19)  SpO2: 97% (08 Jan 2021 11:28) (97% - 100%)      PHYSICAL EXAM:    Constitutional: NAD, awake and alert, well-developed  HEENT: PERR, EOMI, Normal Hearing, MMM  Neck: Soft and supple, No LAD, No JVD  Respiratory: Breath sounds are clear bilaterally, No wheezing, rales or rhonchi  Cardiovascular: S1 and S2, regular rate and rhythm, no Murmurs, gallops or rubs  Gastrointestinal: Bowel Sounds present, abd distention, no rebound  Extremities: No peripheral edema  Vascular: 2+ peripheral pulses  Neurological: A/O x 3, no focal deficits  Musculoskeletal: 5/5 strength b/l upper and lower extremities  Skin: No rashes

## 2021-01-08 NOTE — H&P ADULT - ASSESSMENT
#acute kidney injury  - admit to ms  - gentle fluid hydration vs inadequate perfusion from ascites vs low BP in setting of lasix and entresto  - planned for tx para today  - if > 5L albumin post therapeutic paracentesis  - renal consult with Dr Hoang           #acute kidney injury  - admit to ms  - gentle fluid hydration vs inadequate perfusion from ascites vs low BP in setting of lasix and entresto  - planned for tx para today  - if > 5L albumin post therapeutic paracentesis  - renal consult with Dr Hoang  - obtain baseline cxr to r/o any e/o acute HFrEF  - BNP  - Hold entresto and lasix for now  - urine studies  - bmp in am      #Afib on eliquis  - adjust for GFR  - On hold for paracentesis today  - BB - hold parameters for SBP < 90  - HR in low 100s      #NAHOMI on CPAP  - will need setting from patient and can use hospital cpap      DVT px: doac  Full code

## 2021-01-08 NOTE — ED ADULT NURSE NOTE - OBJECTIVE STATEMENT
Pt reports hx of ascites secondary to abdominal ca that has not been fully diagnosed at this time, but they "think" is pancreatic.  Pt reports prior paracentesis 2 weeks ago for the first time. reports returning abdominal distention.

## 2021-01-08 NOTE — ED PROVIDER NOTE - PSH
H/O arthroscopy of left knee  1994  History of tonsillectomy and adenoidectomy    S/P hip replacement, bilateral

## 2021-01-08 NOTE — CONSULT NOTE ADULT - SUBJECTIVE AND OBJECTIVE BOX
NEPHROLOGY INTERVAL HPI/OVERNIGHT EVENTS:  MADHAVI [id number]  HPI:  66 yo female with PMH of  Atrial flutter  on Eliquis 11/9/19  had GILSON cardioversion,  Combined systolic and diastolic HF , Cardiomyopathy , pHTN, Vascular insufficiency LLE, R sciatica, DJD hips, morbid Obesity , low bak pain, Pulm. HTN, NAHOMI on CPAP, who presented  in the ER for abdominal distention. 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  Discharged in kate december due to distention and [place]is.   she otherwise did not endorse sob. no LE Edema. No chest pain to Ed provider. Pt at IR for tx paracentesis. Will complete H&P at later time  -------------------------------------------  pt with hx of ascites that has been determined to be related to undifferentiated adeno ca with recurrent ascites.   s/p 9 L of paracentesis today which re-accumulated in 2 week time frame  since dc, pt was not restarted on lasix or entresto.  saw dr marquis monday   has been receiving tx with onc with consideration for start of chemo   she is concerned about admission and being on COVID floor   no nsaid use no abx use  has been taking in dec po intake due to abd distension     Allergies  No Known Allergies    PAST MEDICAL & SURGICAL HISTORY:  Vascular insufficiency  left leg  Sciatic leg pain  right  Hip pain  Arthritis  S/P hip replacement, bilateral  History of tonsillectomy and adenoidectomy  H/O arthroscopy of left knee  1994    FAMILY HISTORY:  Family history of heart disease  a. fib, valvular dysfunction    Family history of COPD (chronic obstructive pulmonary disease)      MEDICATIONS  (STANDING):  apixaban 5 milliGRAM(s) Oral every 12 hours  metoprolol succinate ER 12.5 milliGRAM(s) Oral two times a day    MEDICATIONS  (PRN):  ondansetron Injectable 4 milliGRAM(s) IV Push every 6 hours PRN Nausea      Allergies  No Known Allergies    Intolerances        I&O's Summary      Home Medications:  cyclobenzaprine 5 mg oral tablet: 1 tab(s) orally 3 times a day, As Needed (08 Jan 2021 12:53)  metoprolol succinate 25 mg oral tablet, extended release: 1 tab(s) orally 2 times a day (08 Jan 2021 12:53)    Vital Signs Last 24 Hrs  T(C): 36.8 (08 Jan 2021 20:52), Max: 36.9 (08 Jan 2021 08:37)  T(F): 98.3 (08 Jan 2021 20:52), Max: 98.5 (08 Jan 2021 11:28)  HR: 104 (08 Jan 2021 20:52) (103 - 121)  BP: 95/56 (08 Jan 2021 20:52) (93/68 - 125/71)  BP(mean): 74 (08 Jan 2021 11:28) (74 - 86)  RR: 18 (08 Jan 2021 20:52) (16 - 19)  SpO2: 97% (08 Jan 2021 20:52) (96% - 100%)  Daily Height in cm: 160.02 (08 Jan 2021 08:27)    Daily   I&O's Summary      PHYSICAL EXAM:  GEN: alert awake O X 3  HEENT: MMM  NECK supple no jvd  CV: RRR s1s2  LUNGS: b/l CTA  ABD: +bs soft, mildly distented  EXT: no edema    LABS:                        12.2   12.96 )-----------( 201      ( 08 Jan 2021 09:13 )             39.2     01-08    134<L>  |  102  |  33<H>  ----------------------------<  125<H>  4.9   |  26  |  2.46<H>    Ca    9.1      08 Jan 2021 10:04    TPro  6.9  /  Alb  2.3<L>  /  TBili  0.4  /  DBili  x   /  AST  21  /  ALT  16  /  AlkPhos  106  01-08    PT/INR - ( 08 Jan 2021 09:49 )   PT: 17.2 sec;   INR: 1.50 ratio         PTT - ( 08 Jan 2021 09:49 )  PTT:30.0 sec

## 2021-01-09 ENCOUNTER — APPOINTMENT (OUTPATIENT)
Dept: NUCLEAR MEDICINE | Facility: CLINIC | Age: 66
End: 2021-01-09

## 2021-01-09 LAB
ALBUMIN FLD-MCNC: 2.6 G/DL — SIGNIFICANT CHANGE UP
ANION GAP SERPL CALC-SCNC: 6 MMOL/L — SIGNIFICANT CHANGE UP (ref 5–17)
APPEARANCE UR: ABNORMAL
B PERT IGG+IGM PNL SER: ABNORMAL
BILIRUB UR-MCNC: NEGATIVE — SIGNIFICANT CHANGE UP
BUN SERPL-MCNC: 33 MG/DL — HIGH (ref 7–23)
CALCIUM SERPL-MCNC: 8.4 MG/DL — LOW (ref 8.5–10.1)
CHLORIDE SERPL-SCNC: 102 MMOL/L — SIGNIFICANT CHANGE UP (ref 96–108)
CO2 SERPL-SCNC: 23 MMOL/L — SIGNIFICANT CHANGE UP (ref 22–31)
COLOR FLD: YELLOW — SIGNIFICANT CHANGE UP
COLOR SPEC: YELLOW — SIGNIFICANT CHANGE UP
CREAT ?TM UR-MCNC: 216 MG/DL — SIGNIFICANT CHANGE UP
CREAT SERPL-MCNC: 2.08 MG/DL — HIGH (ref 0.5–1.3)
DIFF PNL FLD: ABNORMAL
EOSINOPHIL # FLD: 0 % — SIGNIFICANT CHANGE UP
FLUID INTAKE SUBSTANCE CLASS: SIGNIFICANT CHANGE UP
FLUID SEGMENTED GRANULOCYTES: 8 % — SIGNIFICANT CHANGE UP
FOLATE+VIT B12 SERBLD-IMP: 0 % — SIGNIFICANT CHANGE UP
GLUCOSE FLD-MCNC: 96 MG/DL — SIGNIFICANT CHANGE UP
GLUCOSE SERPL-MCNC: 137 MG/DL — HIGH (ref 70–99)
GLUCOSE UR QL: NEGATIVE MG/DL — SIGNIFICANT CHANGE UP
HCT VFR BLD CALC: 37.7 % — SIGNIFICANT CHANGE UP (ref 34.5–45)
HGB BLD-MCNC: 11.9 G/DL — SIGNIFICANT CHANGE UP (ref 11.5–15.5)
KETONES UR-MCNC: ABNORMAL
LDH SERPL L TO P-CCNC: 525 U/L — SIGNIFICANT CHANGE UP
LEUKOCYTE ESTERASE UR-ACNC: ABNORMAL
LYMPHOCYTES # FLD: 39 % — SIGNIFICANT CHANGE UP
MCHC RBC-ENTMCNC: 27.5 PG — SIGNIFICANT CHANGE UP (ref 27–34)
MCHC RBC-ENTMCNC: 31.6 GM/DL — LOW (ref 32–36)
MCV RBC AUTO: 87.3 FL — SIGNIFICANT CHANGE UP (ref 80–100)
MESOTHL CELL # FLD: 0 % — SIGNIFICANT CHANGE UP
MONOS+MACROS # FLD: 53 % — SIGNIFICANT CHANGE UP
NITRITE UR-MCNC: NEGATIVE — SIGNIFICANT CHANGE UP
NRBC # FLD: 0 — SIGNIFICANT CHANGE UP
OTHER CELLS FLD MANUAL: 0 % — SIGNIFICANT CHANGE UP
PH UR: 5 — SIGNIFICANT CHANGE UP (ref 5–8)
PLATELET # BLD AUTO: 212 K/UL — SIGNIFICANT CHANGE UP (ref 150–400)
POTASSIUM SERPL-MCNC: 4.9 MMOL/L — SIGNIFICANT CHANGE UP (ref 3.5–5.3)
POTASSIUM SERPL-SCNC: 4.9 MMOL/L — SIGNIFICANT CHANGE UP (ref 3.5–5.3)
PROT FLD-MCNC: 4.4 G/DL — SIGNIFICANT CHANGE UP
PROT UR-MCNC: 30 MG/DL
RBC # BLD: 4.32 M/UL — SIGNIFICANT CHANGE UP (ref 3.8–5.2)
RBC # FLD: 13.5 % — SIGNIFICANT CHANGE UP (ref 10.3–14.5)
RCV VOL RI: 4000 /UL — HIGH (ref 0–0)
SARS-COV-2 IGG SERPL QL IA: NEGATIVE — SIGNIFICANT CHANGE UP
SARS-COV-2 IGM SERPL IA-ACNC: 0.07 INDEX — SIGNIFICANT CHANGE UP
SODIUM SERPL-SCNC: 131 MMOL/L — LOW (ref 135–145)
SODIUM UR-SCNC: <20 MMOL/L — SIGNIFICANT CHANGE UP
SP GR SPEC: 1.02 — SIGNIFICANT CHANGE UP (ref 1.01–1.02)
TOTAL NUCLEATED CELL COUNT, BODY FLUID: 1063 /UL — SIGNIFICANT CHANGE UP
TUBE TYPE: SIGNIFICANT CHANGE UP
UROBILINOGEN FLD QL: NEGATIVE MG/DL — SIGNIFICANT CHANGE UP
WBC # BLD: 16.8 K/UL — HIGH (ref 3.8–10.5)
WBC # FLD AUTO: 16.8 K/UL — HIGH (ref 3.8–10.5)

## 2021-01-09 PROCEDURE — 99233 SBSQ HOSP IP/OBS HIGH 50: CPT

## 2021-01-09 RX ORDER — CEFTRIAXONE 500 MG/1
INJECTION, POWDER, FOR SOLUTION INTRAMUSCULAR; INTRAVENOUS
Refills: 0 | Status: COMPLETED | OUTPATIENT
Start: 2021-01-09 | End: 2021-01-13

## 2021-01-09 RX ORDER — CEFTRIAXONE 500 MG/1
1000 INJECTION, POWDER, FOR SOLUTION INTRAMUSCULAR; INTRAVENOUS ONCE
Refills: 0 | Status: COMPLETED | OUTPATIENT
Start: 2021-01-09 | End: 2021-01-09

## 2021-01-09 RX ORDER — LANOLIN ALCOHOL/MO/W.PET/CERES
3 CREAM (GRAM) TOPICAL AT BEDTIME
Refills: 0 | Status: DISCONTINUED | OUTPATIENT
Start: 2021-01-09 | End: 2021-01-13

## 2021-01-09 RX ORDER — CEFTRIAXONE 500 MG/1
INJECTION, POWDER, FOR SOLUTION INTRAMUSCULAR; INTRAVENOUS
Refills: 0 | Status: DISCONTINUED | OUTPATIENT
Start: 2021-01-09 | End: 2021-01-09

## 2021-01-09 RX ORDER — CEFTRIAXONE 500 MG/1
1000 INJECTION, POWDER, FOR SOLUTION INTRAMUSCULAR; INTRAVENOUS EVERY 24 HOURS
Refills: 0 | Status: COMPLETED | OUTPATIENT
Start: 2021-01-10 | End: 2021-01-13

## 2021-01-09 RX ADMIN — CEFTRIAXONE 1000 MILLIGRAM(S): 500 INJECTION, POWDER, FOR SOLUTION INTRAMUSCULAR; INTRAVENOUS at 14:58

## 2021-01-09 RX ADMIN — Medication 12.5 MILLIGRAM(S): at 01:45

## 2021-01-09 RX ADMIN — APIXABAN 5 MILLIGRAM(S): 2.5 TABLET, FILM COATED ORAL at 21:29

## 2021-01-09 RX ADMIN — Medication 3 MILLIGRAM(S): at 21:32

## 2021-01-09 RX ADMIN — Medication 12.5 MILLIGRAM(S): at 21:29

## 2021-01-09 RX ADMIN — Medication 12.5 MILLIGRAM(S): at 10:58

## 2021-01-09 RX ADMIN — APIXABAN 5 MILLIGRAM(S): 2.5 TABLET, FILM COATED ORAL at 10:57

## 2021-01-09 NOTE — PROGRESS NOTE ADULT - SUBJECTIVE AND OBJECTIVE BOX
64 yo female with PMH of  Atrial flutter  on Eliquis 11/9/19  had GILSON cardioversion,  Combined systolic and diastolic HF , Cardiomyopathy , pHTN, Vascular insufficiency LLE, R sciatica, DJD hips, morbid Obesity , low vignesh pain, Pulm. HTN, NAHOMI on CPAP, who presented  in the ER for abdominal distention. 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  Discharged in kate december due to distention and louis.   she otherwise did not endorse sob. no LE Edema. No chest pain to Ed provider. Pt at IR for tx paracentesis.     1/9 - no cp palps sob. was able to eat a burger after para but developed some abdo discomfort after that; reports dysuria     PHYSICAL EXAM:  GENERAL: NAD, able to lie flat in bed  HEAD:  Atraumatic, Normocephalic  EYES: EOMI, PERRLA, normal sclera  ENT: Moist mucous membranes  NECK: Supple, No JVD, no nuchal rigidity  CHEST/LUNG: Clear to auscultation bilaterally; No rales, rhonchi, wheezing, or rubs. Unlabored respirations  HEART: Regular rate and rhythm; No murmurs, rubs, or gallops  ABDOMEN: Bowel sounds present; Soft, Nontender, obese   EXTREMITIES:  no pitting bilaterally  NERVOUS SYSTEM:  Alert & Oriented X3, speech clear. No focal motor or sensory deficits  MSK: FROM all 4 extremities, full and equal strength  SKIN: No rashes or lesions    LABS: All Labs Reviewed:                        11.9   16.80 )-----------( 212      ( 09 Jan 2021 06:39 )             37.7     01-09    131<L>  |  102  |  33<H>  ----------------------------<  137<H>  4.9   |  23  |  2.08<H>    Ca    8.4<L>      09 Jan 2021 06:39    TPro  6.9  /  Alb  2.3<L>  /  TBili  0.4  /  DBili  x   /  AST  21  /  ALT  16  /  AlkPhos  106  01-08    PT/INR - ( 08 Jan 2021 09:49 )   PT: 17.2 sec;   INR: 1.50 ratio         PTT - ( 08 Jan 2021 09:49 )  PTT:30.0 sec    RAD:  < from: US Kidney and Bladder (01.08.21 @ 13:24) >  Bilateral corticalthinning, otherwise unremarkable.  < end of copied text >        apixaban 5 milliGRAM(s) Oral every 12 hours  cefTRIAXone Injectable.      metoprolol succinate ER 12.5 milliGRAM(s) Oral two times a day  ondansetron Injectable 4 milliGRAM(s) IV Push every 6 hours PRN

## 2021-01-09 NOTE — PROGRESS NOTE ADULT - ASSESSMENT
64 yo with ascites related undifferentiated adeno ca admitted with elective paracentesis and noted with HANANE;. Etiology likely due to pre-renal azothemia  in setting of low bp  will r/o HRS ( doubt with no liver dx defined on previous scans)     PLAN  - monitor on po intake  - urine electrolytes  - repeat labs tonight and monitor trend of the scr  - fu renal/bladder sono   - albumin post paracentesis     1/9 SY  --HANANE : slightly improved following paracentesis.  unclear if related to elevated intra-abdominal pressure.    Continue to monitor.    Likely baseline CKD with bilateral cortical thinning.  --Hyponatremia with HANANE leading to decreased water excretion.    Continue to monitor.   Moderate restriction in po fluid and liberalize diet.

## 2021-01-09 NOTE — PROGRESS NOTE ADULT - SUBJECTIVE AND OBJECTIVE BOX
NEPHROLOGY INTERVAL HPI/OVERNIGHT EVENTS:    Date of Service: 21 @ 17:58    --Feeling a little improved but concerned due to renal dysfunction and reaccumulation of ascites.    HPI:  64 yo female with PMH of  Atrial flutter  on Eliquis 19  had GILSON cardioversion,  Combined systolic and diastolic HF , Cardiomyopathy , pHTN, Vascular insufficiency LLE, R sciatica, DJD hips, morbid Obesity , low vignesh pain, Pulm. HTN, NAHOMI on CPAP, who presented  in the ER for abdominal distention. 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  Discharged in  due to distention and [place]is.   she otherwise did not endorse sob. no LE Edema. No chest pain to Ed provider. Pt at IR for tx paracentesis. Will complete H&P at later time  -------------------------------------------  pt with hx of ascites that has been determined to be related to undifferentiated adeno ca with recurrent ascites.   s/p 9 L of paracentesis today which re-accumulated in 2 week time frame  since dc, pt was not restarted on lasix or entresto.  saw dr marquis monday   has been receiving tx with onc with consideration for start of chemo   she is concerned about admission and being on COVID floor   no nsaid use no abx use  has been taking in dec po intake due to abd distension     MEDICATIONS  (STANDING):  apixaban 5 milliGRAM(s) Oral every 12 hours  cefTRIAXone Injectable.      metoprolol succinate ER 12.5 milliGRAM(s) Oral two times a day    MEDICATIONS  (PRN):  ondansetron Injectable 4 milliGRAM(s) IV Push every 6 hours PRN Nausea    Vital Signs Last 24 Hrs  T(C): 36.7 (2021 16:26), Max: 36.9 (2021 00:52)  T(F): 98 (2021 16:26), Max: 98.4 (2021 00:52)  HR: 113 (2021 16:26) (104 - 124)  BP: 104/53 (2021 16:26) (95/56 - 129/58)  BP(mean): --  RR: 17 (2021 16:26) (17 - 18)  SpO2: 95% (2021 16:26) (94% - 100%)     @ 07:01  -   @ 17:58  --------------------------------------------------------  IN: 240 mL / OUT: 250 mL / NET: -10 mL    PHYSICAL EXAM:  GENERAL: Comfortable  CHEST/LUNG: Clear to aus  HEART: S1S2 RRR  ABDOMEN: distended  EXTREMITIES: trace edema  SKIN:     LABS:                        11.9   16.80 )-----------( 212      ( 2021 06:39 )             37.7         131<L>  |  102  |  33<H>  ----------------------------<  137<H>  4.9   |  23  |  2.08<H>    Ca    8.4<L>      2021 06:39    TPro  6.9  /  Alb  2.3<L>  /  TBili  0.4  /  DBili  x   /  AST  21  /  ALT  16  /  AlkPhos  106  01-08    PT/INR - ( 2021 09:49 )   PT: 17.2 sec;   INR: 1.50 ratio         PTT - ( 2021 09:49 )  PTT:30.0 sec  Urinalysis Basic - ( 2021 11:45 )    Color: Yellow / Appearance: Slightly Turbid / S.020 / pH: x  Gluc: x / Ketone: Trace  / Bili: Negative / Urobili: Negative mg/dL   Blood: x / Protein: 30 mg/dL / Nitrite: Negative   Leuk Esterase: Trace / RBC: 3-5 /HPF / WBC 0-2   Sq Epi: x / Non Sq Epi: Few / Bacteria: Moderate              RADIOLOGY & ADDITIONAL TESTS:

## 2021-01-09 NOTE — PROGRESS NOTE ADULT - ASSESSMENT
#acute kidney injury  # known to have malignant ascites  #Dysuria, possible UTI     - HANANE possibly from  inadequate perfusion from ascites vs low BP in setting of lasix and entresto  - s/p tx para got albumin and 9L out   1/9 noted paracentesis results - not SBP based on it, will f/u peritoneal fluid Cx  - STOP entresto and lasix for now, appreciate renal help   - start rocephin for suspected UTI, f/u UCx   - COnsult Dr Carl       #Afib on eliquis  - adjust for GFR  - On hold for paracentesis today  - BB - hold parameters for SBP < 90  - HR in low 100s  1/9 resume eliquis renally dosed       #NAHOMI on CPAP  - will need setting from patient and can use hospital cpap    discussed with RN    #acute kidney injury  # known to have malignant ascites  #Dysuria, possible UTI     - HANANE possibly from  inadequate perfusion from ascites vs low BP in setting of lasix and entresto  - s/p tx para got albumin and 9L out   1/9 noted paracentesis results - not SBP based on it, will f/u peritoneal fluid Cx  - STOP entresto and lasix for now, appreciate renal help   - start rocephin for suspected UTI, f/u UCx   - COnsult Dr Carl       #Afib on eliquis  - adjust for GFR  - On hold for paracentesis today  - BB - hold parameters for SBP < 90  - HR in low 100s  1/9 resumed eliquis       #NAHOMI on CPAP  - will need setting from patient and can use hospital cpap    discussed with RN

## 2021-01-10 LAB
ANION GAP SERPL CALC-SCNC: 6 MMOL/L — SIGNIFICANT CHANGE UP (ref 5–17)
BUN SERPL-MCNC: 34 MG/DL — HIGH (ref 7–23)
CALCIUM SERPL-MCNC: 8.7 MG/DL — SIGNIFICANT CHANGE UP (ref 8.5–10.1)
CHLORIDE SERPL-SCNC: 102 MMOL/L — SIGNIFICANT CHANGE UP (ref 96–108)
CO2 SERPL-SCNC: 24 MMOL/L — SIGNIFICANT CHANGE UP (ref 22–31)
CREAT SERPL-MCNC: 1.78 MG/DL — HIGH (ref 0.5–1.3)
GLUCOSE SERPL-MCNC: 122 MG/DL — HIGH (ref 70–99)
HCT VFR BLD CALC: 37 % — SIGNIFICANT CHANGE UP (ref 34.5–45)
HGB BLD-MCNC: 11.8 G/DL — SIGNIFICANT CHANGE UP (ref 11.5–15.5)
MCHC RBC-ENTMCNC: 27.6 PG — SIGNIFICANT CHANGE UP (ref 27–34)
MCHC RBC-ENTMCNC: 31.9 GM/DL — LOW (ref 32–36)
MCV RBC AUTO: 86.4 FL — SIGNIFICANT CHANGE UP (ref 80–100)
PLATELET # BLD AUTO: 194 K/UL — SIGNIFICANT CHANGE UP (ref 150–400)
POTASSIUM SERPL-MCNC: 4.6 MMOL/L — SIGNIFICANT CHANGE UP (ref 3.5–5.3)
POTASSIUM SERPL-SCNC: 4.6 MMOL/L — SIGNIFICANT CHANGE UP (ref 3.5–5.3)
RBC # BLD: 4.28 M/UL — SIGNIFICANT CHANGE UP (ref 3.8–5.2)
RBC # FLD: 13.6 % — SIGNIFICANT CHANGE UP (ref 10.3–14.5)
SODIUM SERPL-SCNC: 132 MMOL/L — LOW (ref 135–145)
WBC # BLD: 15.6 K/UL — HIGH (ref 3.8–10.5)
WBC # FLD AUTO: 15.6 K/UL — HIGH (ref 3.8–10.5)

## 2021-01-10 PROCEDURE — 99232 SBSQ HOSP IP/OBS MODERATE 35: CPT

## 2021-01-10 PROCEDURE — 93010 ELECTROCARDIOGRAM REPORT: CPT

## 2021-01-10 RX ORDER — POLYETHYLENE GLYCOL 3350 17 G/17G
17 POWDER, FOR SOLUTION ORAL
Refills: 0 | Status: DISCONTINUED | OUTPATIENT
Start: 2021-01-10 | End: 2021-01-13

## 2021-01-10 RX ADMIN — CEFTRIAXONE 1000 MILLIGRAM(S): 500 INJECTION, POWDER, FOR SOLUTION INTRAMUSCULAR; INTRAVENOUS at 14:02

## 2021-01-10 RX ADMIN — APIXABAN 5 MILLIGRAM(S): 2.5 TABLET, FILM COATED ORAL at 21:09

## 2021-01-10 RX ADMIN — Medication 5 MILLIGRAM(S): at 23:16

## 2021-01-10 RX ADMIN — Medication 12.5 MILLIGRAM(S): at 21:10

## 2021-01-10 RX ADMIN — Medication 12.5 MILLIGRAM(S): at 09:54

## 2021-01-10 RX ADMIN — Medication 3 MILLIGRAM(S): at 23:16

## 2021-01-10 RX ADMIN — APIXABAN 5 MILLIGRAM(S): 2.5 TABLET, FILM COATED ORAL at 09:55

## 2021-01-10 NOTE — PROGRESS NOTE ADULT - ASSESSMENT
#acute kidney injury  # known to have malignant ascites  #Dysuria, possible UTI     - HANANE possibly from  inadequate perfusion from ascites vs low BP in setting of lasix and entresto  - s/p tx para got albumin and 9L out   1/9 noted paracentesis results - not SBP based on it, will f/u peritoneal fluid Cx  - STOP entresto and lasix for now, appreciate renal help   - start rocephin for suspected UTI, f/u UCx   - COnsult Dr Carl   1/10 - to discuss with Dr Carl re pleurX cathter for malignant ascites versus periodic taps with IR       #Afib on eliquis  - adjust for GFR  - On hold for paracentesis today  - BB - hold parameters for SBP < 90  - HR in low 100s  1/9 resumed eliquis       #NAHOMI on CPAP  - will need setting from patient and can use hospital cpap    discussed with RN    #acute kidney injury  # known to have malignant ascites  #Dysuria, possible UTI     - HANANE possibly from  inadequate perfusion from ascites vs low BP in setting of lasix and entresto  - s/p tx para got albumin and 9L out   1/9 noted paracentesis results - not SBP based on it, will f/u peritoneal fluid Cx  - STOP entresto and lasix for now, appreciate renal help   - start rocephin for suspected UTI, f/u UCx   - COnsult Dr Carl   1/10 - to discuss with Dr Carl re pleurX cathter for malignant ascites versus periodic taps with IR       #Afib on eliquis  - adjust for GFR  - On hold for paracentesis today  - BB - hold parameters for SBP < 90  - HR in low 100s  1/9 resumed eliquis     #1/10 Pt c/o GIB - will check FOBT  never had a COL   monitor H&H, is constipated, will place on bowel regimen   consider GI Cx       #NAHOMI on CPAP  - will need setting from patient and can use hospital cpap    discussed with RN

## 2021-01-10 NOTE — CONSULT NOTE ADULT - SUBJECTIVE AND OBJECTIVE BOX
REASON FOR CONSULTATION    HPI:  64 yo female with PMH of  Atrial flutter  on Eliquis 11/9/19  had GILSON cardioversion,  Combined systolic and diastolic HF , Cardiomyopathy , pHTN, Vascular insufficiency LLE, R sciatica, DJD hips, morbid Obesity , low vignesh pain, Pulm. HTN, NAHOMI on CPAP, who presented  in the ER for abdominal distention. Her symptoms started few weeks ago and she was recently dx with undifferentiated adenoCA  with malignant ascites.  Discharged in  december due to distention and louis.   Heme onc consulted for malignant ascites    Patient saw Dr Perez in the office on 12/31/20. Plan was for Out patient PET scan to better assess the extent of disease and possibility of biopsy   Patient currently S/p Paracentesis with 9 lt out       Allergies  No Known Allergies    PAST MEDICAL & SURGICAL HISTORY:  Vascular insufficiency  left leg    Sciatic leg pain  right    Hip pain    Arthritis    S/P hip replacement, bilateral    History of tonsillectomy and adenoidectomy    H/O arthroscopy of left knee  1994        FAMILY HISTORY:  Family history of heart disease  a. fib, valvular dysfunction    Family history of COPD (chronic obstructive pulmonary disease)       (08 Jan 2021 11:37)      REVIEW OF SYSTEMS:    as per HPI       PAST MEDICAL & SURGICAL HISTORY:  Vascular insufficiency  left leg    Sciatic leg pain  right    Hip pain    Arthritis    S/P hip replacement, bilateral    History of tonsillectomy and adenoidectomy    H/O arthroscopy of left knee  1994        SOCIAL HISTORY:    FAMILY HISTORY:  Family history of heart disease  a. fib, valvular dysfunction    Family history of COPD (chronic obstructive pulmonary disease)        SOCIAL HISTORY:    Allergies    No Known Allergies    Intolerances        MEDICATIONS  (STANDING):  apixaban 5 milliGRAM(s) Oral every 12 hours  cefTRIAXone Injectable.      cefTRIAXone Injectable. 1000 milliGRAM(s) IV Push every 24 hours  melatonin 3 milliGRAM(s) Oral at bedtime  metoprolol succinate ER 12.5 milliGRAM(s) Oral two times a day    MEDICATIONS  (PRN):  ondansetron Injectable 4 milliGRAM(s) IV Push every 6 hours PRN Nausea      Vital Signs Last 24 Hrs  T(C): 36.9 (10 Jasper 2021 09:16), Max: 36.9 (10 Jasper 2021 09:16)  T(F): 98.4 (10 Jasper 2021 09:16), Max: 98.4 (10 Jasper 2021 09:16)  HR: 112 (10 Jasper 2021 09:16) (112 - 113)  BP: 138/56 (10 Jasper 2021 09:16) (104/53 - 138/56)  BP(mean): --  RR: 17 (10 Jasper 2021 09:16) (17 - 18)  SpO2: 97% (10 Jasper 2021 09:16) (95% - 97%)    PHYSICAL EXAM:    GENERAL: NAD, well-groomed, well-developed  HEAD:  Atraumatic, Normocephalic  EYES: EOMI, PERRLA, conjunctiva and sclera clear  ENMT: No tonsillar erythema, exudates, or enlargement; Moist mucous membranes, Good dentition, No lesions  NECK: Supple, No JVD, Normal thyroid  NERVOUS SYSTEM:  Alert & Oriented X3, Good concentration; Motor Strength 5/5 B/L upper and lower extremities; DTRs 2+ intact and symmetric  CHEST/LUNG: Clear to percussion bilaterally; No rales, rhonchi, wheezing, or rubs  HEART: Regular rate and rhythm; No murmurs, rubs, or gallops  ABDOMEN: Soft, Nontender, Nondistended; Bowel sounds present  EXTREMITIES:  2+ Peripheral Pulses, No clubbing, cyanosis, or edema  LYMPH: No lymphadenopathy noted  SKIN: No rashes or lesions      LABS:                        11.8   15.60 )-----------( 194      ( 10 Jasper 2021 07:15 )             37.0     01-10    132<L>  |  102  |  34<H>  ----------------------------<  122<H>  4.6   |  24  |  1.78<H>    Ca    8.7      10 Jasper 2021 07:15              RADIOLOGY & ADDITIONAL STUDIES:    PATHOLOGY:     REASON FOR CONSULTATION    HPI:  64 yo female with PMH of  Atrial flutter  on Eliquis 11/9/19  had GILSON cardioversion,  Combined systolic and diastolic HF , Cardiomyopathy , pHTN, Vascular insufficiency LLE, R sciatica, DJD hips, morbid Obesity , low vignesh pain, Pulm. HTN, NAHOMI on CPAP, who presented  in the ER for abdominal distention. Her symptoms started few weeks ago and she was recently dx with undifferentiated adenoCA  with malignant ascites.  Discharged in  december due to distention and louis.   Heme onc consulted for malignant ascites    Patient saw Dr Perez in the office on 12/31/20. Plan was for Out patient PET scan to better assess the extent of disease and possibility of biopsy   Patient currently S/p Paracentesis with 9 lt out       Allergies  No Known Allergies    PAST MEDICAL & SURGICAL HISTORY:  Vascular insufficiency  left leg    Sciatic leg pain  right    Hip pain    Arthritis    S/P hip replacement, bilateral    History of tonsillectomy and adenoidectomy    H/O arthroscopy of left knee  1994        FAMILY HISTORY:  Family history of heart disease  a. fib, valvular dysfunction    Family history of COPD (chronic obstructive pulmonary disease)       (08 Jan 2021 11:37)      REVIEW OF SYSTEMS:    as per HPI       PAST MEDICAL & SURGICAL HISTORY:  Vascular insufficiency  left leg    Sciatic leg pain  right    Hip pain    Arthritis    S/P hip replacement, bilateral    History of tonsillectomy and adenoidectomy    H/O arthroscopy of left knee  1994        SOCIAL HISTORY:    FAMILY HISTORY:  Family history of heart disease  a. fib, valvular dysfunction    Family history of COPD (chronic obstructive pulmonary disease)        SOCIAL HISTORY:    Allergies    No Known Allergies    Intolerances        MEDICATIONS  (STANDING):  apixaban 5 milliGRAM(s) Oral every 12 hours  cefTRIAXone Injectable.      cefTRIAXone Injectable. 1000 milliGRAM(s) IV Push every 24 hours  melatonin 3 milliGRAM(s) Oral at bedtime  metoprolol succinate ER 12.5 milliGRAM(s) Oral two times a day    MEDICATIONS  (PRN):  ondansetron Injectable 4 milliGRAM(s) IV Push every 6 hours PRN Nausea      Vital Signs Last 24 Hrs  T(C): 36.9 (10 Jasper 2021 09:16), Max: 36.9 (10 Jasper 2021 09:16)  T(F): 98.4 (10 Jasper 2021 09:16), Max: 98.4 (10 Jasper 2021 09:16)  HR: 112 (10 Jasper 2021 09:16) (112 - 113)  BP: 138/56 (10 Jasper 2021 09:16) (104/53 - 138/56)  BP(mean): --  RR: 17 (10 Jasper 2021 09:16) (17 - 18)  SpO2: 97% (10 Jasper 2021 09:16) (95% - 97%)    PHYSICAL EXAM:    GENERAL: NAD, well-groomed, well-developed  HEAD:  Atraumatic, Normocephalic  EYES: EOMI, PERRLA, conjunctiva and sclera clear  ENMT: No tonsillar erythema, exudates, or enlargement; Moist mucous membranes, Good dentition, No lesions  NECK: Supple, No JVD, Normal thyroid  NERVOUS SYSTEM:  Alert & Oriented X3, Good concentration; Motor Strength 5/5 B/L upper and lower extremities; DTRs 2+ intact and symmetric  CHEST/LUNG: Clear to percussion bilaterally; No rales, rhonchi, wheezing, or rubs  HEART: Regular rate and rhythm; No murmurs, rubs, or gallops  ABDOMEN: Soft, + distension   EXTREMITIES:  2+ Peripheral Pulses, No clubbing, cyanosis, or edema  SKIN: No rashes or lesions      LABS:                        11.8   15.60 )-----------( 194      ( 10 Jasper 2021 07:15 )             37.0     01-10    132<L>  |  102  |  34<H>  ----------------------------<  122<H>  4.6   |  24  |  1.78<H>    Ca    8.7      10 Jasper 2021 07:15              RADIOLOGY & ADDITIONAL STUDIES:    PATHOLOGY:

## 2021-01-10 NOTE — PROGRESS NOTE ADULT - SUBJECTIVE AND OBJECTIVE BOX
66 yo female with PMH of  Atrial flutter  on Eliquis 11/9/19  had GILSON cardioversion,  Combined systolic and diastolic HF , Cardiomyopathy , pHTN, Vascular insufficiency LLE, R sciatica, DJD hips, morbid Obesity , low vignesh pain, Pulm. HTN, NAHOMI on CPAP, who presented  in the ER for abdominal distention. 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  Discharged in kate december due to distention and louis.   she otherwise did not endorse sob. no LE Edema. No chest pain to Ed provider. Pt at IR for tx paracentesis.     1/9 - no cp palps sob. was able to eat a burger after para but developed some abdo discomfort after that; reports dysuria   1/10 - no cp palps sob; still with abdo discomfort from distension but non tender     PHYSICAL EXAM:  GENERAL: NAD, able to lie flat in bed  HEAD:  Atraumatic, Normocephalic  EYES: EOMI, PERRLA, normal sclera  ENT: Moist mucous membranes  NECK: Supple, No JVD, no nuchal rigidity  CHEST/LUNG: Clear to auscultation bilaterally; No rales, rhonchi, wheezing, or rubs. Unlabored respirations  HEART: Regular rate and rhythm; No murmurs, rubs, or gallops  ABDOMEN: Bowel sounds present; Soft, Nontender, obese   EXTREMITIES:  no pitting bilaterally  NERVOUS SYSTEM:  Alert & Oriented X3, speech clear. No focal motor or sensory deficits  MSK: FROM all 4 extremities, full and equal strength  SKIN: No rashes or lesions      RAD:  < from: US Kidney and Bladder (01.08.21 @ 13:24) >  Bilateral corticalthinning, otherwise unremarkable.  < end of copied text >    LABS: All Labs Reviewed:                        11.8   15.60 )-----------( 194      ( 10 Jasper 2021 07:15 )             37.0     132<L>  |  102  |  34<H>  ----------------------------<  122<H>  4.6   |  24  |  1.78<H>    Ca    8.7      10 Jasper 2021 07:15        MEDS:   apixaban 5 milliGRAM(s) Oral every 12 hours  cefTRIAXone Injectable.      cefTRIAXone Injectable. 1000 milliGRAM(s) IV Push every 24 hours  melatonin 3 milliGRAM(s) Oral at bedtime  metoprolol succinate ER 12.5 milliGRAM(s) Oral two times a day  ondansetron Injectable 4 milliGRAM(s) IV Push every 6 hours PRN

## 2021-01-10 NOTE — CONSULT NOTE ADULT - ASSESSMENT
64 yo female with PMH of  Atrial flutter  on Eliquis 11/9/19  had GILSON cardioversion,  Combined systolic and diastolic HF , Cardiomyopathy , pHTN, Vascular insufficiency LLE, R sciatica, DJD hips, morbid Obesity , low vignesh pain, Pulm. HTN, NAHOMI on CPAP, who presented  in the ER for abdominal distention. Her symptoms started few weeks ago and she was recently dx with undifferentiated adenoCA  with malignant ascites.      Patient saw Dr Perez in the office on 12/31/20. Plan was for Out patient PET scan to better assess the extent of disease and possibility of biopsy   Patient currently S/p Paracentesis with 9 lt out     - IN PROGRESS 64 yo female with PMH of  Atrial flutter  on Eliquis 11/9/19  had GILSON cardioversion,  Combined systolic and diastolic HF , Cardiomyopathy , pHTN, Vascular insufficiency LLE, R sciatica, DJD hips, morbid Obesity , low vignesh pain, Pulm. HTN, NAHOMI on CPAP, who presented  in the ER for abdominal distention. Her symptoms started few weeks ago and she was recently dx with undifferentiated adenoCA  with malignant ascites.      Patient saw Dr Perez in the office on 12/31/20. Plan was for Out patient PET scan to better assess the extent of disease and possibility of biopsy   Patient currently S/p Paracentesis with 9 lt out     - Patient initially felt well post Paracentesis on 1/8/21 however feels distended again today   - GIven that the ascites built up so quickly 9 lts in ~2 weeks, she might require a more routine Paracentesis schedule to be set up.   - Repeat USG abd tomorrow to assess ascites  - In addition patient was scheduled to have PET on 1/9/21 however she missed it because of being in the hospital   - Will requier out patient PET to be done   - Patient has a out patient chemo teaching on 1/13/21 in the office   - Continue to follow

## 2021-01-11 DIAGNOSIS — C80.1 MALIGNANT (PRIMARY) NEOPLASM, UNSPECIFIED: ICD-10-CM

## 2021-01-11 DIAGNOSIS — R18.0 MALIGNANT ASCITES: ICD-10-CM

## 2021-01-11 DIAGNOSIS — R00.0 TACHYCARDIA, UNSPECIFIED: ICD-10-CM

## 2021-01-11 LAB
ANION GAP SERPL CALC-SCNC: 8 MMOL/L — SIGNIFICANT CHANGE UP (ref 5–17)
BASOPHILS # BLD AUTO: 0.07 K/UL — SIGNIFICANT CHANGE UP (ref 0–0.2)
BASOPHILS NFR BLD AUTO: 0.4 % — SIGNIFICANT CHANGE UP (ref 0–2)
BUN SERPL-MCNC: 43 MG/DL — HIGH (ref 7–23)
CALCIUM SERPL-MCNC: 8.6 MG/DL — SIGNIFICANT CHANGE UP (ref 8.5–10.1)
CHLORIDE SERPL-SCNC: 98 MMOL/L — SIGNIFICANT CHANGE UP (ref 96–108)
CO2 SERPL-SCNC: 24 MMOL/L — SIGNIFICANT CHANGE UP (ref 22–31)
CREAT SERPL-MCNC: 2.16 MG/DL — HIGH (ref 0.5–1.3)
EOSINOPHIL # BLD AUTO: 0.06 K/UL — SIGNIFICANT CHANGE UP (ref 0–0.5)
EOSINOPHIL NFR BLD AUTO: 0.4 % — SIGNIFICANT CHANGE UP (ref 0–6)
GLUCOSE SERPL-MCNC: 156 MG/DL — HIGH (ref 70–99)
HCT VFR BLD CALC: 38.6 % — SIGNIFICANT CHANGE UP (ref 34.5–45)
HGB BLD-MCNC: 12.3 G/DL — SIGNIFICANT CHANGE UP (ref 11.5–15.5)
IMM GRANULOCYTES NFR BLD AUTO: 1.4 % — SIGNIFICANT CHANGE UP (ref 0–1.5)
LYMPHOCYTES # BLD AUTO: 1.16 K/UL — SIGNIFICANT CHANGE UP (ref 1–3.3)
LYMPHOCYTES # BLD AUTO: 7.2 % — LOW (ref 13–44)
MAGNESIUM SERPL-MCNC: 2.2 MG/DL — SIGNIFICANT CHANGE UP (ref 1.6–2.6)
MCHC RBC-ENTMCNC: 27.6 PG — SIGNIFICANT CHANGE UP (ref 27–34)
MCHC RBC-ENTMCNC: 31.9 GM/DL — LOW (ref 32–36)
MCV RBC AUTO: 86.5 FL — SIGNIFICANT CHANGE UP (ref 80–100)
MONOCYTES # BLD AUTO: 1.32 K/UL — HIGH (ref 0–0.9)
MONOCYTES NFR BLD AUTO: 8.2 % — SIGNIFICANT CHANGE UP (ref 2–14)
NEUTROPHILS # BLD AUTO: 13.36 K/UL — HIGH (ref 1.8–7.4)
NEUTROPHILS NFR BLD AUTO: 82.4 % — HIGH (ref 43–77)
OB PNL STL: POSITIVE
PLATELET # BLD AUTO: 226 K/UL — SIGNIFICANT CHANGE UP (ref 150–400)
POTASSIUM SERPL-MCNC: 4.5 MMOL/L — SIGNIFICANT CHANGE UP (ref 3.5–5.3)
POTASSIUM SERPL-SCNC: 4.5 MMOL/L — SIGNIFICANT CHANGE UP (ref 3.5–5.3)
RBC # BLD: 4.46 M/UL — SIGNIFICANT CHANGE UP (ref 3.8–5.2)
RBC # FLD: 13.6 % — SIGNIFICANT CHANGE UP (ref 10.3–14.5)
SODIUM SERPL-SCNC: 130 MMOL/L — LOW (ref 135–145)
TSH SERPL-MCNC: 3.86 UU/ML — SIGNIFICANT CHANGE UP (ref 0.34–4.82)
WBC # BLD: 16.19 K/UL — HIGH (ref 3.8–10.5)
WBC # FLD AUTO: 16.19 K/UL — HIGH (ref 3.8–10.5)

## 2021-01-11 PROCEDURE — 99233 SBSQ HOSP IP/OBS HIGH 50: CPT

## 2021-01-11 PROCEDURE — 99223 1ST HOSP IP/OBS HIGH 75: CPT

## 2021-01-11 PROCEDURE — 99232 SBSQ HOSP IP/OBS MODERATE 35: CPT

## 2021-01-11 PROCEDURE — 93308 TTE F-UP OR LMTD: CPT | Mod: 26

## 2021-01-11 RX ORDER — SODIUM CHLORIDE 9 MG/ML
500 INJECTION INTRAMUSCULAR; INTRAVENOUS; SUBCUTANEOUS ONCE
Refills: 0 | Status: COMPLETED | OUTPATIENT
Start: 2021-01-11 | End: 2021-01-11

## 2021-01-11 RX ORDER — ZOLPIDEM TARTRATE 10 MG/1
5 TABLET ORAL ONCE
Refills: 0 | Status: DISCONTINUED | OUTPATIENT
Start: 2021-01-11 | End: 2021-01-11

## 2021-01-11 RX ORDER — DEXAMETHASONE 0.5 MG/5ML
10 ELIXIR ORAL ONCE
Refills: 0 | Status: COMPLETED | OUTPATIENT
Start: 2021-01-11 | End: 2021-01-11

## 2021-01-11 RX ORDER — ALBUMIN HUMAN 25 %
50 VIAL (ML) INTRAVENOUS EVERY 8 HOURS
Refills: 0 | Status: COMPLETED | OUTPATIENT
Start: 2021-01-11 | End: 2021-01-12

## 2021-01-11 RX ORDER — GEMCITABINE 38 MG/ML
190 INJECTION, SOLUTION INTRAVENOUS ONCE
Refills: 0 | Status: DISCONTINUED | OUTPATIENT
Start: 2021-01-11 | End: 2021-01-11

## 2021-01-11 RX ORDER — MIDODRINE HYDROCHLORIDE 2.5 MG/1
5 TABLET ORAL THREE TIMES A DAY
Refills: 0 | Status: DISCONTINUED | OUTPATIENT
Start: 2021-01-11 | End: 2021-01-13

## 2021-01-11 RX ORDER — PACLITAXEL 100 MG/20ML
190 INJECTION, POWDER, LYOPHILIZED, FOR SUSPENSION INTRAVENOUS ONCE
Refills: 0 | Status: COMPLETED | OUTPATIENT
Start: 2021-01-11 | End: 2021-01-11

## 2021-01-11 RX ORDER — GEMCITABINE 38 MG/ML
1900 INJECTION, SOLUTION INTRAVENOUS ONCE
Refills: 0 | Status: COMPLETED | OUTPATIENT
Start: 2021-01-11 | End: 2021-01-11

## 2021-01-11 RX ORDER — ONDANSETRON 8 MG/1
8 TABLET, FILM COATED ORAL ONCE
Refills: 0 | Status: COMPLETED | OUTPATIENT
Start: 2021-01-11 | End: 2021-01-11

## 2021-01-11 RX ADMIN — Medication 12.5 MILLIGRAM(S): at 14:58

## 2021-01-11 RX ADMIN — Medication 5 MILLIGRAM(S): at 20:17

## 2021-01-11 RX ADMIN — Medication 102 MILLIGRAM(S): at 14:14

## 2021-01-11 RX ADMIN — ONDANSETRON 8 MILLIGRAM(S): 8 TABLET, FILM COATED ORAL at 14:15

## 2021-01-11 RX ADMIN — CEFTRIAXONE 1000 MILLIGRAM(S): 500 INJECTION, POWDER, FOR SOLUTION INTRAMUSCULAR; INTRAVENOUS at 09:49

## 2021-01-11 RX ADMIN — PACLITAXEL 76 MILLIGRAM(S): 100 INJECTION, POWDER, LYOPHILIZED, FOR SUSPENSION INTRAVENOUS at 15:51

## 2021-01-11 RX ADMIN — MIDODRINE HYDROCHLORIDE 5 MILLIGRAM(S): 2.5 TABLET ORAL at 17:28

## 2021-01-11 RX ADMIN — SODIUM CHLORIDE 500 MILLILITER(S): 9 INJECTION INTRAMUSCULAR; INTRAVENOUS; SUBCUTANEOUS at 14:58

## 2021-01-11 RX ADMIN — ZOLPIDEM TARTRATE 5 MILLIGRAM(S): 10 TABLET ORAL at 22:55

## 2021-01-11 RX ADMIN — GEMCITABINE 600 MILLIGRAM(S): 38 INJECTION, SOLUTION INTRAVENOUS at 16:38

## 2021-01-11 RX ADMIN — Medication 50 MILLILITER(S): at 23:24

## 2021-01-11 RX ADMIN — Medication 5 MILLIGRAM(S): at 10:41

## 2021-01-11 NOTE — PROGRESS NOTE ADULT - SUBJECTIVE AND OBJECTIVE BOX
MADHAVI JOHNSON, 65y Female  MRN: [id number]  ATTENDING: Asif Alvarenga    HPI:    MEDICATIONS:  bisacodyl 5 milliGRAM(s) Oral every 12 hours  cefTRIAXone Injectable.      cefTRIAXone Injectable. 1000 milliGRAM(s) IV Push every 24 hours  melatonin 3 milliGRAM(s) Oral at bedtime  metoprolol succinate ER 12.5 milliGRAM(s) Oral two times a day  ondansetron Injectable 4 milliGRAM(s) IV Push every 6 hours PRN  polyethylene glycol 3350 17 Gram(s) Oral two times a day    All other medications reviewed.    SUBJECTIVE:    VITALS:  T(C): 36.9 (01-11-21 @ 09:22), Max: 36.9 (01-11-21 @ 01:05)  T(F): 98.5 (01-11-21 @ 09:22), Max: 98.5 (01-11-21 @ 09:22)  HR: 125 (01-11-21 @ 09:22) (103 - 125)  BP: 97/64 (01-11-21 @ 09:22) (92/46 - 104/64)      PHYSICAL EXAM:  Constitutional: alert, well developed  HEENT: normocephalic, anicteric sclerae, no mucositis or thrush  Respiratory: bilateral clear to auscultation anteriorly  Cardiovascular : S1, S2 regular, rhythmic, no murmurs, gallops or rubs  Abdomen: soft, distended, + normoactive BS, no palpable HS- megaly  Extremities: no tenderness;  -c/c/e, pulses equal bilaterally    LABS:  (01-11) WBC: 16.19 K/uL,Hemoglobin: 12.3 g/dL, Hematocrit: 38.6 %,    Platelet: 226 K/uL        RADIOLOGY:     MADHAVI JOHNSON, 65y Female  MRN: [id number]  ATTENDING: Asif Alvarenga    HPI:  65F with past medical history of atrial flutter, on chronic anticoagulation with Eliquis, CHF, cardiomyopathy, essential hypertension, vascular insufficiency, osteoarthritis, obstructive sleep apnea on CPAP admitted at [facility] for the second time in less than a month with recurrent ascites; had 9 L of fluid removed during paracentesis.  Patient seen in consultation in ambulatory on 12/31/2020, diagnosed with malignant ascites, favor GI source, and a CA 19–9 of > 4,000.  While hospitalized, patient presented rectal bleeding, attributed to hemorrhoids.  She was scheduled to have a PET/CT in ambulatory prior to hospitalization.    MEDICATIONS:  bisacodyl 5 milliGRAM(s) Oral every 12 hours  cefTRIAXone Injectable.      cefTRIAXone Injectable. 1000 milliGRAM(s) IV Push every 24 hours  melatonin 3 milliGRAM(s) Oral at bedtime  metoprolol succinate ER 12.5 milliGRAM(s) Oral two times a day  ondansetron Injectable 4 milliGRAM(s) IV Push every 6 hours PRN  polyethylene glycol 3350 17 Gram(s) Oral two times a day  All other medications reviewed.    SUBJECTIVE:  OOB to chair; denies abdominal pain, and states she feels less bloated since paracentesis.    Concerned with rapid weight loss.    VITALS:  T(C): 36.9 (01-11-21 @ 09:22), Max: 36.9 (01-11-21 @ 01:05)  T(F): 98.5 (01-11-21 @ 09:22), Max: 98.5 (01-11-21 @ 09:22)  HR: 125 (01-11-21 @ 09:22) (103 - 125)  BP: 97/64 (01-11-21 @ 09:22) (92/46 - 104/64)    PHYSICAL EXAM:  Constitutional: alert, well developed  HEENT: normocephalic, anicteric sclerae, no mucositis or thrush  Respiratory: bilateral clear to auscultation anteriorly  Cardiovascular : S1, S2 regular, rhythmic, no murmurs, gallops or rubs  Abdomen: s/p paracentesis; soft, distended, + normoactive BS, no palpable HS- megaly  Extremities: no tenderness;  -c/c/e, pulses equal bilaterally    LABS:  (01-11) WBC: 16.19 K/uL,Hemoglobin: 12.3 g/dL, Hematocrit: 38.6 %,  Platelet: 226 K/uL    RADIOLOGY:    EXAM:  CT ABDOMEN AND PELVIS                          PROCEDURE DATE:  12/23/2020          INTERPRETATION:  CLINICAL INFORMATION: Abdominal distention with elevated and new Ascites    COMPARISON: None.    PROCEDURE:  CT of the Abdomen and Pelvis was performed without intravenous contrast.  Intravenous contrast: None.  Oral contrast: None.  Sagittal and coronal reformats were performed.    FINDINGS:  LOWER CHEST: Mild linear atelectasis or scarring at the lung bases. Coronary artery calcification is appreciated    LIVER: Within normal limits.  BILE DUCTS: Normal caliber.  GALLBLADDER: Cholelithiasis.  SPLEEN: Within normal limits.  PANCREAS: Within normal limits.  ADRENALS: Within normal limits.  KIDNEYS/URETERS: Within normal limits.    BLADDER: Within normal limits.  REPRODUCTIVE ORGANS: Calcified uterine fibroid    BOWEL: No bowel obstruction. Appendix . There is colonic diverticulosis  PERITONEUM: Moderate ascites.  VESSELS: Atherosclerotic changes.  RETROPERITONEUM/LYMPH NODES: No lymphadenopathy.  ABDOMINAL WALL: Within normal limits.. There is grade 1 anterolisthesis of L4 on L5 likely on a degenerative basis  BONES: Degenerative changes.. Bilateral total hip replacements are also seen    IMPRESSION:  Moderate ascites  Cholelithiasis with multiple stones  Colonic diverticulosis  Calcified uterine fibroid    *** ADDENDUM 01/07/2021  ***    Mild haziness and linear densities in the mesentery may be due to ascites however carcinomatosis cannot be excluded.

## 2021-01-11 NOTE — CONSULT NOTE ADULT - SUBJECTIVE AND OBJECTIVE BOX
Chief Complaint:  Patient is a 65y old  Female who presents with a chief complaint of needs port    HPI:  64 yo female with PMH of  Atrial flutter  on Eliquis 11/9/19  had GILSON cardioversion,  Combined systolic and diastolic HF , Cardiomyopathy , pHTN, Vascular insufficiency LLE, R sciatica, DJD hips, morbid Obesity , low back pain, Pulm. HTN, NAHOMI on CPAP, who presented  in the ER for abdominal distention. Her symptoms started few weeks ago and she was recently dx with undifferentiated adenoCA  with malignant ascites. Pt had paracentesis on 1/8/21. IR consulted for port placement.    Allergies  No Known Allergies    MEDICATIONS  (STANDING):  bisacodyl 5 milliGRAM(s) Oral every 12 hours  cefTRIAXone Injectable.      cefTRIAXone Injectable. 1000 milliGRAM(s) IV Push every 24 hours  dexAMETHasone  IVPB 10 milliGRAM(s) IV Intermittent once  gemcitabine IVPB (eMAR) 1900 milliGRAM(s) IV Intermittent once  melatonin 3 milliGRAM(s) Oral at bedtime  metoprolol succinate ER 12.5 milliGRAM(s) Oral two times a day  ondansetron   Disintegrating Tablet 8 milliGRAM(s) Oral once  PACLitaxel/albumin IVPB (eMAR) 190 milliGRAM(s) IV Intermittent once  polyethylene glycol 3350 17 Gram(s) Oral two times a day    MEDICATIONS  (PRN):  ondansetron Injectable 4 milliGRAM(s) IV Push every 6 hours PRN Nausea      PAST MEDICAL & SURGICAL HISTORY:  Vascular insufficiency  left leg    Sciatic leg pain  right    Hip pain    Arthritis    S/P hip replacement, bilateral    History of tonsillectomy and adenoidectomy    H/O arthroscopy of left knee  1994        FAMILY HISTORY:  Family history of heart disease  a. fib, valvular dysfunction    Family history of COPD (chronic obstructive pulmonary disease)      Vital Signs Last 24 Hrs  T(C): 36.9 (11 Jan 2021 09:22), Max: 36.9 (11 Jan 2021 01:05)  T(F): 98.5 (11 Jan 2021 09:22), Max: 98.5 (11 Jan 2021 09:22)  HR: 125 (11 Jan 2021 09:22) (103 - 125)  BP: 97/64 (11 Jan 2021 09:22) (92/46 - 104/64)  BP(mean): --  RR: 18 (11 Jan 2021 09:22) (18 - 20)  SpO2: 97% (11 Jan 2021 09:22) (95% - 100%)        CBC                        12.3   16.19 )-----------( 226      ( 11 Jan 2021 09:00 )             38.6       Chemistry  01-11    130<L>  |  98  |  43<H>  ----------------------------<  156<H>  4.5   |  24  |  2.16<H>    Ca    8.6      11 Jan 2021 09:00  Mg     2.2     01-11

## 2021-01-11 NOTE — PROGRESS NOTE ADULT - ASSESSMENT
#acute kidney injury  # known to have malignant ascites  #Dysuria, possible UTI     - s/p tx para on admisison and got albumin and 9L out   1/9 noted paracentesis results - not SBP based on it, will f/u peritoneal fluid Cx  - STOP entresto and lasix for now, appreciate renal help   - start rocephin for suspected UTI, f/u UCx   - COnsult Dr Cral   1/10 - to discuss with Dr Carl re pleurX cathter for malignant ascites versus periodic taps with IR   1/11 - Plan for port in the next 1 to 2 days  - so held off on AC and will keep NPO P MN - see if IR can accomodate  to get gemcitabine/abraxane through peripheral line today     - HANANE possibly from  inadequate perfusion from ascites vs low BP in setting of lasix and entresto  1/11 - still with HANANE, low BPs, disucssed with renal - they rec midodrine/albumin,  dry - added NS bolus and ECHO ordered due to palpitations etc  r/o pericardial effusion ekgs look okay     #Afib on eliquis  - adjust for GFR  - On hold for paracentesis today  - BB - hold parameters for SBP < 90  - HR in low 100s  1/9 resumed eliquis   1/11 - AC On hold for port and to monitor H&H while FOBT positive    #1/10 Pt c/o GIB - will check FOBT  never had a COL   monitor H&H, is constipated, will place on bowel regimen   consider GI Cx       #NAHOMI on CPAP  - will need setting from patient and can use hospital cpap    discussed with RN /renal etc

## 2021-01-11 NOTE — CONSULT NOTE ADULT - ASSESSMENT
65 year old woman with a flutter on eliquis, systolic/diastolic CHF, pHTN, morbid obesity, metastatic pancreatic adenocarcinoma (ascites) sent in for acute renal failure.     No signs of severe/significant GI bleeding. Likely hemorrhoids but as never colonoscopy unclear. But as patient has multiple medical issues along with metastatic pancreatic cancer, her overall prognosis is poor and therefore a colonscopic eval at this point not appropriate and would not change any management or therapy. Since the bleeding is chronic/minor and hgb stable/normal, no reason to withold anticoagulation. A palliative pleurex drain would assist her but also runs the risk of infection, risk/benefit of palliative pleurex vs repeted IR drainage should be assessed, although again with metastatic pancreatic cancer I feel that a pleurex drain is not a bad idea to keep her from having to go to IR frequenty even though it runs the risk of infection.

## 2021-01-11 NOTE — PROGRESS NOTE ADULT - ASSESSMENT
65F with past medical history of atrial flutter, on chronic anticoagulation with Eliquis, CHF, cardiomyopathy, essential hypertension, vascular insufficiency, osteoarthritis, obstructive sleep apnea on CPAP admitted at [facility] for the second time in less than a month with recurrent ascites; had 9 L of fluid removed during paracentesis.  Patient seen in consultation in ambulatory on 12/31/2020, diagnosed with malignant ascites, favor GI source, and a CA 19–9 of > 4,000.  While hospitalized, patient presented rectal bleeding, attributed to hemorrhoids.  She was scheduled to have a PET/CT in ambulatory prior to hospitalization.

## 2021-01-11 NOTE — CONSULT NOTE ADULT - ATTENDING COMMENTS
Delano Jacinto M.D.  Cardiology, [facility]  Cell: [phone]  Office:   [phone] ([facility] Office)  [phone] ([facility] Office)

## 2021-01-11 NOTE — PROGRESS NOTE ADULT - PROBLEM SELECTOR PLAN 1
Intra-abdominal neoplasm (suspect pancreatic or biliary primary) or carcinoma of unknown primary with recurrent malignant ascites requiring second high-volume paracentesis in less than a month; CA 19–9 at 4000.  Metastatic work-up could not be completed in ambulatory to further look for a primary neoplasm.  Due to rapid accumulation of intra-abdominal fluid in patient's clinical decline, consider starting treatment with nab-paclitaxel (100 mg/m²) and gemcitabine (1000 mg/m²)–cycle 1 today.  The regimen covers both types of malignancies, and aims at controlling rapid ascites accumulation.  Discussed with patient regarding side effect profile including myelosuppression, neurotoxicity, allergic reactions, etc.  Patient expressed her understanding and is in agreement with the plan; signed consent for chemo administration.  Will submit cytopathology from ascitic fluid.  Patient understand treatment is palliative.  Will follow-up CBC/ CMP.

## 2021-01-11 NOTE — PROVIDER CONTACT NOTE (OTHER) - ACTION/TREATMENT ORDERED:
stat ekg ordered, MD aware of results, will recheck HR at 0100
OK to reinstate metoprolol and administer to patient as per Dr. Arora
Spoke with judy at office, will relay consult message to dr. marquis

## 2021-01-11 NOTE — PROGRESS NOTE ADULT - SUBJECTIVE AND OBJECTIVE BOX
64 yo female with PMH of  Atrial flutter  on Eliquis 11/9/19  had GILSON cardioversion,  Combined systolic and diastolic HF , Cardiomyopathy , pHTN, Vascular insufficiency LLE, R sciatica, DJD hips, morbid Obesity , low vignesh pain, Pulm. HTN, NAHOMI on CPAP, who presented  in the ER for abdominal distention. 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  Discharged in kate december due to distention and louis.   she otherwise did not endorse sob. no LE Edema. No chest pain to Ed provider. Pt at IR for tx paracentesis.     1/9 - no cp palps sob. was able to eat a burger after para but developed some abdo discomfort after that; reports dysuria   1/10 - no cp palps sob; still with abdo discomfort from distension but non tender \  1/11 - no cp palps sob; [port today and to start gemcitabine/abraxane ; took laxative and had a BM     PHYSICAL EXAM:  GENERAL: NAD, able to lie flat in bed  HEAD:  Atraumatic, Normocephalic  EYES: EOMI, PERRLA, normal sclera  ENT: Moist mucous membranes  NECK: Supple, No JVD, no nuchal rigidity  CHEST/LUNG: Clear to auscultation bilaterally; No rales, rhonchi, wheezing, or rubs. Unlabored respirations  HEART: Regular rate and rhythm; No murmurs, rubs, or gallops  ABDOMEN: Bowel sounds present; Soft, Nontender, obese   EXTREMITIES:  no pitting bilaterally  NERVOUS SYSTEM:  Alert & Oriented X3, speech clear. No focal motor or sensory deficits  MSK: FROM all 4 extremities, full and equal strength  SKIN: No rashes or lesions      RAD:  < from: US Kidney and Bladder (01.08.21 @ 13:24) >  Bilateral corticalthinning, otherwise unremarkable.  < end of copied text >      LABS: All Labs Reviewed:                        12.3   16.19 )-----------( 226      ( 11 Jan 2021 09:00 )             38.6     130<L>  |  98  |  43<H>  ----------------------------<  156<H>  4.5   |  24  |  2.16<H>    Ca    8.6      11 Jan 2021 09:00  Mg     2.2     01-11    albumin human 25% IVPB 50 milliLiter(s) IV Intermittent every 8 hours  bisacodyl 5 milliGRAM(s) Oral every 12 hours  cefTRIAXone Injectable.      cefTRIAXone Injectable. 1000 milliGRAM(s) IV Push every 24 hours  melatonin 3 milliGRAM(s) Oral at bedtime  metoprolol succinate ER 12.5 milliGRAM(s) Oral two times a day  midodrine. 5 milliGRAM(s) Oral three times a day  ondansetron Injectable 4 milliGRAM(s) IV Push every 6 hours PRN  polyethylene glycol 3350 17 Gram(s) Oral two times a day

## 2021-01-11 NOTE — CONSULT NOTE ADULT - CONSULT REQUESTED DATE/TIME
08-Jan-2021 09:05
10-Jasper-2021 14:05
11-Jan-2021 20:35
11-Jan-2021 13:02
11-Jan-2021 18:31
08-Jan-2021 21:06

## 2021-01-11 NOTE — CONSULT NOTE ADULT - SUBJECTIVE AND OBJECTIVE BOX
64 yo female with PMH of  Atrial flutter  on Eliquis 11/9/19  had GILSON cardioversion,  Combined systolic and diastolic HF , Cardiomyopathy , pHTN, Vascular insufficiency LLE, R sciatica, DJD hips, morbid Obesity , low bak pain, Pulm. HTN, NAHOMI on CPAP, who presented  in the ER for abdominal distention.     Admitted for HANANE, malignant ascites possible UTI.  Started on abx, considering pleurex catheter for malignant   ascites.    Consulted for tachycardia.  Review of VS show  HRs in 110s-130s.  No obvious causes identified:  no pain, fever, hypovolemia, sepsis, hypoxia, anemia  No TSH checked recently.  Of note is on a lower dose of metoprolol succ than OP  2/2 borderline SBPs.  Review of clinic notes show pt has had freqeunt HRs  in 110s-120s.  Pt states her HR has been high for yrs.  No cardiac symptoms,: no chest pain, palpitations, syncope  dyspnea.      PAST MEDICAL AND SURGICAL HISTORY:  Vascular insufficiency  left leg  Sciatic leg pain  right  Hip pain  Arthritis  S/P hi replacement, bilateral  History of tonsillectomy and adenoidectomy  H/O arthroscopy of left knee  1994    ALLERGIES:  Allergies  No Known Allergies  Intolerances        SOCIAL HISTORY:  as above (08 Jan 2021 11:37)      FAMILY  HISTORY:  Family history of heart disease  a. fib, valvular dysfunction  Family history of COPD (chronic obstructive pulmonary disease)        MEDICATIONS:  OUTPATIENT:  Home Medications:  cyclobenzaprine 5 mg oral tablet: 1 tab(s) orally 3 times a day, As Needed (08 Jan 2021 12:53)  metoprolol succinate 25 mg oral tablet, extended release: 1 tab(s) orally 2 times a day (08 Jan 2021 12:53)        INPATIENT:  MEDICATIONS  (STANDING):  albumin human 25% IVPB 50 milliLiter(s) IV Intermittent every 8 hours  bisacodyl 5 milliGRAM(s) Oral every 12 hours  cefTRIAXone Injectable.      cefTRIAXone Injectable. 1000 milliGRAM(s) IV Push every 24 hours  melatonin 3 milliGRAM(s) Oral at bedtime  metoprolol succinate ER 12.5 milliGRAM(s) Oral two times a day  midodrine. 5 milliGRAM(s) Oral three times a day  polyethylene glycol 3350 17 Gram(s) Oral two times a day    MEDICATIONS  (PRN):  ondansetron Injectable 4 milliGRAM(s) IV Push every 6 hours PRN Nausea    MEDICATIONS  (PRN):  ondansetron Injectable 4 milliGRAM(s) IV Push every 6 hours PRN Nausea    REVIEW OF SYSTEMS:    CONSTITUTIONAL: No weakness, fevers or chills  EYES/ENT: No visual changes;  No vertigo or throat pain   NECK: No pain or stiffness  RESPIRATORY: No cough, wheezing, hemoptysis; No shortness of breath  CARDIOVASCULAR: No chest pain or palpitations  GASTROINTESTINAL: No abdominal or epigastric pain. No nausea, vomiting, or hematemesis; No diarrhea or constipation. No melena or hematochezia.  GENITOURINARY: No dysuria, frequency or hematuria  NEUROLOGICAL: No numbness or weakness  SKIN: No itching, burning, rashes, or lesions   All other review of systems is negative unless indicated above    Vital Signs Last 24 Hrs  T(C): 36.7 (11 Jan 2021 16:30), Max: 37.1 (11 Jan 2021 14:43)  T(F): 98 (11 Jan 2021 16:30), Max: 98.8 (11 Jan 2021 14:43)  HR: 118 (11 Jan 2021 18:07) (103 - 130)  BP: 111/74 (11 Jan 2021 18:07) (92/46 - 117/57)  BP(mean): --  RR: 18 (11 Jan 2021 18:07) (18 - 20)  SpO2: 97% (11 Jan 2021 18:07) (96% - 100%)    I&O's Summary    10 Jasper 2021 07:01  -  11 Jan 2021 07:00  --------------------------------------------------------  IN: 240 mL / OUT: 0 mL / NET: 240 mL        PHYSICAL EXAM:    Constitutional: NAD, awake and alert, obese  HEENT: PERR, EOMI,  No oral cyananosis.  Neck:  supple,  No JVD  Respiratory: Breath sounds are clear bilaterally, No wheezing, rales or rhonchi  Cardiovascular: S1 and S2, tachycardic, no Murmurs, gallops or rubs  Gastrointestinal: Bowel Sounds present, soft, nontender.   distended,   Extremities: No peripheral edema. No clubbing or cyanosis.  Vascular: 2+ peripheral pulses  Neurological: A/O x 3, no focal deficits        LABS: All Labs Reviewed:                        12.3   16.19 )-----------( 226      ( 11 Jan 2021 09:00 )             38.6                         11.8   15.60 )-----------( 194      ( 10 Jasper 2021 07:15 )             37.0                         11.9   16.80 )-----------( 212      ( 09 Jan 2021 06:39 )             37.7     11 Jan 2021 09:00    130    |  98     |  43     ----------------------------<  156    4.5     |  24     |  2.16   10 Jasper 2021 07:15    132    |  102    |  34     ----------------------------<  122    4.6     |  24     |  1.78   09 Jan 2021 06:39    131    |  102    |  33     ----------------------------<  137    4.9     |  23     |  2.08     Ca    8.6        11 Jan 2021 09:00  Ca    8.7        10 Jasper 2021 07:15  Ca    8.4        09 Jan 2021 06:39  Mg     2.2       11 Jan 2021 09:00      ===============================  EKG: sinus tachtycardia, no ischemic changes.    ===============================  RADIOLOGY:  < from: Xray Chest 1 View- PORTABLE-Urgent (Xray Chest 1 View- PORTABLE-Urgent .) (12.23.20 @ 18:26) >  PROCEDURE DATE:  12/23/2020      INTERPRETATION:  Portable chest radiograph  CLINICAL INFORMATION: Preadmission chest radiograph  TECHNIQUE:  Portable  AP view of the chest was obtained.  COMPARISON: 9/10/2019 chest radiograph available for review.    FINDINGS:  The lungs are clear of airspace consolidations or effusions. No pneumothorax.  The heart and mediastinum are within normal limits.  Visualized osseous structures are intact.    IMPRESSION:   No evidence of active chest disease.    ===============================  ECHO:  < from: Transthoracic Echocardiogram Follow Up (01.11.21 @ 10:24) >   Impression     Summary     Fibrocalcific changes noted to the mitral valve leaflets with preserved   leaflet excursion.   Trace to mild mitral regurgitation is present.   Fibrocalcific changes noted to the Aortic valve leaflets with preserved   leaflet excursion.   Trace aortic regurgitation is present.   Trace tricuspid valve regurgitation is present.   The left ventricle is normal in size, wall thickness, wall motion and   contractility.   Estimated left ventricular ejection fraction is 50 %.      ===============================  CARDIAC CATH:  Mar '20: normal coronaries, mild-mod pulmonary HTN    ===============================

## 2021-01-11 NOTE — PROVIDER CONTACT NOTE (OTHER) - DATE AND TIME:
09-Jan-2021 19:51
10-Jasper-2021 23:30
11-Jan-2021 11:43
08-Jan-2021 14:26
09-Jan-2021 01:40
11-Jan-2021 15:07

## 2021-01-11 NOTE — PROVIDER CONTACT NOTE (OTHER) - SITUATION
Dr Sam's office of admission
Spoke with Nery regarding consult
spoke with Kaley in office regarding consult
Notified Dr. Arora about patients heart rate in the 120s. metoprolol was held earlier d/t low BP. BP is stable now.
pt with elevated , to receive chemotherpay

## 2021-01-11 NOTE — PROGRESS NOTE ADULT - ASSESSMENT
66 yo with ascites related undifferentiated adeno ca admitted with elective paracentesis and noted with HANANE;. Etiology likely due to pre-renal azothemia  in setting of low bp  will r/o HRS ( doubt with no liver dx defined on previous scans)     PLAN  - monitor on po intake  - urine electrolytes  - repeat labs tonight and monitor trend of the scr  - fu renal/bladder sono   - albumin post paracentesis     1/9 SY  --HANANE : slightly improved following paracentesis.  unclear if related to elevated intra-abdominal pressure.    Continue to monitor.    Likely baseline CKD with bilateral cortical thinning.  --Hyponatremia with HANANE leading to decreased water excretion.    Continue to monitor.   Moderate restriction in po fluid and liberalize diet.    1/11 Mk  - HANANE with variability.  will monitor response to ivf and bb    cardiology consult    midodrine for bp support    will monitor response to albumin x24 hrs     fu trend of na value   - undifferentiated adenocarcinoma     chemo started today    port in am

## 2021-01-11 NOTE — CONSULT NOTE ADULT - ASSESSMENT
66 yo female with undifferentiated adenoCA  with malignant ascites referred to IR for port placement.  - WBC 16.19  - Last dose eliquis yesterday  - Consentable  - Covid negative

## 2021-01-11 NOTE — CONSULT NOTE ADULT - PROBLEM SELECTOR RECOMMENDATION 9
- Please keep pt NPO after midnight  - Continue to hold anticoagulation  - F/U with IR in AM to see if procedure is ok as per attending  - As per Dr. Slater, WBC is likely due to patients UTI, no concern for bacteremia
persistent sinus tachycardia w/ no clear cause: no hypovolemia/anemia, pain, dyspnea.  Prior clinic evals show HR has been in 100s-110s.  Check TSH.  Tachycardia may in part be due to reduction of BB dose for HFrEF, restart at previous dose w/ BP parameters of SBP <90. Pt likely has inappropriate sinus tachycardia in general is treated conservatively w/ BB and/or CaB.
- Plan for IR therapeutic paracentesis pending labs/COVID results  - Dr. Parson aware

## 2021-01-11 NOTE — CONSULT NOTE ADULT - CONSULT REASON
64yo F with ascites referred to IR for thereapeutic paracentesis
Malignant Ascites
heme positive stool
66 yo female with undifferentiated adenoCA  with malignant ascites referred to IR for port placement.
tachycardia
louis

## 2021-01-11 NOTE — CDI QUERY NOTE - NSCDIOTHERTXTBX_GEN_ALL_CORE_HH
Documentation on chart:    64 yo female with PMH of  Atrial flutter  on Eliquis 11/9/19  had GILSON cardioversion,  Combined systolic and diastolic HF , Cardiomyopathy , pHTN, Vascular insufficiency LLE, R sciatica, DJD hips, morbid Obesity , low bak pain, Pulm. HTN, NAHOMI on CPAP, who presented  in the ER for abdominal distention. 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  Discharged in kate december due to distention and hanane.   she otherwise did not endorse sob. no LE Edema. No chest pain to Ed provider. Pt at IR for tx paracentesis.     Home Medications:   * Patient Currently Takes Medications as of 26-Dec-2020 14:05 documented in Structured Notes  · 	pantoprazole 40 mg oral delayed release tablet: 1 tab(s) orally once a day (before a meal)  · 	saccharomyces boulardii lyo 250 mg oral capsule: 1 cap(s) orally 2 times a day  · 	metoprolol succinate 25 mg oral tablet, extended release: 1 tab(s) orally 2 times a day  · 	apixaban 5 mg oral tablet: 1 tab(s) orally every 12 hours  · 	cyclobenzaprine 5 mg oral tablet: 1 tab(s) orally 3 times a day, As Needed    Nephrology documentation:  64 yo with ascites related undifferentiated adeno ca admitted with elective paracentesis and noted with HANANE;. Etiology likely due to pre-renal azothemia  in setting of low bp  will r/o HRS ( doubt with no liver dx defined on previous scans)     PLAN  - monitor on po intake  - urine electrolytes  - repeat labs tonight and monitor trend of the scr  - fu renal/bladder sono   - albumin post paracentesis     1/9 SY  --HANANE : slightly improved following paracentesis.  unclear if related to elevated intra-abdominal pressure.    Continue to monitor.    Likely baseline CKD with bilateral cortical thinning.  --Hyponatremia with HANANE leading to decreased water excretion.    Continue to monitor.   Moderate restriction in po fluid and liberalize diet.      Pt. received Chemotherapy Gemcitabine and Paclitaxel during hospitalization.    Creatinine, Serum: 2.16 mg/dL <H> [0.50 - 1.30] (01-11-21)  Creatinine, Serum: 1.78 mg/dL <H> [0.50 - 1.30] (01-10-21)  Creatinine, Serum: 2.08 mg/dL <H> [0.50 - 1.30] (01-09-21)  Creatinine, Serum: 2.49 mg/dL <H> [0.50 - 1.30] (01-08-21)  Creatinine, Serum: 2.46 mg/dL <H> [0.50 - 1.30] (01-08-21)    EGFR 19-29    Please clarify a diagnosis based on the above clinical criteria:  A) HANANE with CKD IV  B) HANANE with ATN and CKD 4  C) Other ( Please specify condition)

## 2021-01-11 NOTE — PROGRESS NOTE ADULT - SUBJECTIVE AND OBJECTIVE BOX
NEPHROLOGY INTERVAL HPI/OVERNIGHT EVENTS:  21 @ 15:37   nervous, has elevated hr of 125-130 BB started, noted with low bp and 500 cc bolus in progress to start gemcitibine and PACLitaxel  --Feeling a little improved but concerned due to renal dysfunction and reaccumulation of ascites.    HPI:  64 yo female with PMH of  Atrial flutter  on Eliquis 19  had GILSON cardioversion,  Combined systolic and diastolic HF , Cardiomyopathy , pHTN, Vascular insufficiency LLE, R sciatica, DJD hips, morbid Obesity , low vignesh pain, Pulm. HTN, NAHOMI on CPAP, who presented  in the ER for abdominal distention. 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  Discharged in  due to distention and louis.   she otherwise did not endorse sob. no LE Edema. No chest pain to Ed provider. Pt at IR for tx paracentesis. Will complete H&P at later time  -------------------------------------------  pt with hx of ascites that has been determined to be related to undifferentiated adeno ca with recurrent ascites.   s/p 9 L of paracentesis today which re-accumulated in 2 week time frame  since dc, pt was not restarted on lasix or entresto.  saw dr marquis monday   has been receiving tx with onc with consideration for start of chemo   she is concerned about admission and being on COVID floor   no nsaid use no abx use  has been taking in dec po intake due to abd distension       MEDICATIONS  (STANDING):  bisacodyl 5 milliGRAM(s) Oral every 12 hours  cefTRIAXone Injectable.      cefTRIAXone Injectable. 1000 milliGRAM(s) IV Push every 24 hours  gemcitabine IVPB (eMAR) 1900 milliGRAM(s) IV Intermittent once  melatonin 3 milliGRAM(s) Oral at bedtime  metoprolol succinate ER 12.5 milliGRAM(s) Oral two times a day  PACLitaxel/albumin IVPB (eMAR) 190 milliGRAM(s) IV Intermittent once  polyethylene glycol 3350 17 Gram(s) Oral two times a day    MEDICATIONS  (PRN):  ondansetron Injectable 4 milliGRAM(s) IV Push every 6 hours PRN Nausea      Allergies    No Known Allergies    Intolerances        I&O's Detail    10 Jasper 2021 07:01  -  2021 07:00  --------------------------------------------------------  IN:    Oral Fluid: 240 mL  Total IN: 240 mL    OUT:  Total OUT: 0 mL    Total NET: 240 mL          .  Vital Signs Last 24 Hrs  T(C): 37.1 (2021 14:43), Max: 37.1 (2021 14:43)  T(F): 98.8 (2021 14:43), Max: 98.8 (2021 14:43)  HR: 130 (2021 14:43) (103 - 130)  BP: 117/57 (2021 14:43) (92/46 - 117/57)  BP(mean): --  RR: 20 (2021 14:43) (18 - 20)  SpO2: 99% (2021 14:43) (95% - 100%)  Daily     Daily Weight in k.1 (2021 06:47)    PHYSICAL EXAM:  General: alert. awake Ox3  HEENT: MMM  CV: s1s2 rrr  LUNGS: B/L CTA  EXT: no edema    LABS:                        12.3   16.19 )-----------( 226      ( 2021 09:00 )             38.6         130<L>  |  98  |  43<H>  ----------------------------<  156<H>  4.5   |  24  |  2.16<H>    Ca    8.6      2021 09:00  Mg     2.2               Magnesium, Serum: 2.2 mg/dL ( @ 09:00)

## 2021-01-11 NOTE — CONSULT NOTE ADULT - REASON FOR ADMISSION
sent in for abnl labs

## 2021-01-11 NOTE — CONSULT NOTE ADULT - SUBJECTIVE AND OBJECTIVE BOX
Patient is a 65y old  Female who presents with a chief complaint of sent in for abnl labs (11 Jan 2021 19:50)    65 year old woman with a flutter on eliquis, systolic/diastolic CHF, pHTN, morbid obesity, metastatic pancreatic adenocarcinoma (ascites) sent in for acute renal failure.     Patient has been on chemotherapy for ascites and has required LVP's for malignant ascites. She was sent to the ER for acute renal failure and found to have UTI. Reason for our call was heme positive stool. Patient does state she has blood around stool and on toilet paper occassionally, once in bowl but this with brown solid BM's and has never had clots or daron hematochezia without BM's. No melena or hematemesis. The last time was a few days ago. She can't say that this occurs in the setting of constipation. She has never had a colonoscopy. She has a brown solid BM almost every day.       Allergies  No Known Allergies    PAST MEDICAL & SURGICAL HISTORY:  Vascular insufficiency  left leg    Sciatic leg pain  right    Hip pain    Arthritis    S/P hip replacement, bilateral    History of tonsillectomy and adenoidectomy    H/O arthroscopy of left knee  1994        FAMILY HISTORY:  Family history of heart disease  a. fib, valvular dysfunction    Family history of COPD (chronic obstructive pulmonary disease)       (08 Jan 2021 11:37)      PAST MEDICAL & SURGICAL HISTORY:  Vascular insufficiency  left leg    Sciatic leg pain  right    Hip pain    Arthritis    S/P hip replacement, bilateral    History of tonsillectomy and adenoidectomy    H/O arthroscopy of left knee  1994        MEDICATIONS  (STANDING):  albumin human 25% IVPB 50 milliLiter(s) IV Intermittent every 8 hours  bisacodyl 5 milliGRAM(s) Oral every 12 hours  cefTRIAXone Injectable.      cefTRIAXone Injectable. 1000 milliGRAM(s) IV Push every 24 hours  melatonin 3 milliGRAM(s) Oral at bedtime  metoprolol succinate ER 12.5 milliGRAM(s) Oral two times a day  midodrine. 5 milliGRAM(s) Oral three times a day  polyethylene glycol 3350 17 Gram(s) Oral two times a day    MEDICATIONS  (PRN):  ondansetron Injectable 4 milliGRAM(s) IV Push every 6 hours PRN Nausea  zolpidem 5 milliGRAM(s) Oral once PRN Insomnia      Allergies    No Known Allergies    Intolerances        SOCIAL HISTORY:  no smoking, drinking or drugs    FAMILY HISTORY:  Family history of heart disease  a. fib, valvular dysfunction    Family history of COPD (chronic obstructive pulmonary disease)        REVIEW OF SYSTEMS:    CONSTITUTIONAL: No weakness, fevers or chills  EYES/ENT: No visual changes;  No vertigo or throat pain   NECK: No pain or stiffness  RESPIRATORY: No cough, wheezing, hemoptysis; No shortness of breath  CARDIOVASCULAR: No chest pain or palpitations  GASTROINTESTINAL: per HPI  GENITOURINARY: No dysuria, frequency or hematuria  NEUROLOGICAL: No numbness or weakness  SKIN: No itching, burning, rashes, or lesions   PSYCH: Normal mood and affect  All other review of systems is negative unless indicated above.    Vital Signs Last 24 Hrs  T(C): 36.7 (11 Jan 2021 16:30), Max: 37.1 (11 Jan 2021 14:43)  T(F): 98 (11 Jan 2021 16:30), Max: 98.8 (11 Jan 2021 14:43)  HR: 118 (11 Jan 2021 18:07) (103 - 130)  BP: 111/74 (11 Jan 2021 18:07) (92/46 - 117/57)  BP(mean): --  RR: 18 (11 Jan 2021 18:07) (18 - 20)  SpO2: 97% (11 Jan 2021 18:07) (96% - 100%)    PHYSICAL EXAM:    Constitutional: No acute distress, well-developed, non-toxic appearing  HEENT: masked, good phonation, not icteric  Neck: supple, no lymphadenopathy  Respiratory: clear to ascultation bilaterally, no wheezing  Cardiovascular: S1 and S2, regular rate and rhythm, no murmurs rubs or gallops  Gastrointestinal: soft, non-tender, non-distended, +bowel sounds, no rebound or guarding, no surgical scars, no drains  Extremities: No peripheral edema, no cyanosis or clubbing  Vascular: 2+ peripheral pulses, no venous stasis  Neurological: A/O x 3, no focal deficits, no asterixis  Psychiatric: Normal mood, normal affect  Skin: No rashes, not jaundiced    LABS:                        12.3   16.19 )-----------( 226      ( 11 Jan 2021 09:00 )             38.6     01-11    130<L>  |  98  |  43<H>  ----------------------------<  156<H>  4.5   |  24  |  2.16<H>    Ca    8.6      11 Jan 2021 09:00  Mg     2.2     01-11            RADIOLOGY & ADDITIONAL STUDIES: imaging reviewed by me

## 2021-01-12 LAB
ANION GAP SERPL CALC-SCNC: 7 MMOL/L — SIGNIFICANT CHANGE UP (ref 5–17)
BASOPHILS # BLD AUTO: 0.01 K/UL — SIGNIFICANT CHANGE UP (ref 0–0.2)
BASOPHILS NFR BLD AUTO: 0.1 % — SIGNIFICANT CHANGE UP (ref 0–2)
BUN SERPL-MCNC: 41 MG/DL — HIGH (ref 7–23)
CALCIUM SERPL-MCNC: 8.2 MG/DL — LOW (ref 8.5–10.1)
CHLORIDE SERPL-SCNC: 103 MMOL/L — SIGNIFICANT CHANGE UP (ref 96–108)
CO2 SERPL-SCNC: 24 MMOL/L — SIGNIFICANT CHANGE UP (ref 22–31)
CREAT SERPL-MCNC: 1.66 MG/DL — HIGH (ref 0.5–1.3)
CULTURE RESULTS: SIGNIFICANT CHANGE UP
EOSINOPHIL # BLD AUTO: 0 K/UL — SIGNIFICANT CHANGE UP (ref 0–0.5)
EOSINOPHIL NFR BLD AUTO: 0 % — SIGNIFICANT CHANGE UP (ref 0–6)
GLUCOSE SERPL-MCNC: 163 MG/DL — HIGH (ref 70–99)
HCT VFR BLD CALC: 31.8 % — LOW (ref 34.5–45)
HGB BLD-MCNC: 10.2 G/DL — LOW (ref 11.5–15.5)
IMM GRANULOCYTES NFR BLD AUTO: 1.2 % — SIGNIFICANT CHANGE UP (ref 0–1.5)
LYMPHOCYTES # BLD AUTO: 0.35 K/UL — LOW (ref 1–3.3)
LYMPHOCYTES # BLD AUTO: 3 % — LOW (ref 13–44)
MAGNESIUM SERPL-MCNC: 2.2 MG/DL — SIGNIFICANT CHANGE UP (ref 1.6–2.6)
MCHC RBC-ENTMCNC: 27.6 PG — SIGNIFICANT CHANGE UP (ref 27–34)
MCHC RBC-ENTMCNC: 32.1 GM/DL — SIGNIFICANT CHANGE UP (ref 32–36)
MCV RBC AUTO: 86.2 FL — SIGNIFICANT CHANGE UP (ref 80–100)
MONOCYTES # BLD AUTO: 0.44 K/UL — SIGNIFICANT CHANGE UP (ref 0–0.9)
MONOCYTES NFR BLD AUTO: 3.8 % — SIGNIFICANT CHANGE UP (ref 2–14)
NEUTROPHILS # BLD AUTO: 10.61 K/UL — HIGH (ref 1.8–7.4)
NEUTROPHILS NFR BLD AUTO: 91.9 % — HIGH (ref 43–77)
PLATELET # BLD AUTO: 148 K/UL — LOW (ref 150–400)
POTASSIUM SERPL-MCNC: 4.5 MMOL/L — SIGNIFICANT CHANGE UP (ref 3.5–5.3)
POTASSIUM SERPL-SCNC: 4.5 MMOL/L — SIGNIFICANT CHANGE UP (ref 3.5–5.3)
RBC # BLD: 3.69 M/UL — LOW (ref 3.8–5.2)
RBC # FLD: 13.4 % — SIGNIFICANT CHANGE UP (ref 10.3–14.5)
SODIUM SERPL-SCNC: 134 MMOL/L — LOW (ref 135–145)
SPECIMEN SOURCE: SIGNIFICANT CHANGE UP
WBC # BLD: 11.55 K/UL — HIGH (ref 3.8–10.5)
WBC # FLD AUTO: 11.55 K/UL — HIGH (ref 3.8–10.5)

## 2021-01-12 PROCEDURE — 99232 SBSQ HOSP IP/OBS MODERATE 35: CPT

## 2021-01-12 PROCEDURE — 99233 SBSQ HOSP IP/OBS HIGH 50: CPT

## 2021-01-12 PROCEDURE — 36561 INSERT TUNNELED CV CATH: CPT

## 2021-01-12 PROCEDURE — 77001 FLUOROGUIDE FOR VEIN DEVICE: CPT | Mod: 26

## 2021-01-12 RX ORDER — FENTANYL CITRATE 50 UG/ML
25 INJECTION INTRAVENOUS
Refills: 0 | Status: DISCONTINUED | OUTPATIENT
Start: 2021-01-12 | End: 2021-01-12

## 2021-01-12 RX ORDER — OXYCODONE HYDROCHLORIDE 5 MG/1
5 TABLET ORAL ONCE
Refills: 0 | Status: DISCONTINUED | OUTPATIENT
Start: 2021-01-12 | End: 2021-01-12

## 2021-01-12 RX ORDER — HYDROMORPHONE HYDROCHLORIDE 2 MG/ML
0.5 INJECTION INTRAMUSCULAR; INTRAVENOUS; SUBCUTANEOUS
Refills: 0 | Status: DISCONTINUED | OUTPATIENT
Start: 2021-01-12 | End: 2021-01-12

## 2021-01-12 RX ORDER — ONDANSETRON 8 MG/1
4 TABLET, FILM COATED ORAL ONCE
Refills: 0 | Status: DISCONTINUED | OUTPATIENT
Start: 2021-01-12 | End: 2021-01-12

## 2021-01-12 RX ORDER — ACETAMINOPHEN 500 MG
650 TABLET ORAL EVERY 6 HOURS
Refills: 0 | Status: DISCONTINUED | OUTPATIENT
Start: 2021-01-12 | End: 2021-01-13

## 2021-01-12 RX ORDER — SODIUM CHLORIDE 9 MG/ML
1000 INJECTION INTRAMUSCULAR; INTRAVENOUS; SUBCUTANEOUS
Refills: 0 | Status: DISCONTINUED | OUTPATIENT
Start: 2021-01-12 | End: 2021-01-12

## 2021-01-12 RX ORDER — CEFAZOLIN SODIUM 1 G
2000 VIAL (EA) INJECTION ONCE
Refills: 0 | Status: DISCONTINUED | OUTPATIENT
Start: 2021-01-12 | End: 2021-01-13

## 2021-01-12 RX ORDER — ACETAMINOPHEN 500 MG
1000 TABLET ORAL ONCE
Refills: 0 | Status: DISCONTINUED | OUTPATIENT
Start: 2021-01-12 | End: 2021-01-12

## 2021-01-12 RX ORDER — ZOLPIDEM TARTRATE 10 MG/1
5 TABLET ORAL AT BEDTIME
Refills: 0 | Status: DISCONTINUED | OUTPATIENT
Start: 2021-01-12 | End: 2021-01-12

## 2021-01-12 RX ADMIN — MIDODRINE HYDROCHLORIDE 5 MILLIGRAM(S): 2.5 TABLET ORAL at 12:52

## 2021-01-12 RX ADMIN — MIDODRINE HYDROCHLORIDE 5 MILLIGRAM(S): 2.5 TABLET ORAL at 05:49

## 2021-01-12 RX ADMIN — Medication 50 MILLILITER(S): at 15:52

## 2021-01-12 RX ADMIN — Medication 5 MILLIGRAM(S): at 12:51

## 2021-01-12 RX ADMIN — POLYETHYLENE GLYCOL 3350 17 GRAM(S): 17 POWDER, FOR SOLUTION ORAL at 22:23

## 2021-01-12 RX ADMIN — ZOLPIDEM TARTRATE 5 MILLIGRAM(S): 10 TABLET ORAL at 23:46

## 2021-01-12 RX ADMIN — MIDODRINE HYDROCHLORIDE 5 MILLIGRAM(S): 2.5 TABLET ORAL at 16:08

## 2021-01-12 RX ADMIN — POLYETHYLENE GLYCOL 3350 17 GRAM(S): 17 POWDER, FOR SOLUTION ORAL at 12:52

## 2021-01-12 RX ADMIN — Medication 12.5 MILLIGRAM(S): at 08:55

## 2021-01-12 RX ADMIN — CEFTRIAXONE 1000 MILLIGRAM(S): 500 INJECTION, POWDER, FOR SOLUTION INTRAMUSCULAR; INTRAVENOUS at 08:56

## 2021-01-12 RX ADMIN — Medication 5 MILLIGRAM(S): at 22:23

## 2021-01-12 RX ADMIN — Medication 50 MILLILITER(S): at 05:49

## 2021-01-12 NOTE — PROGRESS NOTE ADULT - ASSESSMENT
65F with past medical history of atrial flutter, on chronic anticoagulation with Eliquis, CHF, cardiomyopathy, essential hypertension, vascular insufficiency, osteoarthritis, obstructive sleep apnea on CPAP admitted at [facility] for the second time in less than a month with recurrent ascites; had 9 L of fluid removed during paracentesis.  Patient seen in consultation in ambulatory on 12/31/2020, diagnosed with malignant ascites, favor GI source, and a CA 19–9 of > 4,000.  While hospitalized, patient presented rectal bleeding, attributed to hemorrhoids.  She was scheduled to have a PET/CT in ambulatory prior to hospitalization.

## 2021-01-12 NOTE — BRIEF OPERATIVE NOTE - NSICDXBRIEFPROCEDURE_GEN_ALL_CORE_FT
PROCEDURES:  Insertion, central venous catheter, tunneled, with port, age 5 years or older 12-Jan-2021 11:09:35  Eugenio Zhu

## 2021-01-12 NOTE — PROGRESS NOTE ADULT - SUBJECTIVE AND OBJECTIVE BOX
NEPHROLOGY INTERVAL HPI/OVERNIGHT EVENTS:  01-11-21 @ 15:37  1/11 nervous, has elevated hr of 125-130 BB started, noted with low bp and 500 cc bolus in progress to start gemcitibine and PACLitaxel  1/9--Feeling a little improved but concerned due to renal dysfunction and reaccumulation of ascites.    HPI:  66 yo female with PMH of  Atrial flutter  on Eliquis 11/9/19  had GILSON cardioversion,  Combined systolic and diastolic HF , Cardiomyopathy , pHTN, Vascular insufficiency LLE, R sciatica, DJD hips, morbid Obesity , low vignesh pain, Pulm. HTN, NAHOMI on CPAP, who presented  in the ER for abdominal distention. 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  Discharged in kate december due to distention and louis.   she otherwise did not endorse sob. no LE Edema. No chest pain to Ed provider. Pt at IR for tx paracentesis. Will complete H&P at later time  -------------------------------------------  pt with hx of ascites that has been determined to be related to undifferentiated adeno ca with recurrent ascites.   s/p 9 L of paracentesis today which re-accumulated in 2 week time frame  since dc, pt was not restarted on lasix or entresto.  saw dr marquis monday   has been receiving tx with onc with consideration for start of chemo   she is concerned about admission and being on COVID floor   no nsaid use no abx use  has been taking in dec po intake due to abd distension     1/12  OOB in chair, S/P clearvue port via right IJ vein  in good spirits  looking forward to dc home in am  c/o constipation w blood in stool, evaluated by GI      MEDICATIONS  (STANDING):  bisacodyl 5 milliGRAM(s) Oral every 12 hours  ceFAZolin   IVPB 2000 milliGRAM(s) IV Intermittent once  cefTRIAXone Injectable.      cefTRIAXone Injectable. 1000 milliGRAM(s) IV Push every 24 hours  melatonin 3 milliGRAM(s) Oral at bedtime  metoprolol succinate ER 12.5 milliGRAM(s) Oral two times a day  midodrine. 5 milliGRAM(s) Oral three times a day  polyethylene glycol 3350 17 Gram(s) Oral two times a day    MEDICATIONS  (PRN):  acetaminophen   Tablet .. 650 milliGRAM(s) Oral every 6 hours PRN Mild Pain (1 - 3), Moderate Pain (4 - 6)  ondansetron Injectable 4 milliGRAM(s) IV Push every 6 hours PRN Nausea  zolpidem 5 milliGRAM(s) Oral at bedtime PRN Insomnia       Allergies    No Known Allergies    Intolerances        I&O's Detail    10 Jasper 2021 07:01  -  11 Jan 2021 07:00  --------------------------------------------------------  IN:    Oral Fluid: 240 mL  Total IN: 240 mL    OUT:  Total OUT: 0 mL    Total NET: 240 mL        Vital Signs Last 24 Hrs  T(C): 36.8 (12 Jan 2021 15:06), Max: 37.1 (12 Jan 2021 11:05)  T(F): 98.2 (12 Jan 2021 15:06), Max: 98.7 (12 Jan 2021 11:05)  HR: 116 (12 Jan 2021 15:06) (105 - 122)  BP: 98/61 (12 Jan 2021 15:06) (82/51 - 131/62)  BP(mean): --  RR: 18 (12 Jan 2021 15:06) (16 - 26)  SpO2: 96% (12 Jan 2021 15:06) (96% - 98%)    PHYSICAL EXAM:  General: alert. awake Ox3  HEENT: MMM  CV: s1s2 rrr  LUNGS: B/L CTA  EXT: no edema    LABS:    CBC Full  -  ( 12 Jan 2021 05:38 )  WBC Count : 11.55 K/uL  RBC Count : 3.69 M/uL  Hemoglobin : 10.2 g/dL  Hematocrit : 31.8 %  Platelet Count - Automated : 148 K/uL                              10.2   11.55 )-----------( 148      ( 12 Jan 2021 05:38 )             31.8                           12.3   16.19 )-----------( 226      ( 11 Jan 2021 09:00 )             38.6     01-12    134<L>  |  103  |  41<H>  ----------------------------<  163<H>  4.5   |  24  |  1.66<H>    Ca    8.2<L>      12 Jan 2021 05:38  Mg     2.2     01-12 01-11    130<L>  |  98  |  43<H>  ----------------------------<  156<H>  4.5   |  24  |  2.16<H>    Ca    8.6      11 Jan 2021 09:00  Mg     2.2     01-11          Magnesium, Serum: 2.2 mg/dL (01-11 @ 09:00)

## 2021-01-12 NOTE — PROGRESS NOTE ADULT - SUBJECTIVE AND OBJECTIVE BOX
MADHAVI JOHNSON, 65y Female  MRN: [id number]  ATTENDING: Asif Alvarenga    HPI:  65F with past medical history of atrial flutter, on chronic anticoagulation with Eliquis, CHF, cardiomyopathy, essential hypertension, vascular insufficiency, osteoarthritis, obstructive sleep apnea on CPAP admitted at [facility] for the second time in less than a month with recurrent ascites; had 9 L of fluid removed during paracentesis.  Patient seen in consultation in ambulatory on 12/31/2020, diagnosed with malignant ascites, favor GI source, and a CA 19–9 of > 4,000.  While hospitalized, patient presented rectal bleeding, attributed to hemorrhoids.  She was scheduled to have a PET/CT in ambulatory prior to hospitalization.    MEDICATIONS:  bisacodyl 5 milliGRAM(s) Oral every 12 hours  cefTRIAXone Injectable.      cefTRIAXone Injectable. 1000 milliGRAM(s) IV Push every 24 hours  melatonin 3 milliGRAM(s) Oral at bedtime  metoprolol succinate ER 12.5 milliGRAM(s) Oral two times a day  ondansetron Injectable 4 milliGRAM(s) IV Push every 6 hours PRN  polyethylene glycol 3350 17 Gram(s) Oral two times a day  All other medications reviewed.    SUBJECTIVE:  OOB to chair; denies abdominal pain, and states she feels less bloated since paracentesis.    Concerned with rapid weight loss.    VITALS:  T(C): 36.9 (01-11-21 @ 09:22), Max: 36.9 (01-11-21 @ 01:05)  T(F): 98.5 (01-11-21 @ 09:22), Max: 98.5 (01-11-21 @ 09:22)  HR: 125 (01-11-21 @ 09:22) (103 - 125)  BP: 97/64 (01-11-21 @ 09:22) (92/46 - 104/64)    PHYSICAL EXAM:  Constitutional: alert, well developed  HEENT: normocephalic, anicteric sclerae, no mucositis or thrush  Respiratory: bilateral clear to auscultation anteriorly  Cardiovascular : S1, S2 regular, rhythmic, no murmurs, gallops or rubs  Abdomen: s/p paracentesis; soft, distended, + normoactive BS, no palpable HS- megaly  Extremities: no tenderness;  -c/c/e, pulses equal bilaterally    LABS:  (01-11) WBC: 16.19 K/uL,Hemoglobin: 12.3 g/dL, Hematocrit: 38.6 %,  Platelet: 226 K/uL    RADIOLOGY:    EXAM:  CT ABDOMEN AND PELVIS                          PROCEDURE DATE:  12/23/2020          INTERPRETATION:  CLINICAL INFORMATION: Abdominal distention with elevated and new Ascites    COMPARISON: None.    PROCEDURE:  CT of the Abdomen and Pelvis was performed without intravenous contrast.  Intravenous contrast: None.  Oral contrast: None.  Sagittal and coronal reformats were performed.    FINDINGS:  LOWER CHEST: Mild linear atelectasis or scarring at the lung bases. Coronary artery calcification is appreciated    LIVER: Within normal limits.  BILE DUCTS: Normal caliber.  GALLBLADDER: Cholelithiasis.  SPLEEN: Within normal limits.  PANCREAS: Within normal limits.  ADRENALS: Within normal limits.  KIDNEYS/URETERS: Within normal limits.    BLADDER: Within normal limits.  REPRODUCTIVE ORGANS: Calcified uterine fibroid    BOWEL: No bowel obstruction. Appendix . There is colonic diverticulosis  PERITONEUM: Moderate ascites.  VESSELS: Atherosclerotic changes.  RETROPERITONEUM/LYMPH NODES: No lymphadenopathy.  ABDOMINAL WALL: Within normal limits.. There is grade 1 anterolisthesis of L4 on L5 likely on a degenerative basis  BONES: Degenerative changes.. Bilateral total hip replacements are also seen    IMPRESSION:  Moderate ascites  Cholelithiasis with multiple stones  Colonic diverticulosis  Calcified uterine fibroid    *** ADDENDUM 01/07/2021  ***    Mild haziness and linear densities in the mesentery may be due to ascites however carcinomatosis cannot be excluded.     MADHAVI JOHNSON, 65y Female  MRN: [id number]  ATTENDING: Asif Alvarenga    HPI:  65F with past medical history of atrial flutter, on chronic anticoagulation with Eliquis, CHF, cardiomyopathy, essential hypertension, vascular insufficiency, osteoarthritis, obstructive sleep apnea on CPAP admitted at [facility] for the second time in less than a month with recurrent ascites; had 9 L of fluid removed during paracentesis.  Patient seen in consultation in ambulatory on 12/31/2020, diagnosed with malignant ascites, favor GI source, and a CA 19–9 of > 4,000.  While hospitalized, patient presented rectal bleeding, attributed to hemorrhoids.  She was scheduled to have a PET/CT in ambulatory prior to hospitalization.    1/11/21- cycle #1 Abraxane/ Gemcitabine    1/12/21- [id number] placement    MEDICATIONS:  bisacodyl 5 milliGRAM(s) Oral every 12 hours  cefTRIAXone Injectable.      cefTRIAXone Injectable. 1000 milliGRAM(s) IV Push every 24 hours  melatonin 3 milliGRAM(s) Oral at bedtime  metoprolol succinate ER 12.5 milliGRAM(s) Oral two times a day  ondansetron Injectable 4 milliGRAM(s) IV Push every 6 hours PRN  polyethylene glycol 3350 17 Gram(s) Oral two times a day  All other medications reviewed.    SUBJECTIVE:  Feeling less fatigued; states her appetite has improved after the chemotherapy.  Did not have a BM today to assess for BRBPR.  She denies palpitations, but still tachycardic.    VITALS:  T(C): 36.9 (01-11-21 @ 09:22), Max: 36.9 (01-11-21 @ 01:05)  T(F): 98.5 (01-11-21 @ 09:22), Max: 98.5 (01-11-21 @ 09:22)  HR: 125 (01-11-21 @ 09:22) (103 - 125)  BP: 97/64 (01-11-21 @ 09:22) (92/46 - 104/64)    PHYSICAL EXAM:  Constitutional: alert, well developed  HEENT: normocephalic, anicteric sclerae, no mucositis or thrush  Respiratory: bilateral clear to auscultation anteriorly  Cardiovascular : S1, S2 regular, rhythmic, no murmurs, gallops or rubs  Abdomen: s/p paracentesis; soft, distended, + normoactive BS, no palpable HS- megaly  Extremities: no tenderness;  -c/c/e, pulses equal bilaterally    LABS:  (01-11) WBC: 16.19 K/uL,Hemoglobin: 12.3 g/dL, Hematocrit: 38.6 %,  Platelet: 226 K/uL    RADIOLOGY:    EXAM:  CT ABDOMEN AND PELVIS                          PROCEDURE DATE:  12/23/2020          INTERPRETATION:  CLINICAL INFORMATION: Abdominal distention with elevated and new Ascites    COMPARISON: None.    PROCEDURE:  CT of the Abdomen and Pelvis was performed without intravenous contrast.  Intravenous contrast: None.  Oral contrast: None.  Sagittal and coronal reformats were performed.    FINDINGS:  LOWER CHEST: Mild linear atelectasis or scarring at the lung bases. Coronary artery calcification is appreciated    LIVER: Within normal limits.  BILE DUCTS: Normal caliber.  GALLBLADDER: Cholelithiasis.  SPLEEN: Within normal limits.  PANCREAS: Within normal limits.  ADRENALS: Within normal limits.  KIDNEYS/URETERS: Within normal limits.    BLADDER: Within normal limits.  REPRODUCTIVE ORGANS: Calcified uterine fibroid    BOWEL: No bowel obstruction. Appendix . There is colonic diverticulosis  PERITONEUM: Moderate ascites.  VESSELS: Atherosclerotic changes.  RETROPERITONEUM/LYMPH NODES: No lymphadenopathy.  ABDOMINAL WALL: Within normal limits.. There is grade 1 anterolisthesis of L4 on L5 likely on a degenerative basis  BONES: Degenerative changes.. Bilateral total hip replacements are also seen    IMPRESSION:  Moderate ascites  Cholelithiasis with multiple stones  Colonic diverticulosis  Calcified uterine fibroid    *** ADDENDUM 01/07/2021  ***    Mild haziness and linear densities in the mesentery may be due to ascites however carcinomatosis cannot be excluded.

## 2021-01-12 NOTE — CHART NOTE - NSCHARTNOTEFT_GEN_A_CORE
Vascular & Interventional Radiology Pre-Procedure Note    Procedure Name: ____chest port insertion___    HPI: 65y Female with ____adenocarcinoma of unknown source___    Allergies:   Medications (Abx/Cardiac/Anticoagulation/Blood Products)  apixaban: 5 milliGRAM(s) Oral (01-10 @ 21:09)  cefTRIAXone Injectable.: 1000 milliGRAM(s) IV Push (01-12 @ 08:56)  metoprolol succinate ER: 12.5 milliGRAM(s) Oral (01-12 @ 08:55)  midodrine.: 5 milliGRAM(s) Oral (01-12 @ 05:49)    Data:    T(C): 36.8  HR: 122  BP: 103/53  RR: 18  SpO2: 98%    Exam  General: ___wnl____  Chest: ___wnl____  Abdomen: __wnl_____  Extremities: ____wnl___    -WBC 11.55 / HgB 10.2 / Hct 31.8 / Plt 148  -Na 134 / Cl 103 / BUN 41 / Glucose 163  -K 4.5 / CO2 24 / Cr 1.66  -ALT -- / Alk Phos -- / T.Bili --  -INR1.50    Imaging: ____na___    Plan:   -65y Female presents for ___chest port insertion____  -Risks/Benefits/alternatives explained with the patient and witnessed informed consent obtained.

## 2021-01-12 NOTE — PROGRESS NOTE ADULT - SUBJECTIVE AND OBJECTIVE BOX
66 yo female with PMH of  Atrial flutter  on Eliquis 11/9/19  had GILSON cardioversion,  Combined systolic and diastolic HF , Cardiomyopathy , pHTN, Vascular insufficiency LLE, R sciatica, DJD hips, morbid Obesity , low vignesh pain, Pulm. HTN, NAHOMI on CPAP, who presented  in the ER for abdominal distention. 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  Discharged in kate december due to distention and louis.   she otherwise did not endorse sob. no LE Edema. No chest pain to Ed provider. Pt at IR for tx paracentesis.     1/9 - no cp palps sob. was able to eat a burger after para but developed some abdo discomfort after that; reports dysuria   1/10 - no cp palps sob; still with abdo discomfort from distension but non tender \  1/11 - no cp palps sob; to start gemcitabine/abraxane ; took laxative and had a BM   1/12 - s/p port today, no BM, no cp palps sob - no abdo pain     PHYSICAL EXAM:  GENERAL: NAD, able to lie flat in bed  HEAD:  Atraumatic, Normocephalic  EYES: EOMI, PERRLA, normal sclera  ENT: Moist mucous membranes  NECK: Supple, No JVD, no nuchal rigidity  CHEST/LUNG: Clear to auscultation bilaterally; No rales, rhonchi, wheezing, or rubs. Unlabored respirations  Rt CW port   HEART: Regular rate and rhythm; No murmurs, rubs, or gallops  ABDOMEN: Bowel sounds present; Soft, Nontender, obese   EXTREMITIES:  no pitting bilaterally  NERVOUS SYSTEM:  Alert & Oriented X3, speech clear. No focal motor or sensory deficits  MSK: FROM all 4 extremities, full and equal strength  SKIN: No rashes or lesions      RAD:  < from: US Kidney and Bladder (01.08.21 @ 13:24) >  Bilateral corticalthinning, otherwise unremarkable.  < end of copied text >    LABS: All Labs Reviewed:                        10.2   11.55 )-----------( 148      ( 12 Jan 2021 05:38 )             31.8     134<L>  |  103  |  41<H>  ----------------------------<  163<H>  4.5   |  24  |  1.66<H>    Ca    8.2<L>      12 Jan 2021 05:38  Mg     2.2     01-12      acetaminophen   Tablet .. 650 milliGRAM(s) Oral every 6 hours PRN  bisacodyl 5 milliGRAM(s) Oral every 12 hours  ceFAZolin   IVPB 2000 milliGRAM(s) IV Intermittent once  cefTRIAXone Injectable.      cefTRIAXone Injectable. 1000 milliGRAM(s) IV Push every 24 hours  melatonin 3 milliGRAM(s) Oral at bedtime  metoprolol succinate ER 12.5 milliGRAM(s) Oral two times a day  midodrine. 5 milliGRAM(s) Oral three times a day  ondansetron Injectable 4 milliGRAM(s) IV Push every 6 hours PRN  polyethylene glycol 3350 17 Gram(s) Oral two times a day  zolpidem 5 milliGRAM(s) Oral at bedtime PRN

## 2021-01-12 NOTE — PROGRESS NOTE ADULT - PROBLEM SELECTOR PLAN 1
Intra-abdominal neoplasm (suspect pancreatic or biliary primary) or carcinoma of unknown primary with recurrent malignant ascites requiring second high-volume paracentesis in less than a month; CA 19–9 at 4000.  Metastatic work-up could not be completed in ambulatory to further look for a primary neoplasm.  Due to rapid accumulation of intra-abdominal fluid in patient's clinical decline, consider starting treatment with nab-paclitaxel (100 mg/m²) and gemcitabine (1000 mg/m²)–cycle 1 today.  The regimen covers both types of malignancies, and aims at controlling rapid ascites accumulation.  Discussed with patient regarding side effect profile including myelosuppression, neurotoxicity, allergic reactions, etc.  Patient expressed her understanding and is in agreement with the plan; signed consent for chemo administration.  Will submit cytopathology from ascitic fluid.  Patient understand treatment is palliative.  Will follow-up CBC/ CMP. Intra-abdominal neoplasm (suspect pancreatic or biliary primary) - patient had first cycle of Nab-paclitaxel and Gemcitabine yesterday. For [id number] placement today. Will have PET scan in ambulatory. OK from oncology perspective for discharge home after port placement today.

## 2021-01-12 NOTE — PROGRESS NOTE ADULT - ASSESSMENT
#acute kidney injury  # known to have malignant ascites  #Dysuria, possible UTI     - s/p tx para on admisison and got albumin and 9L out   1/9 noted paracentesis results - not SBP based on it, will f/u peritoneal fluid Cx  - STOP entresto and lasix for now, appreciate renal help   - start rocephin for suspected UTI, f/u UCx   - COnsult Dr Carl   1/10 - to discuss with Dr Carl re pleurX cathter for malignant ascites versus periodic taps with IR   1/11 - Plan for port in the next 1 to 2 days  - so held off on AC and will keep NPO P MN - see if IR can accomodate  to get gemcitabine/abraxane through peripheral line today   1/12 - s/p port today     - HANANE possibly from  inadequate perfusion from ascites vs low BP in setting of lasix and entresto  1/11 - still with HANANE, low BPs, disucssed with renal - they rec midodrine/albumin,  dry - added NS bolus and ECHO ordered due to palpitations etc  r/o pericardial effusion ekgs look okay   1/12 - renal fxn better - BPs still low - cont midodrine - may need to increase dose     #Afib on eliquis  - adjust for GFR  - On hold for paracentesis today  - BB - hold parameters for SBP < 90  - HR in low 100s  1/9 resumed eliquis   1/11 - AC On hold for port and to monitor H&H while FOBT positive  1/12 - noted GI recs; H&H low today, no BM - will f/u H&H tomorrow and make a decision regarding eliquis    #1/10 Pt c/o GIB - will check FOBT  never had a COL   monitor H&H, is constipated, will place on bowel regimen   consider GI Cx       #NAHOMI on CPAP  - will need setting from patient and can use hospital cpap    discussed with RN    BAILEY Planning - home tomorrow

## 2021-01-12 NOTE — PROGRESS NOTE ADULT - ASSESSMENT
66 yo with ascites related undifferentiated adeno ca admitted with elective paracentesis and noted with HANANE;. Etiology likely due to pre-renal azothemia  in setting of low bp  will r/o HRS ( doubt with no liver dx defined on previous scans)     PLAN  - monitor on po intake  - urine electrolytes  - repeat labs tonight and monitor trend of the scr  - fu renal/bladder sono   - albumin post paracentesis     1/9 SY  --HANANE : slightly improved following paracentesis.  unclear if related to elevated intra-abdominal pressure.    Continue to monitor.    Likely baseline CKD with bilateral cortical thinning.  --Hyponatremia with HANANE leading to decreased water excretion.    Continue to monitor.   Moderate restriction in po fluid and liberalize diet.    1/11 Mk  - HANANE with variability.  will monitor response to ivf and bb    cardiology consult    midodrine for bp support    will monitor response to albumin x24 hrs     fu trend of na value   - undifferentiated adenocarcinoma     chemo started today    port in am     1/12  S/P PORT PLACEMENT  NA AND CREAT IMPROVING  STABLE FOR DC FROM RENAL STANDPOINT  PT STATES SHE WILL FOLLOW UP W LABS/ dR TRAN AS OUTPT

## 2021-01-13 ENCOUNTER — TRANSCRIPTION ENCOUNTER (OUTPATIENT)
Age: 66
End: 2021-01-13

## 2021-01-13 ENCOUNTER — APPOINTMENT (OUTPATIENT)
Age: 66
End: 2021-01-13

## 2021-01-13 VITALS
SYSTOLIC BLOOD PRESSURE: 101 MMHG | RESPIRATION RATE: 18 BRPM | HEART RATE: 124 BPM | OXYGEN SATURATION: 96 % | TEMPERATURE: 98 F | DIASTOLIC BLOOD PRESSURE: 63 MMHG

## 2021-01-13 LAB
ANION GAP SERPL CALC-SCNC: 6 MMOL/L — SIGNIFICANT CHANGE UP (ref 5–17)
BUN SERPL-MCNC: 38 MG/DL — HIGH (ref 7–23)
CALCIUM SERPL-MCNC: 8.2 MG/DL — LOW (ref 8.5–10.1)
CHLORIDE SERPL-SCNC: 105 MMOL/L — SIGNIFICANT CHANGE UP (ref 96–108)
CO2 SERPL-SCNC: 25 MMOL/L — SIGNIFICANT CHANGE UP (ref 22–31)
CREAT SERPL-MCNC: 1.43 MG/DL — HIGH (ref 0.5–1.3)
CULTURE RESULTS: SIGNIFICANT CHANGE UP
GLUCOSE SERPL-MCNC: 123 MG/DL — HIGH (ref 70–99)
HCT VFR BLD CALC: 32.5 % — LOW (ref 34.5–45)
HGB BLD-MCNC: 10.3 G/DL — LOW (ref 11.5–15.5)
MAGNESIUM SERPL-MCNC: 2.2 MG/DL — SIGNIFICANT CHANGE UP (ref 1.6–2.6)
MCHC RBC-ENTMCNC: 27.5 PG — SIGNIFICANT CHANGE UP (ref 27–34)
MCHC RBC-ENTMCNC: 31.7 GM/DL — LOW (ref 32–36)
MCV RBC AUTO: 86.9 FL — SIGNIFICANT CHANGE UP (ref 80–100)
PLATELET # BLD AUTO: 113 K/UL — LOW (ref 150–400)
POTASSIUM SERPL-MCNC: 4 MMOL/L — SIGNIFICANT CHANGE UP (ref 3.5–5.3)
POTASSIUM SERPL-SCNC: 4 MMOL/L — SIGNIFICANT CHANGE UP (ref 3.5–5.3)
RBC # BLD: 3.74 M/UL — LOW (ref 3.8–5.2)
RBC # FLD: 13.3 % — SIGNIFICANT CHANGE UP (ref 10.3–14.5)
SARS-COV-2 RNA SPEC QL NAA+PROBE: SIGNIFICANT CHANGE UP
SODIUM SERPL-SCNC: 136 MMOL/L — SIGNIFICANT CHANGE UP (ref 135–145)
SPECIMEN SOURCE: SIGNIFICANT CHANGE UP
WBC # BLD: 11.89 K/UL — HIGH (ref 3.8–10.5)
WBC # FLD AUTO: 11.89 K/UL — HIGH (ref 3.8–10.5)

## 2021-01-13 PROCEDURE — 99232 SBSQ HOSP IP/OBS MODERATE 35: CPT

## 2021-01-13 PROCEDURE — 99239 HOSP IP/OBS DSCHRG MGMT >30: CPT

## 2021-01-13 RX ORDER — POLYETHYLENE GLYCOL 3350 17 G/17G
17 POWDER, FOR SOLUTION ORAL
Qty: 300 | Refills: 0
Start: 2021-01-13 | End: 2021-01-27

## 2021-01-13 RX ORDER — CYCLOBENZAPRINE HYDROCHLORIDE 10 MG/1
1 TABLET, FILM COATED ORAL
Qty: 0 | Refills: 0 | DISCHARGE

## 2021-01-13 RX ORDER — MIDODRINE HYDROCHLORIDE 2.5 MG/1
1 TABLET ORAL
Qty: 90 | Refills: 0
Start: 2021-01-13 | End: 2021-02-11

## 2021-01-13 RX ORDER — ZOLPIDEM TARTRATE 10 MG/1
1 TABLET ORAL
Qty: 5 | Refills: 0
Start: 2021-01-13 | End: 2021-01-17

## 2021-01-13 RX ADMIN — POLYETHYLENE GLYCOL 3350 17 GRAM(S): 17 POWDER, FOR SOLUTION ORAL at 10:08

## 2021-01-13 RX ADMIN — Medication 5 MILLIGRAM(S): at 10:07

## 2021-01-13 RX ADMIN — CEFTRIAXONE 1000 MILLIGRAM(S): 500 INJECTION, POWDER, FOR SOLUTION INTRAMUSCULAR; INTRAVENOUS at 10:08

## 2021-01-13 RX ADMIN — Medication 12.5 MILLIGRAM(S): at 10:08

## 2021-01-13 NOTE — DISCHARGE NOTE PROVIDER - PROVIDER TOKENS
PROVIDER:[TOKEN:[[id number]],FOLLOWUP:[1 week]],PROVIDER:[TOKEN:[[id number]],FOLLOWUP:[1 week]],PROVIDER:[TOKEN:[[id number]],FOLLOWUP:[1 week]],PROVIDER:[TOKEN:[[id number]],FOLLOWUP:[2 weeks]]

## 2021-01-13 NOTE — GOALS OF CARE CONVERSATION - ADVANCED CARE PLANNING - CONVERSATION DETAILS
Patient is an alert,  oriented woman  who is independent ,  self care and  employed. On this admission, She underwent a biopsy, results are pending,. She had  chemotherapy during this admission for new liver mets and malignant ascites.    This new diagnosis was unexpected  and has created a change in her life. She spoke about  considering skilled nursing. She has good family support. Her sister, Stacy , lives close by . She and her son, the patient's nephew,  are her main support.  . She is knowledgeable about her diagnosis and plans on following up with her Oncologist in the community for continued chemotherapy.   I provided her with Supportive Information in the Community .

## 2021-01-13 NOTE — DISCHARGE NOTE PROVIDER - NSDCCPCAREPLAN_GEN_ALL_CORE_FT
PRINCIPAL DISCHARGE DIAGNOSIS  Diagnosis: Acute renal injury  Assessment and Plan of Treatment: due to vascular depletion, better with midodirn/albumin. Monitor bloodwork closely      SECONDARY DISCHARGE DIAGNOSES  Diagnosis: Malignant ascites  Assessment and Plan of Treatment: s/p paracenteisis, port insertion, chemo started - follow up with Dr Vasquez

## 2021-01-13 NOTE — DISCHARGE NOTE PROVIDER - NSDCMRMEDTOKEN_GEN_ALL_CORE_FT
apixaban 5 mg oral tablet: 1 tab(s) orally every 12 hours  bisacodyl 5 mg oral delayed release tablet: 1 tab(s) orally every 12 hours, As Needed   metoprolol succinate 25 mg oral tablet, extended release: 1 tab(s) orally 2 times a day  midodrine 5 mg oral tablet: 1 tab(s) orally 3 times a day  pantoprazole 40 mg oral delayed release tablet: 1 tab(s) orally once a day (before a meal)  polyethylene glycol 3350 oral powder for reconstitution: 17 gram(s) orally once a day    Ambien 5 mg oral tablet: 1 tab(s) orally once a day (at bedtime), As Needed MDD:one tab  apixaban 5 mg oral tablet: 1 tab(s) orally every 12 hours  bisacodyl 5 mg oral delayed release tablet: 1 tab(s) orally every 12 hours, As Needed   metoprolol succinate 25 mg oral tablet, extended release: 1 tab(s) orally 2 times a day  midodrine 5 mg oral tablet: 1 tab(s) orally 3 times a day  pantoprazole 40 mg oral delayed release tablet: 1 tab(s) orally once a day (before a meal)  polyethylene glycol 3350 oral powder for reconstitution: 17 gram(s) orally once a day

## 2021-01-13 NOTE — PROGRESS NOTE ADULT - SUBJECTIVE AND OBJECTIVE BOX
MADHAVI JOHNSON, 65y Female  MRN: [id number]  ATTENDING: Asif Alvarenga    HPI:  65F with past medical history of atrial flutter, on chronic anticoagulation with Eliquis, CHF, cardiomyopathy, essential hypertension, vascular insufficiency, osteoarthritis, obstructive sleep apnea on CPAP admitted at [facility] for the second time in less than a month with recurrent ascites; had 9 L of fluid removed during paracentesis.  Patient seen in consultation in ambulatory on 12/31/2020, diagnosed with malignant ascites, favor GI source, and a CA 19–9 of > 4,000.  While hospitalized, patient presented rectal bleeding, attributed to hemorrhoids.  She was scheduled to have a PET/CT in ambulatory prior to hospitalization.    1/11/21- cycle #1 Abraxane/ Gemcitabine    1/12/21- [id number] placement    MEDICATIONS:  bisacodyl 5 milliGRAM(s) Oral every 12 hours  cefTRIAXone Injectable.      cefTRIAXone Injectable. 1000 milliGRAM(s) IV Push every 24 hours  melatonin 3 milliGRAM(s) Oral at bedtime  metoprolol succinate ER 12.5 milliGRAM(s) Oral two times a day  ondansetron Injectable 4 milliGRAM(s) IV Push every 6 hours PRN  polyethylene glycol 3350 17 Gram(s) Oral two times a day  All other medications reviewed.    SUBJECTIVE:  Continues to complain of constipation, but overall feeling ready for discharge.    Her hemoglobin stabilized at 10 g/dL, and she denies further BRBPR.    Her abdominal distention is minimal, not causing discomfort.    VITALS:  T(C): 36.9 (01-11-21 @ 09:22), Max: 36.9 (01-11-21 @ 01:05)  T(F): 98.5 (01-11-21 @ 09:22), Max: 98.5 (01-11-21 @ 09:22)  HR: 125 (01-11-21 @ 09:22) (103 - 125)  BP: 97/64 (01-11-21 @ 09:22) (92/46 - 104/64)    PHYSICAL EXAM:  Constitutional: alert, well developed  HEENT: normocephalic, anicteric sclerae, no mucositis or thrush  Respiratory: bilateral clear to auscultation anteriorly  Cardiovascular : S1, S2 regular, rhythmic, no murmurs, gallops or rubs  Abdomen: s/p paracentesis; soft, distended, + normoactive BS, no palpable HS- megaly  Extremities: no tenderness;  -c/c/e, pulses equal bilaterally    LABS:  Hemoglobin: 10.3 g/dL (01-13-21 @ 07:25)  Hemoglobin: 10.2 g/dL (01-12-21 @ 05:38)  Hemoglobin: 12.3 g/dL (01-11-21 @ 09:00)    (01-11) WBC: 16.19 K/uL,Hemoglobin: 12.3 g/dL, Hematocrit: 38.6 %,  Platelet: 226 K/uL    RADIOLOGY:    EXAM:  CT ABDOMEN AND PELVIS                          PROCEDURE DATE:  12/23/2020          INTERPRETATION:  CLINICAL INFORMATION: Abdominal distention with elevated and new Ascites    COMPARISON: None.    PROCEDURE:  CT of the Abdomen and Pelvis was performed without intravenous contrast.  Intravenous contrast: None.  Oral contrast: None.  Sagittal and coronal reformats were performed.    FINDINGS:  LOWER CHEST: Mild linear atelectasis or scarring at the lung bases. Coronary artery calcification is appreciated    LIVER: Within normal limits.  BILE DUCTS: Normal caliber.  GALLBLADDER: Cholelithiasis.  SPLEEN: Within normal limits.  PANCREAS: Within normal limits.  ADRENALS: Within normal limits.  KIDNEYS/URETERS: Within normal limits.    BLADDER: Within normal limits.  REPRODUCTIVE ORGANS: Calcified uterine fibroid    BOWEL: No bowel obstruction. Appendix . There is colonic diverticulosis  PERITONEUM: Moderate ascites.  VESSELS: Atherosclerotic changes.  RETROPERITONEUM/LYMPH NODES: No lymphadenopathy.  ABDOMINAL WALL: Within normal limits.. There is grade 1 anterolisthesis of L4 on L5 likely on a degenerative basis  BONES: Degenerative changes.. Bilateral total hip replacements are also seen    IMPRESSION:  Moderate ascites  Cholelithiasis with multiple stones  Colonic diverticulosis  Calcified uterine fibroid    *** ADDENDUM 01/07/2021  ***    Mild haziness and linear densities in the mesentery may be due to ascites however carcinomatosis cannot be excluded.

## 2021-01-13 NOTE — PROGRESS NOTE ADULT - REASON FOR ADMISSION
sent in for abnl labs
sent in for abnl labs
pancreatic cancer
sent in for abnl labs

## 2021-01-13 NOTE — CDI QUERY NOTE - NSCDIOTHERTXTBX_GEN_ALL_CORE_HH
Documentation on chart:    66 yo female with PMH of  Atrial flutter  on Eliquis 11/9/19  had GILSON cardioversion,  Combined systolic and diastolic HF , Cardiomyopathy , pHTN, Vascular insufficiency LLE, R sciatica, DJD hips, morbid Obesity , low bak pain, Pulm. HTN, NAHOMI on CPAP, who presented  in the ER for abdominal distention. 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  Discharged in kate december due to distention and hanane.   she otherwise did not endorse sob. no LE Edema. No chest pain to Ed provider. Pt at IR for tx paracentesis.     Home Medications:   * Patient Currently Takes Medications as of 26-Dec-2020 14:05 documented in Structured Notes  · 	pantoprazole 40 mg oral delayed release tablet: 1 tab(s) orally once a day (before a meal)  · 	saccharomyces boulardii lyo 250 mg oral capsule: 1 cap(s) orally 2 times a day  · 	metoprolol succinate 25 mg oral tablet, extended release: 1 tab(s) orally 2 times a day  · 	apixaban 5 mg oral tablet: 1 tab(s) orally every 12 hours  · 	cyclobenzaprine 5 mg oral tablet: 1 tab(s) orally 3 times a day, As Needed    Nephrology documentation:  66 yo with ascites related undifferentiated adeno ca admitted with elective paracentesis and noted with HANANE;. Etiology likely due to pre-renal azothemia  in setting of low bp  will r/o HRS ( doubt with no liver dx defined on previous scans)     PLAN  - monitor on po intake  - urine electrolytes  - repeat labs tonight and monitor trend of the scr  - fu renal/bladder sono   - albumin post paracentesis     1/9 SY  --HANANE : slightly improved following paracentesis.  unclear if related to elevated intra-abdominal pressure.    Continue to monitor.    Likely baseline CKD with bilateral cortical thinning.  --Hyponatremia with HANANE leading to decreased water excretion.    Continue to monitor.   Moderate restriction in po fluid and liberalize diet.      Pt. received Chemotherapy Gemcitabine and Paclitaxel during hospitalization.    Creatinine, Serum: 2.16 mg/dL <H> [0.50 - 1.30] (01-11-21)  Creatinine, Serum: 1.78 mg/dL <H> [0.50 - 1.30] (01-10-21)  Creatinine, Serum: 2.08 mg/dL <H> [0.50 - 1.30] (01-09-21)  Creatinine, Serum: 2.49 mg/dL <H> [0.50 - 1.30] (01-08-21)  Creatinine, Serum: 2.46 mg/dL <H> [0.50 - 1.30] (01-08-21)    EGFR 19-29    Please clarify a diagnosis based on the above clinical criteria:  A) HANANE with CKD IV  B) HANANE with ATN and CKD 4  C) Other ( Please specify condition)  D) Unable to clinically determine

## 2021-01-13 NOTE — PROGRESS NOTE ADULT - PROBLEM SELECTOR PLAN 1
Intra-abdominal neoplasm (suspect pancreatic or biliary primary) - Patient is status post Tqicyd-n-Eyjj placement yesterday.  She is hematologically and clinically stable for discharge today.  Will have next cycle of chemotherapy in ambulatory ([facility] office), where will tentatively schedule for her next paracentesis as well.  Patient's questions were answered to her satisfaction.  Case discussed with .

## 2021-01-13 NOTE — DISCHARGE NOTE PROVIDER - HOSPITAL COURSE
66 yo female with PMH of  Atrial flutter  on Eliquis 11/9/19  had GILSON cardioversion,  Combined systolic and diastolic HF , Cardiomyopathy , pHTN, Vascular insufficiency LLE, R sciatica, DJD hips, morbid Obesity , low vignesh pain, Pulm. HTN, NAHOMI on CPAP, who presented  in the ER for abdominal distention. 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  Discharged in kate december due to distention and hanane.   she otherwise did not endorse sob. no LE Edema. No chest pain to Ed provider. Pt at IR for tx paracentesis.      HOSPITAL COURSE:   #acute kidney injury  # known to have malignant ascites  #Dysuria, possible UTI   - s/p tx para on admisison and got albumin and 9L out   1/9 noted paracentesis results - not SBP based on it, will f/u peritoneal fluid Cx  - STOP entresto and lasix for now, appreciate renal help   - start rocephin for suspected UTI, f/u UCx  - negative; asymptomatic now, stop abx   - COnsult Dr Carl   1/10 - to discuss with Dr Carl re pleurX cathter for malignant ascites versus periodic taps with IR   1/11 - Plan for port in the next 1 to 2 days  - so held off on AC and will keep NPO P MN - see if IR can accomodate  to get gemcitabine/abraxane through peripheral line today   1/12 - s/p port today     - HANANE possibly from  inadequate perfusion from ascites vs low BP in setting of lasix and entresto  1/11 - still with HANANE, low BPs, disucssed with renal - they rec midodrine/albumin,  dry - added NS bolus and ECHO ordered due to palpitations etc  r/o pericardial effusion ekgs look okay   1/12 - renal fxn better - BPs still low - cont midodrine - may need to increase dose #Afib on eliquis  - adjust for GFR  - On hold for paracentesis today  - BB - hold parameters for SBP < 90  - HR in low 100s  1/9 resumed eliquis   1/11 - AC On hold for port and to monitor H&H while FOBT positive  1/12 - noted GI recs; H&H low today, no BM - will f/u H&H tomorrow and make a decision regarding eliquis  1/13 - H&H stable, can resume eliquis with close monitoring of H&H and renal fxn   Pt was seen by Renal/Dr Hoang here and rec f/u as OP     #1/10 Pt c/o GIB - will check FOBT  never had a COL   monitor H&H, is constipated, will place on bowel regimen   consider GI Cx   1/13 - H&H stable, f/u with GI as OP     #NAHOMI on CPAP  - will need setting from patient and can use hospital cpap    discussed with RN    BAILEY Planning - home today    OTHER DETAILS:  1/12 - s/p port today, no BM, no cp palps sob - no abdo pain     PHYSICAL EXAM:  GENERAL: NAD, able to lie flat in bed  HEAD:  Atraumatic, Normocephalic  EYES: EOMI, PERRLA, normal sclera  ENT: Moist mucous membranes  NECK: Supple, No JVD, no nuchal rigidity  CHEST/LUNG: Clear to auscultation bilaterally; No rales, rhonchi, wheezing, or rubs. Unlabored respirations  Rt CW port   HEART: Regular rate and rhythm; No murmurs, rubs, or gallops  ABDOMEN: Bowel sounds present; Soft, Nontender, obese   EXTREMITIES:  no pitting bilaterally  NERVOUS SYSTEM:  Alert & Oriented X3, speech clear. No focal motor or sensory deficits  MSK: FROM all 4 extremities, full and equal strength  SKIN: No rashes or lesions    RAD:  < from: US Kidney and Bladder (01.08.21 @ 13:24) >  Bilateral corticalthinning, otherwise unremarkable.  < end of copied text >    LABS: All Labs Reviewed:                        10.3   11.89 )-----------( 113      ( 13 Jan 2021 07:25 )             32.5     01-13    136  |  105  |  38<H>  ----------------------------<  123<H>  4.0   |  25  |  1.43<H>    Ca    8.2<L>      13 Jan 2021 07:25  Mg     2.2     01-13    discussed with Dr Meseret RN  stable for discharge   time spent on discharge - 55 mins

## 2021-01-13 NOTE — DISCHARGE NOTE NURSING/CASE MANAGEMENT/SOCIAL WORK - PATIENT PORTAL LINK FT
You can access the FollowMyHealth Patient Portal offered by [facility] by registering at the following website: [email or web address]. By joining [facility]’s FollowMyHealth portal, you will also be able to view your health information using other applications (apps) compatible with our system.

## 2021-01-13 NOTE — GOALS OF CARE CONVERSATION - ADVANCED CARE PLANNING - TREATMENT GUIDELINE COMMENT
Patient will be following up with Oncologist for continued chemotherapy. Her doctor will be monitoring her blood work and she plans on continuing treatment for ascites if their continues to be further build up of fluid.

## 2021-01-13 NOTE — DISCHARGE NOTE PROVIDER - CARE PROVIDERS DIRECT ADDRESSES
,[email or web address],DirectAddress_Unknown

## 2021-01-13 NOTE — PROGRESS NOTE ADULT - PROVIDER SPECIALTY LIST ADULT
Nephrology
Heme/Onc
Hospitalist
Nephrology
Nephrology
Hospitalist
Heme/Onc
Heme/Onc

## 2021-01-13 NOTE — PROGRESS NOTE ADULT - ASSESSMENT
65F with past medical history of atrial flutter, on chronic anticoagulation with Eliquis, CHF, cardiomyopathy, essential hypertension, vascular insufficiency, osteoarthritis, obstructive sleep apnea on CPAP admitted at [facility] for the second time in less than a month with recurrent ascites; had 9 L of fluid removed during paracentesis.  Patient seen in consultation in ambulatory on 12/31/2020, diagnosed with malignant ascites, favor GI source, and a CA 19–9 of > 4,000.  While hospitalized, patient presented rectal bleeding, attributed to hemorrhoids.  She was scheduled to have a PET/CT in ambulatory prior to hospitalization.

## 2021-01-15 ENCOUNTER — RESULT REVIEW (OUTPATIENT)
Age: 66
End: 2021-01-15

## 2021-01-15 ENCOUNTER — APPOINTMENT (OUTPATIENT)
Age: 66
End: 2021-01-15
Payer: COMMERCIAL

## 2021-01-15 ENCOUNTER — LABORATORY RESULT (OUTPATIENT)
Age: 66
End: 2021-01-15

## 2021-01-15 VITALS
SYSTOLIC BLOOD PRESSURE: 107 MMHG | RESPIRATION RATE: 18 BRPM | DIASTOLIC BLOOD PRESSURE: 72 MMHG | HEART RATE: 139 BPM | HEIGHT: 63 IN | WEIGHT: 273 LBS | BODY MASS INDEX: 48.37 KG/M2 | TEMPERATURE: 97.3 F

## 2021-01-15 DIAGNOSIS — Z96.641 PRESENCE OF RIGHT ARTIFICIAL HIP JOINT: ICD-10-CM

## 2021-01-15 DIAGNOSIS — Z96.642 PRESENCE OF LEFT ARTIFICIAL HIP JOINT: ICD-10-CM

## 2021-01-15 DIAGNOSIS — M16.12 UNILATERAL PRIMARY OSTEOARTHRITIS, LEFT HIP: ICD-10-CM

## 2021-01-15 DIAGNOSIS — M16.11 UNILATERAL PRIMARY OSTEOARTHRITIS, RIGHT HIP: ICD-10-CM

## 2021-01-15 DIAGNOSIS — Z96.641 AFTERCARE FOLLOWING JOINT REPLACEMENT SURGERY: ICD-10-CM

## 2021-01-15 DIAGNOSIS — Z47.1 AFTERCARE FOLLOWING JOINT REPLACEMENT SURGERY: ICD-10-CM

## 2021-01-15 DIAGNOSIS — Z96.643 PRESENCE OF ARTIFICIAL HIP JOINT, BILATERAL: ICD-10-CM

## 2021-01-15 DIAGNOSIS — R18.0 MALIGNANT ASCITES: ICD-10-CM

## 2021-01-15 DIAGNOSIS — C25.9 MALIGNANT NEOPLASM OF PANCREAS, UNSPECIFIED: ICD-10-CM

## 2021-01-15 DIAGNOSIS — Z96.642 AFTERCARE FOLLOWING JOINT REPLACEMENT SURGERY: ICD-10-CM

## 2021-01-15 DIAGNOSIS — Z79.899 OTHER LONG TERM (CURRENT) DRUG THERAPY: ICD-10-CM

## 2021-01-15 PROCEDURE — 99215 OFFICE O/P EST HI 40 MIN: CPT

## 2021-01-15 RX ORDER — VITAMIN E ACETATE 670 MG
CAPSULE ORAL
Refills: 0 | Status: ACTIVE | COMMUNITY

## 2021-01-15 RX ORDER — MIDODRINE HYDROCHLORIDE 5 MG/1
5 TABLET ORAL 3 TIMES DAILY
Refills: 0 | Status: ACTIVE | COMMUNITY

## 2021-01-15 RX ORDER — METHYLPREDNISOLONE 4 MG/1
4 TABLET ORAL
Qty: 21 | Refills: 0 | Status: DISCONTINUED | COMMUNITY
Start: 2020-09-30 | End: 2021-01-15

## 2021-01-15 RX ORDER — LORATADINE 5 MG
17 TABLET,CHEWABLE ORAL
Refills: 0 | Status: ACTIVE | COMMUNITY

## 2021-01-15 RX ORDER — PREGABALIN 50 MG/1
50 CAPSULE ORAL
Qty: 120 | Refills: 0 | Status: ACTIVE | COMMUNITY
Start: 2020-12-15

## 2021-01-15 RX ORDER — SACUBITRIL AND VALSARTAN 24; 26 MG/1; MG/1
24-26 TABLET, FILM COATED ORAL
Qty: 180 | Refills: 0 | Status: ACTIVE | COMMUNITY
Start: 2020-12-14

## 2021-01-17 DIAGNOSIS — C25.9 MALIGNANT NEOPLASM OF PANCREAS, UNSPECIFIED: ICD-10-CM

## 2021-01-17 DIAGNOSIS — Z79.899 OTHER LONG TERM (CURRENT) DRUG THERAPY: ICD-10-CM

## 2021-01-19 DIAGNOSIS — E53.8 DEFICIENCY OF OTHER SPECIFIED B GROUP VITAMINS: ICD-10-CM

## 2021-01-19 RX ORDER — FOLIC ACID 1 MG/1
1 TABLET ORAL DAILY
Qty: 90 | Refills: 3 | Status: ACTIVE | COMMUNITY
Start: 2021-01-19 | End: 1900-01-01

## 2021-01-20 ENCOUNTER — OUTPATIENT (OUTPATIENT)
Dept: OUTPATIENT SERVICES | Facility: HOSPITAL | Age: 66
LOS: 1 days | End: 2021-01-20
Payer: COMMERCIAL

## 2021-01-20 ENCOUNTER — APPOINTMENT (OUTPATIENT)
Dept: ULTRASOUND IMAGING | Facility: CLINIC | Age: 66
End: 2021-01-20

## 2021-01-20 ENCOUNTER — RESULT REVIEW (OUTPATIENT)
Age: 66
End: 2021-01-20

## 2021-01-20 DIAGNOSIS — Z98.890 OTHER SPECIFIED POSTPROCEDURAL STATES: Chronic | ICD-10-CM

## 2021-01-20 DIAGNOSIS — Z96.643 PRESENCE OF ARTIFICIAL HIP JOINT, BILATERAL: Chronic | ICD-10-CM

## 2021-01-20 DIAGNOSIS — Z00.8 ENCOUNTER FOR OTHER GENERAL EXAMINATION: ICD-10-CM

## 2021-01-20 PROCEDURE — 49083 ABD PARACENTESIS W/IMAGING: CPT

## 2021-01-21 ENCOUNTER — APPOINTMENT (OUTPATIENT)
Age: 66
End: 2021-01-21

## 2021-01-21 DIAGNOSIS — I27.20 PULMONARY HYPERTENSION, UNSPECIFIED: ICD-10-CM

## 2021-01-21 DIAGNOSIS — E66.01 MORBID (SEVERE) OBESITY DUE TO EXCESS CALORIES: ICD-10-CM

## 2021-01-21 DIAGNOSIS — E87.1 HYPO-OSMOLALITY AND HYPONATREMIA: ICD-10-CM

## 2021-01-21 DIAGNOSIS — G47.33 OBSTRUCTIVE SLEEP APNEA (ADULT) (PEDIATRIC): ICD-10-CM

## 2021-01-21 DIAGNOSIS — C76.2 MALIGNANT NEOPLASM OF ABDOMEN: ICD-10-CM

## 2021-01-21 DIAGNOSIS — Z96.643 PRESENCE OF ARTIFICIAL HIP JOINT, BILATERAL: ICD-10-CM

## 2021-01-21 DIAGNOSIS — I48.91 UNSPECIFIED ATRIAL FIBRILLATION: ICD-10-CM

## 2021-01-21 DIAGNOSIS — K64.9 UNSPECIFIED HEMORRHOIDS: ICD-10-CM

## 2021-01-21 DIAGNOSIS — Z79.01 LONG TERM (CURRENT) USE OF ANTICOAGULANTS: ICD-10-CM

## 2021-01-21 DIAGNOSIS — R00.0 TACHYCARDIA, UNSPECIFIED: ICD-10-CM

## 2021-01-21 DIAGNOSIS — I99.8 OTHER DISORDER OF CIRCULATORY SYSTEM: ICD-10-CM

## 2021-01-21 DIAGNOSIS — N39.0 URINARY TRACT INFECTION, SITE NOT SPECIFIED: ICD-10-CM

## 2021-01-21 DIAGNOSIS — N18.4 CHRONIC KIDNEY DISEASE, STAGE 4 (SEVERE): ICD-10-CM

## 2021-01-21 DIAGNOSIS — R18.0 MALIGNANT ASCITES: ICD-10-CM

## 2021-01-21 DIAGNOSIS — N17.9 ACUTE KIDNEY FAILURE, UNSPECIFIED: ICD-10-CM

## 2021-01-22 ENCOUNTER — RESULT REVIEW (OUTPATIENT)
Age: 66
End: 2021-01-22

## 2021-01-22 ENCOUNTER — NON-APPOINTMENT (OUTPATIENT)
Age: 66
End: 2021-01-22

## 2021-01-22 ENCOUNTER — APPOINTMENT (OUTPATIENT)
Age: 66
End: 2021-01-22

## 2021-01-22 VITALS
SYSTOLIC BLOOD PRESSURE: 100 MMHG | HEART RATE: 118 BPM | BODY MASS INDEX: 46.25 KG/M2 | WEIGHT: 261 LBS | HEIGHT: 63 IN | TEMPERATURE: 96.7 F | RESPIRATION RATE: 18 BRPM | DIASTOLIC BLOOD PRESSURE: 68 MMHG

## 2021-01-22 LAB
BASOPHILS # BLD AUTO: 0 K/UL — SIGNIFICANT CHANGE UP (ref 0–0.2)
BASOPHILS NFR BLD AUTO: 0 % — SIGNIFICANT CHANGE UP (ref 0–2)
EOSINOPHIL # BLD AUTO: 0 K/UL — SIGNIFICANT CHANGE UP (ref 0–0.5)
EOSINOPHIL NFR BLD AUTO: 0 % — SIGNIFICANT CHANGE UP (ref 0–6)
HCT VFR BLD CALC: 30.2 % — LOW (ref 34.5–45)
HGB BLD-MCNC: 9.9 G/DL — LOW (ref 11.5–15.5)
HYPOCHROMIA BLD QL: SLIGHT — SIGNIFICANT CHANGE UP
LYMPHOCYTES # BLD AUTO: 0.82 K/UL — LOW (ref 1–3.3)
LYMPHOCYTES # BLD AUTO: 6 % — LOW (ref 13–44)
MACROCYTES BLD QL: SLIGHT — SIGNIFICANT CHANGE UP
MCHC RBC-ENTMCNC: 27.5 PG — SIGNIFICANT CHANGE UP (ref 27–34)
MCHC RBC-ENTMCNC: 32.8 GM/DL — SIGNIFICANT CHANGE UP (ref 32–36)
MCV RBC AUTO: 83.9 FL — SIGNIFICANT CHANGE UP (ref 80–100)
METAMYELOCYTES # FLD: 5 % — HIGH (ref 0–0)
MONOCYTES # BLD AUTO: 0.27 K/UL — SIGNIFICANT CHANGE UP (ref 0–0.9)
MONOCYTES NFR BLD AUTO: 2 % — SIGNIFICANT CHANGE UP (ref 2–14)
MYELOCYTES NFR BLD: 2 % — HIGH (ref 0–0)
NEUTROPHILS # BLD AUTO: 11.5 K/UL — HIGH (ref 1.8–7.4)
NEUTROPHILS NFR BLD AUTO: 84 % — HIGH (ref 43–77)
NRBC # BLD: 4 /100 — HIGH (ref 0–0)
NRBC # BLD: SIGNIFICANT CHANGE UP /100 WBCS (ref 0–0)
PLAT MORPH BLD: NORMAL — SIGNIFICANT CHANGE UP
PLATELET # BLD AUTO: 167 K/UL — SIGNIFICANT CHANGE UP (ref 150–400)
PROMYELOCYTES # FLD: 1 % — HIGH (ref 0–0)
RBC # BLD: 3.6 M/UL — LOW (ref 3.8–5.2)
RBC # FLD: 14.2 % — SIGNIFICANT CHANGE UP (ref 10.3–14.5)
RBC BLD AUTO: ABNORMAL
WBC # BLD: 13.69 K/UL — HIGH (ref 3.8–10.5)
WBC # FLD AUTO: 13.69 K/UL — HIGH (ref 3.8–10.5)

## 2021-01-22 RX ORDER — PROCHLORPERAZINE MALEATE 10 MG/1
10 TABLET ORAL EVERY 6 HOURS
Qty: 24 | Refills: 2 | Status: ACTIVE | COMMUNITY
Start: 2021-01-22 | End: 1900-01-01

## 2021-01-22 RX ORDER — PANTOPRAZOLE 40 MG/1
40 TABLET, DELAYED RELEASE ORAL
Qty: 60 | Refills: 1 | Status: ACTIVE | COMMUNITY
Start: 2020-12-26 | End: 1900-01-01

## 2021-01-23 DIAGNOSIS — A04.72 ENTEROCOLITIS DUE TO CLOSTRIDIUM DIFFICILE, NOT SPECIFIED AS RECURRENT: ICD-10-CM

## 2021-01-23 DIAGNOSIS — Z87.898 PERSONAL HISTORY OF OTHER SPECIFIED CONDITIONS: ICD-10-CM

## 2021-01-23 LAB
ALBUMIN SERPL ELPH-MCNC: 2.8 G/DL — LOW (ref 3.3–5)
ALP SERPL-CCNC: 259 U/L — HIGH (ref 40–120)
ALT FLD-CCNC: 38 U/L — SIGNIFICANT CHANGE UP (ref 10–45)
ANION GAP SERPL CALC-SCNC: 16 MMOL/L — SIGNIFICANT CHANGE UP (ref 5–17)
AST SERPL-CCNC: 88 U/L — HIGH (ref 10–40)
BILIRUB SERPL-MCNC: 0.4 MG/DL — SIGNIFICANT CHANGE UP (ref 0.2–1.2)
BUN SERPL-MCNC: 71 MG/DL — HIGH (ref 7–23)
CALCIUM SERPL-MCNC: 8.1 MG/DL — LOW (ref 8.4–10.5)
CHLORIDE SERPL-SCNC: 98 MMOL/L — SIGNIFICANT CHANGE UP (ref 96–108)
CO2 SERPL-SCNC: 17 MMOL/L — LOW (ref 22–31)
CREAT SERPL-MCNC: 2.77 MG/DL — HIGH (ref 0.5–1.3)
CULTURE RESULTS: SIGNIFICANT CHANGE UP
GLUCOSE SERPL-MCNC: 177 MG/DL — HIGH (ref 70–99)
MAGNESIUM SERPL-MCNC: 1.8 MG/DL — SIGNIFICANT CHANGE UP (ref 1.6–2.6)
POTASSIUM SERPL-MCNC: 4.4 MMOL/L — SIGNIFICANT CHANGE UP (ref 3.5–5.3)
POTASSIUM SERPL-SCNC: 4.4 MMOL/L — SIGNIFICANT CHANGE UP (ref 3.5–5.3)
PROT SERPL-MCNC: 5.4 G/DL — LOW (ref 6–8.3)
SODIUM SERPL-SCNC: 131 MMOL/L — LOW (ref 135–145)
SPECIMEN SOURCE: SIGNIFICANT CHANGE UP

## 2021-01-23 RX ORDER — VANCOMYCIN HCL 1 G
1 VIAL (EA) INTRAVENOUS
Qty: 0 | Refills: 0 | DISCHARGE
Start: 2021-01-23

## 2021-01-23 RX ORDER — VANCOMYCIN HYDROCHLORIDE 125 MG/1
125 CAPSULE ORAL 4 TIMES DAILY
Qty: 56 | Refills: 1 | Status: ACTIVE | COMMUNITY
Start: 2021-01-23 | End: 1900-01-01

## 2021-01-25 DIAGNOSIS — R11.2 NAUSEA WITH VOMITING, UNSPECIFIED: ICD-10-CM

## 2021-01-25 DIAGNOSIS — Z51.11 ENCOUNTER FOR ANTINEOPLASTIC CHEMOTHERAPY: ICD-10-CM

## 2021-01-26 ENCOUNTER — APPOINTMENT (OUTPATIENT)
Dept: GASTROENTEROLOGY | Facility: CLINIC | Age: 66
End: 2021-01-26

## 2021-01-27 ENCOUNTER — INPATIENT (INPATIENT)
Facility: HOSPITAL | Age: 66
LOS: 8 days | Discharge: HOSPICE | DRG: 435 | End: 2021-02-05
Attending: INTERNAL MEDICINE | Admitting: FAMILY MEDICINE
Payer: COMMERCIAL

## 2021-01-27 VITALS
OXYGEN SATURATION: 94 % | SYSTOLIC BLOOD PRESSURE: 89 MMHG | HEIGHT: 63 IN | HEART RATE: 63 BPM | TEMPERATURE: 98 F | RESPIRATION RATE: 26 BRPM | DIASTOLIC BLOOD PRESSURE: 51 MMHG | WEIGHT: 259.93 LBS

## 2021-01-27 DIAGNOSIS — Z98.890 OTHER SPECIFIED POSTPROCEDURAL STATES: Chronic | ICD-10-CM

## 2021-01-27 DIAGNOSIS — R06.00 DYSPNEA, UNSPECIFIED: ICD-10-CM

## 2021-01-27 DIAGNOSIS — R94.31 ABNORMAL ELECTROCARDIOGRAM [ECG] [EKG]: ICD-10-CM

## 2021-01-27 DIAGNOSIS — Z96.643 PRESENCE OF ARTIFICIAL HIP JOINT, BILATERAL: Chronic | ICD-10-CM

## 2021-01-27 LAB
ADD ON TEST-SPECIMEN IN LAB: SIGNIFICANT CHANGE UP
ALBUMIN SERPL ELPH-MCNC: 1.6 G/DL — LOW (ref 3.3–5)
ALP SERPL-CCNC: 272 U/L — HIGH (ref 40–120)
ALT FLD-CCNC: 117 U/L — HIGH (ref 12–78)
ANION GAP SERPL CALC-SCNC: 12 MMOL/L — SIGNIFICANT CHANGE UP (ref 5–17)
ANISOCYTOSIS BLD QL: SLIGHT — SIGNIFICANT CHANGE UP
APPEARANCE UR: CLEAR — SIGNIFICANT CHANGE UP
APTT BLD: 26.8 SEC — LOW (ref 27.5–35.5)
AST SERPL-CCNC: 165 U/L — HIGH (ref 15–37)
BACTERIA # UR AUTO: ABNORMAL
BASOPHILS # BLD AUTO: 0 K/UL — SIGNIFICANT CHANGE UP (ref 0–0.2)
BASOPHILS NFR BLD AUTO: 0 % — SIGNIFICANT CHANGE UP (ref 0–2)
BILIRUB SERPL-MCNC: 1 MG/DL — SIGNIFICANT CHANGE UP (ref 0.2–1.2)
BILIRUB UR-MCNC: NEGATIVE — SIGNIFICANT CHANGE UP
BUN SERPL-MCNC: 84 MG/DL — HIGH (ref 7–23)
CALCIUM SERPL-MCNC: 7.6 MG/DL — LOW (ref 8.5–10.1)
CHLORIDE SERPL-SCNC: 102 MMOL/L — SIGNIFICANT CHANGE UP (ref 96–108)
CO2 SERPL-SCNC: 19 MMOL/L — LOW (ref 22–31)
COLOR SPEC: YELLOW — SIGNIFICANT CHANGE UP
COMMENT - URINE: SIGNIFICANT CHANGE UP
CREAT SERPL-MCNC: 2.85 MG/DL — HIGH (ref 0.5–1.3)
D DIMER BLD IA.RAPID-MCNC: 4080 NG/ML DDU — HIGH
DIFF PNL FLD: ABNORMAL
EOSINOPHIL # BLD AUTO: 0 K/UL — SIGNIFICANT CHANGE UP (ref 0–0.5)
EOSINOPHIL NFR BLD AUTO: 0 % — SIGNIFICANT CHANGE UP (ref 0–6)
EPI CELLS # UR: SIGNIFICANT CHANGE UP
GLUCOSE SERPL-MCNC: 191 MG/DL — HIGH (ref 70–99)
GLUCOSE UR QL: NEGATIVE MG/DL — SIGNIFICANT CHANGE UP
HCT VFR BLD CALC: 25.3 % — LOW (ref 34.5–45)
HCT VFR BLD CALC: 26.6 % — LOW (ref 34.5–45)
HGB BLD-MCNC: 7.9 G/DL — LOW (ref 11.5–15.5)
HGB BLD-MCNC: 8.1 G/DL — LOW (ref 11.5–15.5)
HYPOCHROMIA BLD QL: SIGNIFICANT CHANGE UP
INR BLD: 2.02 RATIO — HIGH (ref 0.88–1.16)
KETONES UR-MCNC: NEGATIVE — SIGNIFICANT CHANGE UP
LACTATE SERPL-SCNC: 1.8 MMOL/L — SIGNIFICANT CHANGE UP (ref 0.7–2)
LACTATE SERPL-SCNC: 2.2 MMOL/L — HIGH (ref 0.7–2)
LEUKOCYTE ESTERASE UR-ACNC: ABNORMAL
LYMPHOCYTES # BLD AUTO: 0.3 K/UL — LOW (ref 1–3.3)
LYMPHOCYTES # BLD AUTO: 21 % — SIGNIFICANT CHANGE UP (ref 13–44)
MANUAL SMEAR VERIFICATION: SIGNIFICANT CHANGE UP
MCHC RBC-ENTMCNC: 26.7 PG — LOW (ref 27–34)
MCHC RBC-ENTMCNC: 27 PG — SIGNIFICANT CHANGE UP (ref 27–34)
MCHC RBC-ENTMCNC: 30.5 GM/DL — LOW (ref 32–36)
MCHC RBC-ENTMCNC: 31.2 GM/DL — LOW (ref 32–36)
MCV RBC AUTO: 85.5 FL — SIGNIFICANT CHANGE UP (ref 80–100)
MCV RBC AUTO: 88.7 FL — SIGNIFICANT CHANGE UP (ref 80–100)
METAMYELOCYTES # FLD: 2 % — HIGH (ref 0–0)
MONOCYTES # BLD AUTO: 0 K/UL — SIGNIFICANT CHANGE UP (ref 0–0.9)
MONOCYTES NFR BLD AUTO: 0 % — LOW (ref 2–14)
NEUTROPHILS # BLD AUTO: 1.09 K/UL — LOW (ref 1.8–7.4)
NEUTROPHILS NFR BLD AUTO: 69 % — SIGNIFICANT CHANGE UP (ref 43–77)
NEUTS BAND # BLD: 8 % — SIGNIFICANT CHANGE UP (ref 0–8)
NITRITE UR-MCNC: NEGATIVE — SIGNIFICANT CHANGE UP
NRBC # BLD: 0 /100 — SIGNIFICANT CHANGE UP (ref 0–0)
NRBC # BLD: SIGNIFICANT CHANGE UP /100 WBCS (ref 0–0)
PH UR: 5 — SIGNIFICANT CHANGE UP (ref 5–8)
PLAT MORPH BLD: NORMAL — SIGNIFICANT CHANGE UP
PLATELET # BLD AUTO: 53 K/UL — LOW (ref 150–400)
PLATELET # BLD AUTO: 62 K/UL — LOW (ref 150–400)
POTASSIUM SERPL-MCNC: 5.1 MMOL/L — SIGNIFICANT CHANGE UP (ref 3.5–5.3)
POTASSIUM SERPL-SCNC: 5.1 MMOL/L — SIGNIFICANT CHANGE UP (ref 3.5–5.3)
PROT SERPL-MCNC: 5.7 GM/DL — LOW (ref 6–8.3)
PROT UR-MCNC: 30 MG/DL
PROTHROM AB SERPL-ACNC: 22.9 SEC — HIGH (ref 10.6–13.6)
RBC # BLD: 2.96 M/UL — LOW (ref 3.8–5.2)
RBC # BLD: 3 M/UL — LOW (ref 3.8–5.2)
RBC # FLD: 14.7 % — HIGH (ref 10.3–14.5)
RBC # FLD: 14.7 % — HIGH (ref 10.3–14.5)
RBC BLD AUTO: ABNORMAL
RBC CASTS # UR COMP ASSIST: ABNORMAL /HPF (ref 0–4)
S PYO AG SPEC QL IA: NEGATIVE — SIGNIFICANT CHANGE UP
SARS-COV-2 RNA SPEC QL NAA+PROBE: SIGNIFICANT CHANGE UP
SODIUM SERPL-SCNC: 133 MMOL/L — LOW (ref 135–145)
SP GR SPEC: 1.01 — SIGNIFICANT CHANGE UP (ref 1.01–1.02)
URATE CRY FLD QL MICRO: ABNORMAL
UROBILINOGEN FLD QL: NEGATIVE MG/DL — SIGNIFICANT CHANGE UP
WBC # BLD: 1.42 K/UL — LOW (ref 3.8–10.5)
WBC # BLD: 1.55 K/UL — LOW (ref 3.8–10.5)
WBC # FLD AUTO: 1.42 K/UL — LOW (ref 3.8–10.5)
WBC # FLD AUTO: 1.55 K/UL — LOW (ref 3.8–10.5)
WBC UR QL: SIGNIFICANT CHANGE UP

## 2021-01-27 PROCEDURE — C1769: CPT

## 2021-01-27 PROCEDURE — C1880: CPT

## 2021-01-27 PROCEDURE — 93010 ELECTROCARDIOGRAM REPORT: CPT

## 2021-01-27 PROCEDURE — 86923 COMPATIBILITY TEST ELECTRIC: CPT

## 2021-01-27 PROCEDURE — 82746 ASSAY OF FOLIC ACID SERUM: CPT

## 2021-01-27 PROCEDURE — 81001 URINALYSIS AUTO W/SCOPE: CPT

## 2021-01-27 PROCEDURE — 93971 EXTREMITY STUDY: CPT | Mod: RT

## 2021-01-27 PROCEDURE — 86850 RBC ANTIBODY SCREEN: CPT

## 2021-01-27 PROCEDURE — 78580 LUNG PERFUSION IMAGING: CPT | Mod: 26

## 2021-01-27 PROCEDURE — 85025 COMPLETE CBC W/AUTO DIFF WBC: CPT

## 2021-01-27 PROCEDURE — 36430 TRANSFUSION BLD/BLD COMPNT: CPT

## 2021-01-27 PROCEDURE — 71045 X-RAY EXAM CHEST 1 VIEW: CPT | Mod: 26

## 2021-01-27 PROCEDURE — C1729: CPT

## 2021-01-27 PROCEDURE — 83615 LACTATE (LD) (LDH) ENZYME: CPT

## 2021-01-27 PROCEDURE — 83605 ASSAY OF LACTIC ACID: CPT

## 2021-01-27 PROCEDURE — 76705 ECHO EXAM OF ABDOMEN: CPT

## 2021-01-27 PROCEDURE — 85610 PROTHROMBIN TIME: CPT

## 2021-01-27 PROCEDURE — 85730 THROMBOPLASTIN TIME PARTIAL: CPT

## 2021-01-27 PROCEDURE — 82306 VITAMIN D 25 HYDROXY: CPT

## 2021-01-27 PROCEDURE — 86900 BLOOD TYPING SEROLOGIC ABO: CPT

## 2021-01-27 PROCEDURE — 80076 HEPATIC FUNCTION PANEL: CPT

## 2021-01-27 PROCEDURE — 36415 COLL VENOUS BLD VENIPUNCTURE: CPT

## 2021-01-27 PROCEDURE — 85045 AUTOMATED RETICULOCYTE COUNT: CPT

## 2021-01-27 PROCEDURE — 80053 COMPREHEN METABOLIC PANEL: CPT

## 2021-01-27 PROCEDURE — U0005: CPT

## 2021-01-27 PROCEDURE — 80074 ACUTE HEPATITIS PANEL: CPT

## 2021-01-27 PROCEDURE — 82728 ASSAY OF FERRITIN: CPT

## 2021-01-27 PROCEDURE — 97110 THERAPEUTIC EXERCISES: CPT | Mod: GP

## 2021-01-27 PROCEDURE — 97116 GAIT TRAINING THERAPY: CPT | Mod: GP

## 2021-01-27 PROCEDURE — 93306 TTE W/DOPPLER COMPLETE: CPT | Mod: 26

## 2021-01-27 PROCEDURE — 86022 PLATELET ANTIBODIES: CPT

## 2021-01-27 PROCEDURE — 76705 ECHO EXAM OF ABDOMEN: CPT | Mod: 26

## 2021-01-27 PROCEDURE — 85027 COMPLETE CBC AUTOMATED: CPT

## 2021-01-27 PROCEDURE — 99223 1ST HOSP IP/OBS HIGH 75: CPT

## 2021-01-27 PROCEDURE — 93971 EXTREMITY STUDY: CPT | Mod: 26,RT

## 2021-01-27 PROCEDURE — 84100 ASSAY OF PHOSPHORUS: CPT

## 2021-01-27 PROCEDURE — 83010 ASSAY OF HAPTOGLOBIN QUANT: CPT

## 2021-01-27 PROCEDURE — 80048 BASIC METABOLIC PNL TOTAL CA: CPT

## 2021-01-27 PROCEDURE — 76770 US EXAM ABDO BACK WALL COMP: CPT

## 2021-01-27 PROCEDURE — C1894: CPT

## 2021-01-27 PROCEDURE — 80069 RENAL FUNCTION PANEL: CPT

## 2021-01-27 PROCEDURE — U0003: CPT

## 2021-01-27 PROCEDURE — 82272 OCCULT BLD FECES 1-3 TESTS: CPT

## 2021-01-27 PROCEDURE — 49083 ABD PARACENTESIS W/IMAGING: CPT

## 2021-01-27 PROCEDURE — 87086 URINE CULTURE/COLONY COUNT: CPT

## 2021-01-27 PROCEDURE — 83735 ASSAY OF MAGNESIUM: CPT

## 2021-01-27 PROCEDURE — 82607 VITAMIN B-12: CPT

## 2021-01-27 PROCEDURE — 71045 X-RAY EXAM CHEST 1 VIEW: CPT

## 2021-01-27 PROCEDURE — 37191 INS ENDOVAS VENA CAVA FILTR: CPT

## 2021-01-27 PROCEDURE — 97530 THERAPEUTIC ACTIVITIES: CPT | Mod: GP

## 2021-01-27 PROCEDURE — 83550 IRON BINDING TEST: CPT

## 2021-01-27 PROCEDURE — 82140 ASSAY OF AMMONIA: CPT

## 2021-01-27 PROCEDURE — 88341 IMHCHEM/IMCYTCHM EA ADD ANTB: CPT

## 2021-01-27 PROCEDURE — C9113: CPT

## 2021-01-27 PROCEDURE — 86901 BLOOD TYPING SEROLOGIC RH(D): CPT

## 2021-01-27 PROCEDURE — 83540 ASSAY OF IRON: CPT

## 2021-01-27 PROCEDURE — P9016: CPT

## 2021-01-27 RX ORDER — ZOLPIDEM TARTRATE 10 MG/1
5 TABLET ORAL AT BEDTIME
Refills: 0 | Status: DISCONTINUED | OUTPATIENT
Start: 2021-01-27 | End: 2021-02-03

## 2021-01-27 RX ORDER — VANCOMYCIN HCL 1 G
125 VIAL (EA) INTRAVENOUS EVERY 6 HOURS
Refills: 0 | Status: DISCONTINUED | OUTPATIENT
Start: 2021-01-27 | End: 2021-02-05

## 2021-01-27 RX ORDER — VANCOMYCIN HCL 1 G
2000 VIAL (EA) INTRAVENOUS ONCE
Refills: 0 | Status: DISCONTINUED | OUTPATIENT
Start: 2021-01-27 | End: 2021-01-27

## 2021-01-27 RX ORDER — APIXABAN 2.5 MG/1
5 TABLET, FILM COATED ORAL EVERY 12 HOURS
Refills: 0 | Status: DISCONTINUED | OUTPATIENT
Start: 2021-01-27 | End: 2021-01-27

## 2021-01-27 RX ORDER — METOPROLOL TARTRATE 50 MG
12.5 TABLET ORAL EVERY 8 HOURS
Refills: 0 | Status: DISCONTINUED | OUTPATIENT
Start: 2021-01-27 | End: 2021-01-27

## 2021-01-27 RX ORDER — HEPARIN SODIUM 5000 [USP'U]/ML
INJECTION INTRAVENOUS; SUBCUTANEOUS
Qty: 25000 | Refills: 0 | Status: DISCONTINUED | OUTPATIENT
Start: 2021-01-27 | End: 2021-01-28

## 2021-01-27 RX ORDER — SODIUM CHLORIDE 9 MG/ML
3700 INJECTION, SOLUTION INTRAVENOUS ONCE
Refills: 0 | Status: COMPLETED | OUTPATIENT
Start: 2021-01-27 | End: 2021-01-27

## 2021-01-27 RX ORDER — HEPARIN SODIUM 5000 [USP'U]/ML
9500 INJECTION INTRAVENOUS; SUBCUTANEOUS ONCE
Refills: 0 | Status: DISCONTINUED | OUTPATIENT
Start: 2021-01-27 | End: 2021-01-27

## 2021-01-27 RX ORDER — ENOXAPARIN SODIUM 100 MG/ML
80 INJECTION SUBCUTANEOUS ONCE
Refills: 0 | Status: DISCONTINUED | OUTPATIENT
Start: 2021-01-27 | End: 2021-01-27

## 2021-01-27 RX ORDER — ZOLPIDEM TARTRATE 10 MG/1
5 TABLET ORAL AT BEDTIME
Refills: 0 | Status: DISCONTINUED | OUTPATIENT
Start: 2021-01-27 | End: 2021-01-30

## 2021-01-27 RX ORDER — HEPARIN SODIUM 5000 [USP'U]/ML
9500 INJECTION INTRAVENOUS; SUBCUTANEOUS EVERY 6 HOURS
Refills: 0 | Status: DISCONTINUED | OUTPATIENT
Start: 2021-01-27 | End: 2021-01-28

## 2021-01-27 RX ORDER — ONDANSETRON 8 MG/1
4 TABLET, FILM COATED ORAL EVERY 6 HOURS
Refills: 0 | Status: DISCONTINUED | OUTPATIENT
Start: 2021-01-27 | End: 2021-02-05

## 2021-01-27 RX ORDER — HEPARIN SODIUM 5000 [USP'U]/ML
7500 INJECTION INTRAVENOUS; SUBCUTANEOUS EVERY 8 HOURS
Refills: 0 | Status: DISCONTINUED | OUTPATIENT
Start: 2021-01-27 | End: 2021-01-27

## 2021-01-27 RX ORDER — PIPERACILLIN AND TAZOBACTAM 4; .5 G/20ML; G/20ML
3.38 INJECTION, POWDER, LYOPHILIZED, FOR SOLUTION INTRAVENOUS ONCE
Refills: 0 | Status: DISCONTINUED | OUTPATIENT
Start: 2021-01-27 | End: 2021-01-27

## 2021-01-27 RX ORDER — ACETAMINOPHEN 500 MG
650 TABLET ORAL EVERY 4 HOURS
Refills: 0 | Status: DISCONTINUED | OUTPATIENT
Start: 2021-01-27 | End: 2021-02-05

## 2021-01-27 RX ORDER — HEPARIN SODIUM 5000 [USP'U]/ML
4500 INJECTION INTRAVENOUS; SUBCUTANEOUS EVERY 6 HOURS
Refills: 0 | Status: DISCONTINUED | OUTPATIENT
Start: 2021-01-27 | End: 2021-01-28

## 2021-01-27 RX ORDER — PANTOPRAZOLE SODIUM 20 MG/1
40 TABLET, DELAYED RELEASE ORAL
Refills: 0 | Status: DISCONTINUED | OUTPATIENT
Start: 2021-01-27 | End: 2021-01-30

## 2021-01-27 RX ORDER — MIDODRINE HYDROCHLORIDE 2.5 MG/1
5 TABLET ORAL THREE TIMES A DAY
Refills: 0 | Status: DISCONTINUED | OUTPATIENT
Start: 2021-01-27 | End: 2021-01-30

## 2021-01-27 RX ORDER — METOPROLOL TARTRATE 50 MG
25 TABLET ORAL
Refills: 0 | Status: DISCONTINUED | OUTPATIENT
Start: 2021-01-27 | End: 2021-01-27

## 2021-01-27 RX ORDER — METOPROLOL TARTRATE 50 MG
25 TABLET ORAL EVERY 8 HOURS
Refills: 0 | Status: DISCONTINUED | OUTPATIENT
Start: 2021-01-27 | End: 2021-01-29

## 2021-01-27 RX ADMIN — SODIUM CHLORIDE 3700 MILLILITER(S): 9 INJECTION, SOLUTION INTRAVENOUS at 10:32

## 2021-01-27 RX ADMIN — ZOLPIDEM TARTRATE 5 MILLIGRAM(S): 10 TABLET ORAL at 23:47

## 2021-01-27 RX ADMIN — ONDANSETRON 4 MILLIGRAM(S): 8 TABLET, FILM COATED ORAL at 23:47

## 2021-01-27 RX ADMIN — MIDODRINE HYDROCHLORIDE 5 MILLIGRAM(S): 2.5 TABLET ORAL at 23:47

## 2021-01-27 RX ADMIN — Medication 125 MILLIGRAM(S): at 23:47

## 2021-01-27 RX ADMIN — HEPARIN SODIUM 2100 UNIT(S)/HR: 5000 INJECTION INTRAVENOUS; SUBCUTANEOUS at 22:37

## 2021-01-27 NOTE — ED ADULT NURSE REASSESSMENT NOTE - NS ED NURSE REASSESS COMMENT FT1
Contacted MD gonzalez regarding patient plan of care, STAT CBC ordered, heparin to held until lab results. Surgery at bedside for consult.

## 2021-01-27 NOTE — ED PROVIDER NOTE - OBJECTIVE STATEMENT
66 y/o female with a PMHx of Atrial flutter  on Eliquis 11/9/19  had GILSON cardioversion, combined systolic and diastolic HF , cardiomyopathy, DJD hips, low back pain, morbid obesity, NAHOMI on CPAP, pancreatic cancer on chemo, pHTN, vascular insufficiency LLE, R sciatica, presents to the ED c/o generalized weakness. +nausea, +LACY. Denies dizziness, CP. Last chemo treatment on 1/22.  Last paracentesis 1 week ago. Currently being treated for Cdiff on PO Vanco x5 days. No other complaints at this time.

## 2021-01-27 NOTE — ED PROVIDER NOTE - PROGRESS NOTE DETAILS
Nolan Bartholomew for attending Dr. Do: EKG read by computer concern for STEMI. EKG reviewed by PCI doctor on call Dr. Ramos. Although small ST elevations in  V4, V5, V6 appear new, clinical picture not entirely consistent with acute MI. Urgent echo for pericardial effusion recommended at this time. KV: case discussed with Dr. Perez who recommends admission, antibiotics for neutropenia, ID consult, anticoagulation for hypercoagulable state. Case discussed with Dr. Walker, admitting team, who recommends against antibiotics prior to ID evaluation. patient tba.

## 2021-01-27 NOTE — CHART NOTE - NSCHARTNOTEFT_GEN_A_CORE
Patient was found to have an extensive deep venous thrombosis involving the popliteal, posterior tibial, gastrocnemius and soleal veins of the right lower extremity. Will start Heparin gtt per protocol. Vascular surgery was consulted.

## 2021-01-27 NOTE — CONSULT NOTE ADULT - SUBJECTIVE AND OBJECTIVE BOX
PCP:    REQUESTING PHYSICIAN:    REASON FOR CONSULT:    CHIEF COMPLAINT:    HPI:64 yo female with PMH of  Atrial flutter  on Eliquis 11/9/19  had GILSON cardioversion,  Combined systolic and diastolic HF , Cardiomyopathy , pHTN, Vascular insufficiency LLE, R sciatica, DJD hips, morbid Obesity , low vignesh pain, Pulm. HTN, NAHOMI on CPAP, who presented  in the ER for abdominal distention and fatigue. Cardiology called to evaluate low voltage and possible injury pattern on ECG. Pt denies chest pain but reports mild dyspnea and fatigue since her diagnosis of cancer with ascites. Urgent echo was performed in the ED which revealed no evidence of pericardial effusion. EF was adequate.     Admitted for HANANE, malignant ascites possible UTI.  Started on abx, considering pleurex catheter for malignant   ascites.          PAST MEDICAL & SURGICAL HISTORY:  Vascular insufficiency  left leg    Sciatic leg pain  right    Hip pain    Arthritis    S/P hip replacement, bilateral    History of tonsillectomy and adenoidectomy    H/O arthroscopy of left knee  1994        Allergies    No Known Allergies    Intolerances        SOCIAL HISTORY:    FAMILY HISTORY:  Family history of heart disease  a. fib, valvular dysfunction    Family history of COPD (chronic obstructive pulmonary disease)        MEDICATIONS:  MEDICATIONS  (STANDING):    MEDICATIONS  (PRN):      REVIEW OF SYSTEMS:    CONSTITUTIONAL: Fatigue  EYES/ENT: No visual changes;  No vertigo or throat pain   NECK: No pain or stiffness  RESPIRATORY: No cough, wheezing, hemoptysis; No shortness of breath  CARDIOVASCULAR: No chest pain or palpitations  GASTROINTESTINAL: No abdominal or epigastric pain. No nausea, vomiting, or hematemesis; No diarrhea or constipation. No melena or hematochezia.  GENITOURINARY: No dysuria, frequency or hematuria  NEUROLOGICAL: No numbness or weakness  SKIN: No itching, burning, rashes, or lesions   All other review of systems is negative unless indicated above    Vital Signs Last 24 Hrs  T(C): 36.9 (27 Jan 2021 09:31), Max: 36.9 (27 Jan 2021 09:31)  T(F): 98.5 (27 Jan 2021 09:31), Max: 98.5 (27 Jan 2021 09:31)  HR: 91 (27 Jan 2021 11:00) (63 - 91)  BP: 102/53 (27 Jan 2021 11:00) (89/51 - 102/53)  BP(mean): --  RR: 20 (27 Jan 2021 11:00) (20 - 26)  SpO2: 98% (27 Jan 2021 11:00) (94% - 98%)    I&O's Summary      PHYSICAL EXAM:    Constitutional: NAD, awake and alert, well-developed  HEENT: PERR, EOMI,  No oral cyananosis.  Neck:  supple,  No JVD  Respiratory: Breath sounds are clear bilaterally, No wheezing, rales or rhonchi  Cardiovascular: S1 and S2, regular rate and rhythm, distant sounds  Gastrointestinal: Distended abdomen  Extremities: No peripheral edema. No clubbing or cyanosis.  Vascular: 2+ peripheral pulses  Neurological: A/O x 3, no focal deficits  Musculoskeletal: no calf tenderness.  Skin: No rashes.      LABS: All Labs Reviewed:                        7.9    1.42  )-----------( 62       ( 27 Jan 2021 10:12 )             25.3     27 Jan 2021 10:12    133    |  102    |  84     ----------------------------<  191    5.1     |  19     |  2.85     Ca    7.6        27 Jan 2021 10:12    TPro  5.7    /  Alb  1.6    /  TBili  1.0    /  DBili  x      /  AST  165    /  ALT  117    /  AlkPhos  272    27 Jan 2021 10:12    PT/INR - ( 27 Jan 2021 10:12 )   PT: 22.9 sec;   INR: 2.02 ratio         PTT - ( 27 Jan 2021 10:12 )  PTT:26.8 sec      Blood Culture:         RADIOLOGY/EKG: ECG  NSR low voltage

## 2021-01-27 NOTE — H&P ADULT - HISTORY OF PRESENT ILLNESS
66 yo F with multiple comorbidities including pancreatic cancer with malignant ascites          PAST MEDICAL & SURGICAL HISTORY:  Vascular insufficiency  left leg    Sciatic leg pain  right    Hip pain    Arthritis    S/P hip replacement, bilateral    History of tonsillectomy and adenoidectomy    H/O arthroscopy of left knee  1994        Allergies    No Known Allergies    Intolerances        SOCIAL HISTORY:    FAMILY HISTORY:  Family history of heart disease  a. fib, valvular dysfunction    Family history of COPD (chronic obstructive pulmonary disease)     66 yo F with multiple comorbidities including pancreatic cancer with malignant ascites on checmo with abaraxane and gemictabine last dose 1/22, currently under tx for CDAD on day #5 of po vanco which she states she ahs been unable to tolerate, recurrent ascites and multiple paracentesis (last 2 weeks ago), renal insuffiency, NAHOMI of cpap presents with generlazied weakness, nausea, diarrhea and lacy. Nausea will not allow for her to take po vanco. Still having numerous watery BMs daily.  LACY with minimal exertion. She denies cough. No abd pain. No HA/Neck pain or blurry vision. No other complaints to offer    ED course: given iv vanco and zosyn for presumed sepsis. Lactate 2.2 cleared after 3L NS. Dimer elevated however V/Q neg for PE. O2S 94% on RA at rest with soft BP 89/51 however historically patient has low BP and was placed on midodrine last admission. PT pancytopenic. Afebrile. CXR clear.           PAST MEDICAL & SURGICAL HISTORY:  Vascular insufficiency  left leg    Sciatic leg pain  right    Hip pain    Arthritis    S/P hip replacement, bilateral    History of tonsillectomy and adenoidectomy    H/O arthroscopy of left knee  1994        Allergies    No Known Allergies    Intolerances        SOCIAL HISTORY:    FAMILY HISTORY:  Family history of heart disease  a. fib, valvular dysfunction    Family history of COPD (chronic obstructive pulmonary disease)

## 2021-01-27 NOTE — ED PROVIDER NOTE - PHYSICAL EXAMINATION
Vital signs as available reviewed.  General:  Overweight, comfortable, no acute distress.   Head:  Normocephalic, atraumatic.  Eyes:  Conjunctiva pink, no icterus.  Cardiovascular:  Regular rate, no obvious murmur.  Respiratory:  Decreased breath sounds right base.   Abdomen:  Soft, distended, non-tender.  Musculoskeletal:  No deformity or calf tenderness.  Neurologic: Alert and oriented, moving all extremities.  Skin:  Warm and dry.

## 2021-01-27 NOTE — H&P ADULT - NSHPOUTPATIENTPROVIDERS_GEN_ALL_CORE
PCP: Flaco  Onc: Chris  Cards: Shahbaz PCP: Flaco  Onc: Chris  Cards: Shahbaz  GI: Tamara  Nephro: Samson

## 2021-01-27 NOTE — H&P ADULT - ASSESSMENT
#LACY - multifactorial (recurrent ascites, anemia and large body habitus and NAHOMI/OHS)  #Elevated lactate  #CDAD  #Hypovolemia/volume depletion  #Renal insufficiency  - stable for MS floor  - Stat echo done in ED negative for effusion  - V/Q negative for PE  - EKG: cardio input appreciated and no ischemic changes   - LACY likely from recurrent ascites, had + fluid wave on abd exam s/o ascites   -abd sono and poss paracentesis thereafter. Consider pleurex as this will be 4th paracentesis in few months. pt wants to speak with Dr Perez prior to this  - supp o2 as needed  - no e/o pna on cxr; would not start abx at this time especially with c diff infection presently  - She is NOT toxic appearing and giving her abx empirically may not be beneficial in this case  - Lactate also may have been from pancreatic CA  - Low bp is chronic  - Doubt sbp with benign abd exam/afebrile  - Neutropenia may be from recent chemo -> onology to discuss regarding GSF stimulating factors  - Acute on chronic anemia - Hgb 7.9 IF drops below 7 can give prbc. PT has no symptoms unless exertional  - check iron stores and if low start iron sucrose  - Renal consult/oncology consult  - Cont po vanco solution (Pt states she had hard time swallowing vanco pills), if not flagyl        Dispo:   MS floor  PT consult  DASH diet  AM labs  po vanco  Abd sono      Full code  GOC time: 17 min

## 2021-01-27 NOTE — CONSULT NOTE ADULT - SUBJECTIVE AND OBJECTIVE BOX
66 yo F with multiple comorbidities including pancreatic cancer with malignant ascites on chemo with abaraxane and gemictabine last dose 1/22, currently being treated for C diff  presents with generalized weakness, nausea, diarrhea and lacy. Still having numerous watery BMs daily.  LACY with minimal exertion. She denies cough. No abd pain. No HA/Neck pain or blurry vision. No other complaints to offer. patient denies any acute trauma and reports starting chemo two weeks ago since them has not been feeling herself.       PAST MEDICAL & SURGICAL HISTORY:  Vascular insufficiency  Sciatic leg pain  Hip pain  Arthritis  S/P hip replacement, bilateral  History of tonsillectomy and adenoidectomy  H/O arthroscopy of left knee  1994        Allergies    No Known Allergies    Intolerances    Vital Signs Last 24 Hrs  T(C): 36.7 (27 Jan 2021 21:04), Max: 37.2 (27 Jan 2021 18:17)  T(F): 98 (27 Jan 2021 21:04), Max: 99 (27 Jan 2021 18:17)  HR: 120 (27 Jan 2021 21:04) (63 - 123)  BP: 92/49 (27 Jan 2021 21:04) (88/50 - 104/89)  BP(mean): --  RR: 20 (27 Jan 2021 21:04) (19 - 26)  SpO2: 98% (27 Jan 2021 21:04) (94% - 100%)    Gen: pale, NAD    HEENT: supple, no JVD, EOMI  Lungs: CTA b/l, aerating well   CV: S1 and S2 present  Abd: BS present, NT ,ND, no RGR   Ext: palpable pulses, chronic venous skin changes b/l lower extremities, RLE swollen compared to the left    Skin: warm, dry                           7.9    1.42  )-----------( 62       ( 27 Jan 2021 10:12 )             25.3   01-27    133<L>  |  102  |  84<H>  ----------------------------<  191<H>  5.1   |  19<L>  |  2.85<H>    Ca    7.6<L>      27 Jan 2021 10:12    TPro  5.7<L>  /  Alb  1.6<L>  /  TBili  1.0  /  DBili  x   /  AST  165<H>  /  ALT  117<H>  /  AlkPhos  272<H>  01-27  < from: US Duplex Venous Lower Ext Ltd, Right (01.27.21 @ 16:18) >    EXAM:  US DPLX LWR EXT VEINS LTD RT                            PROCEDURE DATE:  01/27/2021          INTERPRETATION:  CLINICAL INFORMATION: Pain and swelling    COMPARISON: 9/11/2019    TECHNIQUE: Duplex sonography of the RIGHT LOWER extremity veinswith color and spectral Doppler, with and without compression.    FINDINGS:    There is occlusive thrombus involving the popliteal posterior tibial soleal and gastrocnemius veins. Common femoral and femoral veins are patent.    The contralateral common femoral vein is patent.    IMPRESSION:  Extensive deep venous thrombosis involving the popliteal, posterior tibial, gastrocnemius and soleal veins of the right lower extremity            STEFFI VELASCO MD; Attending Radiologist  This document has been electronically signed. Jan 27 2021  5:02PM    < end of copied text >

## 2021-01-27 NOTE — ED ADULT TRIAGE NOTE - CHIEF COMPLAINT QUOTE
Pt presents from private residence, pt is an oncology pt, complaints of increased weakness, dehydration and nauseau, pt alertx4, last chemo treatment was last Friday, pt with known c-diff history as per ems.

## 2021-01-27 NOTE — H&P ADULT - NSHPPHYSICALEXAM_GEN_ALL_CORE
ICU Vital Signs Last 24 Hrs  T(C): 36.6 (27 Jan 2021 15:16), Max: 37.1 (27 Jan 2021 13:30)  T(F): 97.9 (27 Jan 2021 15:16), Max: 98.7 (27 Jan 2021 13:30)  HR: 108 (27 Jan 2021 15:16) (63 - 110)  BP: 88/50 (27 Jan 2021 15:16) (88/50 - 102/53)  BP(mean): --  ABP: --  ABP(mean): --  RR: 24 (27 Jan 2021 15:16) (19 - 26)  SpO2: 98% (27 Jan 2021 15:16) (94% - 100%)      PHYSICAL EXAM:    Constitutional: NAD, awake and alert, well-developed; morbidly obeses  HEENT: PERR, EOMI, Normal Hearing, MMM  Neck: Soft and supple, No LAD, No JVD  Respiratory: Breath sounds are clear bilaterally, No wheezing, rales or rhonchi  Cardiovascular: S1 and S2, regular rate and rhythm, no Murmurs, gallops or rubs  Gastrointestinal: Bowel Sounds present, soft, nontender, nondistended, no guarding, no rebound  Extremities: No peripheral edema  Vascular: 2+ peripheral pulses  Neurological: A/O x 3, no focal deficits  Musculoskeletal: 5/5 strength b/l upper and lower extremities  Skin: No rashes; chronic venous changes to B/L LEs

## 2021-01-27 NOTE — ED PROVIDER NOTE - NS ED ROS FT
Constitutional: No fever. +generalized weakness  Neurological: No headache.  Eyes: No vision changes.   Ears, Nose, Mouth, Throat: No congestion.  Cardiovascular: No chest pain.  Respiratory: +LACY  Gastrointestinal: No vomiting. +nausea  Genitourinary: No dysuria.  Musculoskeletal: No joint pain.  Integumentary (skin and/or breast): No rash.

## 2021-01-27 NOTE — ED ADULT NURSE REASSESSMENT NOTE - NS ED NURSE REASSESS COMMENT FT1
Patient ordered for heparin due to DVT. Patient complaining of frequent nose bleeds and bleeding from mouth prior to administration. Contacted MD Laureano, as per MD do not initiate heparin infusion. MD to contact sx regarding DVT.

## 2021-01-27 NOTE — CONSULT NOTE ADULT - PROBLEM SELECTOR RECOMMENDATION 9
Reduced voltage from prior. Echo reveals no new wall motion abnormalities, no pericardial effusion. Pt has no chest pain. Consider PE rule out.

## 2021-01-27 NOTE — ED ADULT NURSE REASSESSMENT NOTE - NS ED NURSE REASSESS COMMENT FT1
Report given by JUN Alvarez @ 1000. Cardiology at bedside, contacted Echo for STAT bedside procedure. Patient labs delayed due to poor peripheral access, phlebotomy contacted. Patient updated on plan of care, will continue to monitor

## 2021-01-27 NOTE — ED ADULT NURSE NOTE - OBJECTIVE STATEMENT
Pt presents from home , pt with hx of Ca complaints of increased weakness, dehydration and nausea, pt alertx4, last chemo treatment was last Friday, pt with known c-diff history as per ems.

## 2021-01-27 NOTE — H&P ADULT - NSHPREVIEWOFSYSTEMS_GEN_ALL_CORE
REVIEW OF SYSTEMS:    CONSTITUTIONAL: No weakness, fevers or chills  EYES/ENT: No visual changes;  No vertigo or throat pain   NECK: No pain or stiffness  RESPIRATORY: No cough, wheezing, hemoptysis; No shortness of breath  CARDIOVASCULAR: No chest pain or palpitations  GASTROINTESTINAL: No abdominal or epigastric pain. No nausea, vomiting, or hematemesis; No diarrhea or constipation. No melena or hematochezia.  GENITOURINARY: No dysuria, frequency or hematuria  NEUROLOGICAL: No numbness or weakness  SKIN: No itching, burning, rashes, or lesions   All other review of systems is negative unless indicated above REVIEW OF SYSTEMS:    CONSTITUTIONAL: No weakness, fevers or chills  EYES/ENT: No visual changes;  No vertigo or throat pain   NECK: No pain or stiffness  RESPIRATORY: + shortness of breath  CARDIOVASCULAR: No chest pain or palpitations  GASTROINTESTINAL: No abdominal or epigastric pain. No nausea, vomiting, or hematemesis; No diarrhea or constipation. No melena or hematochezia.  GENITOURINARY: No dysuria, frequency or hematuria  NEUROLOGICAL: No numbness or weakness  SKIN: No itching, burning, rashes, or lesions   All other review of systems is negative unless indicated above

## 2021-01-27 NOTE — H&P ADULT - NSHPLABSRESULTS_GEN_ALL_CORE
LABS: All Labs Reviewed:                        7.9    1.42  )-----------( 62       ( 27 Jan 2021 10:12 )             25.3     01-27    133<L>  |  102  |  84<H>  ----------------------------<  191<H>  5.1   |  19<L>  |  2.85<H>    Ca    7.6<L>      27 Jan 2021 10:12    TPro  5.7<L>  /  Alb  1.6<L>  /  TBili  1.0  /  DBili  x   /  AST  165<H>  /  ALT  117<H>  /  AlkPhos  272<H>  01-27    PT/INR - ( 27 Jan 2021 10:12 )   PT: 22.9 sec;   INR: 2.02 ratio         PTT - ( 27 Jan 2021 10:12 )  PTT:26.8 sec          Blood Culture:             EKG: pending

## 2021-01-28 ENCOUNTER — RESULT REVIEW (OUTPATIENT)
Age: 66
End: 2021-01-28

## 2021-01-28 DIAGNOSIS — C25.9 MALIGNANT NEOPLASM OF PANCREAS, UNSPECIFIED: ICD-10-CM

## 2021-01-28 DIAGNOSIS — I82.409 ACUTE EMBOLISM AND THROMBOSIS OF UNSPECIFIED DEEP VEINS OF UNSPECIFIED LOWER EXTREMITY: ICD-10-CM

## 2021-01-28 DIAGNOSIS — D69.6 THROMBOCYTOPENIA, UNSPECIFIED: ICD-10-CM

## 2021-01-28 DIAGNOSIS — I48.92 UNSPECIFIED ATRIAL FLUTTER: ICD-10-CM

## 2021-01-28 LAB
ALBUMIN SERPL ELPH-MCNC: 1.3 G/DL — LOW (ref 3.3–5)
ALP SERPL-CCNC: 230 U/L — HIGH (ref 40–120)
ALT FLD-CCNC: 92 U/L — HIGH (ref 12–78)
ANION GAP SERPL CALC-SCNC: 11 MMOL/L — SIGNIFICANT CHANGE UP (ref 5–17)
APTT BLD: 48.6 SEC — HIGH (ref 27.5–35.5)
AST SERPL-CCNC: 138 U/L — HIGH (ref 15–37)
BASOPHILS # BLD AUTO: 0.04 K/UL — SIGNIFICANT CHANGE UP (ref 0–0.2)
BASOPHILS NFR BLD AUTO: 2.8 % — HIGH (ref 0–2)
BILIRUB DIRECT SERPL-MCNC: 0.5 MG/DL — HIGH (ref 0–0.2)
BILIRUB INDIRECT FLD-MCNC: 0.3 MG/DL — SIGNIFICANT CHANGE UP (ref 0.2–1)
BILIRUB SERPL-MCNC: 0.8 MG/DL — SIGNIFICANT CHANGE UP (ref 0.2–1.2)
BUN SERPL-MCNC: 77 MG/DL — HIGH (ref 7–23)
CALCIUM SERPL-MCNC: 7.2 MG/DL — LOW (ref 8.5–10.1)
CHLORIDE SERPL-SCNC: 105 MMOL/L — SIGNIFICANT CHANGE UP (ref 96–108)
CO2 SERPL-SCNC: 18 MMOL/L — LOW (ref 22–31)
CREAT SERPL-MCNC: 2.58 MG/DL — HIGH (ref 0.5–1.3)
EOSINOPHIL # BLD AUTO: 0.01 K/UL — SIGNIFICANT CHANGE UP (ref 0–0.5)
EOSINOPHIL NFR BLD AUTO: 0.7 % — SIGNIFICANT CHANGE UP (ref 0–6)
FOLATE SERPL-MCNC: 9.6 NG/ML — SIGNIFICANT CHANGE UP
GLUCOSE SERPL-MCNC: 156 MG/DL — HIGH (ref 70–99)
HAPTOGLOB SERPL-MCNC: 293 MG/DL — HIGH (ref 34–200)
HCT VFR BLD CALC: 21 % — CRITICAL LOW (ref 34.5–45)
HCT VFR BLD CALC: 22.8 % — LOW (ref 34.5–45)
HCT VFR BLD CALC: 27.2 % — LOW (ref 34.5–45)
HGB BLD-MCNC: 6.8 G/DL — CRITICAL LOW (ref 11.5–15.5)
HGB BLD-MCNC: 7.5 G/DL — LOW (ref 11.5–15.5)
HGB BLD-MCNC: 8.7 G/DL — LOW (ref 11.5–15.5)
IMM GRANULOCYTES NFR BLD AUTO: 0.7 % — SIGNIFICANT CHANGE UP (ref 0–1.5)
IRON SATN MFR SERPL: 14 % — SIGNIFICANT CHANGE UP (ref 14–50)
IRON SATN MFR SERPL: 24 UG/DL — LOW (ref 30–160)
LACTATE SERPL-SCNC: 1.1 MMOL/L — SIGNIFICANT CHANGE UP (ref 0.7–2)
LDH SERPL L TO P-CCNC: 421 U/L — HIGH (ref 84–241)
LYMPHOCYTES # BLD AUTO: 0.23 K/UL — LOW (ref 1–3.3)
LYMPHOCYTES # BLD AUTO: 15.9 % — SIGNIFICANT CHANGE UP (ref 13–44)
MCHC RBC-ENTMCNC: 27.1 PG — SIGNIFICANT CHANGE UP (ref 27–34)
MCHC RBC-ENTMCNC: 27.6 PG — SIGNIFICANT CHANGE UP (ref 27–34)
MCHC RBC-ENTMCNC: 28.4 PG — SIGNIFICANT CHANGE UP (ref 27–34)
MCHC RBC-ENTMCNC: 32 GM/DL — SIGNIFICANT CHANGE UP (ref 32–36)
MCHC RBC-ENTMCNC: 32.4 GM/DL — SIGNIFICANT CHANGE UP (ref 32–36)
MCHC RBC-ENTMCNC: 32.9 GM/DL — SIGNIFICANT CHANGE UP (ref 32–36)
MCV RBC AUTO: 83.7 FL — SIGNIFICANT CHANGE UP (ref 80–100)
MCV RBC AUTO: 86.3 FL — SIGNIFICANT CHANGE UP (ref 80–100)
MCV RBC AUTO: 86.4 FL — SIGNIFICANT CHANGE UP (ref 80–100)
MONOCYTES # BLD AUTO: 0.02 K/UL — SIGNIFICANT CHANGE UP (ref 0–0.9)
MONOCYTES NFR BLD AUTO: 1.4 % — LOW (ref 2–14)
NEUTROPHILS # BLD AUTO: 1.14 K/UL — LOW (ref 1.8–7.4)
NEUTROPHILS NFR BLD AUTO: 78.5 % — HIGH (ref 43–77)
NRBC # BLD: SIGNIFICANT CHANGE UP /100 WBCS (ref 0–0)
PLATELET # BLD AUTO: 33 K/UL — LOW (ref 150–400)
PLATELET # BLD AUTO: 36 K/UL — LOW (ref 150–400)
PLATELET # BLD AUTO: 44 K/UL — LOW (ref 150–400)
POTASSIUM SERPL-MCNC: 5 MMOL/L — SIGNIFICANT CHANGE UP (ref 3.5–5.3)
POTASSIUM SERPL-SCNC: 5 MMOL/L — SIGNIFICANT CHANGE UP (ref 3.5–5.3)
PROT SERPL-MCNC: 4.9 GM/DL — LOW (ref 6–8.3)
RBC # BLD: 2.51 M/UL — LOW (ref 3.8–5.2)
RBC # BLD: 2.51 M/UL — LOW (ref 3.8–5.2)
RBC # BLD: 2.64 M/UL — LOW (ref 3.8–5.2)
RBC # BLD: 3.15 M/UL — LOW (ref 3.8–5.2)
RBC # FLD: 14.4 % — SIGNIFICANT CHANGE UP (ref 10.3–14.5)
RBC # FLD: 14.6 % — HIGH (ref 10.3–14.5)
RBC # FLD: 14.8 % — HIGH (ref 10.3–14.5)
RETICS #: 8.7 K/UL — LOW (ref 25–125)
RETICS/RBC NFR: 0.4 % — LOW (ref 0.5–2.5)
SARS-COV-2 IGG SERPL QL IA: NEGATIVE — SIGNIFICANT CHANGE UP
SARS-COV-2 IGM SERPL IA-ACNC: <0.1 INDEX — SIGNIFICANT CHANGE UP
SODIUM SERPL-SCNC: 134 MMOL/L — LOW (ref 135–145)
TIBC SERPL-MCNC: 170 UG/DL — LOW (ref 220–430)
UIBC SERPL-MCNC: 145 UG/DL — SIGNIFICANT CHANGE UP (ref 110–370)
VIT B12 SERPL-MCNC: 1224 PG/ML — SIGNIFICANT CHANGE UP (ref 232–1245)
WBC # BLD: 1.33 K/UL — LOW (ref 3.8–10.5)
WBC # BLD: 1.45 K/UL — LOW (ref 3.8–10.5)
WBC # BLD: 1.45 K/UL — LOW (ref 3.8–10.5)
WBC # FLD AUTO: 1.33 K/UL — LOW (ref 3.8–10.5)
WBC # FLD AUTO: 1.45 K/UL — LOW (ref 3.8–10.5)
WBC # FLD AUTO: 1.45 K/UL — LOW (ref 3.8–10.5)

## 2021-01-28 PROCEDURE — 88342 IMHCHEM/IMCYTCHM 1ST ANTB: CPT | Mod: 26,59

## 2021-01-28 PROCEDURE — 88341 IMHCHEM/IMCYTCHM EA ADD ANTB: CPT | Mod: 26

## 2021-01-28 PROCEDURE — 99233 SBSQ HOSP IP/OBS HIGH 50: CPT

## 2021-01-28 PROCEDURE — 99223 1ST HOSP IP/OBS HIGH 75: CPT

## 2021-01-28 PROCEDURE — 76770 US EXAM ABDO BACK WALL COMP: CPT | Mod: 26

## 2021-01-28 PROCEDURE — 99222 1ST HOSP IP/OBS MODERATE 55: CPT | Mod: GC

## 2021-01-28 PROCEDURE — 88321 CONSLTJ&REPRT SLD PREP ELSWR: CPT

## 2021-01-28 RX ORDER — HEPARIN SODIUM 5000 [USP'U]/ML
9500 INJECTION INTRAVENOUS; SUBCUTANEOUS EVERY 6 HOURS
Refills: 0 | Status: DISCONTINUED | OUTPATIENT
Start: 2021-01-28 | End: 2021-01-28

## 2021-01-28 RX ORDER — HEPARIN SODIUM 5000 [USP'U]/ML
INJECTION INTRAVENOUS; SUBCUTANEOUS
Qty: 25000 | Refills: 0 | Status: DISCONTINUED | OUTPATIENT
Start: 2021-01-28 | End: 2021-01-28

## 2021-01-28 RX ORDER — FLUCONAZOLE 150 MG/1
200 TABLET ORAL ONCE
Refills: 0 | Status: COMPLETED | OUTPATIENT
Start: 2021-01-28 | End: 2021-01-28

## 2021-01-28 RX ORDER — HEPARIN SODIUM 5000 [USP'U]/ML
4500 INJECTION INTRAVENOUS; SUBCUTANEOUS EVERY 6 HOURS
Refills: 0 | Status: DISCONTINUED | OUTPATIENT
Start: 2021-01-28 | End: 2021-01-28

## 2021-01-28 RX ORDER — FLUCONAZOLE 150 MG/1
100 TABLET ORAL DAILY
Refills: 0 | Status: DISCONTINUED | OUTPATIENT
Start: 2021-01-29 | End: 2021-02-05

## 2021-01-28 RX ORDER — HEPARIN SODIUM 5000 [USP'U]/ML
9500 INJECTION INTRAVENOUS; SUBCUTANEOUS ONCE
Refills: 0 | Status: COMPLETED | OUTPATIENT
Start: 2021-01-28 | End: 2021-01-28

## 2021-01-28 RX ORDER — SODIUM CHLORIDE 9 MG/ML
1000 INJECTION INTRAMUSCULAR; INTRAVENOUS; SUBCUTANEOUS
Refills: 0 | Status: DISCONTINUED | OUTPATIENT
Start: 2021-01-28 | End: 2021-01-30

## 2021-01-28 RX ORDER — HEPARIN SODIUM 5000 [USP'U]/ML
9500 INJECTION INTRAVENOUS; SUBCUTANEOUS ONCE
Refills: 0 | Status: DISCONTINUED | OUTPATIENT
Start: 2021-01-28 | End: 2021-01-28

## 2021-01-28 RX ADMIN — HEPARIN SODIUM 4500 UNIT(S): 5000 INJECTION INTRAVENOUS; SUBCUTANEOUS at 05:00

## 2021-01-28 RX ADMIN — ZOLPIDEM TARTRATE 5 MILLIGRAM(S): 10 TABLET ORAL at 23:05

## 2021-01-28 RX ADMIN — Medication 125 MILLIGRAM(S): at 07:36

## 2021-01-28 RX ADMIN — MIDODRINE HYDROCHLORIDE 5 MILLIGRAM(S): 2.5 TABLET ORAL at 05:02

## 2021-01-28 RX ADMIN — Medication 125 MILLIGRAM(S): at 17:08

## 2021-01-28 RX ADMIN — Medication 125 MILLIGRAM(S): at 12:03

## 2021-01-28 RX ADMIN — MIDODRINE HYDROCHLORIDE 5 MILLIGRAM(S): 2.5 TABLET ORAL at 12:03

## 2021-01-28 RX ADMIN — ONDANSETRON 4 MILLIGRAM(S): 8 TABLET, FILM COATED ORAL at 23:06

## 2021-01-28 RX ADMIN — HEPARIN SODIUM 2400 UNIT(S)/HR: 5000 INJECTION INTRAVENOUS; SUBCUTANEOUS at 04:59

## 2021-01-28 RX ADMIN — Medication 125 MILLIGRAM(S): at 23:05

## 2021-01-28 RX ADMIN — PANTOPRAZOLE SODIUM 40 MILLIGRAM(S): 20 TABLET, DELAYED RELEASE ORAL at 05:03

## 2021-01-28 RX ADMIN — HEPARIN SODIUM 2100 UNIT(S)/HR: 5000 INJECTION INTRAVENOUS; SUBCUTANEOUS at 19:10

## 2021-01-28 RX ADMIN — HEPARIN SODIUM 9500 UNIT(S): 5000 INJECTION INTRAVENOUS; SUBCUTANEOUS at 19:14

## 2021-01-28 RX ADMIN — Medication 25 MILLIGRAM(S): at 23:05

## 2021-01-28 RX ADMIN — SODIUM CHLORIDE 100 MILLILITER(S): 9 INJECTION INTRAMUSCULAR; INTRAVENOUS; SUBCUTANEOUS at 05:41

## 2021-01-28 RX ADMIN — MIDODRINE HYDROCHLORIDE 5 MILLIGRAM(S): 2.5 TABLET ORAL at 17:07

## 2021-01-28 RX ADMIN — FLUCONAZOLE 200 MILLIGRAM(S): 150 TABLET ORAL at 12:04

## 2021-01-28 NOTE — CONSULT NOTE ADULT - SUBJECTIVE AND OBJECTIVE BOX
MADHAVI JOHNSON,  65y Female  MRN: [id number]  ATTENDING: Dr. Roland Walker      HPI:    64 yo F with multiple comorbidities including pancreatic cancer with malignant ascites on checmo with abaraxane and gemictabine last dose 1/22, currently under tx for CDAD on day #5 of po vanco which she states she ahs been unable to tolerate, recurrent ascites and multiple paracentesis (last 2 weeks ago), renal insuffiency, NAHOMI of cpap presents with generlazied weakness, nausea, diarrhea and lacy. Nausea will not allow for her to take po vanco. Still having numerous watery BMs daily.  LACY with minimal exertion. She denies cough. No abd pain. No HA/Neck pain or blurry vision. No other complaints to offer    ED course: given iv vanco and zosyn for presumed sepsis. Lactate 2.2 cleared after 3L NS. Dimer elevated however V/Q neg for PE. O2S 94% on RA at rest with soft BP 89/51 however historically patient has low BP and was placed on midodrine last admission. PT pancytopenic. Afebrile. CXR clear.           PAST MEDICAL & SURGICAL HISTORY:  Vascular insufficiency  left leg    Sciatic leg pain  right    Hip pain    Arthritis    S/P hip replacement, bilateral    History of tonsillectomy and adenoidectomy    H/O arthroscopy of left knee  1994        Allergies    No Known Allergies    Intolerances        SOCIAL HISTORY:    FAMILY HISTORY:  Family history of heart disease  a. fib, valvular dysfunction    Family history of COPD (chronic obstructive pulmonary disease)   (27 Jan 2021 15:06)      PAST MEDICAL & SURGICAL HISTORY:  Vascular insufficiency  left leg    Sciatic leg pain  right    Hip pain    Arthritis    S/P hip replacement, bilateral    History of tonsillectomy and adenoidectomy    H/O arthroscopy of left knee  1994        MEDICATION:  metoprolol tartrate 25 milliGRAM(s) Oral every 8 hours  midodrine. 5 milliGRAM(s) Oral three times a day  pantoprazole    Tablet 40 milliGRAM(s) Oral before breakfast  sodium chloride 0.9%. 1000 milliLiter(s) IV Continuous <Continuous>  vancomycin    Solution 125 milliGRAM(s) Oral every 6 hours      ALLERGIES:  No Known Allergies      FAMILY HISTORY:  Reviewed, non-contributory: [ ]   Maternal-  Paternal-    SOCIAL HISTORY:  Tobacco: YES [ ]  ; NO [ ]; Former smoker [ ]  Alcohol:   YES [ ]  ; NO [ ]; Social alcohol user [ ]  Occupation/ marital status/ children:    REVIEW SYSTEMS:  Constitutional: no fever, chills, night sweats, no weight loss  HEENT: denies visual changes; no oral ulcers, dysphagia, no epistaxis; oses corrective lenses  Respiratory: no dyspnea , wheezing, cough, hemoptysis  Cardiovascular: denies acute chest pain, palpitations  GI: no loss of appetite, dark stools, or abdominal tenderness / pain; no change in bowel habits.  Musculoskeletal: no new back pain, bone/ joint pain ,no extremity swelling  Integumentary: denies pruritus; no skin rash, bruises, no  suspicious skin lesions  Neurologic: denies peripheral numbness, no dizziness, no gait problems.  Heme: no reported easy bruisability; no lymph node enlargement    VITALS:  T(C): 36.4, Max: 37.3 (01-27-21 @ 22:34)  T(F): 97.6, Max: 99.2 (01-27-21 @ 22:34)  HR: 113 (91 - 123)  BP: 95/51 (83/46 - 104/89)  SpO2: 96% (96% - 100%)    PHYSICAL EXAM:  Constitutional: alert, well developed  HEENT: normocephalic, anicteric sclerae, no mucositis or thrush  Respiratory: bilateral clear to auscultation anteriorly  Cardiovascular : S1, S2 regular, rhythmic, no murmurs, gallops or rubs  Abdomen: soft, distended, + normoactive BS, no palpable HS- megaly  Extremities: no tenderness;  -c/c/e, pulses equal bilaterally  Integumentary: no rashes, scars, or lesions suggestive of malignancy  Neurologic: no gross focal deficits    LABS:  (01-28) WBC: 1.45 K/uL,Hemoglobin: 6.8 g/dL, Hematocrit: 21.0 %,    Platelet: 44 K/uL    (01-28) Na: 134 mmol/L ; K: 5.0 mmol/L ; BUN: 77 mg/dL ; Cr: 2.58 mg/dL.      PT/INR - ( 27 Jan 2021 10:12 )   PT: 22.9 sec;   INR: 2.02 ratio         PTT - ( 28 Jan 2021 04:30 )  PTT:48.6 sec    RADIOLOGY:               MADHAVI JOHNSON,  65y Female  MRN: [id number]  ATTENDING: Dr. Roland Walker      HPI:  65F with history of atrial flutter, on chronic anticoagulation with Eliquis, CHF, cardiomyopathy, essential hypertension, vascular insufficiency, sciatica, obesity, osteoarthritis, NAHOMI on CPAP, diagnosed in December 2020 with carcinomatosis via paracentesis with removal of 7.4 L ascites and cytopathology positive for malignant cells, favoring adenocarcinoma.  CA 19–9 was >4,000.  Started on gemcitabine/Abraxane in ambulatory, last chemotherapy given 1/22/2021.  Admitted with fatigue, generalized weakness, nausea.  Started on IV antibiotics.  VQ scan negative for PE.  Presenting with chemotherapy induced pancytopenia; also in HANANE.Venous ultrasound consistent with extensive DVT involving the popliteal, posterior tibial, gastrocnemius and soleal veins in the right lower extremity.Hematology-oncology consult requested to comment on ongoing treatment for pancreatic adenocarcinoma and on anticoagulation in patient with platelet count less than 50,000 .    PAST MEDICAL & SURGICAL HISTORY:  Vascular insufficiency left leg  Sciatic leg pain right  Hip pain  Arthritis  S/P hip replacement, bilateral  History of tonsillectomy and adenoidectomy  H/O arthroscopy of left knee-1994    MEDICATION:  metoprolol tartrate 25 milliGRAM(s) Oral every 8 hours  midodrine. 5 milliGRAM(s) Oral three times a day  pantoprazole    Tablet 40 milliGRAM(s) Oral before breakfast  sodium chloride 0.9%. 1000 milliLiter(s) IV Continuous <Continuous>  vancomycin    Solution 125 milliGRAM(s) Oral every 6 hours    ALLERGIES:  No Known Allergies    FAMILY HISTORY:  Reviewed, non-contributory: [ ]   Maternal- COPD, arthritis  Paternal-COPD    SOCIAL HISTORY:  Tobacco: YES [ ]  ; NO [ X ]; Former smoker [ ]  Alcohol:   YES [ ]  ; NO [ X ]; Social alcohol user [ ]  Occupation/ marital status/ children: single, no children    REVIEW SYSTEMS:  Constitutional: no fever, chills, night sweats, feeling fatigued  HEENT: denies visual changes; no oral ulcers, dysphagia, no epistaxis; oses corrective lenses  Respiratory: no dyspnea , wheezing, cough, hemoptysis  Cardiovascular: denies acute chest pain, palpitations  GI: no loss of appetite, dark stools, or abdominal tenderness / pain; +nausea  Musculoskeletal: no new back pain, bone/ joint pain ,no extremity swelling  Integumentary: denies pruritus; no skin rash, bruises, no  suspicious skin lesions  Neurologic: denies peripheral numbness, no dizziness, no gait problems.  Heme: no reported easy bruisability; no lymph node enlargement    VITALS:  T(C): 36.4, Max: 37.3 (01-27-21 @ 22:34)  T(F): 97.6, Max: 99.2 (01-27-21 @ 22:34)  HR: 113 (91 - 123)  BP: 95/51 (83/46 - 104/89)  SpO2: 96% (96% - 100%)    PHYSICAL EXAM:  Constitutional: alert, conversant, morbidly obese  HEENT: normocephalic, anicteric sclerae, no mucositis or thrush  Respiratory: bilateral clear to auscultation anteriorly  Cardiovascular : S1, S2 regular, rhythmic, no murmurs, gallops or rubs  Abdomen: soft, distended, + normoactive BS, no palpable HS- megaly  Extremities: no tenderness;  -c/c/e, pulses equal bilaterally  Integumentary: no rashes, scars, or lesions suggestive of malignancy  Neurologic: no gross focal deficits    LABS:  (01-28) WBC: 1.45 K/uL,Hemoglobin: 6.8 g/dL, Hematocrit: 21.0 %,  Platelet: 44 K/uL  (01-28) Na: 134 mmol/L ; K: 5.0 mmol/L ; BUN: 77 mg/dL ; Cr: 2.58 mg/dL.  PT/INR - ( 27 Jan 2021 10:12 )   PT: 22.9 sec;   INR: 2.02 ratio  PTT - ( 28 Jan 2021 04:30 )  PTT:48.6 sec    RADIOLOGY:  EXAM:  US ABDOMEN LIMITED                        PROCEDURE DATE:  01/27/2021    INTERPRETATION:  CLINICAL INFORMATION: Abdominal distention. Evaluate for ascites.  COMPARISON: CT 12/23/2020.  TECHNIQUE: Limited ultrasound of the 4 quadrants of the abdomen was performed to evaluate for ascites.  FINDINGS:  Moderate volume ascites with septations throughout all 4 quadrants.  IMPRESSION:  Moderate volume complex ascites.      EXAM:  US DPLX LWR EXT VEINS LTD RT                        PROCEDURE DATE:  01/27/2021    INTERPRETATION:  CLINICAL INFORMATION: Pain and swelling  COMPARISON: 9/11/2019  TECHNIQUE: Duplex sonography of the RIGHT LOWER extremity veinswith color and spectral Doppler, with and without compression.  FINDINGS:  There is occlusive thrombus involving the popliteal posterior tibial soleal and gastrocnemius veins. Common femoral and femoral veins are patent.  The contralateral common femoral vein is patent.  IMPRESSION:  Extensive deep venous thrombosis involving the popliteal, posterior tibial, gastrocnemius and soleal veins of the right lower extremity

## 2021-01-28 NOTE — PHYSICAL THERAPY INITIAL EVALUATION ADULT - GENERAL OBSERVATIONS, REHAB EVAL
Pt was found lying in bed in SDU with tele, IV,BP cuff, pulse oxy, Pt having breakfast, willing to participate in PT/therex, Low H/H: 6.8/21, pt would be getting blood transfusion soon

## 2021-01-28 NOTE — CONSULT NOTE ADULT - PROBLEM SELECTOR RECOMMENDATION 2
Chemotherapy-induced thrombocytopenia–platelet count 44,000.  This is not an absolute contraindication to anticoagulation, given the hypercoagulability of her malignancy, recent systemic chemotherapy treatment, and newly diagnosed right lower extremity extensive DVT.  Would proceed with heparin drip at this time, to be able to have procedures, with close monitoring of platelet count. Case discussed with hospitalist. Chemotherapy-induced thrombocytopenia–platelet count 44,000.  This is not an absolute contraindication to anticoagulation, given the hypercoagulability of her malignancy, recent systemic chemotherapy treatment, and newly diagnosed right lower extremity extensive DVT.  Would proceed with heparin drip at this time, to be able to accommodate procedures ( paracentesis, etc), with close monitoring of platelet count. Case discussed with Dr. Camacho and hospitalist on call.

## 2021-01-28 NOTE — CONSULT NOTE ADULT - ATTENDING COMMENTS
At this time, IVC filter not indicated.     Per [employer] Appropriateness Methog (Rosa et. al., Blood Transfus. May 2019) provided by Dr. Sandoval, 50% therapeutic dose of LMWH recommended in cancer patients with PLT <50 but >30. IVC Filter can be considered if PLT<30.     Will await further recs from hematology re: LMWH At this time, IVC filter not indicated.     Per [employer]/[facility] Appropriateness Method (Rosa et. al., Blood Transfus. May 2019) provided by Dr. Sandoval, 50% therapeutic dose of LMWH recommended in cancer patients with PLT <50 but >30. IVC Filter can be considered if PLT<30.     Will await further recs from hematology re: LMWH At this time, IVC filter not indicated.     Per [employer] Appropriateness Method (Rosa et. al., Blood Transfus. May 2019) provided by Dr. Sandoval, 50% therapeutic dose of LMWH recommended in cancer patients with PLT <50 but >30.     Discussed case with Dr. Carl who feels that thrombocytopenia is transient and patient can be safely started on AC at this time.     If patient should no longer be candidate for AC or PLT<30, filter may be indicated at that time.

## 2021-01-28 NOTE — PROGRESS NOTE ADULT - SUBJECTIVE AND OBJECTIVE BOX
66 yo female with PMH of  Atrial flutter  on Eliquis 11/9/19  had GILSON cardioversion,  Combined systolic and diastolic HF , Cardiomyopathy , pHTN, Vascular insufficiency LLE, R sciatica, DJD hips, morbid Obesity , low vignesh pain, Pulm. HTN, NAHOMI on CPAP, who presented  in the ER for abdominal distention and fatigue. Cardiology called to evaluate low voltage and possible injury pattern on ECG. Pt denies chest pain but reports mild dyspnea and fatigue since her diagnosis of cancer with ascites. Urgent echo was performed in the ED which revealed no evidence of pericardial effusion. EF was adequate.   Admitted for HANANE, malignant ascites possible UTI. LE extensive DVT noted AC had to be stopped due to thrombocytopenia    Vital Signs Last 24 Hrs  T(C): 36.4 (28 Jan 2021 08:38), Max: 37.3 (27 Jan 2021 22:34)  T(F): 97.6 (28 Jan 2021 08:38), Max: 99.2 (27 Jan 2021 22:34)  HR: 113 (28 Jan 2021 07:00) (91 - 123)  BP: 95/51 (28 Jan 2021 07:00) (83/46 - 104/89)  BP(mean): 60 (28 Jan 2021 07:00) (53 - 61)  RR: 22 (28 Jan 2021 07:00) (19 - 28)  SpO2: 96% (28 Jan 2021 07:00) (96% - 100%)    Constitutional: NAD, awake and alert, well-developed  HEENT: PERR, EOMI,    Neck:  supple,  No JVD  Respiratory: Breath sounds are clear bilaterally, No wheezing, rales or rhonchi  Cardiovascular: S1 and S2, regular rate and rhythm, distant sounds  Gastrointestinal: Distended abdomen  Extremities: No peripheral edema. No clubbing or cyanosis.  Vascular: 2+ peripheral pulses  Neurological: A/O x 3, no focal deficits  Musculoskeletal: no calf tenderness.  Skin: No rashes.                            6.8    1.45  )-----------( 44       ( 28 Jan 2021 04:30 )             21.0       01-28    134<L>  |  105  |  77<H>  ----------------------------<  156<H>  5.0   |  18<L>  |  2.58<H>    Ca    7.2<L>      28 Jan 2021 04:30    TPro  4.9<L>  /  Alb  1.3<L>  /  TBili  0.8  /  DBili  0.5<H>  /  AST  138<H>  /  ALT  92<H>  /  AlkPhos  230<H>  01-28      #LACY - multifactorial (recurrent ascites, anemia and large body habitus and NAHOMI/OHS)  abd doesn't seem tense to need para    #DVT  pt was on Eliquis prior  IR consult re: IVC filter  Onc to help with end of life decisions pt is full code    #CDAD  PO Vanco    #Renal insufficiency  HANANE most likely prerenal  mild Hyperkalemia    Metastatic  Pancreatic cancer  Pall consult    Pancytopenia chemo induced                   66 yo female with PMH of  Atrial flutter  on Eliquis 11/9/19  had GILSON cardioversion,  Combined systolic and diastolic HF , Cardiomyopathy , pHTN, Vascular insufficiency LLE, R sciatica, DJD hips, morbid Obesity , low vignesh pain, Pulm. HTN, NAHOMI on CPAP, who presented  in the ER for abdominal distention and fatigue. Cardiology called to evaluate low voltage and possible injury pattern on ECG. Pt denies chest pain but reports mild dyspnea and fatigue since her diagnosis of cancer with ascites. Urgent echo was performed in the ED which revealed no evidence of pericardial effusion. EF was adequate.   Admitted for HANANE, malignant ascites possible UTI. LE extensive DVT noted AC had to be stopped due to thrombocytopenia    Vital Signs Last 24 Hrs  T(C): 36.4 (28 Jan 2021 08:38), Max: 37.3 (27 Jan 2021 22:34)  T(F): 97.6 (28 Jan 2021 08:38), Max: 99.2 (27 Jan 2021 22:34)  HR: 113 (28 Jan 2021 07:00) (91 - 123)  BP: 95/51 (28 Jan 2021 07:00) (83/46 - 104/89)  BP(mean): 60 (28 Jan 2021 07:00) (53 - 61)  RR: 22 (28 Jan 2021 07:00) (19 - 28)  SpO2: 96% (28 Jan 2021 07:00) (96% - 100%)    Constitutional: NAD, awake and alert, well-developed  HEENT: PERR, EOMI,    Neck:  supple,  No JVD  Respiratory: Breath sounds are clear bilaterally, No wheezing, rales or rhonchi  Cardiovascular: S1 and S2, regular rate and rhythm, distant sounds  Gastrointestinal: Distended abdomen  Extremities: No peripheral edema. No clubbing or cyanosis.  Vascular: 2+ peripheral pulses  Neurological: A/O x 3, no focal deficits  Musculoskeletal: no calf tenderness.  Skin: No rashes.                            6.8    1.45  )-----------( 44       ( 28 Jan 2021 04:30 )             21.0       01-28    134<L>  |  105  |  77<H>  ----------------------------<  156<H>  5.0   |  18<L>  |  2.58<H>    Ca    7.2<L>      28 Jan 2021 04:30    TPro  4.9<L>  /  Alb  1.3<L>  /  TBili  0.8  /  DBili  0.5<H>  /  AST  138<H>  /  ALT  92<H>  /  AlkPhos  230<H>  01-28      #LACY - multifactorial (recurrent ascites, anemia and large body habitus and NAHOMI/OHS)  abd doesn't seem tense to need para    #DVT  pt was on Eliquis prior  IR consult re: IVC filter  Onc to help with end of life decisions pt is full code    #CDAD  PO Vanco    #Renal insufficiency  HANANE most likely prerenal  mild Hyperkalemia    Metastatic  Pancreatic cancer  Pall consult    Pancytopenia chemo induced    Sinus tachy  monitor add BB if needed

## 2021-01-28 NOTE — CONSULT NOTE ADULT - SUBJECTIVE AND OBJECTIVE BOX
Chief Complaint:  Patient is a 65y old  Female who presents with a chief complaint of DVT/chemo induced thrombocytopenia     HPI:  65 year old woman with a history of obesity, venous stasis, atrial flutter, pancreatic cancer (receiving chemotherapy), malignant ascites, NAHOMI admitted on 1/27/21 with fatigue and dyspnea; found to have extensive lower extremity DVT with hospitalization complicated by a decrease in Hgb and plt count. IR was consulted for possible filter placement.      Allergies  No Known Allergies    MEDICATIONS  (STANDING):  metoprolol tartrate 25 milliGRAM(s) Oral every 8 hours  midodrine. 5 milliGRAM(s) Oral three times a day  pantoprazole    Tablet 40 milliGRAM(s) Oral before breakfast  sodium chloride 0.9%. 1000 milliLiter(s) (100 mL/Hr) IV Continuous <Continuous>  vancomycin    Solution 125 milliGRAM(s) Oral every 6 hours    MEDICATIONS  (PRN):  acetaminophen   Tablet .. 650 milliGRAM(s) Oral every 4 hours PRN Mild Pain (1 - 3)  ondansetron Injectable 4 milliGRAM(s) IV Push every 6 hours PRN Nausea  zolpidem 5 milliGRAM(s) Oral at bedtime PRN Insomnia  zolpidem 5 milliGRAM(s) Oral at bedtime PRN Insomnia      PAST MEDICAL & SURGICAL HISTORY:  Vascular insufficiency  left leg    Sciatic leg pain  right    Hip pain    Arthritis    S/P hip replacement, bilateral    History of tonsillectomy and adenoidectomy    H/O arthroscopy of left knee  1994        FAMILY HISTORY:  Family history of heart disease  a. fib, valvular dysfunction    Family history of COPD (chronic obstructive pulmonary disease)      Vital Signs Last 24 Hrs  T(C): 36.1 (28 Jan 2021 11:01), Max: 37.3 (27 Jan 2021 22:34)  T(F): 97 (28 Jan 2021 11:01), Max: 99.2 (27 Jan 2021 22:34)  HR: 114 (28 Jan 2021 12:00) (104 - 123)  BP: 85/46 (28 Jan 2021 12:00) (83/46 - 104/89)  BP(mean): 56 (28 Jan 2021 12:00) (53 - 61)  RR: 25 (28 Jan 2021 12:00) (17 - 28)  SpO2: 96% (28 Jan 2021 12:00) (96% - 100%)      CBC                        6.8    1.45  )-----------( 44       ( 28 Jan 2021 04:30 )             21.0       Chemistry  01-28    134<L>  |  105  |  77<H>  ----------------------------<  156<H>  5.0   |  18<L>  |  2.58<H>    Ca    7.2<L>      28 Jan 2021 04:30    TPro  4.9<L>  /  Alb  1.3<L>  /  TBili  0.8  /  DBili  0.5<H>  /  AST  138<H>  /  ALT  92<H>  /  AlkPhos  230<H>  01-28    Coags  PT/INR - ( 27 Jan 2021 10:12 )   PT: 22.9 sec;   INR: 2.02 ratio    PTT - ( 28 Jan 2021 04:30 )  PTT:48.6 sec

## 2021-01-28 NOTE — CHART NOTE - NSCHARTNOTEFT_GEN_A_CORE
Informed by Dr. Laureano that pt's hgb is 6.8 and that pt will need consent for blood transfusion.   Will transfuse 1 unit PRBC and hold heparin for 2 hours per MD.   Discussed with pt and aware.  Consent obtained and in chart.  Primary team and Vascular to see pt in am for further management of LE DVT Informed by Dr. Laureano that pt's hgb is 6.8 and that pt will need consent for blood transfusion.   No active bleeding   Will transfuse 1 unit PRBC and hold heparin per MD.   Discussed with pt and aware.  Consent obtained and in chart.  Primary team and Vascular to see pt in am for further management of LE DVT

## 2021-01-28 NOTE — CHART NOTE - NSCHARTNOTEFT_GEN_A_CORE
Plts down to 36,000  Case d/w IR- no IVC filter necessary  Case d/o Onc- start UFH  Case d/w HCP: doesn't agree with the use of IV UFH given the potential risk; would like to d/w Onc; I also reached out to vascular sx re: temporary IVC filter they will let me know  HCP has my cell and will call me back with decision; no IV UFH for now.  Cancel renal consult

## 2021-01-28 NOTE — PHYSICAL THERAPY INITIAL EVALUATION ADULT - AMBULATION SKILLS, REHAB EVAL
rollator at home, WC for longer distances, pt was unable to amb since 1 week due to weakness/needs device and assist

## 2021-01-28 NOTE — PHYSICAL THERAPY INITIAL EVALUATION ADULT - PERTINENT HX OF CURRENT PROBLEM, REHAB EVAL
Pt is a 65y old Female with multiple comorbidities including pancreatic cancer with malignant ascites on chemo last dose 1/22, presents with generalized weakness, nausea, diarrhea and lacy. Still having numerous watery BMs daily.  LACY with minimal exertion. She has been found to have extensive DVT LLE, anemia requiring transfusion as well as persistent weakness., her 3rd hospitalization since Dec 2020, Denies dyspnea at rest however endorses overall weakness and fatigue. c/o pain while swallowing

## 2021-01-28 NOTE — PROGRESS NOTE ADULT - SUBJECTIVE AND OBJECTIVE BOX
REASON FOR VISIT: SOB, AF    HPI:  65 year old woman with a history of obesity, venous stasis, atrial flutter, pancreatic cancer (receiving chemotherapy), malignant ascites, NAHOMI admitted on 1/27/21 with fatigue and dyspnea; found to have extensive lower extremity DVT with hospitalization complicated by a decrease in Hgb and plt count.    1/28/21:  Comfortable at rest but fatigued.  No chest pain.    MEDICATIONS  (STANDING):  fluconAZOLE   Tablet 200 milliGRAM(s) Oral once  metoprolol tartrate 25 milliGRAM(s) Oral every 8 hours  midodrine. 5 milliGRAM(s) Oral three times a day  pantoprazole    Tablet 40 milliGRAM(s) Oral before breakfast  sodium chloride 0.9%. 1000 milliLiter(s) (100 mL/Hr) IV Continuous <Continuous>  vancomycin    Solution 125 milliGRAM(s) Oral every 6 hours    Vital Signs Last 24 Hrs  T(C): 36.1 (28 Jan 2021 11:01), Max: 37.3 (27 Jan 2021 22:34)  T(F): 97 (28 Jan 2021 11:01), Max: 99.2 (27 Jan 2021 22:34)  HR: 116 (28 Jan 2021 11:01) (102 - 123)  BP: 90/43 (28 Jan 2021 11:01) (83/46 - 104/89)  BP(mean): 55 (28 Jan 2021 11:01) (53 - 61)  RR: 17 (28 Jan 2021 11:01) (17 - 28)  SpO2: 99% (28 Jan 2021 11:01) (96% - 100%)    PHYSICAL EXAM:  Constitutional: Obese, semirecumbent in bed, awake/alert  Respiratory: Breath sounds are clear bilaterally  Cardiovascular: S1 and S2, regular, mild tachycardia  Gastrointestinal: Distended abdomen  Psych: Appropriate mood and affect    LABS:                6.8    1.45  )-----------( 44       ( 28 Jan 2021 04:30 )             21.0     134<L>  |  105  |  77<H>  ----------------------------<  156<H>  5.0   |  18<L>  |  2.58<H>    Ca    7.2<L>      28 Jan 2021 04:30    TPro  4.9<L>  /  Alb  1.3<L>  /  TBili  0.8  /  DBili  0.5<H>  /  AST  138<H>  /  ALT  92<H>  /  AlkPhos  230<H>  01-28    TTE Echo Complete w/o Contrast w/ Doppler (01.27.21 @ 10:54):   The left ventricle is normal in size, wall motion and contractility. Mild concentric left ventricular hypertrophy is present. Estimated left ventricular ejection fraction is 55-60 %.   The left atrium is mildly dilated.   Normal appearing right ventricle structure and function.   Mild to moderate pulmonary hypertension is present.   Mild aortic sclerosis is present with normal valvular opening. Trace aortic regurgitation is present.   EA reversal of the mitral inflow consistent with reduced compliance of the left ventricle.   Trace mitral regurgitation is present.   Mild (1+) tricuspid valve regurgitation is present.   Trace pulmonic valvular regurgitation is present.   No pericardial effusion    NM Pulmonary Perfusion Scan (01.27.21 @ 14:29): Very low probability of pulmonary embolus.    US Duplex Venous Lower Ext Ltd, Right (01.27.21 @ 16:18):  Extensive deep venous thrombosis involving the popliteal, posterior tibial, gastrocnemius and soleal veins of the right lower extremity    12 Lead ECG (01.27.21 @ 09:45):  Sinus tachycardia with occasional Premature ventricular complexes, Low voltage QRS, ST elevation consider inferior injury or acute infarct, Prolonged QT

## 2021-01-28 NOTE — CONSULT NOTE ADULT - PROBLEM SELECTOR RECOMMENDATION 9
Pancreatic adenocarcinoma with extensive carcinomatosis, malignant ascites, requiring paracentesis, status post chemotherapy with gemcitabine/Abraxane–last dose 1/22/2021.  Cytopenias are chemo induced and expected to be short-lived.  Transfusion support considered.  Will follow up in ambulatory for cycle #2 chemotherapy next week.  Paracentesis today. Pancreatic adenocarcinoma with extensive carcinomatosis, malignant ascites, requiring paracentesis, status post chemotherapy with gemcitabine/Abraxane–last dose 1/22/2021.  Cytopenias are chemo induced and expected to be short-lived.  Transfusion support considered.  Will follow up in ambulatory for cycle #2 chemotherapy next week.  Will hold IVC filter placement for now, as risk of clotting is still significant, given expected platelet recovery.

## 2021-01-28 NOTE — PROGRESS NOTE ADULT - SUBJECTIVE AND OBJECTIVE BOX
SURGERY DAILY PROGRESS NOTE:     Subjective:  Patient seen and examined this AM at bedside. Patient had Hb drop overnight, is to get transfusion soon. No sign of bleeding externally, no melena. Denies fever/chills, shortness of breath, chest pain. VS reviewed    Objective:    MEDICATIONS  (STANDING):  metoprolol tartrate 25 milliGRAM(s) Oral every 8 hours  midodrine. 5 milliGRAM(s) Oral three times a day  pantoprazole    Tablet 40 milliGRAM(s) Oral before breakfast  sodium chloride 0.9%. 1000 milliLiter(s) (100 mL/Hr) IV Continuous <Continuous>  vancomycin    Solution 125 milliGRAM(s) Oral every 6 hours    MEDICATIONS  (PRN):  acetaminophen   Tablet .. 650 milliGRAM(s) Oral every 4 hours PRN Mild Pain (1 - 3)  ondansetron Injectable 4 milliGRAM(s) IV Push every 6 hours PRN Nausea  zolpidem 5 milliGRAM(s) Oral at bedtime PRN Insomnia  zolpidem 5 milliGRAM(s) Oral at bedtime PRN Insomnia      Vital Signs Last 24 Hrs  T(C): 37.3 (2021 05:00), Max: 37.3 (2021 22:34)  T(F): 99.2 (2021 05:00), Max: 99.2 (2021 22:34)  HR: 112 (2021 06:00) (63 - 123)  BP: 90/46 (2021 06:00) (83/46 - 104/89)  BP(mean): 56 (2021 06:00) (53 - 61)  RR: 23 (2021 06:00) (19 - 28)  SpO2: 96% (2021 06:00) (94% - 100%)      PHYSICAL EXAM   GENERAL: NAD, AOx3, well developed, well nourished  HEAD: Atraumatic, normocephalic  EYES: EOMI, PERRLA, conjunctiva and sclera clear  ENT: moist mucous membrane  NECK: supple, No JVD, midline trachea  CHEST/LUNG: clear to auscultation b/l, no rales, rhonchi, wheezing or rubs. unlabored respirations  Heart: S1, S2 normal, RRR w/o murmur  ABDOMEN: soft, nondistended, nontender. no organomegaly  EXTREMITIES: Ext: palpable pulses, chronic venous skin changes b/l lower extremities, RLE swollen compared to the left    NERVOUS SYSTEM: AOx3, speech clear, no neuro-deficits  MSK: full ROM, no deformities  SKIN: warm to touch, no rash or lesions      I&O's Detail    2021 07:01  -  2021 07:00  --------------------------------------------------------  IN:  Total IN: 0 mL    OUT:    Voided (mL): 200 mL  Total OUT: 200 mL    Total NET: -200 mL          Daily Height in cm: 160.02 (2021 09:31)    Daily Weight in k (2021 05:00)    LABS:                        6.8    1.45  )-----------( 44       ( 2021 04:30 )             21.0         134<L>  |  105  |  77<H>  ----------------------------<  156<H>  5.0   |  18<L>  |  2.58<H>    Ca    7.2<L>      2021 04:30    TPro  4.9<L>  /  Alb  1.3<L>  /  TBili  0.8  /  DBili  0.5<H>  /  AST  138<H>  /  ALT  92<H>  /  AlkPhos  230<H>      PT/INR - ( 2021 10:12 )   PT: 22.9 sec;   INR: 2.02 ratio         PTT - ( 2021 04:30 )  PTT:48.6 sec  Urinalysis Basic - ( 2021 17:35 )    Color: Yellow / Appearance: Clear / S.015 / pH: x  Gluc: x / Ketone: Negative  / Bili: Negative / Urobili: Negative mg/dL   Blood: x / Protein: 30 mg/dL / Nitrite: Negative   Leuk Esterase: Trace / RBC: 6-10 /HPF / WBC 3-5   Sq Epi: x / Non Sq Epi: Occasional / Bacteria: Occasional        RADIOLOGY & ADDITIONAL STUDIES:

## 2021-01-28 NOTE — CHART NOTE - NSCHARTNOTEFT_GEN_A_CORE
Platelets now dropped to 33, 000 on repeat CBC  Discussed with Dr. Carl and aware, she recommended to hold Heparin for tonight   Primary team to reevaluate for AC in am Platelets now dropped to 33, 000 on repeat CBC  Discussed with hem/onc Dr. Carl and aware, she recommended to hold Heparin for tonight   Primary team to reevaluate for AC in am

## 2021-01-28 NOTE — PROGRESS NOTE ADULT - ASSESSMENT
64 yo F with PMH of Aflutter on Eliquis and pancreatic CA presents w/ RLE DVT in popliteal  Drop in Hb overnight to 6.8, getting transfusion    Plan:   -patient requires to be on anticoagulation if possible  -monitor leg swelling  -follow cardiology recc  -recommend work up for anemia  -recommend GI evaluation  -continue leg elevation  -continue medical management per primary     Plan discussed with vascular surgery team and attending, Dr. Mccauley

## 2021-01-28 NOTE — CONSULT NOTE ADULT - SUBJECTIVE AND OBJECTIVE BOX
HPI: Pt is a 65y old Female with multiple comorbidities including pancreatic cancer with malignant ascites on chemo last dose , currently under tx for CDAD on po vanco which she states she has been unable to tolerate due to nausea, recurrent ascites and multiple paracentesis (last 2 weeks ago), renal insufficiency, NAHOMI presents with generalized weakness, nausea, diarrhea and lacy. Still having numerous watery BMs daily.  LACY with minimal exertion. She has been found to have extensive DVT LLE, anemia requiring transfusion as well as persistent weakness. Palliative Medicine Consult called to establish GOC as this is her 3rd hospitalization since Dec 2020  1/28/21 Seen and examined at bedside. Denies C/Om pain other than discomfort and difficulty, pain swallowing R/T oral candidiasis. Denies dyspnea at rest however endorses overall weakness and fatigue.      PAIN: (X )Yes   ( )No  Level: mod  Location: oral/throat  Intensity:   5 /10  Quality: sharp  Aggravating Factors: PO intake  Impact on ADLs: severe    DYSPNEA: (X ) Yes  ( ) No  on exertion     PAST MEDICAL & SURGICAL HISTORY:  Vascular insufficiency left leg  Sciatic leg pain right  Hip pain  Arthritis  S/P hip replacement, bilateral  History of tonsillectomy and adenoidectomy  H/O arthroscopy of left knee         SOCIAL HX:  Lives alone  Sister visits daily  Hx opiate tolerance ( )YES  (X )NO    Baseline ADLs  (Prior to Admission)  (X ) Independent   ( )Dependent    FAMILY HISTORY:  Family history of heart disease  a. fib, valvular dysfunction  Family history of COPD (chronic obstructive pulmonary disease)        Review of Systems:    Anxiety-denies  Depression-situational  Physical Discomfort-mod  Dyspnea-on exertion  Constipation-denies  Diarrhea-yes  Nausea-yes  Anorexia-mod-severe  Fatigue-mod-severe  Weakness-severe    All other systems reviewed and negative    PHYSICAL EXAM:    Vital Signs Last 24 Hrs  T(C): 36.4 (2021 08:38), Max: 37.3 (2021 22:34)  T(F): 97.6 (2021 08:38), Max: 99.2 (2021 22:34)  HR: 113 (2021 07:00) (91 - 123)  BP: 95/51 (2021 07:00) (83/46 - 104/89)  BP(mean): 60 (2021 07:00) (53 - 61)  RR: 22 (2021 07:00) (19 - 28)  SpO2: 96% (2021 07:00) (96% - 100%)  Daily Weight in k (2021 05:00)    PPSV2: 40-50 %    General: Obese female in bed in nad  Mental Status: A&O x3  HEENT: oral mucosa dry  Lungs: clear to auscultation nusrat  Cardiac: S1S2+  GI: abd soft NT ND + BS  : voids  Ext: WARREN on bed  Neuro: no focal def      LABS:                        6.8    1.45  )-----------( 44       ( 2021 04:30 )             21.0         134<L>  |  105  |  77<H>  ----------------------------<  156<H>  5.0   |  18<L>  |  2.58<H>    Ca    7.2<L>      2021 04:30    TPro  4.9<L>  /  Alb  1.3<L>  /  TBili  0.8  /  DBili  0.5<H>  /  AST  138<H>  /  ALT  92<H>  /  AlkPhos  230<H>      PT/INR - ( 2021 10:12 )   PT: 22.9 sec;   INR: 2.02 ratio         PTT - ( 2021 04:30 )  PTT:48.6 sec  Albumin: Albumin, Serum: 1.3 g/dL ( @ 04:30)      Allergies    No Known Allergies    Intolerances      MEDICATIONS  (STANDING):  fluconAZOLE   Tablet 200 milliGRAM(s) Oral once  metoprolol tartrate 25 milliGRAM(s) Oral every 8 hours  midodrine. 5 milliGRAM(s) Oral three times a day  pantoprazole    Tablet 40 milliGRAM(s) Oral before breakfast  sodium chloride 0.9%. 1000 milliLiter(s) (100 mL/Hr) IV Continuous <Continuous>  vancomycin    Solution 125 milliGRAM(s) Oral every 6 hours    MEDICATIONS  (PRN):  acetaminophen   Tablet .. 650 milliGRAM(s) Oral every 4 hours PRN Mild Pain (1 - 3)  ondansetron Injectable 4 milliGRAM(s) IV Push every 6 hours PRN Nausea  zolpidem 5 milliGRAM(s) Oral at bedtime PRN Insomnia  zolpidem 5 milliGRAM(s) Oral at bedtime PRN Insomnia      RADIOLOGY/ADDITIONAL STUDIES:    < from: US Abdomen Limited (21 @ 16:57) >    EXAM:  US ABDOMEN LIMITED                            PROCEDURE DATE:  2021          INTERPRETATION:  CLINICAL INFORMATION: Abdominal distention. Evaluate for ascites.    COMPARISON: CT 2020.    TECHNIQUE: Limited ultrasound of the 4 quadrants of the abdomen was performed to evaluate for ascites.    FINDINGS:    Moderate volume ascites with septations throughout all 4 quadrants.    IMPRESSION:    Moderate volume complex ascites.        < from: US Duplex Venous Lower Ext Ltd, Right (21 @ 16:18) >    EXAM:  US DPLX LWR EXT VEINS LTD RT                            PROCEDURE DATE:  2021          INTERPRETATION:  CLINICAL INFORMATION: Pain and swelling    COMPARISON: 2019    TECHNIQUE: Duplex sonography of the RIGHT LOWER extremity veins with color and spectral Doppler, with and without compression.    FINDINGS:    There is occlusive thrombus involving the popliteal posterior tibial soleal and gastrocnemius veins. Common femoral and femoral veins are patent.    The contralateral common femoral vein is patent.    IMPRESSION:  Extensive deep venous thrombosis involving the popliteal, posterior tibial, gastrocnemius and soleal veins of the right lower extremity    < from: Xray Chest 1 View- PORTABLE-Urgent (21 @ 11:11) >  EXAM:  XR CHEST PORTABLE URGENT 1V                            PROCEDURE DATE:  2021          INTERPRETATION:  Exam Date: 2021 11:11 AM    Chest radiograph (one view)    CLINICAL INFORMATION:  Sepsis    TECHNIQUE:  Single frontal view of the chest was obtained.    COMPARISON: 2020 radiograph    FINDINGS/  IMPRESSION:    Right port catheter with tip in the SVC is present.    The lungs are clear.  No pleural abnormality is seen.    Cardiomediastinal silhouette is intact.      < from: NM Pulmonary Perfusion Scan (21 @ 14:29) >    EXAM:  NM PULM PERFUSION IMG                            PROCEDURE DATE:  2021          INTERPRETATION:  RADIOPHARMACEUTICAL: 99mTc-MAA       DOSE: 3.4 mCi IV    CLINICAL INFORMATION: 65 year old female with sepsis, ascites, shortness of breath and elevated d-dimer, referred to evaluate for pulmonary embolus.    TECHNIQUE: Perfusion images of the lungs were obtained following administration of Tc-99m-MAA. Images were obtained in the anterior, posterior, both lateral, and all 4 oblique projections. This study was interpreted in conjunction with a chest radiograph of 2021  .  COMPARISON: No previous lung scan for comparison.    FINDINGS: The study demonstrates heterogenous tracer distribution in both lungs. There are no well-definedsegmental perfusion defects.    IMPRESSION: Very low probability of pulmonary embolus.      < end of copied text >

## 2021-01-29 ENCOUNTER — OUTPATIENT (OUTPATIENT)
Dept: OUTPATIENT SERVICES | Facility: HOSPITAL | Age: 66
LOS: 1 days | Discharge: HOME | End: 2021-01-29

## 2021-01-29 ENCOUNTER — APPOINTMENT (OUTPATIENT)
Age: 66
End: 2021-01-29

## 2021-01-29 DIAGNOSIS — Z98.890 OTHER SPECIFIED POSTPROCEDURAL STATES: Chronic | ICD-10-CM

## 2021-01-29 DIAGNOSIS — Z96.643 PRESENCE OF ARTIFICIAL HIP JOINT, BILATERAL: Chronic | ICD-10-CM

## 2021-01-29 DIAGNOSIS — D68.69 OTHER THROMBOPHILIA: ICD-10-CM

## 2021-01-29 DIAGNOSIS — C80.1 MALIGNANT (PRIMARY) NEOPLASM, UNSPECIFIED: ICD-10-CM

## 2021-01-29 LAB
ADD ON TEST-SPECIMEN IN LAB: SIGNIFICANT CHANGE UP
CULTURE RESULTS: SIGNIFICANT CHANGE UP
HCT VFR BLD CALC: 22 % — LOW (ref 34.5–45)
HGB BLD-MCNC: 7.1 G/DL — LOW (ref 11.5–15.5)
MCHC RBC-ENTMCNC: 27.3 PG — SIGNIFICANT CHANGE UP (ref 27–34)
MCHC RBC-ENTMCNC: 32.3 GM/DL — SIGNIFICANT CHANGE UP (ref 32–36)
MCV RBC AUTO: 84.6 FL — SIGNIFICANT CHANGE UP (ref 80–100)
NRBC # BLD: SIGNIFICANT CHANGE UP /100 WBCS (ref 0–0)
PLATELET # BLD AUTO: 31 K/UL — LOW (ref 150–400)
RBC # BLD: 2.6 M/UL — LOW (ref 3.8–5.2)
RBC # FLD: 14.8 % — HIGH (ref 10.3–14.5)
SPECIMEN SOURCE: SIGNIFICANT CHANGE UP
WBC # BLD: 1.39 K/UL — LOW (ref 3.8–10.5)
WBC # FLD AUTO: 1.39 K/UL — LOW (ref 3.8–10.5)

## 2021-01-29 PROCEDURE — 99232 SBSQ HOSP IP/OBS MODERATE 35: CPT

## 2021-01-29 PROCEDURE — 99233 SBSQ HOSP IP/OBS HIGH 50: CPT

## 2021-01-29 PROCEDURE — 37191 INS ENDOVAS VENA CAVA FILTR: CPT

## 2021-01-29 RX ORDER — DIPHENHYDRAMINE HYDROCHLORIDE AND LIDOCAINE HYDROCHLORIDE AND ALUMINUM HYDROXIDE AND MAGNESIUM HYDRO
10 KIT EVERY 4 HOURS
Refills: 0 | Status: DISCONTINUED | OUTPATIENT
Start: 2021-01-29 | End: 2021-02-05

## 2021-01-29 RX ORDER — METOPROLOL TARTRATE 50 MG
12.5 TABLET ORAL EVERY 8 HOURS
Refills: 0 | Status: DISCONTINUED | OUTPATIENT
Start: 2021-01-29 | End: 2021-02-05

## 2021-01-29 RX ORDER — FILGRASTIM 480MCG/1.6
480 VIAL (ML) INJECTION DAILY
Refills: 0 | Status: COMPLETED | OUTPATIENT
Start: 2021-01-29 | End: 2021-01-31

## 2021-01-29 RX ADMIN — FLUCONAZOLE 100 MILLIGRAM(S): 150 TABLET ORAL at 09:38

## 2021-01-29 RX ADMIN — MIDODRINE HYDROCHLORIDE 5 MILLIGRAM(S): 2.5 TABLET ORAL at 13:34

## 2021-01-29 RX ADMIN — Medication 12.5 MILLIGRAM(S): at 22:29

## 2021-01-29 RX ADMIN — PANTOPRAZOLE SODIUM 40 MILLIGRAM(S): 20 TABLET, DELAYED RELEASE ORAL at 05:48

## 2021-01-29 RX ADMIN — Medication 480 MICROGRAM(S): at 13:36

## 2021-01-29 RX ADMIN — Medication 125 MILLIGRAM(S): at 05:48

## 2021-01-29 RX ADMIN — DIPHENHYDRAMINE HYDROCHLORIDE AND LIDOCAINE HYDROCHLORIDE AND ALUMINUM HYDROXIDE AND MAGNESIUM HYDRO 10 MILLILITER(S): KIT at 18:26

## 2021-01-29 RX ADMIN — Medication 125 MILLIGRAM(S): at 13:35

## 2021-01-29 RX ADMIN — MIDODRINE HYDROCHLORIDE 5 MILLIGRAM(S): 2.5 TABLET ORAL at 18:26

## 2021-01-29 RX ADMIN — ZOLPIDEM TARTRATE 5 MILLIGRAM(S): 10 TABLET ORAL at 21:16

## 2021-01-29 RX ADMIN — MIDODRINE HYDROCHLORIDE 5 MILLIGRAM(S): 2.5 TABLET ORAL at 05:48

## 2021-01-29 RX ADMIN — DIPHENHYDRAMINE HYDROCHLORIDE AND LIDOCAINE HYDROCHLORIDE AND ALUMINUM HYDROXIDE AND MAGNESIUM HYDRO 10 MILLILITER(S): KIT at 22:29

## 2021-01-29 RX ADMIN — Medication 25 MILLIGRAM(S): at 05:48

## 2021-01-29 RX ADMIN — Medication 125 MILLIGRAM(S): at 18:26

## 2021-01-29 RX ADMIN — ONDANSETRON 4 MILLIGRAM(S): 8 TABLET, FILM COATED ORAL at 20:28

## 2021-01-29 NOTE — PROGRESS NOTE ADULT - ASSESSMENT
65F with history of atrial flutter, on chronic anticoagulation with Eliquis, CHF, cardiomyopathy, essential hypertension, vascular insufficiency, sciatica, obesity, osteoarthritis, NAHOMI on CPAP, diagnosed in December 2020 with carcinomatosis via paracentesis with removal of 7.4 L ascites and cytopathology positive for malignant cells, favoring adenocarcinoma.  CA 19–9 was >4,000.  Started on gemcitabine/Abraxane in ambulatory, last chemotherapy given 1/22/2021.  Admitted with fatigue, generalized weakness, nausea.  Started on IV antibiotics.  VQ scan negative for PE.  Presenting with chemotherapy induced pancytopenia; also in HANANE. Venous ultrasound consistent with extensive DVT involving the popliteal, posterior tibial, gastrocnemius and soleal veins in the right lower extremity. Hematology-oncology consult requested to comment on ongoing treatment for pancreatic adenocarcinoma and on anticoagulation in patient with platelet count less than 50,000 .

## 2021-01-29 NOTE — PROGRESS NOTE ADULT - ASSESSMENT
HPI: Pt is a 65y old Female with multiple comorbidities including pancreatic cancer with malignant ascites on chemo last dose 1/22, currently under tx for CDAD on po vanco which she states she has been unable to tolerate due to nausea, recurrent ascites and multiple paracentesis (last 2 weeks ago), renal insufficiency, NAHOMI presents with generalized weakness, nausea, diarrhea and lacy. Still having numerous watery BMs daily.  LACY with minimal exertion. She has been found to have extensive DVT LLE, anemia requiring transfusion as well as persistent weakness. Palliative Medicine Consult called to establish GOC as this is her 3rd hospitalization since Dec 2020  1/28/21 Seen and examined at bedside. Denies C/Om pain other than discomfort and difficulty, pain swallowing R/T oral candidiasis. Denies dyspnea at rest however endorses overall weakness and fatigue.      Assessment and Plan:    1) Dyspnea  -On exertion  -Supplemental O2 PRN  -NM scan= lo probability of PE    2) Pancreatic Cancer  -Carcinimitosis  -Ascites 3X paracentesis   -Chemo last 1/22  -Now neutropenic  -Oral candidiasis/mucocytis  -Add Diflucan 100 mg daily  -CDiff colitis  -Cont vanco  -Hypercoagulable state  -DVT LLE  -Onc eval noted      3) Debility  -Overall weakness  -Obesity  -Anemia  -Transfuse PRBC as per medicine  -Poor PO intake  -Hypoalbuminemia  -PT eval    4) DVT  -IVC filter placement  -AC as tolerated   -Cont to monitor    5) CKD  -Creat > 2.5  -Cont gentle hydration    6) Advanced Directives  -Pt with capacity  -Sister surrogate/pt declines calling her sister to schedule GOC discussion  -Attempted to discuss MOLST with pt and she does not want to discuss  -Will follow up Monday 2/1

## 2021-01-29 NOTE — PROGRESS NOTE ADULT - SUBJECTIVE AND OBJECTIVE BOX
SURGERY DAILY PROGRESS NOTE:     Subjective:  Patient seen and examined this AM at bedside. Having multiple BM secondary to c.diff. No sign of bleeding externally, no melena. Denies fever/chills, shortness of breath, chest pain. VS reviewed    Objective:    MEDICATIONS  (STANDING):  fluconAZOLE   Tablet 100 milliGRAM(s) Oral daily  metoprolol tartrate 25 milliGRAM(s) Oral every 8 hours  midodrine. 5 milliGRAM(s) Oral three times a day  pantoprazole    Tablet 40 milliGRAM(s) Oral before breakfast  sodium chloride 0.9%. 1000 milliLiter(s) (100 mL/Hr) IV Continuous <Continuous>  vancomycin    Solution 125 milliGRAM(s) Oral every 6 hours    MEDICATIONS  (PRN):  acetaminophen   Tablet .. 650 milliGRAM(s) Oral every 4 hours PRN Mild Pain (1 - 3)  ondansetron Injectable 4 milliGRAM(s) IV Push every 6 hours PRN Nausea  zolpidem 5 milliGRAM(s) Oral at bedtime PRN Insomnia  zolpidem 5 milliGRAM(s) Oral at bedtime PRN Insomnia      Vital Signs Last 24 Hrs  T(C): 36.6 (2021 05:00), Max: 37.2 (2021 21:15)  T(F): 97.9 (2021 05:00), Max: 99 (2021 21:15)  HR: 102 (2021 05:00) (102 - 118)  BP: 93/50 (2021 05:00) (81/43 - 102/52)  BP(mean): 59 (2021 05:00) (51 - 71)  RR: 21 (2021 05:00) (17 - 30)  SpO2: 95% (2021 05:00) (90% - 100%)      PHYSICAL EXAM   GENERAL: NAD, AOx3, well developed, well nourished  HEAD: Atraumatic, normocephalic  EYES: EOMI, PERRLA, conjunctiva and sclera clear  ENT: moist mucous membrane  NECK: supple, No JVD, midline trachea  CHEST/LUNG: clear to auscultation b/l, no rales, rhonchi, wheezing or rubs. unlabored respirations  Heart: S1, S2 normal, RRR w/o murmur  ABDOMEN: soft, nondistended, nontender. no organomegaly  EXTREMITIES: Ext: palpable pulses, chronic venous skin changes b/l lower extremities, RLE swollen compared to the left    NERVOUS SYSTEM: AOx3, speech clear, no neuro-deficits  MSK: full ROM, no deformities  SKIN: warm to touch, no rash or lesions      I&O's Detail    2021 07:01  -  2021 07:00  --------------------------------------------------------  IN:    PRBCs (Packed Red Blood Cells): 326 mL  Total IN: 326 mL    OUT:    Voided (mL): 350 mL  Total OUT: 350 mL    Total NET: -24 mL          Daily     Daily Weight in k (2021 05:00)    LABS:                        7.1    1.39  )-----------( 31       ( 2021 06:03 )             22.0         134<L>  |  105  |  77<H>  ----------------------------<  156<H>  5.0   |  18<L>  |  2.58<H>    Ca    7.2<L>      2021 04:30    TPro  4.9<L>  /  Alb  1.3<L>  /  TBili  0.8  /  DBili  0.5<H>  /  AST  138<H>  /  ALT  92<H>  /  AlkPhos  230<H>      PT/INR - ( 2021 10:12 )   PT: 22.9 sec;   INR: 2.02 ratio         PTT - ( 2021 14:16 )  PTT:23.7 sec  Urinalysis Basic - ( 2021 17:35 )    Color: Yellow / Appearance: Clear / S.015 / pH: x  Gluc: x / Ketone: Negative  / Bili: Negative / Urobili: Negative mg/dL   Blood: x / Protein: 30 mg/dL / Nitrite: Negative   Leuk Esterase: Trace / RBC: 6-10 /HPF / WBC 3-5   Sq Epi: x / Non Sq Epi: Occasional / Bacteria: Occasional        RADIOLOGY & ADDITIONAL STUDIES:

## 2021-01-29 NOTE — PROGRESS NOTE ADULT - SUBJECTIVE AND OBJECTIVE BOX
66 yo female with PMH of  Atrial flutter  on Eliquis 11/9/19  had GILSON cardioversion,  Combined systolic and diastolic HF , Cardiomyopathy , pHTN, Vascular insufficiency LLE, R sciatica, DJD hips, morbid Obesity , low vignesh pain, Pulm. HTN, NAHOMI on CPAP, who presented  in the ER for abdominal distention and fatigue. Cardiology called to evaluate low voltage and possible injury pattern on ECG. Pt denies chest pain but reports mild dyspnea and fatigue since her diagnosis of cancer with ascites. Urgent echo was performed in the ED which revealed no evidence of pericardial effusion. EF was adequate.   Admitted for HANANE, malignant ascites possible UTI. LE extensive DVT noted AC had to be stopped due to thrombocytopenia. IVC filter will be inserted today.    Vital Signs Last 24 Hrs  T(C): 35.9 (29 Jan 2021 07:55), Max: 37.2 (28 Jan 2021 21:15)  T(F): 96.6 (29 Jan 2021 07:55), Max: 99 (28 Jan 2021 21:15)  HR: 102 (29 Jan 2021 05:00) (102 - 118)  BP: 93/50 (29 Jan 2021 05:00) (81/43 - 102/52)  BP(mean): 59 (29 Jan 2021 05:00) (51 - 71)  RR: 21 (29 Jan 2021 05:00) (17 - 30)  SpO2: 95% (29 Jan 2021 05:00) (90% - 100%)    Constitutional: NAD, awake and alert, well-developed  HEENT: PERR, EOMI,    Neck:  supple,  No JVD  Respiratory: Breath sounds are clear bilaterally, No wheezing, rales or rhonchi  Cardiovascular: S1 and S2, regular rate and rhythm, distant sounds  Gastrointestinal: Distended abdomen  Extremities: No peripheral edema. No clubbing or cyanosis.  Vascular: 2+ peripheral pulses  Neurological: A/O x 3, no focal deficits  Musculoskeletal: no calf tenderness.  Skin: No rashes.                            7.1    1.39  )-----------( 31       ( 29 Jan 2021 06:03 )             22.0   01-29    138  |  111<H>  |  72<H>  ----------------------------<  139<H>  4.7   |  18<L>  |  2.56<H>    Ca    6.8<L>      29 Jan 2021 06:03    TPro  4.9<L>  /  Alb  1.3<L>  /  TBili  0.8  /  DBili  0.5<H>  /  AST  138<H>  /  ALT  92<H>  /  AlkPhos  230<H>  01-28      #LACY - multifactorial (recurrent ascites, anemia and large body habitus and NAHOMI/OHS)  resolved    Pancytopenia chemo induced    #DVT  pt was on Eliquis prior  IVC filter today      #CDAD  PO Vanco    #Renal insufficiency  HANANE nor recovering    Metastatic  Pancreatic cancer  Pall consult; remains full code    Pancytopenia chemo induced    Sinus tachy  monitor add BB if needed

## 2021-01-29 NOTE — PROGRESS NOTE ADULT - SUBJECTIVE AND OBJECTIVE BOX
HPI: Pt is a 65y old Female with multiple comorbidities including pancreatic cancer with malignant ascites on chemo last dose 1/22, currently under tx for CDAD on po vanco which she states she has been unable to tolerate due to nausea, recurrent ascites and multiple paracentesis (last 2 weeks ago), renal insufficiency, NAHOMI presents with generalized weakness, nausea, diarrhea and lacy. Still having numerous watery BMs daily.  LACY with minimal exertion. She has been found to have extensive DVT LLE, anemia requiring transfusion as well as persistent weakness. Palliative Medicine Consult called to establish GOC as this is her 3rd hospitalization since Dec 2020  1/28/21 Seen and examined at bedside. Denies C/Om pain other than discomfort and difficulty, pain swallowing R/T oral candidiasis. Denies dyspnea at rest however endorses overall weakness and fatigue.    1/29/21 Seen and examined at bedside with no family present. Pt feeling overwhelmed after speak with her oncologist this am. Denies pain however endorses feeling weak and fatigued with no appetite.   PAIN: (X )Yes   ( )No  Level: mod  Location: oral/throat  Intensity:   5 /10  Quality: sharp  Aggravating Factors: PO intake  Impact on ADLs: severe    DYSPNEA: (X ) Yes  ( ) No  on exertion     PAST MEDICAL & SURGICAL HISTORY:  Vascular insufficiency left leg  Sciatic leg pain right  Hip pain  Arthritis  S/P hip replacement, bilateral  History of tonsillectomy and adenoidectomy  H/O arthroscopy of left knee 1994        SOCIAL HX:  Lives alone  Sister visits daily  Hx opiate tolerance ( )YES  (X )NO    Baseline ADLs  (Prior to Admission)  (X ) Independent   ( )Dependent    FAMILY HISTORY:  Family history of heart disease  a. fib, valvular dysfunction  Family history of COPD (chronic obstructive pulmonary disease)        Review of Systems:    Anxiety-denies  Depression-situational  Physical Discomfort-mod  Dyspnea-on exertion  Constipation-denies  Diarrhea-yes  Nausea-yes  Anorexia-mod-severe  Fatigue-mod-severe  Weakness-severe    All other systems reviewed and negative    PHYSICAL EXAM:  ICU Vital Signs Last 24 Hrs  T(C): 35.9 (29 Jan 2021 07:55), Max: 37.2 (28 Jan 2021 21:15)  T(F): 96.6 (29 Jan 2021 07:55), Max: 99 (28 Jan 2021 21:15)  HR: 102 (29 Jan 2021 05:00) (102 - 118)  BP: 93/50 (29 Jan 2021 05:00) (81/43 - 102/52)  BP(mean): 59 (29 Jan 2021 05:00) (51 - 71)  RR: 21 (29 Jan 2021 05:00) (19 - 30)  SpO2: 95% (29 Jan 2021 05:00) (90% - 100%)    PPSV2: 40-50 %    General: Obese female in bed in NAD  Mental Status: A&O x3  HEENT: oral mucosa dry/+ mucocytis  Lungs: clear to auscultation nusrat  Cardiac: S1S2+  GI: abd soft NT ND + BS  : voids  Ext: WARREN on bed  Neuro: no focal def      LABS:                            7.1    1.39  )-----------( 31       ( 29 Jan 2021 06:03 )             22.0                 01-29    138  |  111<H>  |  72<H>  ----------------------------<  139<H>  4.7   |  18<L>  |  2.56<H>    Ca    6.8<L>      29 Jan 2021 06:03    TPro  4.9<L>  /  Alb  1.3<L>  /  TBili  0.8  /  DBili  0.5<H>  /  AST  138<H>  /  ALT  92<H>  /  AlkPhos  230<H>  01-28     PTT - ( 28 Jan 2021 04:30 )  PTT:48.6 sec  Albumin: Albumin, Serum: 1.3 g/dL (01-28 @ 04:30)      Allergies    No Known Allergies    Intolerances      MEDICATIONS  (STANDING):  RADIOLOGY/ADDITIONAL STUDIES:  < from: US Kidney and Bladder (01.28.21 @ 22:09) >    EXAM:  US KIDNEYS AND BLADDER                            PROCEDURE DATE:  01/28/2021          INTERPRETATION:  CLINICAL INFORMATION: Acute renal insufficiency    COMPARISON: Renal ultrasound 1/8/2021    TECHNIQUE: Sonography of the kidneys and bladder.    FINDINGS:    Right kidney: 11.5 cm. A 7 mm lower pole calculus. No hydronephrosis.    Left kidney: 10.7 cm. No renal mass, hydronephrosis or calculi. Poorly evaluated due to excessive shadowing.    Urinary bladder: Decompressed.    Perihepaticascites.    IMPRESSION:    7 mm nonobstructing right lower pole calculus.    Poorly evaluated left kidney.

## 2021-01-29 NOTE — PROGRESS NOTE ADULT - SUBJECTIVE AND OBJECTIVE BOX
REASON FOR VISIT: SOB, AF    HPI:  65 year old woman with a history of obesity, venous stasis, atrial flutter, pancreatic cancer (receiving chemotherapy), malignant ascites, NAHOMI admitted on 1/27/21 with fatigue and dyspnea; found to have extensive lower extremity DVT with hospitalization complicated by a decrease in Hgb and plt count.    1/28/21:  Comfortable at rest but fatigued.  No chest pain.  1/29/21:  Fatigue; no angina; no orthopnea.    MEDICATIONS  (STANDING):  fluconAZOLE   Tablet 100 milliGRAM(s) Oral daily  metoprolol tartrate 25 milliGRAM(s) Oral every 8 hours  midodrine. 5 milliGRAM(s) Oral three times a day  pantoprazole    Tablet 40 milliGRAM(s) Oral before breakfast  sodium chloride 0.9%. 1000 milliLiter(s) (100 mL/Hr) IV Continuous <Continuous>  vancomycin    Solution 125 milliGRAM(s) Oral every 6 hours    MEDICATIONS  (PRN):  acetaminophen   Tablet .. 650 milliGRAM(s) Oral every 4 hours PRN Mild Pain (1 - 3)  ondansetron Injectable 4 milliGRAM(s) IV Push every 6 hours PRN Nausea  zolpidem 5 milliGRAM(s) Oral at bedtime PRN Insomnia  zolpidem 5 milliGRAM(s) Oral at bedtime PRN Insomnia    Vital Signs Last 24 Hrs  T(C): 36.6 (29 Jan 2021 05:00), Max: 37.2 (28 Jan 2021 21:15)  T(F): 97.9 (29 Jan 2021 05:00), Max: 99 (28 Jan 2021 21:15)  HR: 102 (29 Jan 2021 05:00) (102 - 118)  BP: 93/50 (29 Jan 2021 05:00) (81/43 - 102/52)  BP(mean): 59 (29 Jan 2021 05:00) (51 - 71)  RR: 21 (29 Jan 2021 05:00) (17 - 30)  SpO2: 95% (29 Jan 2021 05:00) (90% - 100%)    PHYSICAL EXAM:  Constitutional: Obese, semirecumbent in bed, awake/alert  Respiratory: Breath sounds are clear bilaterally, no crackles  Cardiovascular: S1 and S2, regular, mild tachycardia, + pedal edema  Gastrointestinal: Distended and obese abdomen  Psych: Appropriate mood and affect    LABS:                      7.1    1.39  )-----------( 31       ( 29 Jan 2021 06:03 )             22.0     134<L>  |  105  |  77<H>  ----------------------------<  156<H>  5.0   |  18<L>  |  2.58<H>    Ca    7.2<L>      28 Jan 2021 04:30    TPro  4.9<L>  /  Alb  1.3<L>  /  TBili  0.8  /  DBili  0.5<H>  /  AST  138<H>  /  ALT  92<H>  /  AlkPhos  230<H>  01-28    TTE Echo Complete w/o Contrast w/ Doppler (01.27.21 @ 10:54):   The left ventricle is normal in size, wall motion and contractility. Mild concentric left ventricular hypertrophy is present. Estimated left ventricular ejection fraction is 55-60 %.   The left atrium is mildly dilated.   Normal appearing right ventricle structure and function.   Mild to moderate pulmonary hypertension is present.   Mild aortic sclerosis is present with normal valvular opening. Trace aortic regurgitation is present.   EA reversal of the mitral inflow consistent with reduced compliance of the left ventricle.   Trace mitral regurgitation is present.   Mild (1+) tricuspid valve regurgitation is present.   Trace pulmonic valvular regurgitation is present.   No pericardial effusion    NM Pulmonary Perfusion Scan (01.27.21 @ 14:29): Very low probability of pulmonary embolus.    US Duplex Venous Lower Ext Ltd, Right (01.27.21 @ 16:18):  Extensive deep venous thrombosis involving the popliteal, posterior tibial, gastrocnemius and soleal veins of the right lower extremity    12 Lead ECG (01.27.21 @ 09:45):  Sinus tachycardia with occasional Premature ventricular complexes, Low voltage QRS, ST elevation consider inferior injury or acute infarct, Prolonged QT    Tele:  Sinus tach

## 2021-01-29 NOTE — PROGRESS NOTE ADULT - SUBJECTIVE AND OBJECTIVE BOX
MADHAVI JOHNSON, 65y Female  MRN: [id number]  ATTENDING: Dorothy Sanodval    HPI:  65F with history of atrial flutter, on chronic anticoagulation with Eliquis, CHF, cardiomyopathy, essential hypertension, vascular insufficiency, sciatica, obesity, osteoarthritis, NAHOMI on CPAP, diagnosed in December 2020 with carcinomatosis via paracentesis with removal of 7.4 L ascites and cytopathology positive for malignant cells, favoring adenocarcinoma.  CA 19–9 was >4,000.  Started on gemcitabine/Abraxane in ambulatory, last chemotherapy given 1/22/2021.  Admitted with fatigue, generalized weakness, nausea.  Started on IV antibiotics.  VQ scan negative for PE.  Presenting with chemotherapy induced pancytopenia; also in HANANE.Venous ultrasound consistent with extensive DVT involving the popliteal, posterior tibial, gastrocnemius and soleal veins in the right lower extremity.Hematology-oncology consult requested to comment on ongoing treatment for pancreatic adenocarcinoma and on anticoagulation in patient with platelet count less than 50,000 .    1/28/21- heparin drip initiated for brief period, then discontinued due to platelet count drop (44,000-> 36,000-> 31,000)    MEDICATIONS:  acetaminophen   Tablet .. 650 milliGRAM(s) Oral every 4 hours PRN  filgrastim-sndz (ZARXIO) Injectable 480 MICROGram(s) SubCutaneous daily  fluconAZOLE   Tablet 100 milliGRAM(s) Oral daily  metoprolol tartrate 12.5 milliGRAM(s) Oral every 8 hours  midodrine. 5 milliGRAM(s) Oral three times a day  ondansetron Injectable 4 milliGRAM(s) IV Push every 6 hours PRN  pantoprazole    Tablet 40 milliGRAM(s) Oral before breakfast  sodium chloride 0.9%. 1000 milliLiter(s) IV Continuous <Continuous>  vancomycin    Solution 125 milliGRAM(s) Oral every 6 hours  zolpidem 5 milliGRAM(s) Oral at bedtime PRN  zolpidem 5 milliGRAM(s) Oral at bedtime PRN    All other medications reviewed.    SUBJECTIVE:  Alert, conversant.  Chief complaint: sore mouth/ throat, poor caloric intake, weakness.    VITALS:  T(C): 35.9, Max: 37.2 (01-28-21 @ 21:15)  T(F): 96.6, Max: 99 (01-28-21 @ 21:15)  HR: 102 (102 - 118)  BP: 93/50 (81/43 - 102/52)  SpO2: 95% (90% - 100%)    PHYSICAL EXAM:  Constitutional: alert, morbidly obese  HEENT: +oral mucositis   Respiratory: bilateral decreased breath sounds anteriorly  Cardiovascular : S1, S2 irregular, rhythmic, no murmurs, gallops or rubs  Abdomen: soft, distended, + shifting dullness,, no palpable HS- megaly  Extremities: + bilateral lower extremity swelling    LABS:  Hemoglobin: 7.1 g/dL (01-29-21 @ 06:03)  Hemoglobin: 8.7 g/dL (01-28-21 @ 20:46)  Hemoglobin: 7.5 g/dL (01-28-21 @ 14:16)    Platelet Count - Automated: 31 K/uL (01-29-21 @ 06:03)  Platelet Count - Automated: 33 K/uL (01-28-21 @ 20:46)  Platelet Count - Automated: 36 K/uL (01-28-21 @ 14:16)    (01-29) WBC: 1.39 K/uL,Hemoglobin: 7.1 g/dL, Hematocrit: 22.0 %,  Platelet: 31 K/uL  (01-29) Na: 138 mmol/L ; K: 4.7 mmol/L ; BUN: 72 mg/dL ; Cr: 2.56 mg/dL.    RADIOLOGY:  NM Pulmonary Perfusion Scan (01.27.21 @ 14:29): Very low probability of pulmonary embolus.    US Duplex Venous Lower Ext Ltd, Right (01.27.21 @ 16:18):  Extensive deep venous thrombosis involving the popliteal, posterior tibial, gastrocnemius and soleal veins of the right lower extremity    12 Lead ECG (01.27.21 @ 09:45):  Sinus tachycardia with occasional Premature ventricular complexes, Low voltage QRS, ST elevation consider inferior injury or acute infarct, Prolonged QT    Tele:  Sinus tach

## 2021-01-29 NOTE — CONSULT NOTE ADULT - SUBJECTIVE AND OBJECTIVE BOX
Patient is a 65y old  Female who presents with a chief complaint of elizabeth (2021 11:31)    HPI:    66 yo F with multiple comorbidities including pancreatic cancer with malignant ascites on chemo with abaraxane and gemictabine last dose , currently under tx for CDAD, recurrent ascites and multiple paracentesis (last 2 weeks ago), renal insuffiency, NAHOMI of cpap admitted on  for evaluation of persistent diarrhea and weakness. Also notes oral pain and when she has mucous from coughing it hurts her mouth as well.           PAST MEDICAL & SURGICAL HISTORY:  Vascular insufficiency  left leg    Sciatic leg pain  right    Hip pain    Arthritis    S/P hip replacement, bilateral    History of tonsillectomy and adenoidectomy    H/O arthroscopy of left knee        PMH: as above  PSH: as above  Meds: per reconciliation sheet, noted below  MEDICATIONS  (STANDING):  filgrastim-sndz (ZARXIO) Injectable 480 MICROGram(s) SubCutaneous daily  FIRST- Mouthwash  BLM 10 milliLiter(s) Swish and Swallow every 4 hours  fluconAZOLE   Tablet 100 milliGRAM(s) Oral daily  metoprolol tartrate 12.5 milliGRAM(s) Oral every 8 hours  midodrine. 5 milliGRAM(s) Oral three times a day  pantoprazole    Tablet 40 milliGRAM(s) Oral before breakfast  sodium chloride 0.9%. 1000 milliLiter(s) (100 mL/Hr) IV Continuous <Continuous>  vancomycin    Solution 125 milliGRAM(s) Oral every 6 hours    MEDICATIONS  (PRN):  acetaminophen   Tablet .. 650 milliGRAM(s) Oral every 4 hours PRN Mild Pain (1 - 3)  ondansetron Injectable 4 milliGRAM(s) IV Push every 6 hours PRN Nausea  zolpidem 5 milliGRAM(s) Oral at bedtime PRN Insomnia  zolpidem 5 milliGRAM(s) Oral at bedtime PRN Insomnia    Allergies    No Known Allergies    Intolerances      Social: no smoking, no alcohol, no illegal drugs; no recent travel, no exposure to TB  FAMILY HISTORY:  Family history of heart disease  a. fib, valvular dysfunction    Family history of COPD (chronic obstructive pulmonary disease)         ROS: the patient denies fever, no chills, no HA, no dizziness,  no blurry vision, no CP, no palpitations, no SOB, no cough, no abdominal pain, no diarrhea,  no dysuria, no leg pain, no claudication, no rash, no joint aches, no rectal pain or bleeding, no night sweats  All other systems reviewed and are negative    Vital Signs Last 24 Hrs  T(C): 36.6 (2021 12:20), Max: 37.2 (2021 21:15)  T(F): 97.8 (2021 12:20), Max: 99 (2021 21:15)  HR: 96 (2021 13:00) (95 - 118)  BP: 88/47 (2021 13:00) (71/43 - 102/52)  BP(mean): 55 (2021 13:00) (50 - 71)  RR: 20 (2021 13:00) (19 - 30)  SpO2: 100% (2021 13:00) (95% - 100%)  Daily     Daily Weight in k (2021 05:00)    PE:    Constitutional: frail looking  HEENT: NC/AT, EOMI, PERRLA, conjunctivae clear; ears and nose atraumatic; pharynx with erythema  Neck: supple; thyroid not palpable  Back: no tenderness  Respiratory: respiratory effort normal; clear to auscultation  Cardiovascular: S1S2 regular, no murmurs  Abdomen: soft, not tender, not distended, positive BS; no liver or spleen organomegaly  Genitourinary: no suprapubic tenderness  Musculoskeletal: no muscle tenderness, no joint swelling or tenderness  Neurological/ Psychiatric: AxOx3, judgement and insight normal;  moving all extremities  Skin: no rashes; no palpable lesions    Labs: all available labs reviewed                        7.1    1.39  )-----------( 31       ( 2021 06:03 )             22.0     -    138  |  111<H>  |  72<H>  ----------------------------<  139<H>  4.7   |  18<L>  |  2.56<H>    Ca    6.8<L>      2021 06:03    TPro  4.9<L>  /  Alb  1.3<L>  /  TBili  0.8  /  DBili  0.5<H>  /  AST  138<H>  /  ALT  92<H>  /  AlkPhos  230<H>       LIVER FUNCTIONS - ( 2021 04:30 )  Alb: 1.3 g/dL / Pro: 4.9 gm/dL / ALK PHOS: 230 U/L / ALT: 92 U/L / AST: 138 U/L / GGT: x           Urinalysis Basic - ( 2021 17:35 )    Color: Yellow / Appearance: Clear / S.015 / pH: x  Gluc: x / Ketone: Negative  / Bili: Negative / Urobili: Negative mg/dL   Blood: x / Protein: 30 mg/dL / Nitrite: Negative   Leuk Esterase: Trace / RBC: 6-10 /HPF / WBC 3-5   Sq Epi: x / Non Sq Epi: Occasional / Bacteria: Occasional          Radiology: all available radiological tests reviewed    Advanced directives addressed: full resuscitation

## 2021-01-29 NOTE — PROGRESS NOTE ADULT - ASSESSMENT
66 yo F with PMH of Aflutter on Eliquis and pancreatic CA presents w/ RLE DVT in popliteal  patient responded to transfusion  thrombocytopenia unlikely from anticoagulation    Plan:   -keep patient on anticoagulation  -monitor leg swelling  -follow cardiology recc  -recommend work up for anemia  -continue leg elevation  -continue medical management per primary   -no IVC filter indication at this time    Plan discussed with vascular surgery team and attending, Dr. Mccauley

## 2021-01-30 LAB
ANION GAP SERPL CALC-SCNC: 10 MMOL/L — SIGNIFICANT CHANGE UP (ref 5–17)
BUN SERPL-MCNC: 69 MG/DL — HIGH (ref 7–23)
CALCIUM SERPL-MCNC: 6.7 MG/DL — LOW (ref 8.5–10.1)
CHLORIDE SERPL-SCNC: 110 MMOL/L — HIGH (ref 96–108)
CO2 SERPL-SCNC: 17 MMOL/L — LOW (ref 22–31)
CREAT SERPL-MCNC: 2.79 MG/DL — HIGH (ref 0.5–1.3)
FERRITIN SERPL-MCNC: 3503 NG/ML — HIGH (ref 15–150)
GLUCOSE SERPL-MCNC: 131 MG/DL — HIGH (ref 70–99)
HCT VFR BLD CALC: 23.3 % — LOW (ref 34.5–45)
HEPARIN-PF4 AB RESULT: <0.6 U/ML — SIGNIFICANT CHANGE UP (ref 0–0.9)
HGB BLD-MCNC: 7.5 G/DL — LOW (ref 11.5–15.5)
MCHC RBC-ENTMCNC: 27.9 PG — SIGNIFICANT CHANGE UP (ref 27–34)
MCHC RBC-ENTMCNC: 32.2 GM/DL — SIGNIFICANT CHANGE UP (ref 32–36)
MCV RBC AUTO: 86.6 FL — SIGNIFICANT CHANGE UP (ref 80–100)
NRBC # BLD: 1 /100 WBCS — HIGH (ref 0–0)
OB PNL STL: POSITIVE
PF4 HEPARIN CMPLX AB SER-ACNC: NEGATIVE — SIGNIFICANT CHANGE UP
PLATELET # BLD AUTO: 23 K/UL — LOW (ref 150–400)
POTASSIUM SERPL-MCNC: 4.6 MMOL/L — SIGNIFICANT CHANGE UP (ref 3.5–5.3)
POTASSIUM SERPL-SCNC: 4.6 MMOL/L — SIGNIFICANT CHANGE UP (ref 3.5–5.3)
RBC # BLD: 2.69 M/UL — LOW (ref 3.8–5.2)
RBC # FLD: 15.2 % — HIGH (ref 10.3–14.5)
SODIUM SERPL-SCNC: 137 MMOL/L — SIGNIFICANT CHANGE UP (ref 135–145)
WBC # BLD: 1.59 K/UL — LOW (ref 3.8–10.5)
WBC # FLD AUTO: 1.59 K/UL — LOW (ref 3.8–10.5)

## 2021-01-30 PROCEDURE — 71045 X-RAY EXAM CHEST 1 VIEW: CPT | Mod: 26

## 2021-01-30 PROCEDURE — 99233 SBSQ HOSP IP/OBS HIGH 50: CPT

## 2021-01-30 RX ORDER — MIDODRINE HYDROCHLORIDE 2.5 MG/1
10 TABLET ORAL THREE TIMES A DAY
Refills: 0 | Status: DISCONTINUED | OUTPATIENT
Start: 2021-01-30 | End: 2021-02-05

## 2021-01-30 RX ORDER — SODIUM CHLORIDE 9 MG/ML
1000 INJECTION, SOLUTION INTRAVENOUS
Refills: 0 | Status: DISCONTINUED | OUTPATIENT
Start: 2021-01-30 | End: 2021-01-31

## 2021-01-30 RX ORDER — PANTOPRAZOLE SODIUM 20 MG/1
40 TABLET, DELAYED RELEASE ORAL EVERY 12 HOURS
Refills: 0 | Status: DISCONTINUED | OUTPATIENT
Start: 2021-01-30 | End: 2021-02-04

## 2021-01-30 RX ORDER — LACTOBACILLUS ACIDOPHILUS 100MM CELL
1 CAPSULE ORAL
Refills: 0 | Status: DISCONTINUED | OUTPATIENT
Start: 2021-01-30 | End: 2021-02-05

## 2021-01-30 RX ADMIN — DIPHENHYDRAMINE HYDROCHLORIDE AND LIDOCAINE HYDROCHLORIDE AND ALUMINUM HYDROXIDE AND MAGNESIUM HYDRO 10 MILLILITER(S): KIT at 20:09

## 2021-01-30 RX ADMIN — SODIUM CHLORIDE 75 MILLILITER(S): 9 INJECTION, SOLUTION INTRAVENOUS at 18:05

## 2021-01-30 RX ADMIN — Medication 12.5 MILLIGRAM(S): at 05:53

## 2021-01-30 RX ADMIN — MIDODRINE HYDROCHLORIDE 5 MILLIGRAM(S): 2.5 TABLET ORAL at 05:53

## 2021-01-30 RX ADMIN — MIDODRINE HYDROCHLORIDE 10 MILLIGRAM(S): 2.5 TABLET ORAL at 13:34

## 2021-01-30 RX ADMIN — SODIUM CHLORIDE 100 MILLILITER(S): 9 INJECTION INTRAMUSCULAR; INTRAVENOUS; SUBCUTANEOUS at 05:55

## 2021-01-30 RX ADMIN — DIPHENHYDRAMINE HYDROCHLORIDE AND LIDOCAINE HYDROCHLORIDE AND ALUMINUM HYDROXIDE AND MAGNESIUM HYDRO 10 MILLILITER(S): KIT at 13:30

## 2021-01-30 RX ADMIN — Medication 480 MICROGRAM(S): at 09:51

## 2021-01-30 RX ADMIN — FLUCONAZOLE 100 MILLIGRAM(S): 150 TABLET ORAL at 09:52

## 2021-01-30 RX ADMIN — DIPHENHYDRAMINE HYDROCHLORIDE AND LIDOCAINE HYDROCHLORIDE AND ALUMINUM HYDROXIDE AND MAGNESIUM HYDRO 10 MILLILITER(S): KIT at 01:57

## 2021-01-30 RX ADMIN — DIPHENHYDRAMINE HYDROCHLORIDE AND LIDOCAINE HYDROCHLORIDE AND ALUMINUM HYDROXIDE AND MAGNESIUM HYDRO 10 MILLILITER(S): KIT at 05:52

## 2021-01-30 RX ADMIN — Medication 125 MILLIGRAM(S): at 18:05

## 2021-01-30 RX ADMIN — Medication 12.5 MILLIGRAM(S): at 13:31

## 2021-01-30 RX ADMIN — PANTOPRAZOLE SODIUM 40 MILLIGRAM(S): 20 TABLET, DELAYED RELEASE ORAL at 20:10

## 2021-01-30 RX ADMIN — Medication 125 MILLIGRAM(S): at 05:52

## 2021-01-30 RX ADMIN — PANTOPRAZOLE SODIUM 40 MILLIGRAM(S): 20 TABLET, DELAYED RELEASE ORAL at 05:53

## 2021-01-30 RX ADMIN — DIPHENHYDRAMINE HYDROCHLORIDE AND LIDOCAINE HYDROCHLORIDE AND ALUMINUM HYDROXIDE AND MAGNESIUM HYDRO 10 MILLILITER(S): KIT at 09:51

## 2021-01-30 RX ADMIN — DIPHENHYDRAMINE HYDROCHLORIDE AND LIDOCAINE HYDROCHLORIDE AND ALUMINUM HYDROXIDE AND MAGNESIUM HYDRO 10 MILLILITER(S): KIT at 18:05

## 2021-01-30 RX ADMIN — Medication 125 MILLIGRAM(S): at 23:43

## 2021-01-30 RX ADMIN — Medication 125 MILLIGRAM(S): at 13:30

## 2021-01-30 RX ADMIN — Medication 1 TABLET(S): at 18:05

## 2021-01-30 RX ADMIN — ZOLPIDEM TARTRATE 5 MILLIGRAM(S): 10 TABLET ORAL at 23:43

## 2021-01-30 RX ADMIN — ONDANSETRON 4 MILLIGRAM(S): 8 TABLET, FILM COATED ORAL at 20:18

## 2021-01-30 RX ADMIN — Medication 125 MILLIGRAM(S): at 01:57

## 2021-01-30 NOTE — PROGRESS NOTE ADULT - SUBJECTIVE AND OBJECTIVE BOX
CC: LACY  HPI: 66 yo female with PMH of  Atrial flutter  on Eliquis 11/9/19  had GILSON cardioversion,  Combined systolic and diastolic HF , Cardiomyopathy , pHTN, Vascular insufficiency LLE, R sciatica, DJD hips, morbid Obesity , low vignesh pain, Pulm. HTN, NAHOMI on CPAP, who presented  in the ER for abdominal distention and fatigue. Cardiology called to evaluate low voltage and possible injury pattern on ECG. Pt denies chest pain but reports mild dyspnea and fatigue since her diagnosis of cancer with ascites. Urgent echo was performed in the ED which revealed no evidence of pericardial effusion. EF was adequate.   Admitted for HANANE, malignant ascites possible UTI. LE extensive DVT noted AC had to be stopped due to thrombocytopenia. IVC filter will be inserted today.  1/30 - pt seen and examined at bedside, reports feeling weak and fatigued, poor appetite with nausea when she drinks too much H2O, denies pain, + cough, no CP, palpitaions. + diarrhea with incontinence.   Review of system- Rest of the review of system are negative except mentioned in HPI    Vital Signs Last 24 Hrs  T(C): 36.7 (30 Jan 2021 12:15), Max: 36.9 (29 Jan 2021 22:02)  T(F): 98.1 (30 Jan 2021 12:15), Max: 98.5 (30 Jan 2021 08:56)  HR: 100 (30 Jan 2021 12:15) (96 - 110)  BP: 98/55 (30 Jan 2021 12:15) (87/51 - 112/57)  BP(mean): 56 (29 Jan 2021 21:06) (56 - 59)  RR: 18 (30 Jan 2021 12:15) (18 - 30)  SpO2: 99% (30 Jan 2021 12:15) (95% - 100%)      Constitutional: NAD, awake and alert, well-developed  HEENT: PERR, EOMI,    Neck:  supple,  No JVD  Respiratory: Breath sounds are clear bilaterally, No wheezing, rales or rhonchi  Cardiovascular: S1 and S2, regular rate and rhythm, distant sounds  Gastrointestinal: Distended abdomen  Extremities: No peripheral edema. No clubbing or cyanosis.  Vascular: 2+ peripheral pulses  Neurological: A/O x 3, no focal deficits  Musculoskeletal: no calf tenderness.    01-30    137  |  110<H>  |  69<H>  ----------------------------<  131<H>  4.6   |  17<L>  |  2.79<H>    Ca    6.7<L>      30 Jan 2021 07:17                              7.5    1.59  )-----------( 23       ( 30 Jan 2021 07:17 )             23.3       PTT - ( 28 Jan 2021 14:16 )  PTT:23.7 sec                            7.1    1.39  )-----------( 31       ( 29 Jan 2021 06:03 )             22.0   01-29    138  |  111<H>  |  72<H>  ----------------------------<  139<H>  4.7   |  18<L>  |  2.56<H>    Ca    6.8<L>      29 Jan 2021 06:03    TPro  4.9<L>  /  Alb  1.3<L>  /  TBili  0.8  /  DBili  0.5<H>  /  AST  138<H>  /  ALT  92<H>  /  AlkPhos  230<H>  01-28    < from: US Kidney and Bladder (01.28.21 @ 22:09) >  EXAM:  US KIDNEYS AND BLADDER                            PROCEDURE DATE:  01/28/2021          INTERPRETATION:  CLINICAL INFORMATION: Acute renal insufficiency    COMPARISON: Renal ultrasound 1/8/2021    TECHNIQUE: Sonography of the kidneys and bladder.    FINDINGS:    Right kidney: 11.5 cm. A 7 mm lower pole calculus. No hydronephrosis.    Left kidney: 10.7 cm. No renal mass, hydronephrosis or calculi. Poorly evaluated due to excessive shadowing.    Urinary bladder: Decompressed.    Perihepaticascites.    IMPRESSION:    7 mm nonobstructing right lower pole calculus.    Poorly evaluated left kidney.                      < end of copied text >  < from: US Abdomen Limited (01.27.21 @ 16:57) >  EXAM:  US ABDOMEN LIMITED                            PROCEDURE DATE:  01/27/2021          INTERPRETATION:  CLINICAL INFORMATION: Abdominal distention. Evaluate for ascites.    COMPARISON: CT 12/23/2020.    TECHNIQUE: Limited ultrasound of the 4 quadrants of the abdomen was performed to evaluate for ascites.    FINDINGS:    Moderate volume ascites with septations throughout all 4 quadrants.    IMPRESSION:    Moderate volume complex ascites.          < end of copied text >  < from: US Duplex Venous Lower Ext Ltd, Right (01.27.21 @ 16:18) >    EXAM:  US DPLX LWR EXT VEINS LTD RT                            PROCEDURE DATE:  01/27/2021          INTERPRETATION:  CLINICAL INFORMATION: Pain and swelling    COMPARISON: 9/11/2019    TECHNIQUE: Duplex sonography of the RIGHT LOWER extremity veinswith color and spectral Doppler, with and without compression.    FINDINGS:    There is occlusive thrombus involving the popliteal posterior tibial soleal and gastrocnemius veins. Common femoral and femoral veins are patent.    The contralateral common femoral vein is patent.    IMPRESSION:  Extensive deep venous thrombosis involving the popliteal, posterior tibial, gastrocnemius and soleal veins of the right lower extremity            < end of copied text >  < from: NM Pulmonary Perfusion Scan (01.27.21 @ 14:29) >  EXAM:  NM PULM PERFUSION IMG                            PROCEDURE DATE:  01/27/2021          INTERPRETATION:  RADIOPHARMACEUTICAL: 99mTc-MAA       DOSE: 3.4 mCi IV    CLINICAL INFORMATION: 65 year old female with sepsis, ascites, shortness of breath and elevated d-dimer, referred to evaluate for pulmonary embolus.    TECHNIQUE: Perfusion images of the lungs were obtained following administration of Tc-99m-MAA. Images were obtained in the anterior, posterior, both lateral, and all 4 oblique projections. This study was interpreted in conjunction with a chest radiograph of 1/27/2021  .  COMPARISON: No previous lung scan for comparison.    FINDINGS: The study demonstrates heterogenous tracer distribution in both lungs. There are no well-definedsegmental perfusion defects.    IMPRESSION: Very low probability of pulmonary embolus.          < end of copied text >  < from: Xray Chest 1 View- PORTABLE-Urgent (01.27.21 @ 11:11) >  EXAM:  XR CHEST PORTABLE URGENT 1V                            PROCEDURE DATE:  01/27/2021          INTERPRETATION:  Exam Date: 1/27/2021 11:11 AM    Chest radiograph (one view)    CLINICAL INFORMATION:  Sepsis    TECHNIQUE:  Single frontal view of the chest was obtained.    COMPARISON: 12/23/2020 radiograph    FINDINGS/  IMPRESSION:    Right port catheter with tip in the SVC is present.    The lungs are clear.  No pleural abnormality is seen.    Cardiomediastinal silhouette is intact.          < end of copied text >  Occult Blood, Feces: Positive (01.28.21 @ 05:10) Haptoglobin, Serum: 293 mg/dL (01.28.21 @ 04:30) Vitamin B12, Serum: 1224 pg/mL (01.28.21 @ 04:30) Folate, Serum: 9.6 ng/mL (01.28.21 @ 04:30) Ferritin, Serum: 3503: Test Repeated ng/mL (01.28.21 @ 04:30) Culture Results:   10,000 - 49,000 CFU/mL Presumptive Candida albicans   "Susceptibilities not performed"   Normal Urogenital lashon present (01.27.21 @ 17:35)                      CC: LACY  HPI: 66 yo female with PMH of  Atrial flutter  on Eliquis 11/9/19  had GILSON cardioversion,  Combined systolic and diastolic HF , Cardiomyopathy , pHTN, Vascular insufficiency LLE, R sciatica, DJD hips, morbid Obesity , low vignesh pain, Pulm. HTN, NAHOMI on CPAP, who presented  in the ER for abdominal distention and fatigue. Cardiology called to evaluate low voltage and possible injury pattern on ECG. Pt denies chest pain but reports mild dyspnea and fatigue since her diagnosis of cancer with ascites. Urgent echo was performed in the ED which revealed no evidence of pericardial effusion. EF was adequate.   Admitted for HANANE, malignant ascites possible UTI. LE extensive DVT noted AC had to be stopped due to thrombocytopenia. IVC filter will be inserted today.    1/30 - pt seen and examined at bedside, reports feeling weak and fatigued, poor appetite with nausea when she drinks too much H2O, denies pain, + cough, no CP, palpitations + diarrhea with incontinence.     Review of system- Rest of the review of system are negative except mentioned in HPI    Vital Signs Last 24 Hrs  T(C): 36.7 (30 Jan 2021 12:15), Max: 36.9 (29 Jan 2021 22:02)  T(F): 98.1 (30 Jan 2021 12:15), Max: 98.5 (30 Jan 2021 08:56)  HR: 100 (30 Jan 2021 12:15) (96 - 110)  BP: 98/55 (30 Jan 2021 12:15) (87/51 - 112/57)  BP(mean): 56 (29 Jan 2021 21:06) (56 - 59)  RR: 18 (30 Jan 2021 12:15) (18 - 30)  SpO2: 99% (30 Jan 2021 12:15) (95% - 100%)      Constitutional: NAD, awake and alert, well-developed  HEENT: PERR, EOMI,    Neck:  supple,  No JVD  Respiratory: Breath sounds are decreased at bases  bilaterally, No wheezing, rales or rhonchi  Cardiovascular: S1 and S2, regular rate and rhythm, distant sounds  Gastrointestinal: Distended abdomen  Extremities: No peripheral edema. No clubbing or cyanosis.  Vascular: 2+ peripheral pulses  Neurological: A/O x 3, no focal deficits  Musculoskeletal: no calf tenderness.    01-30    137  |  110<H>  |  69<H>  ----------------------------<  131<H>  4.6   |  17<L>  |  2.79<H>    Ca    6.7<L>      30 Jan 2021 07:17                          7.5    1.59  )-----------( 23       ( 30 Jan 2021 07:17 )             23.3       PTT - ( 28 Jan 2021 14:16 )  PTT:23.7 sec                            7.1    1.39  )-----------( 31       ( 29 Jan 2021 06:03 )             22.0   01-29    138  |  111<H>  |  72<H>  ----------------------------<  139<H>  4.7   |  18<L>  |  2.56<H>    Ca    6.8<L>      29 Jan 2021 06:03    TPro  4.9<L>  /  Alb  1.3<L>  /  TBili  0.8  /  DBili  0.5<H>  /  AST  138<H>  /  ALT  92<H>  /  AlkPhos  230<H>  01-28    < from: US Kidney and Bladder (01.28.21 @ 22:09) >  EXAM:  US KIDNEYS AND BLADDER                            PROCEDURE DATE:  01/28/2021          INTERPRETATION:  CLINICAL INFORMATION: Acute renal insufficiency    COMPARISON: Renal ultrasound 1/8/2021    TECHNIQUE: Sonography of the kidneys and bladder.    FINDINGS:    Right kidney: 11.5 cm. A 7 mm lower pole calculus. No hydronephrosis.    Left kidney: 10.7 cm. No renal mass, hydronephrosis or calculi. Poorly evaluated due to excessive shadowing.    Urinary bladder: Decompressed.    Perihepaticascites.    IMPRESSION:    7 mm nonobstructing right lower pole calculus.    Poorly evaluated left kidney.      < from: Xray Chest 1 View- PORTABLE-Routine (Xray Chest 1 View- PORTABLE-Routine .) (01.30.21 @ 15:24) >  Tubes/lines: Right chest wall port with catheter tip in the region of the right brachiocephalic vein.    Lungs: Bibasilar atelectasis. Low lung volumes..    Pleura: No pneumothorax or pleural effusions.    Cardiomediastinal silhouette: Within normal limits..    < end of copied text >                  < end of copied text >  < from: US Abdomen Limited (01.27.21 @ 16:57) >  EXAM:  US ABDOMEN LIMITED                            PROCEDURE DATE:  01/27/2021          INTERPRETATION:  CLINICAL INFORMATION: Abdominal distention. Evaluate for ascites.    COMPARISON: CT 12/23/2020.    TECHNIQUE: Limited ultrasound of the 4 quadrants of the abdomen was performed to evaluate for ascites.    FINDINGS:    Moderate volume ascites with septations throughout all 4 quadrants.    IMPRESSION:    Moderate volume complex ascites.    Operative Findings:  · Operative Findings	s/p retrievable option, infrarenal IVC filter placement        < end of copied text >  < from: US Duplex Venous Lower Ext Ltd, Right (01.27.21 @ 16:18) >    EXAM:  US DPLX LWR EXT VEINS LTD RT                            PROCEDURE DATE:  01/27/2021          INTERPRETATION:  CLINICAL INFORMATION: Pain and swelling    COMPARISON: 9/11/2019    TECHNIQUE: Duplex sonography of the RIGHT LOWER extremity veinswith color and spectral Doppler, with and without compression.    FINDINGS:    There is occlusive thrombus involving the popliteal posterior tibial soleal and gastrocnemius veins. Common femoral and femoral veins are patent.    The contralateral common femoral vein is patent.    IMPRESSION:  Extensive deep venous thrombosis involving the popliteal, posterior tibial, gastrocnemius and soleal veins of the right lower extremity            < end of copied text >  < from: NM Pulmonary Perfusion Scan (01.27.21 @ 14:29) >  EXAM:  NM PULM PERFUSION IMG                            PROCEDURE DATE:  01/27/2021          INTERPRETATION:  RADIOPHARMACEUTICAL: 99mTc-MAA       DOSE: 3.4 mCi IV    CLINICAL INFORMATION: 65 year old female with sepsis, ascites, shortness of breath and elevated d-dimer, referred to evaluate for pulmonary embolus.    TECHNIQUE: Perfusion images of the lungs were obtained following administration of Tc-99m-MAA. Images were obtained in the anterior, posterior, both lateral, and all 4 oblique projections. This study was interpreted in conjunction with a chest radiograph of 1/27/2021  .  COMPARISON: No previous lung scan for comparison.    FINDINGS: The study demonstrates heterogenous tracer distribution in both lungs. There are no well-definedsegmental perfusion defects.    IMPRESSION: Very low probability of pulmonary embolus.          < end of copied text >  < from: Xray Chest 1 View- PORTABLE-Urgent (01.27.21 @ 11:11) >  EXAM:  XR CHEST PORTABLE URGENT 1V                            PROCEDURE DATE:  01/27/2021          INTERPRETATION:  Exam Date: 1/27/2021 11:11 AM    Chest radiograph (one view)    CLINICAL INFORMATION:  Sepsis    TECHNIQUE:  Single frontal view of the chest was obtained.    COMPARISON: 12/23/2020 radiograph    FINDINGS/  IMPRESSION:    Right port catheter with tip in the SVC is present.    The lungs are clear.  No pleural abnormality is seen.    Cardiomediastinal silhouette is intact.         < end of copied text >  Occult Blood, Feces: Positive (01.28.21 @ 05:10) Haptoglobin, Serum: 293 mg/dL (01.28.21 @ 04:30) Vitamin B12, Serum: 1224 pg/mL (01.28.21 @ 04:30) Folate, Serum: 9.6 ng/mL (01.28.21 @ 04:30) Ferritin, Serum: 3503: Test Repeated ng/mL (01.28.21 @ 04:30) Culture Results:   10,000 - 49,000 CFU/mL Presumptive Candida albicans   "Susceptibilities not performed"   Normal Urogenital lashon present (01.27.21 @ 17:35)   MEDICATIONS  (STANDING):  filgrastim-sndz (ZARXIO) Injectable 480 MICROGram(s) SubCutaneous daily  FIRST- Mouthwash  BLM 10 milliLiter(s) Swish and Swallow every 4 hours  fluconAZOLE   Tablet 100 milliGRAM(s) Oral daily  lactated ringers. 1000 milliLiter(s) (75 mL/Hr) IV Continuous <Continuous>  lactobacillus acidophilus 1 Tablet(s) Oral two times a day with meals  metoprolol tartrate 12.5 milliGRAM(s) Oral every 8 hours  midodrine. 10 milliGRAM(s) Oral three times a day  pantoprazole  Injectable 40 milliGRAM(s) IV Push every 12 hours  vancomycin    Solution 125 milliGRAM(s) Oral every 6 hours    MEDICATIONS  (PRN):  acetaminophen   Tablet .. 650 milliGRAM(s) Oral every 4 hours PRN Mild Pain (1 - 3)  ondansetron Injectable 4 milliGRAM(s) IV Push every 6 hours PRN Nausea  zolpidem 5 milliGRAM(s) Oral at bedtime PRN Insomnia  zolpidem 5 milliGRAM(s) Oral at bedtime PRN Insomnia

## 2021-01-30 NOTE — PROGRESS NOTE ADULT - ASSESSMENT
66 yo female with PMH of Atrial flutter on Eliquis s/p GILSON cardioversion, Combined systolic and diastolic HF , Cardiomyopathy , pHTN, Vascular insufficiency LLE, R sciatica, DJD hips, morbid obesity , low vignesh pain, NAHOMI on CPAP, who presented to ER for abdominal distention and fatigue. Admitted for HANANE, malignant ascites possible UTI. LE extensive DVT noted AC had to be stopped due to thrombocytopenia. IVC filter inserted.      #LACY - multifactorial (recurrent ascites, anemia and large body habitus and NAHOMI/OHS)  -resolved  -V/Q scan neg for PE  -1/30 c/o persistent cough, IVF stopped, CXR ordered    #Pancytopenia chemo induced  -Heme/onc following, started on Zarxio x 3 days  -s/p transfusion of PRBC 1/29, to be transfused again today x 1 unit    #DVT RLE popliteal (extensive)  -pt was on Eliquis prior to admission, dc'd placed on Heparin gtt, platelets low so dc'd  -s/p infrarenal IVC filter 1/29    #CDAD  -PO Vanco  -ID following, hold off on systemic abx for now  -Diflucan for oral candida    #Renal insufficiency/HANANE  -Likely pre-renal/low flow state  -HANANE nor recovering, IVF dc'd 2/2 cough, nephrology consulted    #Hypotension (chronic??)  -on midodrine, dose increased    #Metastatic Pancreatic cancer  -Pall consult; remains full code    #Sinus tachycardia  -EKG reviewed  -Cardiology consult appreciated  - on low dose BB with parameters    #Positive Stool OB  -possibly from C. diff, oral bleeding 2/2 low platelets  -GI consulted  -On PPI            64 yo female with PMH of Atrial flutter on Eliquis s/p GILSON cardioversion, Combined systolic and diastolic HF , Cardiomyopathy , pHTN, Vascular insufficiency LLE, R sciatica, DJD hips, morbid obesity , low vignesh pain, NAHOMI on CPAP, who presented to ER for abdominal distention and fatigue. Admitted for HANANE, malignant ascites possible UTI. LE extensive DVT noted AC had to be stopped due to thrombocytopenia. IVC filter inserted.      LACY - multifactorial (recurrent ascites, anemia and large body habitus and NAHOMI/OHS), Atelectasis  -resolved, V/Q scan neg for PE, 1/30 c/o persistent cough, IVF stopped, CXR - atelectasis,  incentive spirometry  CDAD  -PO Vanco, ID following, hold off on systemic abx for now, add  probiotics  Candidal UTI - Diflucan   Pancytopenia chemo induced  -Heme/onc following, started on Zarxio x 3 days  -s/p transfusion of PRBC 1/29, to be transfused again today x 1 unit  DVT RLE popliteal (extensive)  -pt was on Eliquis prior to admission, dc'd placed on Heparin gtt, platelets low so dc'd  -s/p infrarenal IVC filter 1/29  HANANE on CKD with baseline Cr 1.4-1.6   -Likely pre-renal/low flow state,  nephrology consult,  lower rate of IV fluids  Hypotension , chronic , orthostatic  -on midodrine, dose increased 10 TID   Metastatic Pancreatic cancer, Adenocarcinoma with CA 19-9 > 4000, Ascites   - s/p on gemcitabine/Abraxane in ambulatory, last chemotherapy given 1/22/2021  -Pall consult; remains full code  - oncology consult - poor functional status for systemic therapy at this time  - IR consult for paracenteses  Sinus tachycardia, Atrial flutter   -EKG reviewed, Cardiology consult appreciated   - on low dose BB with parameters  Anemia with Positive FOBT   -possibly from C. diff, oral bleeding 2/2 low platelets  -GI consulted, On PPI IV bid    Advanced Directives FULL CODE  -Pt with capacity  -Sister surrogate/pt declines calling her sister to schedule GOC discussion  -Attempted to discuss MOLST with pt and she does not want to discuss  -palliative team will follow up Monday 2/1    Dispo - IV fluids, GI and nephrology evaluation , PT daily, monitoring

## 2021-01-31 LAB
24R-OH-CALCIDIOL SERPL-MCNC: 8.8 NG/ML — LOW (ref 30–80)
ALBUMIN SERPL ELPH-MCNC: 1.2 G/DL — LOW (ref 3.3–5)
ALP SERPL-CCNC: 270 U/L — HIGH (ref 40–120)
ALT FLD-CCNC: 122 U/L — HIGH (ref 12–78)
AMMONIA BLD-MCNC: 29 UMOL/L — SIGNIFICANT CHANGE UP (ref 11–32)
ANION GAP SERPL CALC-SCNC: 10 MMOL/L — SIGNIFICANT CHANGE UP (ref 5–17)
AST SERPL-CCNC: 230 U/L — HIGH (ref 15–37)
BASOPHILS # BLD AUTO: 0 K/UL — SIGNIFICANT CHANGE UP (ref 0–0.2)
BASOPHILS NFR BLD AUTO: 0 % — SIGNIFICANT CHANGE UP (ref 0–2)
BILIRUB SERPL-MCNC: 1.1 MG/DL — SIGNIFICANT CHANGE UP (ref 0.2–1.2)
BUN SERPL-MCNC: 65 MG/DL — HIGH (ref 7–23)
CALCIUM SERPL-MCNC: 7.4 MG/DL — LOW (ref 8.5–10.1)
CHLORIDE SERPL-SCNC: 106 MMOL/L — SIGNIFICANT CHANGE UP (ref 96–108)
CO2 SERPL-SCNC: 17 MMOL/L — LOW (ref 22–31)
CREAT SERPL-MCNC: 3.15 MG/DL — HIGH (ref 0.5–1.3)
EOSINOPHIL # BLD AUTO: 0 K/UL — SIGNIFICANT CHANGE UP (ref 0–0.5)
EOSINOPHIL NFR BLD AUTO: 0 % — SIGNIFICANT CHANGE UP (ref 0–6)
FOLATE SERPL-MCNC: 7 NG/ML — SIGNIFICANT CHANGE UP
GLUCOSE SERPL-MCNC: 138 MG/DL — HIGH (ref 70–99)
HCT VFR BLD CALC: 26.5 % — LOW (ref 34.5–45)
HGB BLD-MCNC: 8.7 G/DL — LOW (ref 11.5–15.5)
LYMPHOCYTES # BLD AUTO: 0.97 K/UL — LOW (ref 1–3.3)
LYMPHOCYTES # BLD AUTO: 22 % — SIGNIFICANT CHANGE UP (ref 13–44)
MAGNESIUM SERPL-MCNC: 1.8 MG/DL — SIGNIFICANT CHANGE UP (ref 1.6–2.6)
MCHC RBC-ENTMCNC: 28.2 PG — SIGNIFICANT CHANGE UP (ref 27–34)
MCHC RBC-ENTMCNC: 32.8 GM/DL — SIGNIFICANT CHANGE UP (ref 32–36)
MCV RBC AUTO: 85.8 FL — SIGNIFICANT CHANGE UP (ref 80–100)
MONOCYTES # BLD AUTO: 0.31 K/UL — SIGNIFICANT CHANGE UP (ref 0–0.9)
MONOCYTES NFR BLD AUTO: 7 % — SIGNIFICANT CHANGE UP (ref 2–14)
NEUTROPHILS # BLD AUTO: 3.12 K/UL — SIGNIFICANT CHANGE UP (ref 1.8–7.4)
NEUTROPHILS NFR BLD AUTO: 71 % — SIGNIFICANT CHANGE UP (ref 43–77)
NRBC # BLD: SIGNIFICANT CHANGE UP /100 WBCS (ref 0–0)
PHOSPHATE SERPL-MCNC: 3.2 MG/DL — SIGNIFICANT CHANGE UP (ref 2.5–4.5)
PLATELET # BLD AUTO: 40 K/UL — LOW (ref 150–400)
POTASSIUM SERPL-MCNC: 4.6 MMOL/L — SIGNIFICANT CHANGE UP (ref 3.5–5.3)
POTASSIUM SERPL-SCNC: 4.6 MMOL/L — SIGNIFICANT CHANGE UP (ref 3.5–5.3)
PROT SERPL-MCNC: 5.3 GM/DL — LOW (ref 6–8.3)
RBC # BLD: 3.09 M/UL — LOW (ref 3.8–5.2)
RBC # BLD: 3.09 M/UL — LOW (ref 3.8–5.2)
RBC # FLD: 15.4 % — HIGH (ref 10.3–14.5)
RETICS #: 7.7 K/UL — LOW (ref 25–125)
RETICS/RBC NFR: 0.3 % — LOW (ref 0.5–2.5)
SODIUM SERPL-SCNC: 133 MMOL/L — LOW (ref 135–145)
WBC # BLD: 4.4 K/UL — SIGNIFICANT CHANGE UP (ref 3.8–10.5)
WBC # FLD AUTO: 4.4 K/UL — SIGNIFICANT CHANGE UP (ref 3.8–10.5)

## 2021-01-31 PROCEDURE — 99233 SBSQ HOSP IP/OBS HIGH 50: CPT

## 2021-01-31 RX ORDER — SODIUM CHLORIDE 9 MG/ML
1000 INJECTION INTRAMUSCULAR; INTRAVENOUS; SUBCUTANEOUS
Refills: 0 | Status: DISCONTINUED | OUTPATIENT
Start: 2021-01-31 | End: 2021-02-01

## 2021-01-31 RX ADMIN — DIPHENHYDRAMINE HYDROCHLORIDE AND LIDOCAINE HYDROCHLORIDE AND ALUMINUM HYDROXIDE AND MAGNESIUM HYDRO 10 MILLILITER(S): KIT at 05:56

## 2021-01-31 RX ADMIN — SODIUM CHLORIDE 100 MILLILITER(S): 9 INJECTION INTRAMUSCULAR; INTRAVENOUS; SUBCUTANEOUS at 17:30

## 2021-01-31 RX ADMIN — SODIUM CHLORIDE 75 MILLILITER(S): 9 INJECTION, SOLUTION INTRAVENOUS at 05:54

## 2021-01-31 RX ADMIN — DIPHENHYDRAMINE HYDROCHLORIDE AND LIDOCAINE HYDROCHLORIDE AND ALUMINUM HYDROXIDE AND MAGNESIUM HYDRO 10 MILLILITER(S): KIT at 10:56

## 2021-01-31 RX ADMIN — Medication 12.5 MILLIGRAM(S): at 20:57

## 2021-01-31 RX ADMIN — PANTOPRAZOLE SODIUM 40 MILLIGRAM(S): 20 TABLET, DELAYED RELEASE ORAL at 20:57

## 2021-01-31 RX ADMIN — Medication 1 TABLET(S): at 10:55

## 2021-01-31 RX ADMIN — MIDODRINE HYDROCHLORIDE 10 MILLIGRAM(S): 2.5 TABLET ORAL at 17:06

## 2021-01-31 RX ADMIN — Medication 480 MICROGRAM(S): at 14:54

## 2021-01-31 RX ADMIN — Medication 1 TABLET(S): at 17:28

## 2021-01-31 RX ADMIN — MIDODRINE HYDROCHLORIDE 10 MILLIGRAM(S): 2.5 TABLET ORAL at 10:57

## 2021-01-31 RX ADMIN — Medication 125 MILLIGRAM(S): at 17:05

## 2021-01-31 RX ADMIN — Medication 125 MILLIGRAM(S): at 05:57

## 2021-01-31 RX ADMIN — FLUCONAZOLE 100 MILLIGRAM(S): 150 TABLET ORAL at 10:56

## 2021-01-31 RX ADMIN — Medication 125 MILLIGRAM(S): at 23:02

## 2021-01-31 RX ADMIN — ZOLPIDEM TARTRATE 5 MILLIGRAM(S): 10 TABLET ORAL at 22:50

## 2021-01-31 RX ADMIN — MIDODRINE HYDROCHLORIDE 10 MILLIGRAM(S): 2.5 TABLET ORAL at 05:56

## 2021-01-31 RX ADMIN — Medication 12.5 MILLIGRAM(S): at 14:54

## 2021-01-31 RX ADMIN — DIPHENHYDRAMINE HYDROCHLORIDE AND LIDOCAINE HYDROCHLORIDE AND ALUMINUM HYDROXIDE AND MAGNESIUM HYDRO 10 MILLILITER(S): KIT at 14:55

## 2021-01-31 RX ADMIN — Medication 125 MILLIGRAM(S): at 10:56

## 2021-01-31 RX ADMIN — Medication 12.5 MILLIGRAM(S): at 05:56

## 2021-01-31 RX ADMIN — PANTOPRAZOLE SODIUM 40 MILLIGRAM(S): 20 TABLET, DELAYED RELEASE ORAL at 10:56

## 2021-01-31 RX ADMIN — DIPHENHYDRAMINE HYDROCHLORIDE AND LIDOCAINE HYDROCHLORIDE AND ALUMINUM HYDROXIDE AND MAGNESIUM HYDRO 10 MILLILITER(S): KIT at 17:28

## 2021-01-31 NOTE — CONSULT NOTE ADULT - SUBJECTIVE AND OBJECTIVE BOX
NEPHROLOGY INTERVAL HPI/OVERNIGHT EVENTS:  21 @ 17:57  HPI:    66 yo F with multiple comorbidities including pancreatic cancer with malignant ascites on checmo with abaraxane and gemictabine last dose , currently under tx for CDAD on day #5 of po vanco which she states she ahs been unable to tolerate, recurrent ascites and multiple paracentesis (last 2 weeks ago), renal insuffiency, NAHOMI of cpap presents with generlazied weakness, nausea, diarrhea and lacy. Nausea will not allow for her to take po vanco. Still having numerous watery BMs daily.  LACY with minimal exertion. She denies cough. No abd pain. No HA/Neck pain or blurry vision. No other complaints to offer    ED course: given iv vanco and zosyn for presumed sepsis. Lactate 2.2 cleared after 3L NS. Dimer elevated however V/Q neg for PE. O2S 94% on RA at rest with soft BP 89/51 however historically patient has low BP and was placed on midodrine last admission. PT pancytopenic. Afebrile. CXR clear.   ------------------------------------------------------------------------------------  above hx corroborated with patient   she has been on         PAST MEDICAL & SURGICAL HISTORY:  Vascular insufficiency  left leg    Sciatic leg pain  right    Hip pain    Arthritis    S/P hip replacement, bilateral    History of tonsillectomy and adenoidectomy    H/O arthroscopy of left knee          Allergies    No Known Allergies    Intolerances        SOCIAL HISTORY:    FAMILY HISTORY:  Family history of heart disease  a. fib, valvular dysfunction    Family history of COPD (chronic obstructive pulmonary disease)   (2021 15:06)      Patient is a 65y old  Female who presents with a chief complaint of lacy (2021 16:41)      MEDICATIONS  (STANDING):  FIRST- Mouthwash  BLM 10 milliLiter(s) Swish and Swallow every 4 hours  fluconAZOLE   Tablet 100 milliGRAM(s) Oral daily  lactobacillus acidophilus 1 Tablet(s) Oral two times a day with meals  metoprolol tartrate 12.5 milliGRAM(s) Oral every 8 hours  midodrine. 10 milliGRAM(s) Oral three times a day  pantoprazole  Injectable 40 milliGRAM(s) IV Push every 12 hours  sodium chloride 0.9%. 1000 milliLiter(s) (100 mL/Hr) IV Continuous <Continuous>  vancomycin    Solution 125 milliGRAM(s) Oral every 6 hours    MEDICATIONS  (PRN):  acetaminophen   Tablet .. 650 milliGRAM(s) Oral every 4 hours PRN Mild Pain (1 - 3)  ondansetron Injectable 4 milliGRAM(s) IV Push every 6 hours PRN Nausea  zolpidem 5 milliGRAM(s) Oral at bedtime PRN Insomnia  zolpidem 5 milliGRAM(s) Oral at bedtime PRN Insomnia      Allergies    No Known Allergies    Intolerances        I&O's Detail    2021 07:01  -  2021 07:00  --------------------------------------------------------  IN:    Lactated Ringers: 900 mL  Total IN: 900 mL    OUT:    Voided (mL): 400 mL  Total OUT: 400 mL    Total NET: 500 mL          .    Patient was seen and evaluated on dialysis.   Patient is tolerating the procedure well.   Continue dialysis:   Dialyzer:          QB:        QD:   Goal UF ___ over ___ Hours       Vital Signs Last 24 Hrs  T(C): 36.6 (2021 14:55), Max: 36.7 (2021 20:03)  T(F): 97.8 (2021 14:55), Max: 98 (2021 20:03)  HR: 110 (2021 14:55) (99 - 111)  BP: 97/55 (2021 17:19) (86/67 - 104/52)  BP(mean): --  RR: 18 (2021 14:55) (18 - 18)  SpO2: 95% (2021 14:55) (94% - 98%)  Daily     Daily Weight in k.9 (2021 05:47)    PHYSICAL EXAM:  General: alert. awake Ox3  HEENT: MMM  CV: s1s2 rrr  LUNGS: B/L CTA  EXT: no edema    LABS:                        8.7    4.40  )-----------( 40       ( 2021 06:47 )             26.5         133<L>  |  106  |  65<H>  ----------------------------<  138<H>  4.6   |  17<L>  |  3.15<H>    Ca    7.4<L>      2021 06:47  Phos  3.2       Mg     1.8         TPro  5.3<L>  /  Alb  1.2<L>  /  TBili  1.1  /  DBili  x   /  AST  230<H>  /  ALT  122<H>  /  AlkPhos  270<H>          Magnesium, Serum: 1.8 mg/dL ( @ 06:47)  Phosphorus Level, Serum: 3.2 mg/dL ( @ 06:47)  Vitamin D, 25-Hydroxy: 8.8 ng/mL ( @ 06:47)       NEPHROLOGY INTERVAL HPI/OVERNIGHT EVENTS:  21 @ 17:57  HPI:    64 yo F with multiple comorbidities including pancreatic cancer with malignant ascites on checmo with abaraxane and gemictabine last dose , currently under tx for CDAD on day #5 of po vanco which she states she ahs been unable to tolerate, recurrent ascites and multiple paracentesis (last 2 weeks ago), renal insuffiency, NAHOMI of cpap presents with generlazied weakness, nausea, diarrhea and lacy. Nausea will not allow for her to take po vanco. Still having numerous watery BMs daily.  LACY with minimal exertion. She denies cough. No abd pain. No HA/Neck pain or blurry vision. No other complaints to offer    ED course: given iv vanco and zosyn for presumed sepsis. Lactate 2.2 cleared after 3L NS. Dimer elevated however V/Q neg for PE. O2S 94% on RA at rest with soft BP 89/51 however historically patient has low BP and was placed on midodrine last admission. PT pancytopenic. Afebrile. CXR clear.   ------------------------------------------------------------------------------------  above hx corroborated with patient   since  had received chemo   5 days ago started with diarrhea with 5-7 episodes per day  dx with CDAD and has not  been tolerating it   appetite poor   + uop   no nsaid or contrast study   dx with DVT on iv heprin interruped for thrombocytonpenia determined to be due to chemo and now with oral bleeding   GSF given   at home was taking midodrine         PAST MEDICAL & SURGICAL HISTORY:  Vascular insufficiency  left leg  Sciatic leg pain  right  Hip pain  Arthritis  S/P hip replacement, bilateral  History of tonsillectomy and adenoidectomy  H/O arthroscopy of left knee          Allergies  No Known Allergies  Intolerances        SOCIAL HISTORY:    FAMILY HISTORY:  Family history of heart disease  a. fib, valvular dysfunction    Family history of COPD (chronic obstructive pulmonary disease)   (2021 15:06)        MEDICATIONS  (STANDING):  FIRST- Mouthwash  BLM 10 milliLiter(s) Swish and Swallow every 4 hours  fluconAZOLE   Tablet 100 milliGRAM(s) Oral daily  lactobacillus acidophilus 1 Tablet(s) Oral two times a day with meals  metoprolol tartrate 12.5 milliGRAM(s) Oral every 8 hours  midodrine. 10 milliGRAM(s) Oral three times a day  pantoprazole  Injectable 40 milliGRAM(s) IV Push every 12 hours  sodium chloride 0.9%. 1000 milliLiter(s) (100 mL/Hr) IV Continuous <Continuous>  vancomycin    Solution 125 milliGRAM(s) Oral every 6 hours    MEDICATIONS  (PRN):  acetaminophen   Tablet .. 650 milliGRAM(s) Oral every 4 hours PRN Mild Pain (1 - 3)  ondansetron Injectable 4 milliGRAM(s) IV Push every 6 hours PRN Nausea  zolpidem 5 milliGRAM(s) Oral at bedtime PRN Insomnia  zolpidem 5 milliGRAM(s) Oral at bedtime PRN Insomnia      Allergies    No Known Allergies    Intolerances        I&O's Detail    2021 07:01  -  2021 07:00  --------------------------------------------------------  IN:    Lactated Ringers: 900 mL  Total IN: 900 mL    OUT:    Voided (mL): 400 mL  Total OUT: 400 mL    Total NET: 500 mL      Vital Signs Last 24 Hrs  T(C): 36.6 (2021 14:55), Max: 36.7 (2021 20:03)  T(F): 97.8 (2021 14:55), Max: 98 (2021 20:03)  HR: 110 (2021 14:55) (99 - 111)  BP: 97/55 (2021 17:19) (86/67 - 104/52)  BP(mean): --  RR: 18 (2021 14:55) (18 - 18)  SpO2: 95% (2021 14:55) (94% - 98%)  Daily     Daily Weight in k.9 (2021 05:47)    PHYSICAL EXAM:  General: alert. awake Ox3  HEENT: MMM  CV: s1s2 rrr  LUNGS: B/L CTA  EXT: no edema    LABS:                        8.7    4.40  )-----------( 40       ( 2021 06:47 )             26.5         133<L>  |  106  |  65<H>  ----------------------------<  138<H>  4.6   |  17<L>  |  3.15<H>    Ca    7.4<L>      2021 06:47  Phos  3.2       Mg     1.8         TPro  5.3<L>  /  Alb  1.2<L>  /  TBili  1.1  /  DBili  x   /  AST  230<H>  /  ALT  122<H>  /  AlkPhos  270<H>          Magnesium, Serum: 1.8 mg/dL ( @ 06:47)  Phosphorus Level, Serum: 3.2 mg/dL ( @ 06:47)  Vitamin D, 25-Hydroxy: 8.8 ng/mL ( @ 06:47)

## 2021-01-31 NOTE — CONSULT NOTE ADULT - SUBJECTIVE AND OBJECTIVE BOX
HPI:    64 yo F with multiple comorbidities including pancreatic cancer with malignant ascites on checmo with abaraxane and gemictabine last dose 1/22, currently under tx for CDAD on day #5 of po vanco which she states she ahs been unable to tolerate, recurrent ascites and multiple paracentesis (last 2 weeks ago), renal insuffiency, NAHOMI of cpap presents with generlazied weakness, nausea, diarrhea and lacy. Nausea will not allow for her to take po vanco. Still having numerous watery BMs daily.  LACY with minimal exertion. She denies cough. No abd pain. No HA/Neck pain or blurry vision. No other complaints to offer    ED course: given iv vanco and zosyn for presumed sepsis. Lactate 2.2 cleared after 3L NS. Dimer elevated however V/Q neg for PE. O2S 94% on RA at rest with soft BP 89/51 however historically patient has low BP and was placed on midodrine last admission. PT pancytopenic. Afebrile. CXR clear.   ---------------------------  Patient has had anemia/guaiac +. Endoscopic eval has been deferred due to underlying comorbidities  No overt bleeding.        PAST MEDICAL & SURGICAL HISTORY:  Vascular insufficiency  left leg    Sciatic leg pain  right    Hip pain    Arthritis    S/P hip replacement, bilateral    History of tonsillectomy and adenoidectomy    H/O arthroscopy of left knee  1994        Allergies    No Known Allergies    Intolerances        SOCIAL HISTORY:    FAMILY HISTORY:  Family history of heart disease  a. fib, valvular dysfunction    Family history of COPD (chronic obstructive pulmonary disease)   (27 Jan 2021 15:06)      PAST MEDICAL & SURGICAL HISTORY:  Vascular insufficiency  left leg    Sciatic leg pain  right    Hip pain    Arthritis    S/P hip replacement, bilateral    History of tonsillectomy and adenoidectomy    H/O arthroscopy of left knee  1994        Home Medications:  Compazine 10 mg oral tablet: 1 tab(s) orally 3 times a day, As Needed (22 Jan 2021 15:18)  Imodium 2 mg oral capsule: 1 cap(s) orally every 4 hours, As Needed (22 Jan 2021 15:18)  metoprolol succinate 25 mg oral tablet, extended release: 1 tab(s) orally 2 times a day (08 Jan 2021 12:53)  pantoprazole 40 mg oral delayed release tablet: 1 tab(s) orally once a day (27 Jan 2021 15:29)  vancomycin 125 mg oral capsule: 1 cap(s) orally every 6 hours (27 Jan 2021 15:28)      MEDICATIONS  (STANDING):  filgrastim-sndz (ZARXIO) Injectable 480 MICROGram(s) SubCutaneous daily  FIRST- Mouthwash  BLM 10 milliLiter(s) Swish and Swallow every 4 hours  fluconAZOLE   Tablet 100 milliGRAM(s) Oral daily  lactobacillus acidophilus 1 Tablet(s) Oral two times a day with meals  metoprolol tartrate 12.5 milliGRAM(s) Oral every 8 hours  midodrine. 10 milliGRAM(s) Oral three times a day  pantoprazole  Injectable 40 milliGRAM(s) IV Push every 12 hours  sodium chloride 0.9%. 1000 milliLiter(s) (100 mL/Hr) IV Continuous <Continuous>  vancomycin    Solution 125 milliGRAM(s) Oral every 6 hours    MEDICATIONS  (PRN):  acetaminophen   Tablet .. 650 milliGRAM(s) Oral every 4 hours PRN Mild Pain (1 - 3)  ondansetron Injectable 4 milliGRAM(s) IV Push every 6 hours PRN Nausea  zolpidem 5 milliGRAM(s) Oral at bedtime PRN Insomnia  zolpidem 5 milliGRAM(s) Oral at bedtime PRN Insomnia      Allergies    No Known Allergies    Intolerances        SOCIAL HISTORY:    FAMILY HISTORY:  Family history of heart disease  a. fib, valvular dysfunction    Family history of COPD (chronic obstructive pulmonary disease)        ROS  As above  Otherwise unremarkable    Vital Signs Last 24 Hrs  T(C): 36.4 (31 Jan 2021 08:37), Max: 36.8 (30 Jan 2021 15:21)  T(F): 97.6 (31 Jan 2021 08:37), Max: 98.2 (30 Jan 2021 15:21)  HR: 111 (31 Jan 2021 08:37) (99 - 111)  BP: 104/52 (31 Jan 2021 08:37) (86/67 - 104/52)  BP(mean): --  RR: 18 (31 Jan 2021 08:37) (18 - 18)  SpO2: 94% (31 Jan 2021 08:37) (94% - 98%)    Constitutional: chronically ill appearing  Respiratory: CTAB  Cardiovascular: S1 and S2, RRR  Gastrointestinal: BS+, soft, NT/ND  Psychiatric: Normal mood, normal affect  Skin: No rashes    LABS:                        8.7    4.40  )-----------( 40       ( 31 Jan 2021 06:47 )             26.5     01-31    133<L>  |  106  |  65<H>  ----------------------------<  138<H>  4.6   |  17<L>  |  3.15<H>    Ca    7.4<L>      31 Jan 2021 06:47  Phos  3.2     01-31  Mg     1.8     01-31    TPro  5.3<L>  /  Alb  1.2<L>  /  TBili  1.1  /  DBili  x   /  AST  230<H>  /  ALT  122<H>  /  AlkPhos  270<H>  01-31      LIVER FUNCTIONS - ( 31 Jan 2021 06:47 )  Alb: 1.2 g/dL / Pro: 5.3 gm/dL / ALK PHOS: 270 U/L / ALT: 122 U/L / AST: 230 U/L / GGT: x             RADIOLOGY & ADDITIONAL STUDIES:

## 2021-01-31 NOTE — CONSULT NOTE ADULT - ASSESSMENT
HPI: Pt is a 65y old Female with multiple comorbidities including pancreatic cancer with malignant ascites on chemo last dose 1/22, currently under tx for CDAD on po vanco which she states she has been unable to tolerate due to nausea, recurrent ascites and multiple paracentesis (last 2 weeks ago), renal insufficiency, NAHOMI presents with generalized weakness, nausea, diarrhea and lacy. Still having numerous watery BMs daily.  LACY with minimal exertion. She has been found to have extensive DVT LLE, anemia requiring transfusion as well as persistent weakness. Palliative Medicine Consult called to establish GOC as this is her 3rd hospitalization since Dec 2020  1/28/21 Seen and examined at bedside. Denies C/Om pain other than discomfort and difficulty, pain swallowing R/T oral candidiasis. Denies dyspnea at rest however endorses overall weakness and fatigue.      Assessment and Plan:    1) Dyspnea  -On exertion  -Supplemental O2 PRN  -NM scan= lo probability of PE    2) Pancreatic Cancer  -Carcinimitosis  -Ascites 3X paracentesis   -Chemo last 1/22  -Now neutropenic  -Oral candidiasis  -Add Diflucan 100 mg daily  -CDiff colitis  -Cont vanco  -Hypercoagulable state  -DVT LLE  -Onc eval pending      3) Debility  -Overall weakness  -Obesity  -Anemia  -Transfuse PRBC as per medicine  -Poor PO intake  -Hypoalbuminemia  -PT eval    4) DVT  -IVC filter placement  -AC as tolerated   -Cont to monitor    5) CKD  -Creat > 2.5  -Cont gentle hydration    6) Advanced Directives  -Pt with capacity  -Sister surrogate  -Will schedule GOC discussion
66yo F with RLE DVT and chemotherapy induced thrombocytopenia with PLT 44 referred to IR for possible IVC filter placement  - Case discussed with Dr. Sandoval. Pt likely has no active bleed, and thrombocytopenia is chemotherapy induced. She provided an article which stated that 50% therapeutic LMWH dose may be used to treat DVT in pts with PLT count between 30-50.   - If PLT count drops to <30 and anticoagulation is contraindicated, please reconsult IR for possible IVC filter placement  - Thank you for the courtesy of this consult
64 yo F with PMH of Aflutter on Eliquis and pancreatic CA found to have RLE DVT in popliteal, DVT m/l 2/2 to hypercoagulable state to to malignancy     Plan:   Continue anticoagulation at this time   monitor leg swelling  recommend leg elevation   continue medical management per primary         Plan discussed with Dr. Mccauley 
Imp:  Anemia and guaiac +  However, much of this could be related to chemo and thrombocytopenia  Stll unclear if endoscopic eval would     Rec:  Defer endoscopic eval for now  Cont vanco for C. diff per ID  Dr. Mcdonald resumes care tomorrow
64 yo F with multiple comorbidities including pancreatic cancer with malignant ascites on chemo with abaraxane and gemictabine last dose 1/22, currently under tx for CDAD, recurrent ascites and multiple paracentesis (last 2 weeks ago), renal insuffiency, NAHOMI of cpap admitted on 1/27 for evaluation of persistent diarrhea and weakness. Also notes oral pain and when she has mucous from coughing it hurts her mouth as well.   1. Patient admitted with metastatic pancreatic cancer, multiple complications, cdiff, oral pain  - for oral pain agree with magic mouthwash, diflucan added for oral candidiasis; encouraged patient to try to expel her upper airway secretions to avoid accumulation of lung mucous  - she will continue the po vancomycin for Cdiff, contact isolation  - hold on any further systemic antibiotics, which will further the Cdiff  - medicine management of the ascites  - oncology evaluation in progress  - palliative care evaluation  2. other issues: per medicine
65F with history of atrial flutter, on chronic anticoagulation with Eliquis, CHF, cardiomyopathy, essential hypertension, vascular insufficiency, sciatica, obesity, osteoarthritis, NAHOMI on CPAP, diagnosed in December 2020 with carcinomatosis via paracentesis with removal of 7.4 L ascites and cytopathology positive for malignant cells, favoring adenocarcinoma.  CA 19–9 was >4,000.  Started on gemcitabine/Abraxane in ambulatory, last chemotherapy given 1/22/2021.  Admitted with fatigue, generalized weakness, nausea.  Started on IV antibiotics.  VQ scan negative for PE.  Presenting with chemotherapy induced pancytopenia; also in HANANE.Venous ultrasound consistent with extensive DVT involving the popliteal, posterior tibial, gastrocnemius and soleal veins in the right lower extremity.Hematology-oncology consult requested to comment on ongoing treatment for pancreatic adenocarcinoma and on anticoagulation in patient with platelet count less than 50,000 .  
66 yo with dx of metastatic pancreatic ca with malignant ascites presents with generalized weakness.  Noted with HANANE in settting of multiple paracentesis, and CDAD.  dx wtih LE dvt oneliquis pta, now s/p ivc filter   thrombocytopenia related to chemotherapy   cdad on po vanc   hypotension on midodrine   now with HANANE/ pre-renal    PLAN   - monitor response to ivf  - cw midorine   - palliative input noted  - ua and urine lytes

## 2021-01-31 NOTE — CONSULT NOTE ADULT - CONSULT REASON
pancreatic cancer; hypercoagulable syndrome
64yo F with RLE DVT and chemotherapy induced thrombocytopenia with PLT 44 referred to IR for possible IVC filter placement
GOC
RLE DVT
esrd
Anemia, G+
shortness of breath, abnormal ECG
mucositis

## 2021-01-31 NOTE — CONSULT NOTE ADULT - PROVIDER SPECIALTY LIST ADULT
Gastroenterology
Heme/Onc
Intervent Radiology
Palliative Care
Nephrology
Surgery
Cardiology
Infectious Disease

## 2021-01-31 NOTE — PROGRESS NOTE ADULT - SUBJECTIVE AND OBJECTIVE BOX
CC: LACY  HPI: 66 yo female with PMH of  Atrial flutter  on Eliquis 11/9/19  had GILSON cardioversion,  Combined systolic and diastolic HF , Cardiomyopathy , pHTN, Vascular insufficiency LLE, R sciatica, DJD hips, morbid Obesity , low vignesh pain, Pulm. HTN, NAHOMI on CPAP, who presented  in the ER for abdominal distention and fatigue. Cardiology called to evaluate low voltage and possible injury pattern on ECG. Pt denies chest pain but reports mild dyspnea and fatigue since her diagnosis of cancer with ascites. Urgent echo was performed in the ED which revealed no evidence of pericardial effusion. EF was adequate.   Admitted for HANANE, malignant ascites possible UTI. LE extensive DVT noted AC had to be stopped due to thrombocytopenia. IVC filter will be inserted today.    1/30 - pt seen and examined at bedside, reports feeling weak and fatigued, poor appetite with nausea when she drinks too much H2O, denies pain, + cough, no CP, palpitations + diarrhea with incontinence.   1/31 - pt seen and examined, feels slightly better, 2 lose BM overnight, denies cp, dyspnea, + abdomen distended, POC discussed     Review of system- Rest of the review of system are negative except mentioned in HPI    T(C): 36.6 (01-31-21 @ 14:55), Max: 36.7 (01-30-21 @ 20:03)  T(F): 97.8 (01-31-21 @ 14:55), Max: 98 (01-30-21 @ 20:03)  HR: 110 (01-31-21 @ 14:55) (99 - 111)  BP: 101/47 (01-31-21 @ 14:55) (86/67 - 104/52)  RR: 18 (01-31-21 @ 14:55) (18 - 18)  SpO2: 95% (01-31-21 @ 14:55) (94% - 98%)  Wt(kg): --    Constitutional: NAD, awake and alert, well-developed  HEENT: PERR, EOMI,    Neck:  supple,  No JVD  Respiratory: Breath sounds are decreased at bases  bilaterally, No wheezing, rales or rhonchi  Cardiovascular: S1 and S2, regular rate and rhythm, distant sounds  Gastrointestinal: Distended abdomen  Extremities: No peripheral edema. No clubbing or cyanosis.  Vascular: 2+ peripheral pulses  Neurological: A/O x 3, no focal deficits  Musculoskeletal: no calf tenderness.    01-31    133<L>  |  106  |  65<H>  ----------------------------<  138<H>  4.6   |  17<L>  |  3.15<H>    Ca    7.4<L>      31 Jan 2021 06:47  Phos  3.2     01-31  Mg     1.8     01-31    TPro  5.3<L>  /  Alb  1.2<L>  /  TBili  1.1  /  DBili  x   /  AST  230<H>  /  ALT  122<H>  /  AlkPhos  270<H>  01-31                        8.7    4.40  )-----------( 40       ( 31 Jan 2021 06:47 )             26.5       LIVER FUNCTIONS - ( 31 Jan 2021 06:47 )  Alb: 1.2 g/dL / Pro: 5.3 gm/dL / ALK PHOS: 270 U/L / ALT: 122 U/L / AST: 230 U/L / GGT: x                     01-30    137  |  110<H>  |  69<H>  ----------------------------<  131<H>  4.6   |  17<L>  |  2.79<H>    Ca    6.7<L>      30 Jan 2021 07:17                          7.5    1.59  )-----------( 23       ( 30 Jan 2021 07:17 )             23.3       PTT - ( 28 Jan 2021 14:16 )  PTT:23.7 sec                            7.1    1.39  )-----------( 31       ( 29 Jan 2021 06:03 )             22.0   01-29    138  |  111<H>  |  72<H>  ----------------------------<  139<H>  4.7   |  18<L>  |  2.56<H>    Ca    6.8<L>      29 Jan 2021 06:03    TPro  4.9<L>  /  Alb  1.3<L>  /  TBili  0.8  /  DBili  0.5<H>  /  AST  138<H>  /  ALT  92<H>  /  AlkPhos  230<H>  01-28    < from: US Kidney and Bladder (01.28.21 @ 22:09) >  EXAM:  US KIDNEYS AND BLADDER                            PROCEDURE DATE:  01/28/2021          INTERPRETATION:  CLINICAL INFORMATION: Acute renal insufficiency    COMPARISON: Renal ultrasound 1/8/2021    TECHNIQUE: Sonography of the kidneys and bladder.    FINDINGS:    Right kidney: 11.5 cm. A 7 mm lower pole calculus. No hydronephrosis.    Left kidney: 10.7 cm. No renal mass, hydronephrosis or calculi. Poorly evaluated due to excessive shadowing.    Urinary bladder: Decompressed.    Perihepaticascites.    IMPRESSION:    7 mm nonobstructing right lower pole calculus.    Poorly evaluated left kidney.      < from: Xray Chest 1 View- PORTABLE-Routine (Xray Chest 1 View- PORTABLE-Routine .) (01.30.21 @ 15:24) >  Tubes/lines: Right chest wall port with catheter tip in the region of the right brachiocephalic vein.    Lungs: Bibasilar atelectasis. Low lung volumes..    Pleura: No pneumothorax or pleural effusions.    Cardiomediastinal silhouette: Within normal limits..    < end of copied text >                  < end of copied text >  < from: US Abdomen Limited (01.27.21 @ 16:57) >  EXAM:  US ABDOMEN LIMITED                            PROCEDURE DATE:  01/27/2021          INTERPRETATION:  CLINICAL INFORMATION: Abdominal distention. Evaluate for ascites.    COMPARISON: CT 12/23/2020.    TECHNIQUE: Limited ultrasound of the 4 quadrants of the abdomen was performed to evaluate for ascites.    FINDINGS:    Moderate volume ascites with septations throughout all 4 quadrants.    IMPRESSION:    Moderate volume complex ascites.    Operative Findings:  · Operative Findings	s/p retrievable option, infrarenal IVC filter placement        < end of copied text >  < from: US Duplex Venous Lower Ext Ltd, Right (01.27.21 @ 16:18) >    EXAM:  US DPLX LWR EXT VEINS LTD RT                            PROCEDURE DATE:  01/27/2021          INTERPRETATION:  CLINICAL INFORMATION: Pain and swelling    COMPARISON: 9/11/2019    TECHNIQUE: Duplex sonography of the RIGHT LOWER extremity veinswith color and spectral Doppler, with and without compression.    FINDINGS:    There is occlusive thrombus involving the popliteal posterior tibial soleal and gastrocnemius veins. Common femoral and femoral veins are patent.    The contralateral common femoral vein is patent.    IMPRESSION:  Extensive deep venous thrombosis involving the popliteal, posterior tibial, gastrocnemius and soleal veins of the right lower extremity            < end of copied text >  < from: NM Pulmonary Perfusion Scan (01.27.21 @ 14:29) >  EXAM:  NM PULM PERFUSION IMG                            PROCEDURE DATE:  01/27/2021          INTERPRETATION:  RADIOPHARMACEUTICAL: 99mTc-MAA       DOSE: 3.4 mCi IV    CLINICAL INFORMATION: 65 year old female with sepsis, ascites, shortness of breath and elevated d-dimer, referred to evaluate for pulmonary embolus.    TECHNIQUE: Perfusion images of the lungs were obtained following administration of Tc-99m-MAA. Images were obtained in the anterior, posterior, both lateral, and all 4 oblique projections. This study was interpreted in conjunction with a chest radiograph of 1/27/2021  .  COMPARISON: No previous lung scan for comparison.    FINDINGS: The study demonstrates heterogenous tracer distribution in both lungs. There are no well-definedsegmental perfusion defects.    IMPRESSION: Very low probability of pulmonary embolus.          < end of copied text >  < from: Xray Chest 1 View- PORTABLE-Urgent (01.27.21 @ 11:11) >  EXAM:  XR CHEST PORTABLE URGENT 1V                            PROCEDURE DATE:  01/27/2021          INTERPRETATION:  Exam Date: 1/27/2021 11:11 AM    Chest radiograph (one view)    CLINICAL INFORMATION:  Sepsis    TECHNIQUE:  Single frontal view of the chest was obtained.    COMPARISON: 12/23/2020 radiograph    FINDINGS/  IMPRESSION:    Right port catheter with tip in the SVC is present.    The lungs are clear.  No pleural abnormality is seen.    Cardiomediastinal silhouette is intact.         < end of copied text >  Occult Blood, Feces: Positive (01.28.21 @ 05:10) Haptoglobin, Serum: 293 mg/dL (01.28.21 @ 04:30) Vitamin B12, Serum: 1224 pg/mL (01.28.21 @ 04:30) Folate, Serum: 9.6 ng/mL (01.28.21 @ 04:30) Ferritin, Serum: 3503: Test Repeated ng/mL (01.28.21 @ 04:30) Culture Results:   10,000 - 49,000 CFU/mL Presumptive Candida albicans   "Susceptibilities not performed"   Normal Urogenital lashon present (01.27.21 @ 17:35)   MEDICATIONS  (STANDING):  filgrastim-sndz (ZARXIO) Injectable 480 MICROGram(s) SubCutaneous daily  FIRST- Mouthwash  BLM 10 milliLiter(s) Swish and Swallow every 4 hours  fluconAZOLE   Tablet 100 milliGRAM(s) Oral daily  lactated ringers. 1000 milliLiter(s) (75 mL/Hr) IV Continuous <Continuous>  lactobacillus acidophilus 1 Tablet(s) Oral two times a day with meals  metoprolol tartrate 12.5 milliGRAM(s) Oral every 8 hours  midodrine. 10 milliGRAM(s) Oral three times a day  pantoprazole  Injectable 40 milliGRAM(s) IV Push every 12 hours  vancomycin    Solution 125 milliGRAM(s) Oral every 6 hours    MEDICATIONS  (PRN):  acetaminophen   Tablet .. 650 milliGRAM(s) Oral every 4 hours PRN Mild Pain (1 - 3)  ondansetron Injectable 4 milliGRAM(s) IV Push every 6 hours PRN Nausea  zolpidem 5 milliGRAM(s) Oral at bedtime PRN Insomnia  zolpidem 5 milliGRAM(s) Oral at bedtime PRN Insomnia

## 2021-01-31 NOTE — CONSULT NOTE ADULT - CONSULT REQUESTED DATE/TIME
28-Jan-2021
28-Jan-2021 10:39
28-Jan-2021 10:55
31-Jan-2021 17:41
27-Jan-2021 21:20
31-Jan-2021 13:22
27-Jan-2021 11:24
29-Jan-2021 16:52

## 2021-01-31 NOTE — PROGRESS NOTE ADULT - ASSESSMENT
66 yo female with PMH of Atrial flutter on Eliquis s/p GILSON cardioversion, Combined systolic and diastolic HF , Cardiomyopathy , pHTN, Vascular insufficiency LLE, R sciatica, DJD hips, morbid obesity , low vignesh pain, NAHOMI on CPAP, who presented to ER for abdominal distention and fatigue. Admitted for HANANE, malignant ascites possible UTI. LE extensive DVT noted AC had to be stopped due to thrombocytopenia. IVC filter inserted.    LACY - multifactorial (recurrent ascites, anemia and large body habitus and NAHOMI/OHS), Atelectasis  -resolved, V/Q scan neg for PE, 1/30 c/o persistent cough, IVF stopped, CXR - atelectasis,  incentive spirometry  CDAD  -PO Vanco, ID following, hold off on systemic abx for now, add  probiotics  Candidal UTI - Diflucan   Pancytopenia chemo induced, slightly better  -Heme/onc following, started on Zarxio x 3 days  -s/p transfusion of PRBC 1/29, to be transfused again today x 1 unit  DVT RLE popliteal (extensive)  -pt was on Eliquis prior to admission, dc'd placed on Heparin gtt, platelets low so dc'd  -s/p infrarenal IVC filter 1/29  HANANE on CKD with baseline Cr 1.4-1.6   -Likely pre-renal/low flow state,  nephrology consult,  lower rate of IV fluids  - Cr 2.7--> 3.1  Hypotension , chronic , orthostatic  -on midodrine, dose increased 10 TID   Metastatic Pancreatic cancer, Adenocarcinoma with CA 19-9 > 4000, Ascites   - s/p on gemcitabine/Abraxane in ambulatory, last chemotherapy given 1/22/2021  -Pall consult; remains full code  - oncology consult - poor functional status for systemic therapy at this time  - IR consult for paracenteses  Sinus tachycardia, Atrial flutter   -EKG reviewed, Cardiology consult appreciated   - on low dose BB with parameters  Anemia with Positive FOBT   -possibly from C. diff, oral bleeding 2/2 low platelets  -GI consulted - no role of endoscopic evaluation at this time, On PPI IV bid    Advanced Directives FULL CODE  -Pt with capacity  -Sister surrogate/pt declines calling her sister to schedule GOC discussion  -Attempted to discuss MOLST with pt and she does not want to discuss  -palliative team will follow up Monday 2/1    Dispo - IV fluids, GI and nephrology evaluation , PT daily, monitoring

## 2021-02-01 ENCOUNTER — NON-APPOINTMENT (OUTPATIENT)
Age: 66
End: 2021-02-01

## 2021-02-01 LAB
ALBUMIN SERPL ELPH-MCNC: 1.2 G/DL — LOW (ref 3.3–5)
ALP SERPL-CCNC: 288 U/L — HIGH (ref 40–120)
ALT FLD-CCNC: 103 U/L — HIGH (ref 12–78)
ANION GAP SERPL CALC-SCNC: 13 MMOL/L — SIGNIFICANT CHANGE UP (ref 5–17)
AST SERPL-CCNC: 180 U/L — HIGH (ref 15–37)
BASOPHILS # BLD AUTO: 0 K/UL — SIGNIFICANT CHANGE UP (ref 0–0.2)
BASOPHILS NFR BLD AUTO: 0 % — SIGNIFICANT CHANGE UP (ref 0–2)
BILIRUB SERPL-MCNC: 1 MG/DL — SIGNIFICANT CHANGE UP (ref 0.2–1.2)
BUN SERPL-MCNC: 67 MG/DL — HIGH (ref 7–23)
CALCIUM SERPL-MCNC: 7.3 MG/DL — LOW (ref 8.5–10.1)
CHLORIDE SERPL-SCNC: 110 MMOL/L — HIGH (ref 96–108)
CO2 SERPL-SCNC: 14 MMOL/L — LOW (ref 22–31)
CREAT SERPL-MCNC: 3.47 MG/DL — HIGH (ref 0.5–1.3)
CULTURE RESULTS: SIGNIFICANT CHANGE UP
EOSINOPHIL # BLD AUTO: 0 K/UL — SIGNIFICANT CHANGE UP (ref 0–0.5)
EOSINOPHIL NFR BLD AUTO: 0 % — SIGNIFICANT CHANGE UP (ref 0–6)
GLUCOSE SERPL-MCNC: 141 MG/DL — HIGH (ref 70–99)
HCT VFR BLD CALC: 24.3 % — LOW (ref 34.5–45)
HGB BLD-MCNC: 8 G/DL — LOW (ref 11.5–15.5)
INR BLD: 1.27 RATIO — HIGH (ref 0.88–1.16)
LYMPHOCYTES # BLD AUTO: 0.92 K/UL — LOW (ref 1–3.3)
LYMPHOCYTES # BLD AUTO: 10 % — LOW (ref 13–44)
MCHC RBC-ENTMCNC: 28.2 PG — SIGNIFICANT CHANGE UP (ref 27–34)
MCHC RBC-ENTMCNC: 32.9 GM/DL — SIGNIFICANT CHANGE UP (ref 32–36)
MCV RBC AUTO: 85.6 FL — SIGNIFICANT CHANGE UP (ref 80–100)
MONOCYTES # BLD AUTO: 0.74 K/UL — SIGNIFICANT CHANGE UP (ref 0–0.9)
MONOCYTES NFR BLD AUTO: 8 % — SIGNIFICANT CHANGE UP (ref 2–14)
NEUTROPHILS # BLD AUTO: 6.37 K/UL — SIGNIFICANT CHANGE UP (ref 1.8–7.4)
NEUTROPHILS NFR BLD AUTO: 68 % — SIGNIFICANT CHANGE UP (ref 43–77)
NRBC # BLD: SIGNIFICANT CHANGE UP /100 WBCS (ref 0–0)
PLATELET # BLD AUTO: 39 K/UL — LOW (ref 150–400)
POTASSIUM SERPL-MCNC: 4.7 MMOL/L — SIGNIFICANT CHANGE UP (ref 3.5–5.3)
POTASSIUM SERPL-SCNC: 4.7 MMOL/L — SIGNIFICANT CHANGE UP (ref 3.5–5.3)
PROT SERPL-MCNC: 4.7 GM/DL — LOW (ref 6–8.3)
PROTHROM AB SERPL-ACNC: 14.6 SEC — HIGH (ref 10.6–13.6)
RBC # BLD: 2.84 M/UL — LOW (ref 3.8–5.2)
RBC # FLD: 15.8 % — HIGH (ref 10.3–14.5)
SODIUM SERPL-SCNC: 137 MMOL/L — SIGNIFICANT CHANGE UP (ref 135–145)
SPECIMEN SOURCE: SIGNIFICANT CHANGE UP
WBC # BLD: 9.23 K/UL — SIGNIFICANT CHANGE UP (ref 3.8–10.5)
WBC # FLD AUTO: 9.23 K/UL — SIGNIFICANT CHANGE UP (ref 3.8–10.5)

## 2021-02-01 PROCEDURE — 99233 SBSQ HOSP IP/OBS HIGH 50: CPT

## 2021-02-01 PROCEDURE — 49083 ABD PARACENTESIS W/IMAGING: CPT

## 2021-02-01 RX ORDER — SODIUM CHLORIDE 9 MG/ML
1000 INJECTION INTRAMUSCULAR; INTRAVENOUS; SUBCUTANEOUS
Refills: 0 | Status: DISCONTINUED | OUTPATIENT
Start: 2021-02-01 | End: 2021-02-01

## 2021-02-01 RX ORDER — SODIUM CHLORIDE 9 MG/ML
1000 INJECTION, SOLUTION INTRAVENOUS
Refills: 0 | Status: DISCONTINUED | OUTPATIENT
Start: 2021-02-01 | End: 2021-02-01

## 2021-02-01 RX ORDER — SODIUM CHLORIDE 9 MG/ML
1000 INJECTION, SOLUTION INTRAVENOUS
Refills: 0 | Status: DISCONTINUED | OUTPATIENT
Start: 2021-02-01 | End: 2021-02-05

## 2021-02-01 RX ORDER — SODIUM CHLORIDE 9 MG/ML
250 INJECTION INTRAMUSCULAR; INTRAVENOUS; SUBCUTANEOUS ONCE
Refills: 0 | Status: COMPLETED | OUTPATIENT
Start: 2021-02-01 | End: 2021-02-01

## 2021-02-01 RX ADMIN — Medication 12.5 MILLIGRAM(S): at 21:12

## 2021-02-01 RX ADMIN — MIDODRINE HYDROCHLORIDE 10 MILLIGRAM(S): 2.5 TABLET ORAL at 18:23

## 2021-02-01 RX ADMIN — MIDODRINE HYDROCHLORIDE 10 MILLIGRAM(S): 2.5 TABLET ORAL at 05:02

## 2021-02-01 RX ADMIN — SODIUM CHLORIDE 100 MILLILITER(S): 9 INJECTION INTRAMUSCULAR; INTRAVENOUS; SUBCUTANEOUS at 02:15

## 2021-02-01 RX ADMIN — DIPHENHYDRAMINE HYDROCHLORIDE AND LIDOCAINE HYDROCHLORIDE AND ALUMINUM HYDROXIDE AND MAGNESIUM HYDRO 10 MILLILITER(S): KIT at 18:23

## 2021-02-01 RX ADMIN — Medication 125 MILLIGRAM(S): at 18:23

## 2021-02-01 RX ADMIN — Medication 125 MILLIGRAM(S): at 12:45

## 2021-02-01 RX ADMIN — SODIUM CHLORIDE 50 MILLILITER(S): 9 INJECTION, SOLUTION INTRAVENOUS at 18:23

## 2021-02-01 RX ADMIN — DIPHENHYDRAMINE HYDROCHLORIDE AND LIDOCAINE HYDROCHLORIDE AND ALUMINUM HYDROXIDE AND MAGNESIUM HYDRO 10 MILLILITER(S): KIT at 15:06

## 2021-02-01 RX ADMIN — Medication 1 TABLET(S): at 18:23

## 2021-02-01 RX ADMIN — DIPHENHYDRAMINE HYDROCHLORIDE AND LIDOCAINE HYDROCHLORIDE AND ALUMINUM HYDROXIDE AND MAGNESIUM HYDRO 10 MILLILITER(S): KIT at 05:02

## 2021-02-01 RX ADMIN — DIPHENHYDRAMINE HYDROCHLORIDE AND LIDOCAINE HYDROCHLORIDE AND ALUMINUM HYDROXIDE AND MAGNESIUM HYDRO 10 MILLILITER(S): KIT at 21:12

## 2021-02-01 RX ADMIN — FLUCONAZOLE 100 MILLIGRAM(S): 150 TABLET ORAL at 11:06

## 2021-02-01 RX ADMIN — PANTOPRAZOLE SODIUM 40 MILLIGRAM(S): 20 TABLET, DELAYED RELEASE ORAL at 11:08

## 2021-02-01 RX ADMIN — SODIUM CHLORIDE 500 MILLILITER(S): 9 INJECTION INTRAMUSCULAR; INTRAVENOUS; SUBCUTANEOUS at 19:52

## 2021-02-01 RX ADMIN — MIDODRINE HYDROCHLORIDE 10 MILLIGRAM(S): 2.5 TABLET ORAL at 12:45

## 2021-02-01 RX ADMIN — PANTOPRAZOLE SODIUM 40 MILLIGRAM(S): 20 TABLET, DELAYED RELEASE ORAL at 21:12

## 2021-02-01 RX ADMIN — ZOLPIDEM TARTRATE 5 MILLIGRAM(S): 10 TABLET ORAL at 23:07

## 2021-02-01 RX ADMIN — Medication 125 MILLIGRAM(S): at 05:02

## 2021-02-01 RX ADMIN — Medication 125 MILLIGRAM(S): at 23:07

## 2021-02-01 RX ADMIN — Medication 12.5 MILLIGRAM(S): at 05:02

## 2021-02-01 RX ADMIN — DIPHENHYDRAMINE HYDROCHLORIDE AND LIDOCAINE HYDROCHLORIDE AND ALUMINUM HYDROXIDE AND MAGNESIUM HYDRO 10 MILLILITER(S): KIT at 11:08

## 2021-02-01 RX ADMIN — Medication 1 TABLET(S): at 11:07

## 2021-02-01 NOTE — PROGRESS NOTE ADULT - ASSESSMENT
64 yo F with multiple comorbidities including pancreatic cancer with malignant ascites on chemo with abaraxane and gemictabine last dose 1/22, currently under tx for CDAD, recurrent ascites and multiple paracentesis (last 2 weeks ago), renal insuffiency, NAHOMI of cpap admitted on 1/27 for evaluation of persistent diarrhea and weakness. Also notes oral pain and when she has mucous from coughing it hurts her mouth as well.   1. Patient admitted with metastatic pancreatic cancer, multiple complications, cdiff, oral pain  - for oral pain agree with magic mouthwash, diflucan added for oral candidiasis; encouraged patient to try to expel her upper airway secretions to avoid accumulation of lung mucous, day #4  - tolerating antibiotics without rashes or side effects and noted that her oral pain is improved  - she will continue the po vancomycin for Cdiff, contact isolation  - hold on any further systemic antibiotics, which will further the Cdiff; should complete 14-21 days po vancomycin in this patient with malignant ascites  - medicine management of the ascites  - oncology evaluation in progress  - palliative care evaluation  2. other issues: per medicine

## 2021-02-01 NOTE — PROGRESS NOTE ADULT - SUBJECTIVE AND OBJECTIVE BOX
Date of service: 02-01-21 @ 09:23      Patient lying in bed; still having loose stools, afebrile      ROS: unable to obtain secondary to patient medical condition   MEDICATIONS  (STANDING):  FIRST- Mouthwash  BLM 10 milliLiter(s) Swish and Swallow every 4 hours  fluconAZOLE   Tablet 100 milliGRAM(s) Oral daily  lactobacillus acidophilus 1 Tablet(s) Oral two times a day with meals  metoprolol tartrate 12.5 milliGRAM(s) Oral every 8 hours  midodrine. 10 milliGRAM(s) Oral three times a day  pantoprazole  Injectable 40 milliGRAM(s) IV Push every 12 hours  sodium chloride 0.9%. 1000 milliLiter(s) (100 mL/Hr) IV Continuous <Continuous>  vancomycin    Solution 125 milliGRAM(s) Oral every 6 hours    MEDICATIONS  (PRN):  acetaminophen   Tablet .. 650 milliGRAM(s) Oral every 4 hours PRN Mild Pain (1 - 3)  ondansetron Injectable 4 milliGRAM(s) IV Push every 6 hours PRN Nausea  zolpidem 5 milliGRAM(s) Oral at bedtime PRN Insomnia  zolpidem 5 milliGRAM(s) Oral at bedtime PRN Insomnia      Vital Signs Last 24 Hrs  T(C): 36.5 (01 Feb 2021 08:16), Max: 36.9 (31 Jan 2021 20:56)  T(F): 97.7 (01 Feb 2021 08:16), Max: 98.4 (31 Jan 2021 20:56)  HR: 113 (01 Feb 2021 08:16) (105 - 116)  BP: 107/57 (01 Feb 2021 08:16) (97/55 - 108/40)  BP(mean): --  RR: 18 (01 Feb 2021 08:16) (17 - 18)  SpO2: 95% (01 Feb 2021 08:16) (95% - 99%)        Physical Exam:    Constitutional: frail looking  HEENT: NC/AT, EOMI, PERRLA, conjunctivae clear; ears and nose atraumatic; pharynx with erythema  Neck: supple; thyroid not palpable  Back: no tenderness  Respiratory: respiratory effort normal; clear to auscultation  Cardiovascular: S1S2 regular, no murmurs  Abdomen: soft, not tender, not distended, positive BS; no liver or spleen organomegaly  Genitourinary: no suprapubic tenderness  Musculoskeletal: no muscle tenderness, no joint swelling or tenderness  Neurological/ Psychiatric: AxOx3, judgement and insight normal;  moving all extremities  Skin: no rashes; no palpable lesions    Labs: all available labs reviewed                    Labs:                        8.0    9.23  )-----------( 39       ( 01 Feb 2021 08:23 )             24.3     01-31    133<L>  |  106  |  65<H>  ----------------------------<  138<H>  4.6   |  17<L>  |  3.15<H>    Ca    7.4<L>      31 Jan 2021 06:47  Phos  3.2     01-31  Mg     1.8     01-31    TPro  5.3<L>  /  Alb  1.2<L>  /  TBili  1.1  /  DBili  x   /  AST  230<H>  /  ALT  122<H>  /  AlkPhos  270<H>  01-31           Cultures:       Culture - Urine (collected 01-27-21 @ 17:35)  Source: .Urine None  Final Report (01-29-21 @ 06:24):    10,000 - 49,000 CFU/mL Presumptive Candida albicans    "Susceptibilities not performed"    Normal Urogenital lashon present    Culture - Group A Streptococcus (collected 01-27-21 @ 12:00)  Source: .Throat  Final Report (01-29-21 @ 09:48):    No Streptococcus pyogenes (Group A) isolated    Culture - Blood (collected 01-27-21 @ 10:12)  Source: .Blood None  Preliminary Report (01-28-21 @ 15:01):    No growth to date.      Ferritin, Serum: 3503 ng/mL (01-28-21 @ 04:30)  D-Dimer Assay, Quantitative: 4080 ng/mL DDU (01-27-21 @ 10:12)          Radiology: all available radiological tests reviewed    Advanced directives addressed: full resuscitation

## 2021-02-01 NOTE — PROGRESS NOTE ADULT - ASSESSMENT
64 yo female with PMH of Atrial flutter on Eliquis s/p GILSON cardioversion, Combined systolic and diastolic HF , Cardiomyopathy , pHTN, Vascular insufficiency LLE, R sciatica, DJD hips, morbid obesity , low vignesh pain, NAHOMI on CPAP, who presented to ER for abdominal distention and fatigue. Admitted for HANANE, malignant ascites possible UTI. LE extensive DVT noted AC had to be stopped due to thrombocytopenia. IVC filter inserted.    LACY - multifactorial (recurrent ascites, anemia and large body habitus and NAHOMI/OHS), Atelectasis  -resolved, V/Q scan neg for PE,  c/w  IVF stopped, CXR - atelectasis,  incentive spirometry  CDAD  -PO Vanco, ID following, hold off on systemic abx for now, add  probiotics  Candidal UTI - Diflucan   HANANE on CKD with baseline Cr 1.4-1.6   -Likely pre-renal/low flow state,  nephrology consult,  c/w  IV fluids  - Cr 2.7--> 3.1--> 3.5   Pancytopenia chemo induced, slightly better  -Heme/onc following, started on Zarxio x 3 days  -s/p 2 units transfusion of PRBC 1/29, 1/30 , will give one more unit today   DVT RLE popliteal (extensive)  -pt was on Eliquis prior to admission, dc'd placed on Heparin gtt, platelets low so dc'd  -s/p infrarenal IVC filter 1/29  Hypotension , chronic , orthostatic  -on midodrine, dose increased 10 TID   Metastatic Pancreatic cancer, Adenocarcinoma with CA 19-9 > 4000, Ascites   - s/p on gemcitabine/Abraxane in ambulatory, last chemotherapy given 1/22/2021  -Pall consult; remains full code  - oncology consult - poor functional status for systemic therapy at this time  - IR consult for paracenteses today 2/1/21  Sinus tachycardia, Atrial flutter   -EKG reviewed, Cardiology consult appreciated   - on low dose BB with parameters  Anemia with Positive FOBT   -possibly from C. diff, oral bleeding 2/2 low platelets  -GI consulted - no role of endoscopic evaluation at this time, On PPI IV bid    Advanced Directives FULL CODE  -Pt with capacity  -Sister surrogate/pt declines calling her sister to schedule GOC discussion  -Attempted to discuss MOLST with pt and she does not want to discuss  -palliative team - family meeting on  2/2    Dispo - IV fluids, GI and nephrology evaluation , PT daily, monitoring

## 2021-02-01 NOTE — PROGRESS NOTE ADULT - ASSESSMENT
Pt is a 65y old Female with multiple comorbidities including pancreatic cancer with malignant ascites on chemo last dose 1/22, currently under tx for CDAD on po vanco which she states she has been unable to tolerate due to nausea, recurrent ascites and multiple paracentesis (last 2 weeks ago), renal insufficiency, NAHOMI presents with generalized weakness, nausea, diarrhea and lacy. Still having numerous watery BMs daily.  LACY with minimal exertion. She has been found to have extensive DVT LLE, anemia requiring transfusion as well as persistent weakness. Palliative Medicine Consult called to establish GOC as this is her 3rd hospitalization since Dec 2020    1) Dyspnea  - On exertion  - Supplemental O2 PRN  - NM scan= lo probability of PE    2) Pancreatic Cancer  - Carcinomatosis   - Ascites 3X paracentesis - plan for paracentesis today 2/1  - Chemo last 1/22  - Now neutropenic  - Oral candidiasis/mucositis   - Add Diflucan 100 mg daily  - CDiff colitis  - Cont PO vanco  - Onc eval noted    3) Debility  - Overall weakness  - Obesity  - Anemia  - Transfuse PRBC as per medicine  - PT eval appreciated     4) severe protein calorie malnutrition   - albumin 1.1  - poor PO intake   - registered dietician consult appreciated     5) DVT  - IVC filter placement  - AC as tolerated   - Cont to monitor  - hypercoagulable state     6) CKD  - Creat > 2.5  - Cont gentle hydration    7) Advanced Directives  - Pt with capacity  - Sister surrogate/pt declines calling her sister to schedule GOC discussion  - Attempted to discuss MOLST with pt and she does not want to discuss  - Will follow up Monday 2/1  - 2/1/21 spoke with patient at bedside, is overwhelmed with everything that is going on, agreeable to having sister called to schedule GOC meeting.   - GOC meeting scheduled for 2/3/2021 at 12 NOON

## 2021-02-01 NOTE — PROGRESS NOTE ADULT - SUBJECTIVE AND OBJECTIVE BOX
NEPHROLOGY INTERVAL HPI/OVERNIGHT EVENTS:  21 @ 14:40  2/ s/p paracentesis 3l, feels tired   66 yo F with multiple comorbidities including pancreatic cancer with malignant ascites on checmo with abaraxane and gemictabine last dose , currently under tx for CDAD on day #5 of po vanco which she states she ahs been unable to tolerate, recurrent ascites and multiple paracentesis (last 2 weeks ago), renal insuffiency, NAHOMI of cpap presents with generlazied weakness, nausea, diarrhea and lacy. Nausea will not allow for her to take po vanco. Still having numerous watery BMs daily.  LACY with minimal exertion. She denies cough. No abd pain. No HA/Neck pain or blurry vision. No other complaints to offer    ED course: given iv vanco and zosyn for presumed sepsis. Lactate 2.2 cleared after 3L NS. Dimer elevated however V/Q neg for PE. O2S 94% on RA at rest with soft BP 89/51 however historically patient has low BP and was placed on midodrine last admission. PT pancytopenic. Afebrile. CXR clear.   ------------------------------------------------------------------------------------  above hx corroborated with patient   since  had received chemo   5 days ago started with diarrhea with 5-7 episodes per day  dx with CDAD and has not  been tolerating it   appetite poor   + uop   no nsaid or contrast study   dx with DVT on iv heprin interruped for thrombocytonpenia determined to be due to chemo and now with oral bleeding   GSF given   at home was taking midodrine             MEDICATIONS  (STANDING):  FIRST- Mouthwash  BLM 10 milliLiter(s) Swish and Swallow every 4 hours  fluconAZOLE   Tablet 100 milliGRAM(s) Oral daily  lactobacillus acidophilus 1 Tablet(s) Oral two times a day with meals  metoprolol tartrate 12.5 milliGRAM(s) Oral every 8 hours  midodrine. 10 milliGRAM(s) Oral three times a day  pantoprazole  Injectable 40 milliGRAM(s) IV Push every 12 hours  sodium chloride 0.9%. 1000 milliLiter(s) (75 mL/Hr) IV Continuous <Continuous>  vancomycin    Solution 125 milliGRAM(s) Oral every 6 hours    MEDICATIONS  (PRN):  acetaminophen   Tablet .. 650 milliGRAM(s) Oral every 4 hours PRN Mild Pain (1 - 3)  ondansetron Injectable 4 milliGRAM(s) IV Push every 6 hours PRN Nausea  zolpidem 5 milliGRAM(s) Oral at bedtime PRN Insomnia  zolpidem 5 milliGRAM(s) Oral at bedtime PRN Insomnia      Allergies    No Known Allergies    Intolerances        I&O's Detail    2021 07:01  -  2021 07:00  --------------------------------------------------------  IN:    sodium chloride 0.9%: 900 mL  Total IN: 900 mL    OUT:  Total OUT: 0 mL    Total NET: 900 mL      Vital Signs Last 24 Hrs  T(C): 36.5 (2021 08:16), Max: 36.9 (2021 20:56)  T(F): 97.7 (2021 08:16), Max: 98.4 (2021 20:56)  HR: 113 (2021 08:16) (105 - 116)  BP: 107/57 (2021 08:16) (97/55 - 108/40)  BP(mean): --  RR: 18 (2021 08:16) (17 - 18)  SpO2: 95% (2021 08:16) (95% - 99%)  Daily     Daily Weight in k.2 (2021 04:55)    PHYSICAL EXAM:  General: alert. awake nad   HEENT: MMM  CV: s1s2 rrr  LUNGS: B/L CTA  EXT: + edema    LABS:                        8.0    9.23  )-----------( 39       ( 2021 08:23 )             24.3     02-01    137  |  110<H>  |  67<H>  ----------------------------<  141<H>  4.7   |  14<L>  |  3.47<H>    Ca    7.3<L>      2021 08:23  Phos  3.2       Mg     1.8         TPro  4.7<L>  /  Alb  1.2<L>  /  TBili  1.0  /  DBili  x   /  AST  180<H>  /  ALT  103<H>  /  AlkPhos  288<H>      PT/INR - ( 2021 08:23 )   PT: 14.6 sec;   INR: 1.27 ratio

## 2021-02-01 NOTE — PROGRESS NOTE ADULT - ASSESSMENT
66 yo with dx of metastatic pancreatic ca with malignant ascites presents with generalized weakness.  Noted with HANANE in settting of multiple paracentesis, and CDAD.  dx wtih LE dvt oneliquis pta, now s/p ivc filter   thrombocytopenia related to chemotherapy   cdad on po vanc   hypotension on midodrine   now with HANANE/ pre-renal    PLAN   - monitor response to ivf  - cw midorine   - palliative input noted  - ua and urine lytes     2/1 MK   - HANANE nonoliguric and continues to worsen.     midodrine   - metabolic acidosis will change ivf with bicarbonate  - lengthy discussion with pt about GOC, she is scheduled for palliative meeting wednesday.      she inquired about HD     indication and prognosis of hd related to metastatic cancer was discussed.      pt aware that she is "failing" and will consider her options in upcoming days.

## 2021-02-01 NOTE — PROGRESS NOTE ADULT - SUBJECTIVE AND OBJECTIVE BOX
CC: LACY  HPI: 64 yo female with PMH of  Atrial flutter  on Eliquis 11/9/19  had GILSON cardioversion,  Combined systolic and diastolic HF , Cardiomyopathy , pHTN, Vascular insufficiency LLE, R sciatica, DJD hips, morbid Obesity , low vignesh pain, Pulm. HTN, NAHOMI on CPAP, who presented  in the ER for abdominal distention and fatigue. Cardiology called to evaluate low voltage and possible injury pattern on ECG. Pt denies chest pain but reports mild dyspnea and fatigue since her diagnosis of cancer with ascites. Urgent echo was performed in the ED which revealed no evidence of pericardial effusion. EF was adequate.   Admitted for HANANE, malignant ascites possible UTI. LE extensive DVT noted AC had to be stopped due to thrombocytopenia. IVC filter will be inserted today.    1/30 - pt seen and examined at bedside, reports feeling weak and fatigued, poor appetite with nausea when she drinks too much H2O, denies pain, + cough, no CP, palpitations + diarrhea with incontinence.   1/31 - pt seen and examined, feels slightly better, 2 lose BM overnight, denies cp, dyspnea, + abdomen distended, POC discussed   2/1 - pt seen and examined, oob in the chair, denies cp, + abdominal distension, awaiting paracentesis, POC discussed     Review of system- Rest of the review of system are negative except mentioned in HPI    T(C): 36.5 (02-01-21 @ 08:16), Max: 36.9 (01-31-21 @ 20:56)  T(F): 97.7 (02-01-21 @ 08:16), Max: 98.4 (01-31-21 @ 20:56)  HR: 113 (02-01-21 @ 08:16) (105 - 116)  BP: 107/57 (02-01-21 @ 08:16) (97/55 - 108/40)  RR: 18 (02-01-21 @ 08:16) (17 - 18)  SpO2: 95% (02-01-21 @ 08:16) (95% - 99%)  Wt(kg): --    Constitutional: NAD, awake and alert, well-developed  HEENT: PERR, EOMI,    Neck:  supple,  No JVD  Respiratory: Breath sounds are decreased at bases  bilaterally, No wheezing, rales or rhonchi  Cardiovascular: S1 and S2, regular rate and rhythm, distant sounds  Gastrointestinal: Distended abdomen  Extremities: No peripheral edema. No clubbing or cyanosis.  Vascular: 2+ peripheral pulses  Neurological: A/O x 3, no focal deficits  Musculoskeletal: no calf tenderness.    02-01    137  |  110<H>  |  67<H>  ----------------------------<  141<H>  4.7   |  14<L>  |  3.47<H>    Ca    7.3<L>      01 Feb 2021 08:23  Phos  3.2     01-31  Mg     1.8     01-31    TPro  4.7<L>  /  Alb  1.2<L>  /  TBili  1.0  /  DBili  x   /  AST  180<H>  /  ALT  103<H>  /  AlkPhos  288<H>  02-01                        8.0    9.23  )-----------( 39       ( 01 Feb 2021 08:23 )             24.3     LIVER FUNCTIONS - ( 01 Feb 2021 08:23 )  Alb: 1.2 g/dL / Pro: 4.7 gm/dL / ALK PHOS: 288 U/L / ALT: 103 U/L / AST: 180 U/L / GGT: x             PT/INR - ( 01 Feb 2021 08:23 )   PT: 14.6 sec;   INR: 1.27 ratio        01-31    133<L>  |  106  |  65<H>  ----------------------------<  138<H>  4.6   |  17<L>  |  3.15<H>    Ca    7.4<L>      31 Jan 2021 06:47  Phos  3.2     01-31  Mg     1.8     01-31    TPro  5.3<L>  /  Alb  1.2<L>  /  TBili  1.1  /  DBili  x   /  AST  230<H>  /  ALT  122<H>  /  AlkPhos  270<H>  01-31                        8.7    4.40  )-----------( 40       ( 31 Jan 2021 06:47 )             26.5       LIVER FUNCTIONS - ( 31 Jan 2021 06:47 )  Alb: 1.2 g/dL / Pro: 5.3 gm/dL / ALK PHOS: 270 U/L / ALT: 122 U/L / AST: 230 U/L / GGT: x                     01-30    137  |  110<H>  |  69<H>  ----------------------------<  131<H>  4.6   |  17<L>  |  2.79<H>    Ca    6.7<L>      30 Jan 2021 07:17                          7.5    1.59  )-----------( 23       ( 30 Jan 2021 07:17 )             23.3       PTT - ( 28 Jan 2021 14:16 )  PTT:23.7 sec                            7.1    1.39  )-----------( 31       ( 29 Jan 2021 06:03 )             22.0   01-29    138  |  111<H>  |  72<H>  ----------------------------<  139<H>  4.7   |  18<L>  |  2.56<H>    Ca    6.8<L>      29 Jan 2021 06:03    TPro  4.9<L>  /  Alb  1.3<L>  /  TBili  0.8  /  DBili  0.5<H>  /  AST  138<H>  /  ALT  92<H>  /  AlkPhos  230<H>  01-28    < from: US Kidney and Bladder (01.28.21 @ 22:09) >  EXAM:  US KIDNEYS AND BLADDER                            PROCEDURE DATE:  01/28/2021          INTERPRETATION:  CLINICAL INFORMATION: Acute renal insufficiency    COMPARISON: Renal ultrasound 1/8/2021    TECHNIQUE: Sonography of the kidneys and bladder.    FINDINGS:    Right kidney: 11.5 cm. A 7 mm lower pole calculus. No hydronephrosis.    Left kidney: 10.7 cm. No renal mass, hydronephrosis or calculi. Poorly evaluated due to excessive shadowing.    Urinary bladder: Decompressed.    Perihepaticascites.    IMPRESSION:    7 mm nonobstructing right lower pole calculus.    Poorly evaluated left kidney.      < from: Xray Chest 1 View- PORTABLE-Routine (Xray Chest 1 View- PORTABLE-Routine .) (01.30.21 @ 15:24) >  Tubes/lines: Right chest wall port with catheter tip in the region of the right brachiocephalic vein.    Lungs: Bibasilar atelectasis. Low lung volumes..    Pleura: No pneumothorax or pleural effusions.    Cardiomediastinal silhouette: Within normal limits..    < end of copied text >                  < end of copied text >  < from: US Abdomen Limited (01.27.21 @ 16:57) >  EXAM:  US ABDOMEN LIMITED                            PROCEDURE DATE:  01/27/2021          INTERPRETATION:  CLINICAL INFORMATION: Abdominal distention. Evaluate for ascites.    COMPARISON: CT 12/23/2020.    TECHNIQUE: Limited ultrasound of the 4 quadrants of the abdomen was performed to evaluate for ascites.    FINDINGS:    Moderate volume ascites with septations throughout all 4 quadrants.    IMPRESSION:    Moderate volume complex ascites.    Operative Findings:  · Operative Findings	s/p retrievable option, infrarenal IVC filter placement        < end of copied text >  < from: US Duplex Venous Lower Ext Ltd, Right (01.27.21 @ 16:18) >    EXAM:  US DPLX LWR EXT VEINS LTD RT                            PROCEDURE DATE:  01/27/2021          INTERPRETATION:  CLINICAL INFORMATION: Pain and swelling    COMPARISON: 9/11/2019    TECHNIQUE: Duplex sonography of the RIGHT LOWER extremity veinswith color and spectral Doppler, with and without compression.    FINDINGS:    There is occlusive thrombus involving the popliteal posterior tibial soleal and gastrocnemius veins. Common femoral and femoral veins are patent.    The contralateral common femoral vein is patent.    IMPRESSION:  Extensive deep venous thrombosis involving the popliteal, posterior tibial, gastrocnemius and soleal veins of the right lower extremity            < end of copied text >  < from: NM Pulmonary Perfusion Scan (01.27.21 @ 14:29) >  EXAM:  NM PULM PERFUSION IMG                            PROCEDURE DATE:  01/27/2021          INTERPRETATION:  RADIOPHARMACEUTICAL: 99mTc-MAA       DOSE: 3.4 mCi IV    CLINICAL INFORMATION: 65 year old female with sepsis, ascites, shortness of breath and elevated d-dimer, referred to evaluate for pulmonary embolus.    TECHNIQUE: Perfusion images of the lungs were obtained following administration of Tc-99m-MAA. Images were obtained in the anterior, posterior, both lateral, and all 4 oblique projections. This study was interpreted in conjunction with a chest radiograph of 1/27/2021  .  COMPARISON: No previous lung scan for comparison.    FINDINGS: The study demonstrates heterogenous tracer distribution in both lungs. There are no well-definedsegmental perfusion defects.    IMPRESSION: Very low probability of pulmonary embolus.          < end of copied text >  < from: Xray Chest 1 View- PORTABLE-Urgent (01.27.21 @ 11:11) >  EXAM:  XR CHEST PORTABLE URGENT 1V                            PROCEDURE DATE:  01/27/2021          INTERPRETATION:  Exam Date: 1/27/2021 11:11 AM    Chest radiograph (one view)    CLINICAL INFORMATION:  Sepsis    TECHNIQUE:  Single frontal view of the chest was obtained.    COMPARISON: 12/23/2020 radiograph    FINDINGS/  IMPRESSION:    Right port catheter with tip in the SVC is present.    The lungs are clear.  No pleural abnormality is seen.    Cardiomediastinal silhouette is intact.         < end of copied text >  Occult Blood, Feces: Positive (01.28.21 @ 05:10) Haptoglobin, Serum: 293 mg/dL (01.28.21 @ 04:30) Vitamin B12, Serum: 1224 pg/mL (01.28.21 @ 04:30) Folate, Serum: 9.6 ng/mL (01.28.21 @ 04:30) Ferritin, Serum: 3503: Test Repeated ng/mL (01.28.21 @ 04:30) Culture Results:   10,000 - 49,000 CFU/mL Presumptive Candida albicans   "Susceptibilities not performed"   Normal Urogenital lashon present (01.27.21 @ 17:35)   MEDICATIONS  (STANDING):  filgrastim-sndz (ZARXIO) Injectable 480 MICROGram(s) SubCutaneous daily  FIRST- Mouthwash  BLM 10 milliLiter(s) Swish and Swallow every 4 hours  fluconAZOLE   Tablet 100 milliGRAM(s) Oral daily  lactated ringers. 1000 milliLiter(s) (75 mL/Hr) IV Continuous <Continuous>  lactobacillus acidophilus 1 Tablet(s) Oral two times a day with meals  metoprolol tartrate 12.5 milliGRAM(s) Oral every 8 hours  midodrine. 10 milliGRAM(s) Oral three times a day  pantoprazole  Injectable 40 milliGRAM(s) IV Push every 12 hours  vancomycin    Solution 125 milliGRAM(s) Oral every 6 hours    MEDICATIONS  (PRN):  acetaminophen   Tablet .. 650 milliGRAM(s) Oral every 4 hours PRN Mild Pain (1 - 3)  ondansetron Injectable 4 milliGRAM(s) IV Push every 6 hours PRN Nausea  zolpidem 5 milliGRAM(s) Oral at bedtime PRN Insomnia  zolpidem 5 milliGRAM(s) Oral at bedtime PRN Insomnia

## 2021-02-01 NOTE — PROGRESS NOTE ADULT - SUBJECTIVE AND OBJECTIVE BOX
HPI:       PAIN: ( )Yes   ( )No  Level:  Location:  Intensity:    /10  Quality:  Aggravating Factors:  Alleviating Factors:  Radiation:  Duration/Timing:  Impact on ADLs:    DYSPNEA: ( ) Yes  ( ) No  Level:        Review of Systems:    Anxiety-  Depression-  Physical Discomfort-  Dyspnea-  Constipation-  Diarrhea-  Nausea-  Vomiting-  Anorexia-  Weight Loss-   Cough-  Secretions-  Fatigue-  Weakness-  Delirium-    All other systems reviewed and negative  Unable to obtain/Limited due to:        PHYSICAL EXAM:    Vital Signs Last 24 Hrs  T(C): 36.5 (2021 08:16), Max: 36.9 (2021 20:56)  T(F): 97.7 (2021 08:16), Max: 98.4 (2021 20:56)  HR: 113 (2021 08:16) (105 - 116)  BP: 107/57 (2021 08:16) (97/55 - 108/40)  BP(mean): --  RR: 18 (2021 08:16) (17 - 18)  SpO2: 95% (2021 08:16) (95% - 99%)  Daily     Daily Weight in k.2 (2021 04:55)    PPSV2:   %  FAST:    General:  HEENT:  Lungs:  Cardiac:  GI:  :  Ext:  Neuro:      LABS:                        8.0    9.23  )-----------( 39       ( 2021 08:23 )             24.3     02    137  |  110<H>  |  67<H>  ----------------------------<  141<H>  4.7   |  14<L>  |  3.47<H>    Ca    7.3<L>      2021 08:23  Phos  3.2       Mg     1.8         TPro  4.7<L>  /  Alb  1.2<L>  /  TBili  1.0  /  DBili  x   /  AST  180<H>  /  ALT  103<H>  /  AlkPhos  288<H>      PT/INR - ( 2021 08:23 )   PT: 14.6 sec;   INR: 1.27 ratio           Albumin: Albumin, Serum: 1.2 g/dL ( @ 08:23)      Allergies    No Known Allergies    Intolerances      MEDICATIONS  (STANDING):  FIRST- Mouthwash  BLM 10 milliLiter(s) Swish and Swallow every 4 hours  fluconAZOLE   Tablet 100 milliGRAM(s) Oral daily  lactobacillus acidophilus 1 Tablet(s) Oral two times a day with meals  metoprolol tartrate 12.5 milliGRAM(s) Oral every 8 hours  midodrine. 10 milliGRAM(s) Oral three times a day  pantoprazole  Injectable 40 milliGRAM(s) IV Push every 12 hours  sodium chloride 0.9%. 1000 milliLiter(s) (100 mL/Hr) IV Continuous <Continuous>  vancomycin    Solution 125 milliGRAM(s) Oral every 6 hours    MEDICATIONS  (PRN):  acetaminophen   Tablet .. 650 milliGRAM(s) Oral every 4 hours PRN Mild Pain (1 - 3)  ondansetron Injectable 4 milliGRAM(s) IV Push every 6 hours PRN Nausea  zolpidem 5 milliGRAM(s) Oral at bedtime PRN Insomnia  zolpidem 5 milliGRAM(s) Oral at bedtime PRN Insomnia      RADIOLOGY: HPI: pt seen and examined with no family at bedside. Patient denies any complaints but states feels very weak and has not been out of bed in over a week. Patient originally refusing GO meeting but now open to the idea of having family meeting with her sister. GO meeting scheduled for 2/3/2021 at 12 noon.  Pt feeling very overwhelmed about situation, and stating that she lives alone and is scared to go to [facility] worried about being exposed to COVID    PAIN: (X )Yes   ( )No  Level: mod - pt states starting to resolve, pt states mouth feeling better   Location: oral/throat  Intensity:   5 /10  Quality: sharp  Aggravating Factors: PO intake  Impact on ADLs: severe    DYSPNEA: ( ) Yes  (x ) No    Review of Systems:    Anxiety-denies  Depression-situational  Physical Discomfort-mod  Dyspnea-on exertion  Constipation-denies  Diarrhea-yes  Nausea-yes  Anorexia-mod-severe  Fatigue-mod-severe  Weakness-severe    All other systems reviewed and negative    PHYSICAL EXAM:    Vital Signs Last 24 Hrs  T(C): 36.5 (2021 08:16), Max: 36.9 (2021 20:56)  T(F): 97.7 (2021 08:16), Max: 98.4 (2021 20:56)  HR: 113 (2021 08:16) (105 - 116)  BP: 107/57 (2021 08:16) (97/55 - 108/40)  RR: 18 (2021 08:16) (17 - 18)  SpO2: 95% (2021 08:16) (95% - 99%)  Daily Weight in k.2 (2021 04:55)    PPSV2: 40-50 %    General: Obese female in bed in NAD  Mental Status: A&O x3  HEENT: oral mucosa dry/+ mucocytis  Lungs: clear to auscultation nusrat  Cardiac: S1S2+  GI: abd soft NT ND + BS  : voids  Ext: WARREN on bed  Neuro: no focal def    LABS:                        8.0    9.23  )-----------( 39       ( 2021 08:23 )             24.3     02-    137  |  110<H>  |  67<H>  ----------------------------<  141<H>  4.7   |  14<L>  |  3.47<H>    Ca    7.3<L>      2021 08:23  Phos  3.2       Mg     1.8         TPro  4.7<L>  /  Alb  1.2<L>  /  TBili  1.0  /  DBili  x   /  AST  180<H>  /  ALT  103<H>  /  AlkPhos  288<H>      PT/INR - ( 2021 08:23 )   PT: 14.6 sec;   INR: 1.27 ratio         Albumin: Albumin, Serum: 1.2 g/dL ( @ 08:23)      Allergies    No Known Allergies    Intolerances      MEDICATIONS  (STANDING):  FIRST- Mouthwash  BLM 10 milliLiter(s) Swish and Swallow every 4 hours  fluconAZOLE   Tablet 100 milliGRAM(s) Oral daily  lactobacillus acidophilus 1 Tablet(s) Oral two times a day with meals  metoprolol tartrate 12.5 milliGRAM(s) Oral every 8 hours  midodrine. 10 milliGRAM(s) Oral three times a day  pantoprazole  Injectable 40 milliGRAM(s) IV Push every 12 hours  sodium chloride 0.9%. 1000 milliLiter(s) (100 mL/Hr) IV Continuous <Continuous>  vancomycin    Solution 125 milliGRAM(s) Oral every 6 hours    MEDICATIONS  (PRN):  acetaminophen   Tablet .. 650 milliGRAM(s) Oral every 4 hours PRN Mild Pain (1 - 3)  ondansetron Injectable 4 milliGRAM(s) IV Push every 6 hours PRN Nausea  zolpidem 5 milliGRAM(s) Oral at bedtime PRN Insomnia  zolpidem 5 milliGRAM(s) Oral at bedtime PRN Insomnia      RADIOLOGY:  < from: Xray Chest 1 View- PORTABLE-Routine (Xray Chest 1 View- PORTABLE-Routine .) (21 @ 15:24) >  XAM:  XR CHEST PORTABLE ROUTINE 1V                            PROCEDURE DATE:  2021          INTERPRETATION:  CLINICAL HISTORY: 65-year-old with cough..    TECHNIQUE: Single AP radiograph of the chest was reviewed..    COMPARISON: Chest radiograph from 2021.    FINDINGS/  IMPRESSION:    Tubes/lines: Right chest wall port with catheter tip in the region of the right brachiocephalic vein.    Lungs: Bibasilar atelectasis. Low lung volumes..    Pleura: No pneumothorax or pleural effusions.    Cardiomediastinal silhouette: Within normal limits..    < end of copied text >

## 2021-02-02 LAB
ANION GAP SERPL CALC-SCNC: 11 MMOL/L — SIGNIFICANT CHANGE UP (ref 5–17)
BUN SERPL-MCNC: 72 MG/DL — HIGH (ref 7–23)
CALCIUM SERPL-MCNC: 7.4 MG/DL — LOW (ref 8.5–10.1)
CHLORIDE SERPL-SCNC: 108 MMOL/L — SIGNIFICANT CHANGE UP (ref 96–108)
CO2 SERPL-SCNC: 16 MMOL/L — LOW (ref 22–31)
CREAT SERPL-MCNC: 3.67 MG/DL — HIGH (ref 0.5–1.3)
GLUCOSE SERPL-MCNC: 161 MG/DL — HIGH (ref 70–99)
HAV IGM SER-ACNC: SIGNIFICANT CHANGE UP
HBV CORE IGM SER-ACNC: SIGNIFICANT CHANGE UP
HBV SURFACE AG SER-ACNC: SIGNIFICANT CHANGE UP
HCT VFR BLD CALC: 25.9 % — LOW (ref 34.5–45)
HCV AB S/CO SERPL IA: 0.06 S/CO — SIGNIFICANT CHANGE UP (ref 0–0.99)
HCV AB SERPL-IMP: SIGNIFICANT CHANGE UP
HGB BLD-MCNC: 8.6 G/DL — LOW (ref 11.5–15.5)
MCHC RBC-ENTMCNC: 28.5 PG — SIGNIFICANT CHANGE UP (ref 27–34)
MCHC RBC-ENTMCNC: 33.2 GM/DL — SIGNIFICANT CHANGE UP (ref 32–36)
MCV RBC AUTO: 85.8 FL — SIGNIFICANT CHANGE UP (ref 80–100)
NRBC # BLD: 2 /100 WBCS — HIGH (ref 0–0)
PLATELET # BLD AUTO: 29 K/UL — LOW (ref 150–400)
POTASSIUM SERPL-MCNC: 4.4 MMOL/L — SIGNIFICANT CHANGE UP (ref 3.5–5.3)
POTASSIUM SERPL-SCNC: 4.4 MMOL/L — SIGNIFICANT CHANGE UP (ref 3.5–5.3)
RBC # BLD: 3.02 M/UL — LOW (ref 3.8–5.2)
RBC # FLD: 16.4 % — HIGH (ref 10.3–14.5)
SODIUM SERPL-SCNC: 135 MMOL/L — SIGNIFICANT CHANGE UP (ref 135–145)
WBC # BLD: 9.49 K/UL — SIGNIFICANT CHANGE UP (ref 3.8–10.5)
WBC # FLD AUTO: 9.49 K/UL — SIGNIFICANT CHANGE UP (ref 3.8–10.5)

## 2021-02-02 PROCEDURE — 99232 SBSQ HOSP IP/OBS MODERATE 35: CPT

## 2021-02-02 RX ORDER — ALPRAZOLAM 0.25 MG
0.25 TABLET ORAL EVERY 6 HOURS
Refills: 0 | Status: DISCONTINUED | OUTPATIENT
Start: 2021-02-02 | End: 2021-02-03

## 2021-02-02 RX ORDER — SODIUM CHLORIDE 9 MG/ML
250 INJECTION INTRAMUSCULAR; INTRAVENOUS; SUBCUTANEOUS ONCE
Refills: 0 | Status: COMPLETED | OUTPATIENT
Start: 2021-02-02 | End: 2021-02-02

## 2021-02-02 RX ORDER — BENZOCAINE AND MENTHOL 5; 1 G/100ML; G/100ML
1 LIQUID ORAL
Refills: 0 | Status: DISCONTINUED | OUTPATIENT
Start: 2021-02-02 | End: 2021-02-05

## 2021-02-02 RX ADMIN — SODIUM CHLORIDE 250 MILLILITER(S): 9 INJECTION INTRAMUSCULAR; INTRAVENOUS; SUBCUTANEOUS at 00:23

## 2021-02-02 RX ADMIN — MIDODRINE HYDROCHLORIDE 10 MILLIGRAM(S): 2.5 TABLET ORAL at 12:00

## 2021-02-02 RX ADMIN — ONDANSETRON 4 MILLIGRAM(S): 8 TABLET, FILM COATED ORAL at 09:20

## 2021-02-02 RX ADMIN — Medication 125 MILLIGRAM(S): at 12:04

## 2021-02-02 RX ADMIN — Medication 1 TABLET(S): at 09:14

## 2021-02-02 RX ADMIN — Medication 125 MILLIGRAM(S): at 23:40

## 2021-02-02 RX ADMIN — Medication 1 TABLET(S): at 16:18

## 2021-02-02 RX ADMIN — DIPHENHYDRAMINE HYDROCHLORIDE AND LIDOCAINE HYDROCHLORIDE AND ALUMINUM HYDROXIDE AND MAGNESIUM HYDRO 10 MILLILITER(S): KIT at 12:00

## 2021-02-02 RX ADMIN — SODIUM CHLORIDE 50 MILLILITER(S): 9 INJECTION, SOLUTION INTRAVENOUS at 17:29

## 2021-02-02 RX ADMIN — PANTOPRAZOLE SODIUM 40 MILLIGRAM(S): 20 TABLET, DELAYED RELEASE ORAL at 23:40

## 2021-02-02 RX ADMIN — Medication 125 MILLIGRAM(S): at 17:30

## 2021-02-02 RX ADMIN — PANTOPRAZOLE SODIUM 40 MILLIGRAM(S): 20 TABLET, DELAYED RELEASE ORAL at 09:12

## 2021-02-02 RX ADMIN — DIPHENHYDRAMINE HYDROCHLORIDE AND LIDOCAINE HYDROCHLORIDE AND ALUMINUM HYDROXIDE AND MAGNESIUM HYDRO 10 MILLILITER(S): KIT at 01:50

## 2021-02-02 RX ADMIN — ZOLPIDEM TARTRATE 5 MILLIGRAM(S): 10 TABLET ORAL at 23:41

## 2021-02-02 RX ADMIN — FLUCONAZOLE 100 MILLIGRAM(S): 150 TABLET ORAL at 09:14

## 2021-02-02 RX ADMIN — MIDODRINE HYDROCHLORIDE 10 MILLIGRAM(S): 2.5 TABLET ORAL at 16:18

## 2021-02-02 NOTE — PROGRESS NOTE ADULT - ASSESSMENT
64 yo female with PMH of Atrial flutter on Eliquis s/p GILSON cardioversion, Combined systolic and diastolic HF , Cardiomyopathy , pHTN, Vascular insufficiency LLE, R sciatica, DJD hips, morbid obesity , low vignesh pain, NAHOMI on CPAP, who presented to ER for abdominal distention and fatigue. Admitted for HANANE, malignant ascites possible UTI. LE extensive DVT noted AC had to be stopped due to thrombocytopenia. IVC filter inserted.    LACY - multifactorial (recurrent ascites, anemia and large body habitus and NAHOMI/OHS), Atelectasis  -resolved, V/Q scan neg for PE,  c/w  IVF stopped, CXR - atelectasis,  incentive spirometry    CDAD  -PO Vanco, ID following, hold off on systemic abx for now, add  probiotics    Candidal UTI - Diflucan     HANANE on CKD with baseline Cr 1.4-1.6   -Likely pre-renal/low flow state,  nephrology consult,  c/w  IV fluids  - Cr 2.7--> 3.1--> 3.5     Pancytopenia chemo induced, slightly better  -Heme/onc following, started on Zarxio x 3 days  -s/p 2 units transfusion of PRBC 1/29, 1/30 , will give one more unit today     DVT RLE popliteal (extensive)  -pt was on Eliquis prior to admission, dc'd placed on Heparin gtt, platelets low so dc'd  -s/p infrarenal IVC filter 1/29    Hypotension , chronic , orthostatic  -on midodrine, dose increased 10 TID     Metastatic Pancreatic cancer, Adenocarcinoma with CA 19-9 > 4000, Ascites   - s/p on gemcitabine/Abraxane in ambulatory, last chemotherapy given 1/22/2021  -Pall consult; remains full code  - oncology consult - poor functional status for systemic therapy at this time  - IR consult for paracenteses today 2/1/21    Sinus tachycardia, Atrial flutter   -EKG reviewed, Cardiology consult appreciated   - on low dose BB with parameters    Anemia with Positive FOBT   -possibly from C. diff, oral bleeding 2/2 low platelets  -GI consulted - no role of endoscopic evaluation at this time, On PPI IV bid    Advanced Directives FULL CODE  -Pt with capacity  -Sister surrogate/pt declines calling her sister to schedule GOC discussion  -Attempted to discuss MOLST with pt and she does not want to discuss  -palliative team - family meeting on  2/2    Dispo - IV fluids, GI and nephrology evaluation , PT daily, monitoring                64 yo female with PMH of Atrial flutter on Eliquis s/p GILSON cardioversion, Combined systolic and diastolic HF , Cardiomyopathy , pHTN, Vascular insufficiency LLE, R sciatica, DJD hips, morbid obesity , low vignesh pain, NAHOMI on CPAP, who presented to ER for abdominal distention and fatigue. Admitted for HANANE, malignant ascites possible UTI. LE extensive DVT noted AC had to be stopped due to thrombocytopenia. IVC filter inserted.    LACY - multifactorial (recurrent ascites, anemia and large body habitus and NAHOMI/OHS), Atelectasis  -resolved, V/Q scan neg for PE,  c/w  IVF stopped, CXR - atelectasis,  incentive spirometry    CDAD  -PO Vanco, ID following, hold off on systemic abx for now, add  probiotics    Candidal UTI - Diflucan     HANANE on CKD with baseline Cr 1.4-1.6   -Likely pre-renal/low flow state,  nephrology consult,  c/w  IV fluids  - Cr 2.7--> 3.1--> 3.5     Pancytopenia chemo induced, slightly better  -Heme/onc following, started on Zarxio x 3 days  -s/p 2 units transfusion of PRBC 1/29, 1/30 , will give one more unit today     DVT RLE popliteal (extensive)  -pt was on Eliquis prior to admission, dc'd placed on Heparin gtt, platelets low so dc'd  -s/p infrarenal IVC filter 1/29    Hypotension , chronic , orthostatic  -on midodrine, dose increased 10 TID     Metastatic Pancreatic cancer, Adenocarcinoma with CA 19-9 > 4000, Ascites   - s/p on gemcitabine/Abraxane in ambulatory, last chemotherapy given 1/22/2021  -Pall consult; remains full code  - oncology consult - poor functional status for systemic therapy at this time  - IR consult for paracenteses today 2/1/21    Sinus tachycardia, Atrial flutter   -EKG reviewed, Cardiology consult appreciated   - on low dose BB with parameters    Anemia with Positive FOBT   -possibly from C. diff, oral bleeding 2/2 low platelets  -GI consulted - no role of endoscopic evaluation at this time, On PPI IV bid    Advanced Directives FULL CODE  -Pt with capacity  -Sister surrogate/pt declines calling her sister to schedule GOC discussion  -Attempted to discuss MOLST with pt and she does not want to discuss  -palliative team - family meeting on  2/2    Dispo - IV fluids, GI and nephrology evaluation , PT daily, monitoring   2/2 still with diarrhea, willamo vanco zarxio, discussed with Dr Bateman

## 2021-02-02 NOTE — PROVIDER CONTACT NOTE (OTHER) - BACKGROUND
patient s/p 2units
Patient has a history of A-flutter
pt had a paracentesis today and they removed 3L, s/p 1 unit of blood and 250cc bolus. PT was given 12.5 metoprolol for heart rate with no improvement.

## 2021-02-02 NOTE — PROGRESS NOTE ADULT - ASSESSMENT
66 yo with dx of metastatic pancreatic ca with malignant ascites presents with generalized weakness.  Noted with HANANE in settting of multiple paracentesis, and CDAD.  dx wtih LE dvt oneliquis pta, now s/p ivc filter   thrombocytopenia related to chemotherapy   cdad on po vanc   hypotension on midodrine   now with HANANE/ pre-renal    PLAN   - monitor response to ivf  - cw midorine   - palliative input noted  - ua and urine lytes     2/1 MK   - HANANE nonoliguric and continues to worsen.     midodrine   - metabolic acidosis will change ivf with bicarbonate  - lengthy discussion with pt about GOC, she is scheduled for palliative meeting wednesday.      she inquired about HD     indication and prognosis of hd related to metastatic cancer was discussed.      pt aware that she is "failing" and will consider her options in upcoming days.      2/2 SY  --HANANE : Unclear if a degree of CKD.  Renal function continues to worsen now with anasarca.       Prognosis for renal recovery very poor due to continued need for repeat paracentesis.    Further intervention including HD would depend on whether there is any tx option for CA.    Explained to pt in detail.    Awaiting Palliative mtg tomorrow.  --Continue HCO3 infusion for now.

## 2021-02-02 NOTE — CHART NOTE - NSCHARTNOTEFT_GEN_A_CORE
HPI:    66 yo F with multiple comorbidities including pancreatic cancer with malignant ascites on checmo with abaraxane and gemictabine last dose 1/22, currently under tx for CDAD on day #5 of po vanco which she states she ahs been unable to tolerate, recurrent ascites and multiple paracentesis (last 2 weeks ago), renal insuffiency, NAHOMI of cpap presents with generlazied weakness, nausea, diarrhea and elizabeth.    (27 Jan 2021 15:06)      PERTINENT PMH REVIEWED:  [ X ] YES [ ] NO           Primary Contact: Stacy Tee (sister) [phone]    HCP [  ] Surrogate [ X  ] Guardian [   ]    Mental Status: [ X ] Alert  [ X ] Oriented [  ] Confused [  ] Lethargic [  ]  Concerns of Depression [  ]- down at times, situational  Anxiety [   ]- anxious at times  Baseline ADLs (prior to admission):  Independent [ X ] moderately [ ] fully   Dependent   [ ] moderately [ ] fully    Family Meeting: Scheduled for tomorrow at 12:00PM    Anticipated Grief: Patient [ X ] Family [  ]    Caregiver Zebulon Assessed: Yes [ X ] No [  ]    Goals of Care: to be further determined    ADVANCE DIRECTIVES: Pt. currently full code. Will further address at family meeting    Anticipated D/C Plan: to be determined                     Summary:    This SW met with pt. at bedside to provide support. Pt. discussed how she has been given a lot of information and expressed it being overwhelming to process. She reports that she does not just want to give up and die and did not think that her disease would progress as fast as it did. She reports that she doesn't think more treatment is available but did not seem to be sure. She reports that she spoke with the kidney doctor and was told that Dialysis at this point would likely not benefit her. Pt. discussed how she doesn't know what to do as she was hoping to go to rehab and get stronger however, now she is recognizing that this plan may not be realistic.     We discussed hospice both inpatient and home and the criteria and services for both. Pt. discussed how she always felt hospice just left someone their to die and did not do anything. This SW explored those feelings and explained exactly what hospice does and their services. Pt. expressed understanding. A family meeting with pts sister is scheduled for tomorrow. This SW explained that we would discuss all options tomorrow at the meeting and help come up with a plan.    Plan at this time is to be further determined at family meeting tomorrow. Emotional support provided. Our team will continue to follow.

## 2021-02-02 NOTE — PROGRESS NOTE ADULT - SUBJECTIVE AND OBJECTIVE BOX
65F with history of atrial flutter, on chronic anticoagulation with Eliquis, CHF, cardiomyopathy, essential hypertension, vascular insufficiency, sciatica, obesity, osteoarthritis, NAHOMI on CPAP, diagnosed in December 2020 with carcinomatosis via paracentesis with removal of 7.4 L ascites and cytopathology positive for malignant cells, favoring adenocarcinoma.  CA 19–9 was >4,000.  Started on gemcitabine/Abraxane in ambulatory, last chemotherapy given 1/22/2021.  Admitted with fatigue, generalized weakness, nausea.  Started on IV antibiotics.  VQ scan negative for PE.  Presenting with chemotherapy induced pancytopenia; also in HANANE.Venous ultrasound consistent with extensive DVT involving the popliteal, posterior tibial, gastrocnemius and soleal veins in the right lower extremity.    2/2/21    S/p paracentesis 2/1  3 liters . Still with nausea- especially after she takes Vancomycin. Still with diarrhea. Weak. No appetite but trying to drink.   Oral discomfort little better.      MEDICATIONS  (STANDING):  dextrose 5% 1000 milliLiter(s) (50 mL/Hr) IV Continuous <Continuous>  FIRST- Mouthwash  BLM 10 milliLiter(s) Swish and Swallow every 4 hours  fluconAZOLE   Tablet 100 milliGRAM(s) Oral daily  lactobacillus acidophilus 1 Tablet(s) Oral two times a day with meals  metoprolol tartrate 12.5 milliGRAM(s) Oral every 8 hours  midodrine. 10 milliGRAM(s) Oral three times a day  pantoprazole  Injectable 40 milliGRAM(s) IV Push every 12 hours  vancomycin    Solution 125 milliGRAM(s) Oral every 6 hours    MEDICATIONS  (PRN):  acetaminophen   Tablet .. 650 milliGRAM(s) Oral every 4 hours PRN Mild Pain (1 - 3)  ondansetron Injectable 4 milliGRAM(s) IV Push every 6 hours PRN Nausea  zolpidem 5 milliGRAM(s) Oral at bedtime PRN Insomnia  zolpidem 5 milliGRAM(s) Oral at bedtime PRN Insomnia    Vital Signs Last 24 Hrs  T(C): 36.5 (01 Feb 2021 20:50), Max: 36.7 (01 Feb 2021 18:12)  T(F): 97.7 (01 Feb 2021 20:50), Max: 98.1 (01 Feb 2021 18:12)  HR: 109 (02 Feb 2021 05:49) (102 - 110)  BP: 91/52 (02 Feb 2021 05:49) (80/31 - 103/50)  BP(mean): --  RR: 18 (01 Feb 2021 20:50) (18 - 18)  SpO2: 93% (01 Feb 2021 20:50) (93% - 96%)      PHYSICAL EXAM:  Constitutional: alert, morbidly obese  looks fatigued   HEENT: dry mucosa, +oral mucositis   Respiratory: bilateral decreased breath sounds anteriorly  Cardiovascular : S1, S2 irregular, rhythmic, no murmurs, gallops or rubs  Abdomen: soft, distended,  ascites better. No palpable masses.  Extremities: + bilateral lower extremity swelling  Neuro : non focal  Psych - normal affect                           8.0    9.23  )-----------( 39       ( 01 Feb 2021 08:23 )             24.3     02-01    137  |  110<H>  |  67<H>  ----------------------------<  141<H>  4.7   |  14<L>  |  3.47<H>    Ca    7.3<L>      01 Feb 2021 08:23    TPro  4.7<L>  /  Alb  1.2<L>  /  TBili  1.0  /  DBili  x   /  AST  180<H>  /  ALT  103<H>  /  AlkPhos  288<H>  02-01

## 2021-02-02 NOTE — PROGRESS NOTE ADULT - SUBJECTIVE AND OBJECTIVE BOX
NEPHROLOGY INTERVAL HPI/OVERNIGHT EVENTS:    Date of Service: 02-02-21 @ 13:09    2/2--No acute events. Continues with nausea with limited po intake.  Positive phlegm.  Denies SOB.  2/1 s/p paracentesis 3l, feels tired   66 yo F with multiple comorbidities including pancreatic cancer with malignant ascites on checmo with abaraxane and gemictabine last dose 1/22, currently under tx for CDAD on day #5 of po vanco which she states she ahs been unable to tolerate, recurrent ascites and multiple paracentesis (last 2 weeks ago), renal insuffiency, NAHOMI of cpap presents with generlazied weakness, nausea, diarrhea and lacy. Nausea will not allow for her to take po vanco. Still having numerous watery BMs daily.  LACY with minimal exertion. She denies cough. No abd pain. No HA/Neck pain or blurry vision. No other complaints to offer    ED course: given iv vanco and zosyn for presumed sepsis. Lactate 2.2 cleared after 3L NS. Dimer elevated however V/Q neg for PE. O2S 94% on RA at rest with soft BP 89/51 however historically patient has low BP and was placed on midodrine last admission. PT pancytopenic. Afebrile. CXR clear.   ------------------------------------------------------------------------------------  above hx corroborated with patient   since 1/22 had received chemo   5 days ago started with diarrhea with 5-7 episodes per day  dx with CDAD and has not  been tolerating it   appetite poor   + uop   no nsaid or contrast study   dx with DVT on iv heprin interruped for thrombocytonpenia determined to be due to chemo and now with oral bleeding   GSF given   at home was taking midodrine     MEDICATIONS  (STANDING):  dextrose 5% 1000 milliLiter(s) (50 mL/Hr) IV Continuous <Continuous>  FIRST- Mouthwash  BLM 10 milliLiter(s) Swish and Swallow every 4 hours  fluconAZOLE   Tablet 100 milliGRAM(s) Oral daily  lactobacillus acidophilus 1 Tablet(s) Oral two times a day with meals  metoprolol tartrate 12.5 milliGRAM(s) Oral every 8 hours  midodrine. 10 milliGRAM(s) Oral three times a day  pantoprazole  Injectable 40 milliGRAM(s) IV Push every 12 hours  vancomycin    Solution 125 milliGRAM(s) Oral every 6 hours    MEDICATIONS  (PRN):  acetaminophen   Tablet .. 650 milliGRAM(s) Oral every 4 hours PRN Mild Pain (1 - 3)  ondansetron Injectable 4 milliGRAM(s) IV Push every 6 hours PRN Nausea  zolpidem 5 milliGRAM(s) Oral at bedtime PRN Insomnia  zolpidem 5 milliGRAM(s) Oral at bedtime PRN Insomnia    Vital Signs Last 24 Hrs  T(C): 36.3 (02 Feb 2021 09:07), Max: 36.7 (01 Feb 2021 18:12)  T(F): 97.3 (02 Feb 2021 09:07), Max: 98.1 (01 Feb 2021 18:12)  HR: 106 (02 Feb 2021 11:58) (102 - 114)  BP: 103/57 (02 Feb 2021 11:58) (80/31 - 105/45)  BP(mean): --  RR: 18 (02 Feb 2021 09:07) (18 - 18)  SpO2: 95% (02 Feb 2021 09:07) (93% - 96%)  02-01 @ 07:01  -  02-02 @ 07:00  --------------------------------------------------------  IN: 311 mL / OUT: 0 mL / NET: 311 mL    PHYSICAL EXAM:  GENERAL: Alert.  No acute distress.  CHEST/LUNG: Fair air entry  HEART: S1S2 RRR  ABDOMEN: soft  EXTREMITIES: 3+anasarca  SKIN:     LABS:                        8.6    9.49  )-----------( 29       ( 02 Feb 2021 07:52 )             25.9     02-02    135  |  108  |  72<H>  ----------------------------<  161<H>  4.4   |  16<L>  |  3.67<H>    Ca    7.4<L>      02 Feb 2021 07:52    TPro  4.7<L>  /  Alb  1.2<L>  /  TBili  1.0  /  DBili  x   /  AST  180<H>  /  ALT  103<H>  /  AlkPhos  288<H>  02-01    PT/INR - ( 01 Feb 2021 08:23 )   PT: 14.6 sec;   INR: 1.27 ratio                     RADIOLOGY & ADDITIONAL TESTS:

## 2021-02-02 NOTE — PROGRESS NOTE ADULT - SUBJECTIVE AND OBJECTIVE BOX
CC: LACY  HPI: 66 yo female with PMH of  Atrial flutter  on Eliquis 11/9/19  had GILSON cardioversion,  Combined systolic and diastolic HF , Cardiomyopathy , pHTN, Vascular insufficiency LLE, R sciatica, DJD hips, morbid Obesity , low vignesh pain, Pulm. HTN, NAHOMI on CPAP, who presented  in the ER for abdominal distention and fatigue. Cardiology called to evaluate low voltage and possible injury pattern on ECG. Pt denies chest pain but reports mild dyspnea and fatigue since her diagnosis of cancer with ascites. Urgent echo was performed in the ED which revealed no evidence of pericardial effusion. EF was adequate.   Admitted for HANANE, malignant ascites possible UTI. LE extensive DVT noted AC had to be stopped due to thrombocytopenia. IVC filter will be inserted today.    1/30 - pt seen and examined at bedside, reports feeling weak and fatigued, poor appetite with nausea when she drinks too much H2O, denies pain, + cough, no CP, palpitations + diarrhea with incontinence.   1/31 - pt seen and examined, feels slightly better, 2 lose BM overnight, denies cp, dyspnea, + abdomen distended, POC discussed   2/1 - pt seen and examined, oob in the chair, denies cp, + abdominal distension, awaiting paracentesis, POC discussed       Constitutional: NAD, awake and alert, well-developed  HEENT: PERR, EOMI,    Neck:  supple,  No JVD  Respiratory: Breath sounds are decreased at bases  bilaterally, No wheezing, rales or rhonchi  Cardiovascular: S1 and S2, regular rate and rhythm, distant sounds  Gastrointestinal: Distended abdomen  Extremities: No peripheral edema. No clubbing or cyanosis.  Vascular: 2+ peripheral pulses  Neurological: A/O x 3, no focal deficits  Musculoskeletal: no calf tenderness.      < from: US Kidney and Bladder (01.28.21 @ 22:09) >  EXAM:  US KIDNEYS AND BLADDER                        7 mm nonobstructing right lower pole calculus.  Poorly evaluated left kidney.    < from: Xray Chest 1 View- PORTABLE-Routine (Xray Chest 1 View- PORTABLE-Routine .) (01.30.21 @ 15:24) >  Tubes/lines: Right chest wall port with catheter tip in the region of the right brachiocephalic vein.  Lungs: Bibasilar atelectasis. Low lung volumes..  Pleura: No pneumothorax or pleural effusions.  Cardiomediastinal silhouette: Within normal limits..    < from: US Abdomen Limited (01.27.21 @ 16:57) >  Moderate volume ascites with septations throughout all 4 quadrants.    Operative Findings:  · Operative Findings	s/p retrievable option, infrarenal IVC filter placement    TECHNIQUE: Duplex sonography of the RIGHT LOWER extremity veinswith color and spectral Doppler, with and without compression.  IMPRESSION:  Extensive deep venous thrombosis involving the popliteal, posterior tibial, gastrocnemius and soleal veins of the right lower extremity    < from: NM Pulmonary Perfusion Scan (01.27.21 @ 14:29) >  EXAM:  NM PULM PERFUSION IMG                        IMPRESSION: Very low probability of pulmonary embolus.  Occult Blood, Feces: Positive (01.28.21 @ 05:10) Haptoglobin, Serum: 293 mg/dL (01.28.21 @ 04:30) Vitamin B12, Serum: 1224 pg/mL (01.28.21 @ 04:30) Folate, Serum: 9.6 ng/mL (01.28.21 @ 04:30) Ferritin, Serum: 3503: Test Repeated ng/mL (01.28.21 @ 04:30) Culture Results:                      CC: LACY  HPI: 66 yo female with PMH of  Atrial flutter  on Eliquis 11/9/19  had GILSON cardioversion,  Combined systolic and diastolic HF , Cardiomyopathy , pHTN, Vascular insufficiency LLE, R sciatica, DJD hips, morbid Obesity , low vignesh pain, Pulm. HTN, NAHOMI on CPAP, who presented  in the ER for abdominal distention and fatigue. Cardiology called to evaluate low voltage and possible injury pattern on ECG. Pt denies chest pain but reports mild dyspnea and fatigue since her diagnosis of cancer with ascites. Urgent echo was performed in the ED which revealed no evidence of pericardial effusion. EF was adequate.   Admitted for HANANE, malignant ascites possible UTI. LE extensive DVT noted AC had to be stopped due to thrombocytopenia. IVC filter will be inserted today.    1/30 - pt seen and examined at bedside, reports feeling weak and fatigued, poor appetite with nausea when she drinks too much H2O, denies pain, + cough, no CP, palpitations + diarrhea with incontinence.   1/31 - pt seen and examined, feels slightly better, 2 lose BM overnight, denies cp, dyspnea, + abdomen distended, POC discussed   2/1 - pt seen and examined, oob in the chair, denies cp, + abdominal distension, awaiting paracentesis, POC discussed   2/2 overwhelmed about hospice etc ; has a slight cough. no cp palps sob; still with diarrhea     Vital Signs Last 24 Hrs  T(C): 36.3 (02 Feb 2021 14:54), Max: 36.7 (01 Feb 2021 18:12)  T(F): 97.4 (02 Feb 2021 14:54), Max: 98.1 (01 Feb 2021 18:12)  HR: 100 (02 Feb 2021 14:54) (100 - 114)  BP: 98/59 (02 Feb 2021 14:54) (85/46 - 105/45)  BP(mean): --  RR: 18 (02 Feb 2021 14:54) (18 - 18)  SpO2: 94% (02 Feb 2021 14:54) (93% - 95%)  Constitutional: NAD, awake and alert, well-developed  HEENT: PERR, EOMI,    Neck:  supple,  No JVD  Respiratory: Breath sounds are decreased at bases  bilaterally, No wheezing, rales or rhonchi  Cardiovascular: S1 and S2, regular rate and rhythm, distant sounds  Gastrointestinal: Distended abdomen  Extremities: No peripheral edema. No clubbing or cyanosis.  Vascular: 2+ peripheral pulses  Neurological: A/O x 3, no focal deficits  Musculoskeletal: no calf tenderness.      < from: US Kidney and Bladder (01.28.21 @ 22:09) >  EXAM:  US KIDNEYS AND BLADDER                        7 mm nonobstructing right lower pole calculus.  Poorly evaluated left kidney.    < from: Xray Chest 1 View- PORTABLE-Routine (Xray Chest 1 View- PORTABLE-Routine .) (01.30.21 @ 15:24) >  Tubes/lines: Right chest wall port with catheter tip in the region of the right brachiocephalic vein.  Lungs: Bibasilar atelectasis. Low lung volumes..  Pleura: No pneumothorax or pleural effusions.  Cardiomediastinal silhouette: Within normal limits..    < from: US Abdomen Limited (01.27.21 @ 16:57) >  Moderate volume ascites with septations throughout all 4 quadrants.    Operative Findings:  · Operative Findings	s/p retrievable option, infrarenal IVC filter placement    TECHNIQUE: Duplex sonography of the RIGHT LOWER extremity veinswith color and spectral Doppler, with and without compression.  IMPRESSION:  Extensive deep venous thrombosis involving the popliteal, posterior tibial, gastrocnemius and soleal veins of the right lower extremity    < from: NM Pulmonary Perfusion Scan (01.27.21 @ 14:29) >  EXAM:  NM PULM PERFUSION IMG                        IMPRESSION: Very low probability of pulmonary embolus.  Occult Blood, Feces: Positive (01.28.21 @ 05:10) Haptoglobin, Serum: 293 mg/dL (01.28.21 @ 04:30) Vitamin B12, Serum: 1224 pg/mL (01.28.21 @ 04:30) Folate, Serum: 9.6 ng/mL (01.28.21 @ 04:30) Ferritin, Serum: 3503: Test Repeated ng/mL (01.28.21 @ 04:30) Culture Results:     LABS: All Labs Reviewed:                     8.6    9.49  )-----------( 29 ( 02 Feb 2021 07:52 )             25.9    135  |  108  |  72<H>  ----------------------------<  161<H>  4.4   |  16<L>  |  3.67<H>  Ca    7.4<L>      02 Feb 2021 07:52  TPro  4.7<L>  /  Alb  1.2<L>  /  TBili  1.0  /  DBili  x   /  AST  180<H>  /  ALT  103<H>  /  AlkPhos  288<H>  02-01  PT/INR - ( 01 Feb 2021 08:23 )   PT: 14.6 sec;   INR: 1.27 ratio              acetaminophen   Tablet .. 650 milliGRAM(s) Oral every 4 hours PRN  ALPRAZolam 0.25 milliGRAM(s) Oral every 6 hours PRN  benzocaine 15 mG/menthol 3.6 mG (Sugar-Free) Lozenge 1 Lozenge Oral four times a day  dextrose 5% 1000 milliLiter(s) IV Continuous <Continuous>  FIRST- Mouthwash  BLM 10 milliLiter(s) Swish and Swallow every 4 hours  fluconAZOLE   Tablet 100 milliGRAM(s) Oral daily  lactobacillus acidophilus 1 Tablet(s) Oral two times a day with meals  metoprolol tartrate 12.5 milliGRAM(s) Oral every 8 hours  midodrine. 10 milliGRAM(s) Oral three times a day  ondansetron Injectable 4 milliGRAM(s) IV Push every 6 hours PRN  pantoprazole  Injectable 40 milliGRAM(s) IV Push every 12 hours  vancomycin    Solution 125 milliGRAM(s) Oral every 6 hours  zolpidem 5 milliGRAM(s) Oral at bedtime PRN  zolpidem 5 milliGRAM(s) Oral at bedtime PRN

## 2021-02-02 NOTE — PROGRESS NOTE ADULT - ASSESSMENT
65F with history of atrial flutter, on chronic anticoagulation with Eliquis, CHF, cardiomyopathy, essential hypertension, vascular insufficiency, sciatica, obesity, osteoarthritis, NAHOMI on CPAP, diagnosed in December 2020 with carcinomatosis via paracentesis with removal of 7.4 L ascites and cytopathology positive for malignant cells, favoring adenocarcinoma.  CA 19–9 was >4,000.  Started on gemcitabine/Abraxane in ambulatory, last chemotherapy given 1/22/2021.    Admitted with fatigue, generalized weakness, nausea.  Chemotherapy induced pancytopenia; also in HANANE. Venous ultrasound consistent with extensive DVT involving the popliteal, posterior tibial, gastrocnemius and soleal veins in the right lower extremity.    DVT- thrombophilia due to malignancy. Off AC due to  significant thrombocytopenia due to recent chemotherapy. S/p IVC filter. Will be able to resume AC once plts up to 50 K.  C diff colitis- on oral vanco.  Malignant ascites - s/p paracentesis 2/1.  HANANE - creatinine worsening. Nephrology following. Hypotension. Difficult balance - excessive IV hydration will increase edema.    Mucositis due to recent chemotherapy- - Magic mouth wash.  Advanced malignancy. Performance status markedly declined. At this point she is not a candidate/ will not benefit from continuation of palliative chemotherapy.  Palliative team following.   Prognosis is poor.

## 2021-02-03 LAB
ALBUMIN SERPL ELPH-MCNC: 1.2 G/DL — LOW (ref 3.3–5)
ANION GAP SERPL CALC-SCNC: 10 MMOL/L — SIGNIFICANT CHANGE UP (ref 5–17)
BASOPHILS # BLD AUTO: 0 K/UL — SIGNIFICANT CHANGE UP (ref 0–0.2)
BASOPHILS NFR BLD AUTO: 0 % — SIGNIFICANT CHANGE UP (ref 0–2)
BUN SERPL-MCNC: 69 MG/DL — HIGH (ref 7–23)
CALCIUM SERPL-MCNC: 7.4 MG/DL — LOW (ref 8.5–10.1)
CHLORIDE SERPL-SCNC: 107 MMOL/L — SIGNIFICANT CHANGE UP (ref 96–108)
CO2 SERPL-SCNC: 19 MMOL/L — LOW (ref 22–31)
CREAT SERPL-MCNC: 3.66 MG/DL — HIGH (ref 0.5–1.3)
EOSINOPHIL # BLD AUTO: 0 K/UL — SIGNIFICANT CHANGE UP (ref 0–0.5)
EOSINOPHIL NFR BLD AUTO: 0 % — SIGNIFICANT CHANGE UP (ref 0–6)
GLUCOSE SERPL-MCNC: 139 MG/DL — HIGH (ref 70–99)
HCT VFR BLD CALC: 25.6 % — LOW (ref 34.5–45)
HGB BLD-MCNC: 8.4 G/DL — LOW (ref 11.5–15.5)
LYMPHOCYTES # BLD AUTO: 1.4 K/UL — SIGNIFICANT CHANGE UP (ref 1–3.3)
LYMPHOCYTES # BLD AUTO: 13.5 % — SIGNIFICANT CHANGE UP (ref 13–44)
MAGNESIUM SERPL-MCNC: 2.2 MG/DL — SIGNIFICANT CHANGE UP (ref 1.6–2.6)
MCHC RBC-ENTMCNC: 28 PG — SIGNIFICANT CHANGE UP (ref 27–34)
MCHC RBC-ENTMCNC: 32.8 GM/DL — SIGNIFICANT CHANGE UP (ref 32–36)
MCV RBC AUTO: 85.3 FL — SIGNIFICANT CHANGE UP (ref 80–100)
MONOCYTES # BLD AUTO: 0.83 K/UL — SIGNIFICANT CHANGE UP (ref 0–0.9)
MONOCYTES NFR BLD AUTO: 8 % — SIGNIFICANT CHANGE UP (ref 2–14)
NEUTROPHILS # BLD AUTO: 6.66 K/UL — SIGNIFICANT CHANGE UP (ref 1.8–7.4)
NEUTROPHILS NFR BLD AUTO: 63 % — SIGNIFICANT CHANGE UP (ref 43–77)
NRBC # BLD: SIGNIFICANT CHANGE UP /100 WBCS (ref 0–0)
PHOSPHATE SERPL-MCNC: 3 MG/DL — SIGNIFICANT CHANGE UP (ref 2.5–4.5)
PLATELET # BLD AUTO: 25 K/UL — LOW (ref 150–400)
POTASSIUM SERPL-MCNC: 4.1 MMOL/L — SIGNIFICANT CHANGE UP (ref 3.5–5.3)
POTASSIUM SERPL-SCNC: 4.1 MMOL/L — SIGNIFICANT CHANGE UP (ref 3.5–5.3)
RBC # BLD: 3 M/UL — LOW (ref 3.8–5.2)
RBC # FLD: 16.9 % — HIGH (ref 10.3–14.5)
SODIUM SERPL-SCNC: 136 MMOL/L — SIGNIFICANT CHANGE UP (ref 135–145)
WBC # BLD: 10.4 K/UL — SIGNIFICANT CHANGE UP (ref 3.8–10.5)
WBC # FLD AUTO: 10.4 K/UL — SIGNIFICANT CHANGE UP (ref 3.8–10.5)

## 2021-02-03 PROCEDURE — 99498 ADVNCD CARE PLAN ADDL 30 MIN: CPT

## 2021-02-03 PROCEDURE — 99232 SBSQ HOSP IP/OBS MODERATE 35: CPT

## 2021-02-03 PROCEDURE — 99497 ADVNCD CARE PLAN 30 MIN: CPT

## 2021-02-03 PROCEDURE — 99233 SBSQ HOSP IP/OBS HIGH 50: CPT

## 2021-02-03 RX ORDER — SODIUM CHLORIDE 9 MG/ML
500 INJECTION INTRAMUSCULAR; INTRAVENOUS; SUBCUTANEOUS ONCE
Refills: 0 | Status: COMPLETED | OUTPATIENT
Start: 2021-02-03 | End: 2021-02-03

## 2021-02-03 RX ADMIN — Medication 125 MILLIGRAM(S): at 06:16

## 2021-02-03 RX ADMIN — ZOLPIDEM TARTRATE 5 MILLIGRAM(S): 10 TABLET ORAL at 23:41

## 2021-02-03 RX ADMIN — Medication 1 TABLET(S): at 17:26

## 2021-02-03 RX ADMIN — Medication 125 MILLIGRAM(S): at 23:42

## 2021-02-03 RX ADMIN — MIDODRINE HYDROCHLORIDE 10 MILLIGRAM(S): 2.5 TABLET ORAL at 13:15

## 2021-02-03 RX ADMIN — SODIUM CHLORIDE 500 MILLILITER(S): 9 INJECTION INTRAMUSCULAR; INTRAVENOUS; SUBCUTANEOUS at 03:47

## 2021-02-03 RX ADMIN — Medication 0.5 MILLIGRAM(S): at 17:27

## 2021-02-03 RX ADMIN — Medication 125 MILLIGRAM(S): at 13:15

## 2021-02-03 RX ADMIN — PANTOPRAZOLE SODIUM 40 MILLIGRAM(S): 20 TABLET, DELAYED RELEASE ORAL at 23:41

## 2021-02-03 RX ADMIN — MIDODRINE HYDROCHLORIDE 10 MILLIGRAM(S): 2.5 TABLET ORAL at 17:27

## 2021-02-03 RX ADMIN — Medication 1 TABLET(S): at 09:30

## 2021-02-03 RX ADMIN — SODIUM CHLORIDE 50 MILLILITER(S): 9 INJECTION, SOLUTION INTRAVENOUS at 20:32

## 2021-02-03 RX ADMIN — ONDANSETRON 4 MILLIGRAM(S): 8 TABLET, FILM COATED ORAL at 23:41

## 2021-02-03 RX ADMIN — FLUCONAZOLE 100 MILLIGRAM(S): 150 TABLET ORAL at 09:29

## 2021-02-03 RX ADMIN — MIDODRINE HYDROCHLORIDE 10 MILLIGRAM(S): 2.5 TABLET ORAL at 06:16

## 2021-02-03 RX ADMIN — BENZOCAINE AND MENTHOL 1 LOZENGE: 5; 1 LIQUID ORAL at 06:16

## 2021-02-03 RX ADMIN — Medication 125 MILLIGRAM(S): at 17:27

## 2021-02-03 RX ADMIN — PANTOPRAZOLE SODIUM 40 MILLIGRAM(S): 20 TABLET, DELAYED RELEASE ORAL at 09:30

## 2021-02-03 NOTE — PROGRESS NOTE ADULT - ASSESSMENT
65F with history of atrial flutter, on chronic anticoagulation with Eliquis, CHF, cardiomyopathy, essential hypertension, vascular insufficiency, sciatica, obesity, osteoarthritis, NAHOMI on CPAP, diagnosed in December 2020 with carcinomatosis via paracentesis with removal of 7.4 L ascites and cytopathology positive for malignant cells, favoring adenocarcinoma.  CA 19–9 was >4,000.  Started on gemcitabine/Abraxane in ambulatory, last chemotherapy given 1/22/2021.    Admitted with fatigue, generalized weakness, nausea.  Chemotherapy induced pancytopenia; also in HANANE. Venous ultrasound consistent with extensive DVT involving the popliteal, posterior tibial, gastrocnemius and soleal veins in the right lower extremity.    DVT- thrombophilia due to malignancy. Off AC due to  significant thrombocytopenia due to recent chemotherapy. S/p IVC filter. Will be able to resume AC once plts up to 50 K.  C diff colitis- on oral vanco.  Malignant ascites - s/p paracentesis 2/1.  HANANE - creatinine worsening. Nephrology following. Hypotension. Difficult balance - continue hydration and midodrine.     Mucositis due to recent chemotherapy- - Magic mouth wash.  Advanced malignancy. Performance status markedly declined. At this point she is not a candidate/ will not benefit from continuation of palliative chemotherapy.  Palliative team following. [facility] meeting scheduled today. Patient is willing to hear about hospice options .   Prognosis is poor.

## 2021-02-03 NOTE — PROGRESS NOTE ADULT - ASSESSMENT
66 yo female with PMH of Atrial flutter on Eliquis s/p GILSON cardioversion, Combined systolic and diastolic HF , Cardiomyopathy , pHTN, Vascular insufficiency LLE, R sciatica, DJD hips, morbid obesity , low vignesh pain, NAHOMI on CPAP, who presented to ER for abdominal distention and fatigue. Admitted for HANANE, malignant ascites possible UTI. LE extensive DVT noted AC had to be stopped due to thrombocytopenia. IVC filter inserted.    LACY - multifactorial (recurrent ascites, anemia and large body habitus and NAHOMI/OHS), Atelectasis  -resolved, V/Q scan neg for PE,  c/w  IVF stopped, CXR - atelectasis,  incentive spirometry    CDAD  -PO Vanco, ID following, hold off on systemic abx for now, add  probiotics    Candidal UTI - Diflucan     HANANE on CKD with baseline Cr 1.4-1.6   -Likely pre-renal/low flow state,  nephrology consult,  c/w  IV fluids  - Cr 2.7--> 3.1--> 3.5     Pancytopenia chemo induced, slightly better  -Heme/onc following, started on Zarxio x 3 days  -s/p 2 units transfusion of PRBC 1/29, 1/30 , will give one more unit today     DVT RLE popliteal (extensive)  -pt was on Eliquis prior to admission, dc'd placed on Heparin gtt, platelets low so dc'd  -s/p infrarenal IVC filter 1/29    Hypotension , chronic , orthostatic  -on midodrine, dose increased 10 TID     Metastatic Pancreatic cancer, Adenocarcinoma with CA 19-9 > 4000, Ascites   - s/p on gemcitabine/Abraxane in ambulatory, last chemotherapy given 1/22/2021  -Pall consult; remains full code  - oncology consult - poor functional status for systemic therapy at this time  - IR consult for paracenteses today 2/1/21  2/3 - ativan for anxiety    Sinus tachycardia, Atrial flutter   -EKG reviewed, Cardiology consult appreciated   - on low dose BB with parameters    Anemia with Positive FOBT   -possibly from C. diff, oral bleeding 2/2 low platelets  -GI consulted - no role of endoscopic evaluation at this time, On PPI IV bid    Advanced Directives DNR DNI   -Pt with capacity  -Sister surrogate, Pall Care team had meeting with pt and her sister and now DNR DNI      Dispo - still with diarrhea, cont vanco, discussed with Dr Bateman - consider in-pt hospice

## 2021-02-03 NOTE — GOALS OF CARE CONVERSATION - ADVANCED CARE PLANNING - CONVERSATION DETAILS
The role of Palliative Medicine was reviewed with the pt and her sister ( via phone). Pt. discussed how she has been informed by the medical team that her cancer is rapidly progressing. She is aware that at this point she is not a candidate for further treatment. We also discussed pts kidney failure and she reports that she was told by Nephrology that Dialysis would not benefit her. Team discussed the option of focusing on pts comfort. We reviewed options including possible inpatient hospice as she is requiring IV symptom management of perpetual nausea as well as intermittent anxiety( She is unable to take PO due to mod-severe mucositis).  VS home hospice but pt. would need a 24/7 private hire aide VS [facility], which team explained that it may be very difficult for pt. to participate and it is very likely that she would continue to come back and forth to the hospital. LTC was also discussed and the financial implications of that.     Pt. is very clear that she cannot return home as she is not comfortable with someone being in her home and financially it would be difficult for her to hire a aide. Pt. also reports that she feels she is to weak to participate with [facility]I. We discussed inpatient hospice in detail and the criteria, philosophy and services. Pt. and her sister discussed their experience with inpatient hospice with their mother years ago and how they felt nothing was done for their mother at hospice. They expressed understanding of this. Pt. and her sister would like some time to discuss the options before deciding.    Pts wishes regarding resuscitation and intubation discussed. Pt. is clear that she would not want to be resuscitated or intubated. Pt. consented to DNR/DNI. MOLST was completed with DNR/DNI.     Plan at this time is to be determined. Pt. is thinking over her options. Emotional support provided. Our team will continue to follow.

## 2021-02-03 NOTE — PROGRESS NOTE ADULT - SUBJECTIVE AND OBJECTIVE BOX
CC: LACY  HPI: 64 yo female with PMH of  Atrial flutter  on Eliquis 11/9/19  had GILSON cardioversion,  Combined systolic and diastolic HF , Cardiomyopathy , pHTN, Vascular insufficiency LLE, R sciatica, DJD hips, morbid Obesity , low vignesh pain, Pulm. HTN, NAHOMI on CPAP, who presented  in the ER for abdominal distention and fatigue. Cardiology called to evaluate low voltage and possible injury pattern on ECG. Pt denies chest pain but reports mild dyspnea and fatigue since her diagnosis of cancer with ascites. Urgent echo was performed in the ED which revealed no evidence of pericardial effusion. EF was adequate.   Admitted for HANANE, malignant ascites possible UTI. LE extensive DVT noted AC had to be stopped due to thrombocytopenia. IVC filter will be inserted today.    1/30 - pt seen and examined at bedside, reports feeling weak and fatigued, poor appetite with nausea when she drinks too much H2O, denies pain, + cough, no CP, palpitations + diarrhea with incontinence.   1/31 - pt seen and examined, feels slightly better, 2 lose BM overnight, denies cp, dyspnea, + abdomen distended, POC discussed   2/1 - pt seen and examined, oob in the chair, denies cp, + abdominal distension, awaiting paracentesis, POC discussed   2/2 overwhelmed about hospice etc ; has a slight cough. no cp palps sob; still with diarrhea   2/3 - 3-4 BMs today, vague abdo discomfort, anxious and wanted ativan     Vital Signs Last 24 Hrs  T(C): 36.5 (03 Feb 2021 15:06), Max: 36.5 (03 Feb 2021 15:06)  T(F): 97.7 (03 Feb 2021 15:06), Max: 97.7 (03 Feb 2021 15:06)  HR: 119 (03 Feb 2021 15:06) (96 - 119)  BP: 93/53 (03 Feb 2021 15:06) (90/53 - 103/48)  BP(mean): --  RR: 18 (03 Feb 2021 15:06) (18 - 18)  SpO2: 92% (03 Feb 2021 15:06) (91% - 96%)  Constitutional: NAD, awake and alert, well-developed  HEENT: PERR, EOMI,    Neck:  supple,  No JVD  Respiratory: Breath sounds are decreased at bases  bilaterally, No wheezing, rales or rhonchi  Cardiovascular: S1 and S2, regular rate and rhythm, distant sounds  Gastrointestinal: Distended abdomen  Extremities: No peripheral edema. No clubbing or cyanosis.  Vascular: 2+ peripheral pulses  Neurological: A/O x 3, no focal deficits  Musculoskeletal: no calf tenderness.      < from: US Kidney and Bladder (01.28.21 @ 22:09) >  EXAM:  US KIDNEYS AND BLADDER                        7 mm nonobstructing right lower pole calculus.  Poorly evaluated left kidney.    < from: Xray Chest 1 View- PORTABLE-Routine (Xray Chest 1 View- PORTABLE-Routine .) (01.30.21 @ 15:24) >  Tubes/lines: Right chest wall port with catheter tip in the region of the right brachiocephalic vein.  Lungs: Bibasilar atelectasis. Low lung volumes..  Pleura: No pneumothorax or pleural effusions.  Cardiomediastinal silhouette: Within normal limits..    < from: US Abdomen Limited (01.27.21 @ 16:57) >  Moderate volume ascites with septations throughout all 4 quadrants.    Operative Findings:  · Operative Findings	s/p retrievable option, infrarenal IVC filter placement    TECHNIQUE: Duplex sonography of the RIGHT LOWER extremity veinswith color and spectral Doppler, with and without compression.  IMPRESSION:  Extensive deep venous thrombosis involving the popliteal, posterior tibial, gastrocnemius and soleal veins of the right lower extremity    < from: NM Pulmonary Perfusion Scan (01.27.21 @ 14:29) >  EXAM:  NM PULM PERFUSION IMG                        IMPRESSION: Very low probability of pulmonary embolus.  Occult Blood, Feces: Positive (01.28.21 @ 05:10) Haptoglobin, Serum: 293 mg/dL (01.28.21 @ 04:30) Vitamin B12, Serum: 1224 pg/mL (01.28.21 @ 04:30) Folate, Serum: 9.6 ng/mL (01.28.21 @ 04:30) Ferritin, Serum: 3503: Test Repeated ng/mL (01.28.21 @ 04:30) Culture Results:   LABS: All Labs Reviewed:                      8.4    10.40 )-----------( 25       ( 03 Feb 2021 07:16 )             25.6   136  |  107  |  69<H>  ----------------------------<  139<H>  4.1   |  19<L>  |  3.66<H>  TPro  x   /  Alb  1.2<L>  /  TBili  x   /  DBili  x   /  AST  x   /  ALT  x   /  AlkPhos  x   02-03      MEDS:   acetaminophen   Tablet .. 650 milliGRAM(s) Oral every 4 hours PRN  benzocaine 15 mG/menthol 3.6 mG (Sugar-Free) Lozenge 1 Lozenge Oral four times a day  dextrose 5% 1000 milliLiter(s) IV Continuous <Continuous>  FIRST- Mouthwash  BLM 10 milliLiter(s) Swish and Swallow every 4 hours  fluconAZOLE   Tablet 100 milliGRAM(s) Oral daily  lactobacillus acidophilus 1 Tablet(s) Oral two times a day with meals  LORazepam   Injectable 0.5 milliGRAM(s) IV Push every 6 hours PRN  metoprolol tartrate 12.5 milliGRAM(s) Oral every 8 hours  midodrine. 10 milliGRAM(s) Oral three times a day  ondansetron Injectable 4 milliGRAM(s) IV Push every 6 hours PRN  pantoprazole  Injectable 40 milliGRAM(s) IV Push every 12 hours  vancomycin    Solution 125 milliGRAM(s) Oral every 6 hours  zolpidem 5 milliGRAM(s) Oral at bedtime PRN  zolpidem 5 milliGRAM(s) Oral at bedtime PRN

## 2021-02-03 NOTE — PROVIDER CONTACT NOTE (OTHER) - ASSESSMENT
Patient is not SOB/Dizzy and is not experiencing Chest pain
Pt asymptomatic
Patient alert and oriented, asymptotic  MD called for fluid or bolus ordered

## 2021-02-03 NOTE — PROGRESS NOTE ADULT - SUBJECTIVE AND OBJECTIVE BOX
65F with history of atrial flutter, on chronic anticoagulation with Eliquis, CHF, cardiomyopathy, essential hypertension, vascular insufficiency, sciatica, obesity, osteoarthritis, NAHOMI on CPAP, diagnosed in December 2020 with carcinomatosis via paracentesis with removal of 7.4 L ascites and cytopathology positive for malignant cells, favoring adenocarcinoma.  CA 19–9 was >4,000.  Started on gemcitabine/Abraxane in ambulatory, last chemotherapy given 1/22/2021.  Admitted with fatigue, generalized weakness, nausea.  Started on IV antibiotics.  VQ scan negative for PE.  Presenting with chemotherapy induced pancytopenia; also in HANANE.Venous ultrasound consistent with extensive DVT involving the popliteal, posterior tibial, gastrocnemius and soleal veins in the right lower extremity.    2/2/21    S/p paracentesis 2/1  3 liters . Still with nausea- especially after she takes Vancomycin. Still with diarrhea. Weak. No appetite but trying to drink.   Oral discomfort little better.    2/3/21  Feels little better in AM. Oral discomfort improved. States- will try to eat more today. Upset about hospice option but open to discussion.       MEDICATIONS  (STANDING):  dextrose 5% 1000 milliLiter(s) (50 mL/Hr) IV Continuous <Continuous>  FIRST- Mouthwash  BLM 10 milliLiter(s) Swish and Swallow every 4 hours  fluconAZOLE   Tablet 100 milliGRAM(s) Oral daily  lactobacillus acidophilus 1 Tablet(s) Oral two times a day with meals  metoprolol tartrate 12.5 milliGRAM(s) Oral every 8 hours  midodrine. 10 milliGRAM(s) Oral three times a day  pantoprazole  Injectable 40 milliGRAM(s) IV Push every 12 hours  vancomycin    Solution 125 milliGRAM(s) Oral every 6 hours    MEDICATIONS  (PRN):  acetaminophen   Tablet .. 650 milliGRAM(s) Oral every 4 hours PRN Mild Pain (1 - 3)  ondansetron Injectable 4 milliGRAM(s) IV Push every 6 hours PRN Nausea  zolpidem 5 milliGRAM(s) Oral at bedtime PRN Insomnia  zolpidem 5 milliGRAM(s) Oral at bedtime PRN Insomnia    Vital Signs Last 24 Hrs  T(C): 36.3 (03 Feb 2021 08:13), Max: 36.3 (02 Feb 2021 14:54)  T(F): 97.3 (03 Feb 2021 08:13), Max: 97.4 (02 Feb 2021 14:54)  HR: 114 (03 Feb 2021 08:13) (96 - 115)  BP: 90/53 (03 Feb 2021 08:13) (90/53 - 103/57)  BP(mean): --  RR: 18 (03 Feb 2021 08:13) (18 - 18)  SpO2: 95% (03 Feb 2021 08:13) (91% - 96%)  PHYSICAL EXAM:  Constitutional: alert, morbidly obese  looks fatigued   HEENT: dry mucosa, mucositis better    Respiratory: bilateral decreased breath sounds anteriorly  Cardiovascular : S1, S2 irregular, rhythmic, no murmurs, gallops or rubs  Abdomen: soft, distended,  ascites better. No palpable masses.  Extremities: + bilateral lower extremity swelling  Neuro : non focal  Psych - normal affect                            8.4    10.40 )-----------( 25       ( 03 Feb 2021 07:16 )             25.6     02-03    136  |  107  |  69<H>  ----------------------------<  139<H>  4.1   |  19<L>  |  3.66<H>    Ca    7.4<L>      03 Feb 2021 07:16  Phos  3.0     02-03  Mg     2.2     02-03    TPro  x   /  Alb  1.2<L>  /  TBili  x   /  DBili  x   /  AST  x   /  ALT  x   /  AlkPhos  x   02-03

## 2021-02-03 NOTE — PROGRESS NOTE ADULT - SUBJECTIVE AND OBJECTIVE BOX
NEPHROLOGY INTERVAL HPI/OVERNIGHT EVENTS:  02-03-21 @ 13:59  HPI  2/3 dw palliative team, considering hospice   2/2--No acute events. Continues with nausea with limited po intake.  Positive phlegm.  Denies SOB.  2/1 s/p paracentesis 3l, feels tired   66 yo F with multiple comorbidities including pancreatic cancer with malignant ascites on checmo with abaraxane and gemictabine last dose 1/22, currently under tx for CDAD on day #5 of po vanco which she states she ahs been unable to tolerate, recurrent ascites and multiple paracentesis (last 2 weeks ago), renal insuffiency, NAHOMI of cpap presents with generlazied weakness, nausea, diarrhea and lacy. Nausea will not allow for her to take po vanco. Still having numerous watery BMs daily.  LACY with minimal exertion. She denies cough. No abd pain. No HA/Neck pain or blurry vision. No other complaints to offer    ED course: given iv vanco and zosyn for presumed sepsis. Lactate 2.2 cleared after 3L NS. Dimer elevated however V/Q neg for PE. O2S 94% on RA at rest with soft BP 89/51 however historically patient has low BP and was placed on midodrine last admission. PT pancytopenic. Afebrile. CXR clear.   ------------------------------------------------------------------------------------  above hx corroborated with patient   since 1/22 had received chemo   5 days ago started with diarrhea with 5-7 episodes per day  dx with CDAD and has not  been tolerating it   appetite poor   + uop   no nsaid or contrast study   dx with DVT on iv heprin interruped for thrombocytonpenia determined to be due to chemo and now with oral bleeding   GSF given   at home was taking midodrine       MEDICATIONS  (STANDING):  benzocaine 15 mG/menthol 3.6 mG (Sugar-Free) Lozenge 1 Lozenge Oral four times a day  dextrose 5% 1000 milliLiter(s) (50 mL/Hr) IV Continuous <Continuous>  FIRST- Mouthwash  BLM 10 milliLiter(s) Swish and Swallow every 4 hours  fluconAZOLE   Tablet 100 milliGRAM(s) Oral daily  lactobacillus acidophilus 1 Tablet(s) Oral two times a day with meals  metoprolol tartrate 12.5 milliGRAM(s) Oral every 8 hours  midodrine. 10 milliGRAM(s) Oral three times a day  pantoprazole  Injectable 40 milliGRAM(s) IV Push every 12 hours  vancomycin    Solution 125 milliGRAM(s) Oral every 6 hours    MEDICATIONS  (PRN):  acetaminophen   Tablet .. 650 milliGRAM(s) Oral every 4 hours PRN Mild Pain (1 - 3)  ALPRAZolam 0.25 milliGRAM(s) Oral every 6 hours PRN anxiety  LORazepam   Injectable 0.5 milliGRAM(s) IV Push every 6 hours PRN Anxiety  ondansetron Injectable 4 milliGRAM(s) IV Push every 6 hours PRN Nausea  zolpidem 5 milliGRAM(s) Oral at bedtime PRN Insomnia  zolpidem 5 milliGRAM(s) Oral at bedtime PRN Insomnia      Allergies    No Known Allergies    Intolerances        I&O's Detail    Vital Signs Last 24 Hrs  T(C): 36.3 (03 Feb 2021 08:13), Max: 36.3 (02 Feb 2021 14:54)  T(F): 97.3 (03 Feb 2021 08:13), Max: 97.4 (02 Feb 2021 14:54)  HR: 114 (03 Feb 2021 08:13) (96 - 115)  BP: 90/53 (03 Feb 2021 08:13) (90/53 - 103/48)  BP(mean): --  RR: 18 (03 Feb 2021 08:13) (18 - 18)  SpO2: 95% (03 Feb 2021 08:13) (91% - 96%)  Daily     Daily     PHYSICAL EXAM:  General: alert. awake nad  HEENT: MMM  CV: s1s2 rrr  LUNGS: B/L CTA  EXT: no edema    LABS:                        8.4    10.40 )-----------( 25       ( 03 Feb 2021 07:16 )             25.6     02-03    136  |  107  |  69<H>  ----------------------------<  139<H>  4.1   |  19<L>  |  3.66<H>    Ca    7.4<L>      03 Feb 2021 07:16  Phos  3.0     02-03  Mg     2.2     02-03    TPro  x   /  Alb  1.2<L>  /  TBili  x   /  DBili  x   /  AST  x   /  ALT  x   /  AlkPhos  x   02-03        Phosphorus Level, Serum: 3.0 mg/dL (02-03 @ 07:16)  Magnesium, Serum: 2.2 mg/dL (02-03 @ 07:16)

## 2021-02-03 NOTE — GOALS OF CARE CONVERSATION - ADVANCED CARE PLANNING - CONVERSATION DETAILS
Team met with pt. and her sister via phone at bedside to discuss goals of acre, assist with planning and provide supportive counseling. Pt. discussed how she has been informed by the medical team that her cancer is rapidly progressing. She is aware that at this point she is not a candidate for further treatment. We also discussed pts kidney failure and pt. reports that she was told by Nephrology that Dialysis would not benefit her. Team discussed the option of focusing on pts comfort. We reviewed options including possible inpatient hospice (which we explained would be up to hospice to decide, pt. is taking minimal PO and has IV medication for nausea and anxiety on board) VS home hospice but pt. would need a 24/7 aide VS [facility], which team explained that it may be very difficult for pt. to participate and it is very likely that she would continue to come back and forth to the hospital. LTC was also discussed and the financial implications of that.     Pt. is very clear that she cannot return home as she is not comfortable with someone being in her home and financially it would be difficult for her to hire a aide. Pt. also reports that she feels she is to weak to participate with [facility]. We discussed inpatient hospice in detail and the criteria, philosophy and services. Pt. and her sister discussed their experience with inpatient hospice with their mother years ago and how they felt nothing was done for their mother at hospice. Team explored these feelings and explained what inpatient hospice would provide and discussed how it is strictly a comfort focused plan of care. They expressed understanding of this. Pt. and her sister would like some time to discuss the options before deciding.    Pts wishes regarding resuscitation and intubation discussed. Pt. is clear that she would not want to be resuscitated or intubated. Pt. consented to DNR/DNI. MOLST was completed with DNR/DNI.     Plan at this time is to be determined. Pt. is thinking over her options. Emotional support provided. Our team will continue to follow. Team met with pt. and her sister via phone at bedside to discuss goals of care, assist with planning and provide supportive counseling. Pt. discussed how she has been informed by the medical team that her cancer is rapidly progressing. She is aware that at this point she is not a candidate for further treatment. We also discussed pts kidney failure and pt. reports that she was told by Nephrology that Dialysis would not benefit her. Team discussed the option of focusing on pts comfort. We reviewed options including possible inpatient hospice (which we explained would be up to hospice to decide, pt. is taking minimal PO and has IV medication for nausea and anxiety on board) VS home hospice but pt. would need a 24/7 private hire aide VS [facility], which team explained that it may be very difficult for pt. to participate and it is very likely that she would continue to come back and forth to the hospital. LTC was also discussed and the financial implications of that.     Pt. is very clear that she cannot return home as she is not comfortable with someone being in her home and financially it would be difficult for her to hire a aide. Pt. also reports that she feels she is to weak to participate with [facility]. We discussed inpatient hospice in detail and the criteria, philosophy and services. Pt. and her sister discussed their experience with inpatient hospice with their mother years ago and how they felt nothing was done for their mother at hospice. Team explored these feelings and explained what inpatient hospice would provide and discussed how it is strictly a comfort focused plan of care. They expressed understanding of this. Pt. and her sister would like some time to discuss the options before deciding.    Pts wishes regarding resuscitation and intubation discussed. Pt. is clear that she would not want to be resuscitated or intubated. Pt. consented to DNR/DNI. MOLST was completed with DNR/DNI.     Plan at this time is to be determined. Pt. is thinking over her options. Emotional support provided. Our team will continue to follow.

## 2021-02-03 NOTE — PROGRESS NOTE ADULT - ASSESSMENT
66 yo with dx of metastatic pancreatic ca with malignant ascites presents with generalized weakness.  Noted with HANANE in settting of multiple paracentesis, and CDAD.  dx wtih LE dvt oneliquis pta, now s/p ivc filter   thrombocytopenia related to chemotherapy   cdad on po vanc   hypotension on midodrine   now with HANANE/ pre-renal    PLAN   - monitor response to ivf  - cw midorine   - palliative input noted  - ua and urine lytes     2/1 MK   - HANANE nonoliguric and continues to worsen.     midodrine   - metabolic acidosis will change ivf with bicarbonate  - lengthy discussion with pt about GOC, she is scheduled for palliative meeting wednesday.      she inquired about HD     indication and prognosis of hd related to metastatic cancer was discussed.      pt aware that she is "failing" and will consider her options in upcoming days.      2/2 SY  --HANANE : Unclear if a degree of CKD.  Renal function continues to worsen now with anasarca.       Prognosis for renal recovery very poor due to continued need for repeat paracentesis.    Further intervention including HD would depend on whether there is any tx option for CA.    Explained to pt in detail.    Awaiting Palliative mtg tomorrow.  --Continue HCO3 infusion for now.    2/3 MK  - HANANE as above    discussion with palliative noted, considering inpt hospice    cw bicarb drop for now   dw palliative team

## 2021-02-04 LAB
ANION GAP SERPL CALC-SCNC: 9 MMOL/L — SIGNIFICANT CHANGE UP (ref 5–17)
BUN SERPL-MCNC: 66 MG/DL — HIGH (ref 7–23)
CALCIUM SERPL-MCNC: 6.9 MG/DL — LOW (ref 8.5–10.1)
CHLORIDE SERPL-SCNC: 106 MMOL/L — SIGNIFICANT CHANGE UP (ref 96–108)
CO2 SERPL-SCNC: 21 MMOL/L — LOW (ref 22–31)
CREAT SERPL-MCNC: 3.34 MG/DL — HIGH (ref 0.5–1.3)
GLUCOSE SERPL-MCNC: 138 MG/DL — HIGH (ref 70–99)
HCT VFR BLD CALC: 22.6 % — LOW (ref 34.5–45)
HGB BLD-MCNC: 7.5 G/DL — LOW (ref 11.5–15.5)
MAGNESIUM SERPL-MCNC: 2 MG/DL — SIGNIFICANT CHANGE UP (ref 1.6–2.6)
MCHC RBC-ENTMCNC: 28.3 PG — SIGNIFICANT CHANGE UP (ref 27–34)
MCHC RBC-ENTMCNC: 33.2 GM/DL — SIGNIFICANT CHANGE UP (ref 32–36)
MCV RBC AUTO: 85.3 FL — SIGNIFICANT CHANGE UP (ref 80–100)
NRBC # BLD: 2 /100 WBCS — HIGH (ref 0–0)
PLATELET # BLD AUTO: 21 K/UL — LOW (ref 150–400)
POTASSIUM SERPL-MCNC: 3.9 MMOL/L — SIGNIFICANT CHANGE UP (ref 3.5–5.3)
POTASSIUM SERPL-SCNC: 3.9 MMOL/L — SIGNIFICANT CHANGE UP (ref 3.5–5.3)
RBC # BLD: 2.65 M/UL — LOW (ref 3.8–5.2)
RBC # FLD: 17.2 % — HIGH (ref 10.3–14.5)
SODIUM SERPL-SCNC: 136 MMOL/L — SIGNIFICANT CHANGE UP (ref 135–145)
WBC # BLD: 10.17 K/UL — SIGNIFICANT CHANGE UP (ref 3.8–10.5)
WBC # FLD AUTO: 10.17 K/UL — SIGNIFICANT CHANGE UP (ref 3.8–10.5)

## 2021-02-04 PROCEDURE — 99232 SBSQ HOSP IP/OBS MODERATE 35: CPT

## 2021-02-04 PROCEDURE — 99231 SBSQ HOSP IP/OBS SF/LOW 25: CPT

## 2021-02-04 RX ORDER — HYDROCORTISONE 1 %
1 OINTMENT (GRAM) TOPICAL
Refills: 0 | Status: DISCONTINUED | OUTPATIENT
Start: 2021-02-04 | End: 2021-02-05

## 2021-02-04 RX ORDER — PANTOPRAZOLE SODIUM 20 MG/1
40 TABLET, DELAYED RELEASE ORAL
Refills: 0 | Status: DISCONTINUED | OUTPATIENT
Start: 2021-02-04 | End: 2021-02-05

## 2021-02-04 RX ORDER — ZOLPIDEM TARTRATE 10 MG/1
5 TABLET ORAL AT BEDTIME
Refills: 0 | Status: DISCONTINUED | OUTPATIENT
Start: 2021-02-04 | End: 2021-02-05

## 2021-02-04 RX ADMIN — Medication 1 APPLICATION(S): at 10:27

## 2021-02-04 RX ADMIN — DIPHENHYDRAMINE HYDROCHLORIDE AND LIDOCAINE HYDROCHLORIDE AND ALUMINUM HYDROXIDE AND MAGNESIUM HYDRO 10 MILLILITER(S): KIT at 05:20

## 2021-02-04 RX ADMIN — Medication 125 MILLIGRAM(S): at 05:21

## 2021-02-04 RX ADMIN — Medication 1 APPLICATION(S): at 22:53

## 2021-02-04 RX ADMIN — PANTOPRAZOLE SODIUM 40 MILLIGRAM(S): 20 TABLET, DELAYED RELEASE ORAL at 10:28

## 2021-02-04 RX ADMIN — BENZOCAINE AND MENTHOL 1 LOZENGE: 5; 1 LIQUID ORAL at 23:03

## 2021-02-04 RX ADMIN — SODIUM CHLORIDE 50 MILLILITER(S): 9 INJECTION, SOLUTION INTRAVENOUS at 23:04

## 2021-02-04 RX ADMIN — PANTOPRAZOLE SODIUM 40 MILLIGRAM(S): 20 TABLET, DELAYED RELEASE ORAL at 22:52

## 2021-02-04 RX ADMIN — MIDODRINE HYDROCHLORIDE 10 MILLIGRAM(S): 2.5 TABLET ORAL at 10:27

## 2021-02-04 RX ADMIN — Medication 125 MILLIGRAM(S): at 15:42

## 2021-02-04 RX ADMIN — Medication 12.5 MILLIGRAM(S): at 22:51

## 2021-02-04 RX ADMIN — ZOLPIDEM TARTRATE 5 MILLIGRAM(S): 10 TABLET ORAL at 22:52

## 2021-02-04 RX ADMIN — FLUCONAZOLE 100 MILLIGRAM(S): 150 TABLET ORAL at 10:27

## 2021-02-04 RX ADMIN — Medication 125 MILLIGRAM(S): at 22:53

## 2021-02-04 RX ADMIN — MIDODRINE HYDROCHLORIDE 10 MILLIGRAM(S): 2.5 TABLET ORAL at 05:20

## 2021-02-04 RX ADMIN — Medication 1 TABLET(S): at 10:27

## 2021-02-04 RX ADMIN — MIDODRINE HYDROCHLORIDE 10 MILLIGRAM(S): 2.5 TABLET ORAL at 17:12

## 2021-02-04 NOTE — PROVIDER CONTACT NOTE (OTHER) - ACTION/TREATMENT ORDERED:
250cc bolus over 1 hour and re-evaluate blood pressure after. Will con't to monitor patient.
Dr. Sosa aware no further orders received.
Hold dose and recheck vitals.
MD Richards made aware of new consult.
OK to give 500cc bolus over 1 hour. Will con't to monitor.
no orders at this time
OK to access port as per Dr. Sosa
OK to hold Metoprolol as per Dr. Sosa. Will monitor and recheck BP/ HR
fluids started at 75ml/hr

## 2021-02-04 NOTE — PROGRESS NOTE ADULT - SUBJECTIVE AND OBJECTIVE BOX
CC: LACY  HPI: 66 yo female with PMH of  Atrial flutter  on Eliquis 11/9/19  had GILSON cardioversion,  Combined systolic and diastolic HF , Cardiomyopathy , pHTN, Vascular insufficiency LLE, R sciatica, DJD hips, morbid Obesity , low vignesh pain, Pulm. HTN, NAHOMI on CPAP, who presented  in the ER for abdominal distention and fatigue. Cardiology called to evaluate low voltage and possible injury pattern on ECG. Pt denies chest pain but reports mild dyspnea and fatigue since her diagnosis of cancer with ascites. Urgent echo was performed in the ED which revealed no evidence of pericardial effusion. EF was adequate.   Admitted for HANANE, malignant ascites possible UTI. LE extensive DVT noted AC had to be stopped due to thrombocytopenia. IVC filter will be inserted today.    1/30 - pt seen and examined at bedside, reports feeling weak and fatigued, poor appetite with nausea when she drinks too much H2O, denies pain, + cough, no CP, palpitations + diarrhea with incontinence.   1/31 - pt seen and examined, feels slightly better, 2 lose BM overnight, denies cp, dyspnea, + abdomen distended, POC discussed   2/1 - pt seen and examined, oob in the chair, denies cp, + abdominal distension, awaiting paracentesis, POC discussed   2/2 overwhelmed about hospice etc ; has a slight cough. no cp palps sob; still with diarrhea   2/3 - 3-4 BMs today, vague abdo discomfort, anxious and wanted ativan   2/4 - sleeping on prev visit, now awake, talking, ativan helping, no pain, 2 loose BMs all day today (diarrhea better)     Vital Signs Last 24 Hrs  T(C): 36.3 (04 Feb 2021 15:10), Max: 36.8 (04 Feb 2021 09:33)  T(F): 97.4 (04 Feb 2021 15:10), Max: 98.3 (04 Feb 2021 09:33)  HR: 122 (04 Feb 2021 15:10) (114 - 123)  BP: 97/49 (04 Feb 2021 15:10) (88/52 - 101/52)  BP(mean): --  RR: 20 (04 Feb 2021 15:10) (18 - 20)  SpO2: 94% (04 Feb 2021 15:10) (94% - 95%)  Constitutional: NAD, awake and alert, well-developed  HEENT: PERR, EOMI,    Neck:  supple,  No JVD  Respiratory: Breath sounds are decreased at bases  bilaterally, No wheezing, rales or rhonchi  Cardiovascular: S1 and S2, regular rate and rhythm, distant sounds  Gastrointestinal: Distended abdomen  Extremities: No peripheral edema. No clubbing or cyanosis.  Vascular: 2+ peripheral pulses  Neurological: A/O x 3, no focal deficits  Musculoskeletal: no calf tenderness.      < from: US Kidney and Bladder (01.28.21 @ 22:09) >  EXAM:  US KIDNEYS AND BLADDER                        7 mm nonobstructing right lower pole calculus.  Poorly evaluated left kidney.    < from: Xray Chest 1 View- PORTABLE-Routine (Xray Chest 1 View- PORTABLE-Routine .) (01.30.21 @ 15:24) >  Tubes/lines: Right chest wall port with catheter tip in the region of the right brachiocephalic vein.  Lungs: Bibasilar atelectasis. Low lung volumes..  Pleura: No pneumothorax or pleural effusions.  Cardiomediastinal silhouette: Within normal limits..    < from: US Abdomen Limited (01.27.21 @ 16:57) >  Moderate volume ascites with septations throughout all 4 quadrants.    Operative Findings:  · Operative Findings	s/p retrievable option, infrarenal IVC filter placement    TECHNIQUE: Duplex sonography of the RIGHT LOWER extremity veinswith color and spectral Doppler, with and without compression.  IMPRESSION:  Extensive deep venous thrombosis involving the popliteal, posterior tibial, gastrocnemius and soleal veins of the right lower extremity    < from: NM Pulmonary Perfusion Scan (01.27.21 @ 14:29) >  EXAM:  NM PULM PERFUSION IMG                        IMPRESSION: Very low probability of pulmonary embolus.  Occult Blood, Feces: Positive (01.28.21 @ 05:10) Haptoglobin, Serum: 293 mg/dL (01.28.21 @ 04:30) Vitamin B12, Serum: 1224 pg/mL (01.28.21 @ 04:30) Folate, Serum: 9.6 ng/mL (01.28.21 @ 04:30) Ferritin, Serum: 3503: Test Repeated ng/mL (01.28.21 @ 04:30) Culture Results:   LABS: All Labs Reviewed:                        7.5    10.17 )-----------( 21       ( 04 Feb 2021 07:56 )             22.6    136  |  106  |  66<H>  ----------------------------<  138<H>  3.9   |  21<L>  |  3.34<H>    Ca    6.9<L>      04 Feb 2021 07:56  Phos  3.0     02-03  Mg     2.0     02-04    TPro  x   /  Alb  1.2<L>  /  TBili  x   /  DBili  x   /  AST  x   /  ALT  x   /  AlkPhos  x   02-03    acetaminophen   Tablet .. 650 milliGRAM(s) Oral every 4 hours PRN  benzocaine 15 mG/menthol 3.6 mG (Sugar-Free) Lozenge 1 Lozenge Oral four times a day  dextrose 5% 1000 milliLiter(s) IV Continuous <Continuous>  FIRST- Mouthwash  BLM 10 milliLiter(s) Swish and Swallow every 4 hours  fluconAZOLE   Tablet 100 milliGRAM(s) Oral daily  hydrocortisone 1% Cream 1 Application(s) Topical two times a day  lactobacillus acidophilus 1 Tablet(s) Oral two times a day with meals  LORazepam   Injectable 0.5 milliGRAM(s) IV Push every 6 hours PRN  metoprolol tartrate 12.5 milliGRAM(s) Oral every 8 hours  midodrine. 10 milliGRAM(s) Oral three times a day  ondansetron Injectable 4 milliGRAM(s) IV Push every 6 hours PRN  pantoprazole    Tablet 40 milliGRAM(s) Oral two times a day  vancomycin    Solution 125 milliGRAM(s) Oral every 6 hours  zolpidem 5 milliGRAM(s) Oral at bedtime PRN  zolpidem 5 milliGRAM(s) Oral at bedtime PRN

## 2021-02-04 NOTE — PROGRESS NOTE ADULT - ASSESSMENT
66 yo with dx of metastatic pancreatic ca with malignant ascites presents with generalized weakness.  Noted with HANANE in settting of multiple paracentesis, and CDAD.  dx wtih LE dvt oneliquis pta, now s/p ivc filter   thrombocytopenia related to chemotherapy   cdad on po vanc   hypotension on midodrine   now with HANANE/ pre-renal    PLAN   - monitor response to ivf  - cw midorine   - palliative input noted  - ua and urine lytes     2/1 MK   - HANANE nonoliguric and continues to worsen.     midodrine   - metabolic acidosis will change ivf with bicarbonate  - lengthy discussion with pt about GOC, she is scheduled for palliative meeting wednesday.      she inquired about HD     indication and prognosis of hd related to metastatic cancer was discussed.      pt aware that she is "failing" and will consider her options in upcoming days.      2/2 SY  --HANANE : Unclear if a degree of CKD.  Renal function continues to worsen now with anasarca.       Prognosis for renal recovery very poor due to continued need for repeat paracentesis.    Further intervention including HD would depend on whether there is any tx option for CA.    Explained to pt in detail.    Awaiting Palliative mtg tomorrow.  --Continue HCO3 infusion for now.    2/3 MK  - HANANE as above    discussion with palliative noted, considering inpt hospice    cw bicarb drop for now   dw palliative team     2/4  No new changes  cont IVF, Bicarb drip, may d/c once bicarb >22  will sign off  please reconsult prn

## 2021-02-04 NOTE — PROVIDER CONTACT NOTE (OTHER) - REASON
HANANE, Ascites
Pancreatic CA, Pancytopenia
Pt hypotensive, 85/47 HR:110
Unable to access IV
low blood pressure
GI Consult
Heparin Gtt discontinued by medicine and oncology d/t drop in Platelets
ID consult
Nephro Consult
Pt HR:115 BP: 91/41
Pt , BP 99/51
patient had an episode of vomiting after medications given
Low systolic BP

## 2021-02-04 NOTE — PROVIDER CONTACT NOTE (OTHER) - SITUATION
Spoke with Cullen in the answering service regarding consult
Spoke with Marina in the answering service regarding consult
Notified Dr. Sosa that patient vomited after AM meds given. Pt got metoprolol, midodrine, and oral vancomycin.
Notified Dr. Sosa that patients BP is tachycardiac of 115 and BP hypotensive of 91/41.
Notified Dr. Sosa that we were unable to access an IV after two attempts. Pts platelets and BP low, pt needs fluids IV
Pt blood pressure 85/47 HR:110
patient with low BP and high heart rate
Notified Dr. Sosa that patients HR is 110. Pt due to have metoprolol. BP continues to drop when given metoprolol with no improvement of HR. MD does not want BP to drop
Patient is supposed to receive Metoprolol 12.5 mg, however her SBP is less than 90 mmhg

## 2021-02-04 NOTE — PROVIDER CONTACT NOTE (OTHER) - DATE AND TIME:
03-Feb-2021 03:35
04-Feb-2021 00:20
28-Jan-2021 02:55
28-Jan-2021 21:45
29-Jan-2021 12:21
02-Feb-2021 23:30
30-Jan-2021 16:42
02-Feb-2021 00:05
02-Feb-2021 06:40
28-Jan-2021 02:55
30-Jan-2021 16:04
30-Jan-2021 20:05
30-Jan-2021 15:50

## 2021-02-04 NOTE — PROGRESS NOTE ADULT - ASSESSMENT
64 yo female with PMH of Atrial flutter on Eliquis s/p GILSON cardioversion, Combined systolic and diastolic HF , Cardiomyopathy , pHTN, Vascular insufficiency LLE, R sciatica, DJD hips, morbid obesity , low vignesh pain, NAHOMI on CPAP, who presented to ER for abdominal distention and fatigue. Admitted for HANANE, malignant ascites possible UTI. LE extensive DVT noted AC had to be stopped due to thrombocytopenia. IVC filter inserted.    LACY - multifactorial (recurrent ascites, anemia and large body habitus and NAHOMI/OHS), Atelectasis  -resolved, V/Q scan neg for PE,  c/w  IVF stopped, CXR - atelectasis,  incentive spirometry    CDAD  -PO Vanco, ID following, hold off on systemic abx for now, add  probiotics    Candidal UTI - Diflucan     HANANE on CKD with baseline Cr 1.4-1.6   -Likely pre-renal/low flow state,  nephrology consult,  c/w  IV fluids  - Cr 2.7--> 3.1--> 3.5     Pancytopenia chemo induced, slightly better  -Heme/onc following, started on Zarxio x 3 days  -s/p 2 units transfusion of PRBC 1/29, 1/30 , will give one more unit today     DVT RLE popliteal (extensive)  -pt was on Eliquis prior to admission, dc'd placed on Heparin gtt, platelets low so dc'd  -s/p infrarenal IVC filter 1/29    Hypotension , chronic , orthostatic  -on midodrine, dose increased 10 TID     Metastatic Pancreatic cancer, Adenocarcinoma with CA 19-9 > 4000, Ascites   - s/p on gemcitabine/Abraxane in ambulatory, last chemotherapy given 1/22/2021  -Pall consult; remains full code  - oncology consult - poor functional status for systemic therapy at this time  - IR consult for paracenteses today 2/1/21  2/3 - ativan for anxiety    Sinus tachycardia, Atrial flutter   -EKG reviewed, Cardiology consult appreciated   - on low dose BB with parameters    Anemia with Positive FOBT   -possibly from C. diff, oral bleeding 2/2 low platelets  -GI consulted - no role of endoscopic evaluation at this time, On PPI IV bid    Advanced Directives DNR DNI   -Pt with capacity  -Sister surrogate, Pall Care team had meeting with pt and her sister and now DNR DNI      Dispo - still with diarrhea, cont vanco, discussed with Dr Bateman - consider in-pt hospice   discussed with RN

## 2021-02-04 NOTE — PROGRESS NOTE ADULT - SUBJECTIVE AND OBJECTIVE BOX
NEPHROLOGY INTERVAL HPI/OVERNIGHT EVENTS:  02-03-21 @ 13:59  HPI  2/3 dw palliative team, considering hospice   2/2--No acute events. Continues with nausea with limited po intake.  Positive phlegm.  Denies SOB.  2/1 s/p paracentesis 3l, feels tired   64 yo F with multiple comorbidities including pancreatic cancer with malignant ascites on checmo with abaraxane and gemictabine last dose 1/22, currently under tx for CDAD on day #5 of po vanco which she states she ahs been unable to tolerate, recurrent ascites and multiple paracentesis (last 2 weeks ago), renal insuffiency, NAHOMI of cpap presents with generlazied weakness, nausea, diarrhea and lacy. Nausea will not allow for her to take po vanco. Still having numerous watery BMs daily.  LACY with minimal exertion. She denies cough. No abd pain. No HA/Neck pain or blurry vision. No other complaints to offer    ED course: given iv vanco and zosyn for presumed sepsis. Lactate 2.2 cleared after 3L NS. Dimer elevated however V/Q neg for PE. O2S 94% on RA at rest with soft BP 89/51 however historically patient has low BP and was placed on midodrine last admission. PT pancytopenic. Afebrile. CXR clear.   ------------------------------------------------------------------------------------  above hx corroborated with patient   since 1/22 had received chemo   5 days ago started with diarrhea with 5-7 episodes per day  dx with CDAD and has not  been tolerating it   appetite poor   + uop   no nsaid or contrast study   dx with DVT on iv heprin interruped for thrombocytonpenia determined to be due to chemo and now with oral bleeding   GSF given   at home was taking midodrine     MEDICATIONS  (STANDING):  benzocaine 15 mG/menthol 3.6 mG (Sugar-Free) Lozenge 1 Lozenge Oral four times a day  dextrose 5% 1000 milliLiter(s) (50 mL/Hr) IV Continuous <Continuous>  FIRST- Mouthwash  BLM 10 milliLiter(s) Swish and Swallow every 4 hours  fluconAZOLE   Tablet 100 milliGRAM(s) Oral daily  hydrocortisone 1% Cream 1 Application(s) Topical two times a day  lactobacillus acidophilus 1 Tablet(s) Oral two times a day with meals  metoprolol tartrate 12.5 milliGRAM(s) Oral every 8 hours  midodrine. 10 milliGRAM(s) Oral three times a day  pantoprazole    Tablet 40 milliGRAM(s) Oral two times a day  vancomycin    Solution 125 milliGRAM(s) Oral every 6 hours    MEDICATIONS  (PRN):  acetaminophen   Tablet .. 650 milliGRAM(s) Oral every 4 hours PRN Mild Pain (1 - 3)  LORazepam   Injectable 0.5 milliGRAM(s) IV Push every 6 hours PRN Anxiety  ondansetron Injectable 4 milliGRAM(s) IV Push every 6 hours PRN Nausea  zolpidem 5 milliGRAM(s) Oral at bedtime PRN Insomnia  zolpidem 5 milliGRAM(s) Oral at bedtime PRN Insomnia      Allergies    No Known Allergies    Intolerances        I&O's Detail    I&O's Detail    03 Feb 2021 07:01  -  04 Feb 2021 07:00  --------------------------------------------------------  IN:    dextrose 5% w/ Additives: 1000 mL  Total IN: 1000 mL    OUT:  Total OUT: 0 mL    Total NET: 1000 mL    Vital Signs Last 24 Hrs  T(C): 36.8 (04 Feb 2021 09:33), Max: 36.8 (04 Feb 2021 09:33)  T(F): 98.3 (04 Feb 2021 09:33), Max: 98.3 (04 Feb 2021 09:33)  HR: 123 (04 Feb 2021 09:33) (114 - 123)  BP: 88/52 (04 Feb 2021 09:33) (88/52 - 101/52)  BP(mean): --  RR: 18 (04 Feb 2021 09:33) (18 - 18)  SpO2: 95% (04 Feb 2021 09:33) (92% - 95%)      PHYSICAL EXAM:  General: sleeping  HEENT: MMM  CV: s1s2 rrr  LUNGS: B/L CTA  EXT: no edema    LABS:    02-04    136  |  106  |  66<H>  ----------------------------<  138<H>  3.9   |  21<L>  |  3.34<H>    Ca    6.9<L>      04 Feb 2021 07:56  Phos  3.0     02-03  Mg     2.0     02-04    TPro  x   /  Alb  1.2<L>  /  TBili  x   /  DBili  x   /  AST  x   /  ALT  x   /  AlkPhos  x   02-03                          8.4    10.40 )-----------( 25       ( 03 Feb 2021 07:16 )             25.6     02-03    136  |  107  |  69<H>  ----------------------------<  139<H>  4.1   |  19<L>  |  3.66<H>    Ca    7.4<L>      03 Feb 2021 07:16  Phos  3.0     02-03  Mg     2.2     02-03    TPro  x   /  Alb  1.2<L>  /  TBili  x   /  DBili  x   /  AST  x   /  ALT  x   /  AlkPhos  x   02-03        Phosphorus Level, Serum: 3.0 mg/dL (02-03 @ 07:16)  Magnesium, Serum: 2.2 mg/dL (02-03 @ 07:16)                              7.5    10.17 )-----------( 21       ( 04 Feb 2021 07:56 )             22.6                         8.4    10.40 )-----------( 25       ( 03 Feb 2021 07:16 )             25.6                         8.6    9.49  )-----------( 29       ( 02 Feb 2021 07:52 )             25.9

## 2021-02-04 NOTE — PROVIDER CONTACT NOTE (OTHER) - NAME OF MD/NP/PA/DO NOTIFIED:
Dr. Bird service aware
Dr. Sosa
Marge
[place]
Dr. Sosa
Dr. Sosa
Dr. Staples vascular sx aware
MD Gutierrez
MD Richards
Dr. Carl service aware
Dr. Sosa
Dr. Sosa
MD Dey

## 2021-02-04 NOTE — PROGRESS NOTE ADULT - ASSESSMENT
65F with history of atrial flutter, on chronic anticoagulation with Eliquis, CHF, cardiomyopathy, essential hypertension, vascular insufficiency, sciatica, obesity, osteoarthritis, NAHOMI on CPAP, diagnosed in December 2020 with carcinomatosis via paracentesis with removal of 7.4 L ascites and cytopathology positive for malignant cells, favoring adenocarcinoma.  CA 19–9 was >4,000.  Started on gemcitabine/Abraxane in ambulatory, last chemotherapy given 1/22/2021.    Admitted with fatigue, generalized weakness, nausea.  Chemotherapy induced pancytopenia; also in HANANE. Venous ultrasound consistent with extensive DVT involving the popliteal, posterior tibial, gastrocnemius and soleal veins in the right lower extremity.    DVT- thrombophilia due to malignancy. Off AC due to  significant thrombocytopenia due to recent chemotherapy. S/p IVC filter. Will be able to resume AC once plts up to 50 K.  C diff colitis- on oral vanco.  Malignant ascites - s/p paracentesis 2/1.  HANANE - creatinine worsening. Nephrology following. Hypotension. Difficult balance - continue hydration and midodrine.     Mucositis due to recent chemotherapy- - Magic mouth wash.  Advanced malignancy. Performance status markedly declined. At this point she is not a candidate/ will not benefit from continuation of palliative chemotherapy.    Recommend hospice.   D/w 1 [place] team on morning rounds.

## 2021-02-04 NOTE — PROGRESS NOTE ADULT - SUBJECTIVE AND OBJECTIVE BOX
65F with history of atrial flutter, on chronic anticoagulation with Eliquis, CHF, cardiomyopathy, essential hypertension, vascular insufficiency, sciatica, obesity, osteoarthritis, NAHOMI on CPAP, diagnosed in December 2020 with carcinomatosis via paracentesis with removal of 7.4 L ascites and cytopathology positive for malignant cells, favoring adenocarcinoma.  CA 19–9 was >4,000.  Started on gemcitabine/Abraxane in ambulatory, last chemotherapy given 1/22/2021.  Admitted with fatigue, generalized weakness, nausea.  Started on IV antibiotics.  VQ scan negative for PE.  Presenting with chemotherapy induced pancytopenia; also in HANANE.Venous ultrasound consistent with extensive DVT involving the popliteal, posterior tibial, gastrocnemius and soleal veins in the right lower extremity.    2/2/21    S/p paracentesis 2/1  3 liters . Still with nausea- especially after she takes Vancomycin. Still with diarrhea. Weak. No appetite but trying to drink.   Oral discomfort little better.    2/3/21  Feels little better in AM. Oral discomfort improved. States- will try to eat more today. Upset about hospice option but open to discussion.   2/4/21  No major change. Ativan helped - slept well. Thinking about hospice option.     MEDICATIONS  (STANDING):  benzocaine 15 mG/menthol 3.6 mG (Sugar-Free) Lozenge 1 Lozenge Oral four times a day  dextrose 5% 1000 milliLiter(s) (50 mL/Hr) IV Continuous <Continuous>  FIRST- Mouthwash  BLM 10 milliLiter(s) Swish and Swallow every 4 hours  fluconAZOLE   Tablet 100 milliGRAM(s) Oral daily  hydrocortisone 1% Cream 1 Application(s) Topical two times a day  lactobacillus acidophilus 1 Tablet(s) Oral two times a day with meals  metoprolol tartrate 12.5 milliGRAM(s) Oral every 8 hours  midodrine. 10 milliGRAM(s) Oral three times a day  pantoprazole    Tablet 40 milliGRAM(s) Oral two times a day  vancomycin    Solution 125 milliGRAM(s) Oral every 6 hours    MEDICATIONS  (PRN):  acetaminophen   Tablet .. 650 milliGRAM(s) Oral every 4 hours PRN Mild Pain (1 - 3)  LORazepam   Injectable 0.5 milliGRAM(s) IV Push every 6 hours PRN Anxiety  ondansetron Injectable 4 milliGRAM(s) IV Push every 6 hours PRN Nausea  zolpidem 5 milliGRAM(s) Oral at bedtime PRN Insomnia  zolpidem 5 milliGRAM(s) Oral at bedtime PRN Insomnia    Vital Signs Last 24 Hrs  T(C): 36.3 (04 Feb 2021 15:10), Max: 36.8 (04 Feb 2021 09:33)  T(F): 97.4 (04 Feb 2021 15:10), Max: 98.3 (04 Feb 2021 09:33)  HR: 122 (04 Feb 2021 15:10) (114 - 123)  BP: 97/49 (04 Feb 2021 15:10) (88/52 - 101/52)  BP(mean): --  RR: 20 (04 Feb 2021 15:10) (18 - 20)  SpO2: 94% (04 Feb 2021 15:10) (94% - 95%)  Patient seen in AM   PHYSICAL EXAM:  Constitutional: sleepy today morning alert, morbidly obese  looks fatigued   HEENT: dry mucosa, mucositis better    Respiratory: bilateral decreased breath sounds anteriorly  Cardiovascular : S1, S2 irregular, rhythmic, no murmurs, gallops or rubs  Abdomen: soft, distended,  ascites better. No palpable masses.  Extremities: + bilateral lower extremity swelling  Neuro : non focal  Psych - normal affect                            8.4    10.40 )-----------( 25       ( 03 Feb 2021 07:16 )             25.6     02-03    136  |  107  |  69<H>  ----------------------------<  139<H>  4.1   |  19<L>  |  3.66<H>    Ca    7.4<L>      03 Feb 2021 07:16  Phos  3.0     02-03  Mg     2.2     02-03    TPro  x   /  Alb  1.2<L>  /  TBili  x   /  DBili  x   /  AST  x   /  ALT  x   /  AlkPhos  x   02-03

## 2021-02-05 ENCOUNTER — TRANSCRIPTION ENCOUNTER (OUTPATIENT)
Age: 66
End: 2021-02-05

## 2021-02-05 ENCOUNTER — APPOINTMENT (OUTPATIENT)
Age: 66
End: 2021-02-05

## 2021-02-05 VITALS
HEART RATE: 117 BPM | SYSTOLIC BLOOD PRESSURE: 117 MMHG | OXYGEN SATURATION: 99 % | DIASTOLIC BLOOD PRESSURE: 65 MMHG | RESPIRATION RATE: 18 BRPM

## 2021-02-05 LAB
ANION GAP SERPL CALC-SCNC: 9 MMOL/L — SIGNIFICANT CHANGE UP (ref 5–17)
BUN SERPL-MCNC: 62 MG/DL — HIGH (ref 7–23)
CALCIUM SERPL-MCNC: 7.1 MG/DL — LOW (ref 8.5–10.1)
CHLORIDE SERPL-SCNC: 105 MMOL/L — SIGNIFICANT CHANGE UP (ref 96–108)
CO2 SERPL-SCNC: 25 MMOL/L — SIGNIFICANT CHANGE UP (ref 22–31)
CREAT SERPL-MCNC: 3.41 MG/DL — HIGH (ref 0.5–1.3)
GLUCOSE SERPL-MCNC: 140 MG/DL — HIGH (ref 70–99)
HCT VFR BLD CALC: 24.8 % — LOW (ref 34.5–45)
HGB BLD-MCNC: 8.2 G/DL — LOW (ref 11.5–15.5)
MAGNESIUM SERPL-MCNC: 1.8 MG/DL — SIGNIFICANT CHANGE UP (ref 1.6–2.6)
MCHC RBC-ENTMCNC: 28 PG — SIGNIFICANT CHANGE UP (ref 27–34)
MCHC RBC-ENTMCNC: 33.1 GM/DL — SIGNIFICANT CHANGE UP (ref 32–36)
MCV RBC AUTO: 84.6 FL — SIGNIFICANT CHANGE UP (ref 80–100)
NRBC # BLD: SIGNIFICANT CHANGE UP /100 WBCS (ref 0–0)
PLATELET # BLD AUTO: 31 K/UL — LOW (ref 150–400)
POTASSIUM SERPL-MCNC: 3.7 MMOL/L — SIGNIFICANT CHANGE UP (ref 3.5–5.3)
POTASSIUM SERPL-SCNC: 3.7 MMOL/L — SIGNIFICANT CHANGE UP (ref 3.5–5.3)
RBC # BLD: 2.93 M/UL — LOW (ref 3.8–5.2)
RBC # FLD: 17.2 % — HIGH (ref 10.3–14.5)
SARS-COV-2 RNA SPEC QL NAA+PROBE: SIGNIFICANT CHANGE UP
SODIUM SERPL-SCNC: 139 MMOL/L — SIGNIFICANT CHANGE UP (ref 135–145)
WBC # BLD: 11.15 K/UL — HIGH (ref 3.8–10.5)
WBC # FLD AUTO: 11.15 K/UL — HIGH (ref 3.8–10.5)

## 2021-02-05 PROCEDURE — 99239 HOSP IP/OBS DSCHRG MGMT >30: CPT

## 2021-02-05 PROCEDURE — 99231 SBSQ HOSP IP/OBS SF/LOW 25: CPT

## 2021-02-05 PROCEDURE — 99232 SBSQ HOSP IP/OBS MODERATE 35: CPT

## 2021-02-05 RX ORDER — LACTOBACILLUS ACIDOPHILUS 100MM CELL
1 CAPSULE ORAL
Qty: 0 | Refills: 0 | DISCHARGE
Start: 2021-02-05

## 2021-02-05 RX ORDER — HYDROCORTISONE 1 %
1 OINTMENT (GRAM) TOPICAL
Qty: 0 | Refills: 0 | DISCHARGE
Start: 2021-02-05

## 2021-02-05 RX ORDER — PANTOPRAZOLE SODIUM 20 MG/1
1 TABLET, DELAYED RELEASE ORAL
Qty: 0 | Refills: 0 | DISCHARGE

## 2021-02-05 RX ORDER — DIPHENHYDRAMINE HYDROCHLORIDE AND LIDOCAINE HYDROCHLORIDE AND ALUMINUM HYDROXIDE AND MAGNESIUM HYDRO
10 KIT
Qty: 0 | Refills: 0 | DISCHARGE
Start: 2021-02-05

## 2021-02-05 RX ORDER — ACETAMINOPHEN 500 MG
2 TABLET ORAL
Qty: 0 | Refills: 0 | DISCHARGE
Start: 2021-02-05

## 2021-02-05 RX ORDER — FLUCONAZOLE 150 MG/1
1 TABLET ORAL
Qty: 0 | Refills: 0 | DISCHARGE
Start: 2021-02-05

## 2021-02-05 RX ORDER — PANTOPRAZOLE SODIUM 20 MG/1
1 TABLET, DELAYED RELEASE ORAL
Qty: 0 | Refills: 0 | DISCHARGE
Start: 2021-02-05

## 2021-02-05 RX ORDER — METOPROLOL TARTRATE 50 MG
12.5 TABLET ORAL
Qty: 0 | Refills: 0 | DISCHARGE
Start: 2021-02-05

## 2021-02-05 RX ORDER — ZOLPIDEM TARTRATE 10 MG/1
1 TABLET ORAL
Qty: 0 | Refills: 0 | DISCHARGE
Start: 2021-02-05

## 2021-02-05 RX ORDER — MIDODRINE HYDROCHLORIDE 2.5 MG/1
1 TABLET ORAL
Qty: 0 | Refills: 0 | DISCHARGE
Start: 2021-02-05

## 2021-02-05 RX ORDER — ONDANSETRON 8 MG/1
4 TABLET, FILM COATED ORAL
Qty: 0 | Refills: 0 | DISCHARGE
Start: 2021-02-05

## 2021-02-05 RX ADMIN — Medication 1 APPLICATION(S): at 09:54

## 2021-02-05 RX ADMIN — MIDODRINE HYDROCHLORIDE 10 MILLIGRAM(S): 2.5 TABLET ORAL at 11:55

## 2021-02-05 RX ADMIN — Medication 0.5 MILLIGRAM(S): at 18:19

## 2021-02-05 RX ADMIN — Medication 1 TABLET(S): at 11:05

## 2021-02-05 RX ADMIN — FLUCONAZOLE 100 MILLIGRAM(S): 150 TABLET ORAL at 11:05

## 2021-02-05 RX ADMIN — Medication 125 MILLIGRAM(S): at 11:05

## 2021-02-05 RX ADMIN — Medication 125 MILLIGRAM(S): at 18:19

## 2021-02-05 RX ADMIN — Medication 125 MILLIGRAM(S): at 05:15

## 2021-02-05 RX ADMIN — BENZOCAINE AND MENTHOL 1 LOZENGE: 5; 1 LIQUID ORAL at 05:14

## 2021-02-05 RX ADMIN — MIDODRINE HYDROCHLORIDE 10 MILLIGRAM(S): 2.5 TABLET ORAL at 18:27

## 2021-02-05 RX ADMIN — MIDODRINE HYDROCHLORIDE 10 MILLIGRAM(S): 2.5 TABLET ORAL at 05:15

## 2021-02-05 NOTE — DISCHARGE NOTE PROVIDER - NSDCMRMEDTOKEN_GEN_ALL_CORE_FT
Ambien 5 mg oral tablet: 1 tab(s) orally once a day (at bedtime), As Needed MDD:one tab  apixaban 5 mg oral tablet: 1 tab(s) orally every 12 hours  bisacodyl 5 mg oral delayed release tablet: 1 tab(s) orally every 12 hours, As Needed   Compazine 10 mg oral tablet: 1 tab(s) orally 3 times a day, As Needed  Imodium 2 mg oral capsule: 1 cap(s) orally every 4 hours, As Needed  metoprolol succinate 25 mg oral tablet, extended release: 1 tab(s) orally 2 times a day  midodrine 5 mg oral tablet: 1 tab(s) orally 3 times a day  pantoprazole 40 mg oral delayed release tablet: 1 tab(s) orally once a day  polyethylene glycol 3350 oral powder for reconstitution: 17 gram(s) orally once a day   vancomycin 125 mg oral capsule: 1 cap(s) orally every 6 hours   acetaminophen 325 mg oral tablet: 2 tab(s) orally every 4 hours, As needed, Mild Pain (1 - 3)  diphenhydramine/lidocaine/aluminum hydroxide/magnesium hydroxide/simethicone mucous membrane suspension: 10 milliliter(s) mucous membrane every 6 hours; swish and spit   fluconazole 100 mg oral tablet: 1 tab(s) orally once a day  hydrocortisone 1% topical cream: 1 application topically 2 times a day  lactobacillus acidophilus oral capsule: 1 tab(s) orally 2 times a day  LORazepam: 0.5 milligram(s) intravenous every 6 hours, As Needed for anxiety  metoprolol: 12.5 milligram(s) orally every 12 hours. hold for SBP less than 100   midodrine 10 mg oral tablet: 1 tab(s) orally 3 times a day  ondansetron 2 mg/mL injectable solution: 4 milligram(s) injectable every 6 hours, As Needed for nausea   pantoprazole 40 mg oral delayed release tablet: 1 tab(s) orally 2 times a day  vancomycin 125 mg oral capsule: 1 cap(s) orally every 6 hours  zolpidem 5 mg oral tablet: 1 tab(s) orally once a day (at bedtime), As needed, Insomnia

## 2021-02-05 NOTE — DISCHARGE NOTE NURSING/CASE MANAGEMENT/SOCIAL WORK - PATIENT PORTAL LINK FT
You can access the FollowMyHealth Patient Portal offered by [facility] by registering at the following website: [email or web address]. By joining [email or web address] FollowMyHealth portal, you will also be able to view your health information using other applications (apps) compatible with our system.

## 2021-02-05 NOTE — PROGRESS NOTE ADULT - PROVIDER SPECIALTY LIST ADULT
Nephrology
Hospitalist
Palliative Care
Palliative Care
Hospitalist
Hospitalist
Nephrology
Palliative Care
Vascular Surgery
Vascular Surgery
Hospitalist
Infectious Disease
Nephrology
Nephrology
Heme/Onc
Heme/Onc
Cardiology
Cardiology
Heme/Onc

## 2021-02-05 NOTE — PROGRESS NOTE ADULT - ASSESSMENT
65F with history of atrial flutter, on chronic anticoagulation with Eliquis, CHF, cardiomyopathy, essential hypertension, vascular insufficiency, sciatica, obesity, osteoarthritis, NAHOMI on CPAP, diagnosed in December 2020 with carcinomatosis via paracentesis with removal of 7.4 L ascites and cytopathology positive for malignant cells, favoring adenocarcinoma.  CA 19–9 was >4,000.  Started on gemcitabine/Abraxane in ambulatory, last chemotherapy given 1/22/2021.    Admitted with fatigue, generalized weakness, nausea.  Chemotherapy induced pancytopenia; also in HANANE. Venous ultrasound consistent with extensive DVT involving the popliteal, posterior tibial, gastrocnemius and soleal veins in the right lower extremity.    DVT- thrombophilia due to malignancy. Off AC due to  significant thrombocytopenia due to recent chemotherapy. S/p IVC filter. Will be able to resume AC once plts up to 50 K.  C diff colitis- on oral vanco.  Malignant ascites - s/p paracentesis 2/1.  HANANE - creatinine worsening. Nephrology following. Hypotension. Difficult balance - continue hydration and midodrine.     Mucositis due to recent chemotherapy- - Magic mouth wash.  Advanced malignancy. Performance status markedly declined. At this point she is not a candidate/ will not benefit from continuation of palliative chemotherapy.  Recommend hospice.

## 2021-02-05 NOTE — PROGRESS NOTE ADULT - PROBLEM SELECTOR PLAN 2
Patient has multiple secondary factors for hypercoagulability added to the primary malignancy: her morbid obesity, prolonged immobilization, recent systemic chemotherapy, etc. Attempt to anticoagulate  short lived due to drop in platelets; discussed with IR insertion of IVC filter today; pending evaluation.
Patient has multiple secondary factors for hypercoagulability added to the primary malignancy: her morbid obesity, prolonged immobilization, recent systemic chemotherapy, etc. Attempt to anticoagulate  short lived due to drop in platelets; discussed with IR insertion of IVC filter today; pending evaluation.
S/p past cardioversion - presently in sinus rhythm.
S/p past cardioversion - remains in sinus rhythm.
Patient has multiple secondary factors for hypercoagulability added to the primary malignancy: her morbid obesity, prolonged immobilization, recent systemic chemotherapy, etc. Attempt to anticoagulate  short lived due to drop in platelets; discussed with IR insertion of IVC filter today; pending evaluation.

## 2021-02-05 NOTE — DISCHARGE NOTE PROVIDER - HOSPITAL COURSE
HPI: 66 yo female with PMH of  Atrial flutter  on Eliquis 11/9/19  had GILSON cardioversion,  Combined systolic and diastolic HF , Cardiomyopathy , pHTN, Vascular insufficiency LLE, R sciatica, DJD hips, morbid Obesity , low vignesh pain, Pulm. HTN, NAHOMI on CPAP, who presented  in the ER for abdominal distention and fatigue. Cardiology called to evaluate low voltage and possible injury pattern on ECG. Pt denies chest pain but reports mild dyspnea and fatigue since her diagnosis of cancer with ascites. Urgent echo was performed in the ED which revealed no evidence of pericardial effusion. EF was adequate.   Admitted for HANANE, malignant ascites possible UTI. LE extensive DVT noted AC had to be stopped due to thrombocytopenia. IVC filter will be inserted today.    LACY - multifactorial (recurrent ascites, anemia and large body habitus and NAHOMI/OHS), Atelectasis  -resolved, V/Q scan neg for PE,  c/w  IVF stopped, CXR - atelectasis,  incentive spirometry    CDAD  -PO Vanco, ID following, hold off on systemic abx for now, add  probiotics    Candidal UTI - Diflucan     HANANE on CKD with baseline Cr 1.4-1.6   -Likely pre-renal/low flow state,  nephrology consult,  c/w  IV fluids  - Cr 2.7--> 3.1--> 3.5     Pancytopenia chemo induced, slightly better  -Heme/onc following, started on Zarxio x 3 days  -s/p 2 units transfusion of PRBC 1/29, 1/30 , will give one more unit today     DVT RLE popliteal (extensive)  -pt was on Eliquis prior to admission, dc'd placed on Heparin gtt, platelets low so dc'd  -s/p infrarenal IVC filter 1/29    Hypotension , chronic , orthostatic  -on midodrine, dose increased 10 TID     Metastatic Pancreatic cancer, Adenocarcinoma with CA 19-9 > 4000, Ascites   - s/p on gemcitabine/Abraxane in ambulatory, last chemotherapy given 1/22/2021  -Pall consult; remains full code  - oncology consult - poor functional status for systemic therapy at this time  - IR consult for paracenteses today 2/1/21  2/3 - ativan for anxiety    Sinus tachycardia, Atrial flutter   -EKG reviewed, Cardiology consult appreciated   - on low dose BB with parameters    Anemia with Positive FOBT   -possibly from C. diff, oral bleeding 2/2 low platelets  -GI consulted - no role of endoscopic evaluation at this time, On PPI IV bid    Advanced Directives DNR DNI   -Pt with capacity  -Sister surrogate, Pall Care team had meeting with pt and her sister and now DNR DNI      Dispo - still with diarrhea, cont vanco, discussed with Dr Bateman - consider in-pt hospice   discussed with RN    HPI: 66 yo female with PMH of  Atrial flutter  on Eliquis 11/9/19  had GILSON cardioversion,    Combined systolic and diastolic HF , Cardiomyopathy , pHTN, Vascular insufficiency LLE, R sciatica, DJD hips, morbid Obesity , low vignesh pain, Pulm. HTN,   NAHOMI on CPAP, who presented  in the ER for abdominal distention and fatigue.   Cardiology called to evaluate low voltage and possible injury pattern on ECG.   Pt denies chest pain but reports mild dyspnea and fatigue since her diagnosis of cancer with ascites.   Urgent echo was performed in the ED which revealed no evidence of pericardial effusion. EF was adequate.   Admitted for HANANE, malignant ascites possible UTI. LE extensive DVT noted AC had to be stopped due to thrombocytopenia. IVC filter will be inserted today.    HOSPITAL COURSE:   The patient was admitted for   LACY - multifactorial (recurrent ascites, anemia and large body habitus and NAHOMI/OHS), Atelectasis  V/Q scan neg for PE, CXR - atelectasis,  incentive spirometry  Dyspnea improved with paracentesis    CDAD  -PO Vanco, add  probiotics    Candidal UTI - Diflucan     HANANE on CKD with baseline Cr 1.4-1.6   -Likely pre-renal/low flow state,  nephrology consult,  c/w  IV fluids  Cr stable     Pancytopenia chemo induced, slightly better  -Heme/onc following, started on Zarxio x 3 days  -s/p PRBCs    DVT RLE popliteal (extensive)  -pt was on Eliquis prior to admission, dc'd placed on Heparin gtt, platelets low so dc'd  -s/p infrarenal IVC filter 1/29    Hypotension , chronic , orthostatic  -on midodrine, dose increased 10 TID     Metastatic Pancreatic cancer, Adenocarcinoma with CA 19-9 > 4000, Ascites   - s/p on gemcitabine/Abraxane in ambulatory, last chemotherapy given 1/22/2021  -Pall consult; remains full code  - oncology consult - poor functional status for systemic therapy at this time  - IR consult for paracenteses today 2/1/21  2/3 - ativan for anxiety  2/4 - pt continues to find relief with ativan     Sinus tachycardia, Atrial flutter   -EKG reviewed, Cardiology consult appreciated   - on low dose BB with parameters as BP allows     Anemia with Positive FOBT   -possibly from C. diff, oral bleeding 2/2 low platelets  -GI consulted - no role of endoscopic evaluation at this time, On PPI IV bid    Advanced Directives DNR DNI   -Pt with capacity  -Sister surrogate, Pall Care team had meeting with pt and her sister and now DNR DNI    Dispo - still with diarrhea, cont vanco, discussed with Dr Bateman - [facility] hospice, pt agreeable  discussed with team at IDRs    time spent on discharge - 55 mins    OTHER DETAILS:  2/5/21 - no cp palps sob; anxiety better with ativan  PHYSICAL EXAM:  GENERAL: NAD, able to lie flat in bed  HEAD:  Atraumatic, Normocephalic  EYES: EOMI, PERRLA, normal sclera  ENT: Moist mucous membranes  NECK: Supple, No JVD, no nuchal rigidity  CHEST/LUNG: Clear to auscultation bilaterally; No rales, rhonchi, wheezing, or rubs. Unlabored respirations  HEART: Regular rate and rhythm; No murmurs, rubs, or gallops  ABDOMEN: Bowel sounds present; Soft, Nontender, Nondistended. No hepatomegaly  EXTREMITIES:  no pitting bilaterally  NERVOUS SYSTEM:  Alert & Oriented X3, speech clear. No focal motor or sensory deficits  MSK: FROM all 4 extremities, full and equal strength  SKIN: No rashes or lesions    LABS: All Labs Reviewed:                     8.2    11.15 )-----------( 31       ( 05 Feb 2021 10:27 )             24.8   139  |  105  |  62<H>  ----------------------------<  140<H>  3.7   |  25  |  3.41<H>  RADIOLOGY/EKG:  < from: Xray Chest 1 View- PORTABLE-Routine (Xray Chest 1 View- PORTABLE-Routine .) (01.30.21 @ 15:24) >  Tubes/lines: Right chest wall port with catheter tip in the region of the right brachiocephalic vein.  Lungs: Bibasilar atelectasis. Low lung volumes..  Pleura: No pneumothorax or pleural effusions.  Cardiomediastinal silhouette: Within normal limits..    time spent on discharge - 55mins                HPI: 64 yo female with PMH of  Atrial flutter  on Eliquis 11/9/19  had GILSON cardioversion,    Combined systolic and diastolic HF , Cardiomyopathy , pHTN, Vascular insufficiency LLE, R sciatica, DJD hips, morbid Obesity , low vignesh pain, Pulm. HTN,   NAHOMI on CPAP, who presented  in the ER for abdominal distention and fatigue.   Cardiology called to evaluate low voltage and possible injury pattern on ECG.   Pt denies chest pain but reports mild dyspnea and fatigue since her diagnosis of cancer with ascites.   Urgent echo was performed in the ED which revealed no evidence of pericardial effusion. EF was adequate.   Admitted for HANANE, malignant ascites possible UTI. LE extensive DVT noted AC had to be stopped due to thrombocytopenia. IVC filter will be inserted today.    HOSPITAL COURSE:   The patient was admitted for   LACY - multifactorial (recurrent ascites, anemia and large body habitus and NAHOMI/OHS), Atelectasis  V/Q scan neg for PE, CXR - atelectasis,  incentive spirometry  Dyspnea improved with paracentesis    CDAD  -PO Vanco, add  probiotics    Candidal UTI - Diflucan     HANANE on CKD with baseline Cr 1.4-1.6   -Likely pre-renal/low flow state,  nephrology consult,  c/w  IV fluids  Cr stable     Pancytopenia chemo induced, slightly better  -Heme/onc following, started on Zarxio x 3 days  -s/p PRBCs    DVT RLE popliteal (extensive)  -pt was on Eliquis prior to admission, dc'd placed on Heparin gtt, platelets low so dc'd  -s/p infrarenal IVC filter 1/29    Hypotension , chronic , orthostatic  -on midodrine, dose increased 10 TID     Metastatic Pancreatic cancer, Adenocarcinoma with CA 19-9 > 4000, Ascites   - s/p on gemcitabine/Abraxane in ambulatory, last chemotherapy given 1/22/2021  -Pall consult; remains full code  - oncology consult - poor functional status for systemic therapy at this time  - IR consult for paracenteses today 2/1/21  2/3 - ativan for anxiety  2/4 - pt continues to find relief with ativan     Sinus tachycardia, Atrial flutter   -EKG reviewed, Cardiology consult appreciated   - on low dose BB with parameters as BP allows     Anemia with Positive FOBT   -possibly from C. diff, oral bleeding 2/2 low platelets  -GI consulted - no role of endoscopic evaluation at this time, On PPI IV bid    Advanced Directives DNR DNI   -Pt with capacity  -Sister surrogate, Pall Care team had meeting with pt and her sister and now DNR DNI    Dispo - still with diarrhea, cont vanco, discussed with Dr Bateman - [facility] hospice, pt agreeable  discussed with team at IDRs    time spent on discharge - 55 mins    OTHER DETAILS:  2/5/21 - no cp palps sob; anxiety better with ativan  PHYSICAL EXAM:  GENERAL: NAD, able to lie flat in bed  HEAD:  Atraumatic, Normocephalic  EYES: EOMI, PERRLA, normal sclera  ENT: Moist mucous membranes  NECK: Supple, No JVD, no nuchal rigidity  CHEST/LUNG: Clear to auscultation bilaterally; No rales, rhonchi, wheezing, or rubs. Unlabored respirations  HEART: Regular rate and rhythm; No murmurs, rubs, or gallops  ABDOMEN: Bowel sounds present; Soft, Nontender, distended  EXTREMITIES:  no pitting bilaterally  NERVOUS SYSTEM:  Alert & Oriented X3, speech clear. No focal motor or sensory deficits  MSK: FROM all 4 extremities, full and equal strength  SKIN: No rashes or lesions    LABS: All Labs Reviewed:                     8.2    11.15 )-----------( 31       ( 05 Feb 2021 10:27 )             24.8   139  |  105  |  62<H>  ----------------------------<  140<H>  3.7   |  25  |  3.41<H>  RADIOLOGY/EKG:  < from: Xray Chest 1 View- PORTABLE-Routine (Xray Chest 1 View- PORTABLE-Routine .) (01.30.21 @ 15:24) >  Tubes/lines: Right chest wall port with catheter tip in the region of the right brachiocephalic vein.  Lungs: Bibasilar atelectasis. Low lung volumes..  Pleura: No pneumothorax or pleural effusions.  Cardiomediastinal silhouette: Within normal limits..    time spent on discharge - 55mins

## 2021-02-05 NOTE — PROGRESS NOTE ADULT - ASSESSMENT
Pt is a 65y old Female with multiple comorbidities including pancreatic cancer with malignant ascites on chemo last dose 1/22, currently under tx for CDAD on po vanco which she states she has been unable to tolerate due to nausea, recurrent ascites and multiple paracentesis (last 2 weeks ago), renal insufficiency, NAHOMI presents with generalized weakness, nausea, diarrhea and lacy. Still having numerous watery BMs daily.  LACY with minimal exertion. She has been found to have extensive DVT LLE, anemia requiring transfusion as well as persistent weakness. Palliative Medicine Consult called to establish GOC as this is her 3rd hospitalization since Dec 2020    1) Dyspnea  - On exertion  - Supplemental O2 PRN  - NM scan= lo probability of PE    2) Pancreatic Cancer  - Carcinomatosis   - Ascites 3X paracentesis - plan for paracentesis today 2/1  - Chemo last 1/22  - Now with leukocytosis  - Oral candidiasis/mucositis   - cont Diflucan 100 mg daily  - CDiff colitis  - Cont PO vanco  - Onc eval noted/no further treatment    3) Debility  - Overall weakness  - Obesity  - Anemia  - Transfuse PRBC as per medicine  - PT eval appreciated     4) severe protein calorie malnutrition   - albumin 1.1  - poor PO intake   - registered dietician consult appreciated     5) DVT  - IVC filter placement  - AC as tolerated   - Cont to monitor  - hypercoagulable state     6) CKD  - Creat > 3.4      7) Advanced Directives  - Pt with capacity  - MOLST DNR/DNI on chart  - GOC discussion done 2/3  -Pt requests transition to inpatient hospice house  -Referral placed

## 2021-02-05 NOTE — PROGRESS NOTE ADULT - PROBLEM SELECTOR PLAN 3
Chemotherapy- induced cytopenias. Platelet count ayaan yet to be reached, however, if no evidence of significant bleed, will observe and continue to monitor CBC. Leukopenia treatment with Zarxio initiated today. Anemia- drop in hemoglobin may be dilutional. Transfuse PRBC as needed.
Extensive RLE DVT -- anticoagulation has resulted in worsening plt count (and unstable Hgb); would reconsider IVC filter if unable to anticoagulate; IR/vascular to evaluate
Mrs Ken has been anticoagulated with Eliquis -- stopped several times for paracenteses; perhaps this occasional discontinuation + malignancy contributed to DVT formation; IR was consulted for IVC filter given anemia / thrombocytopenia.
Chemotherapy- induced cytopenias. Platelet count ayaan yet to be reached, however, if no evidence of significant bleed, will observe and continue to monitor CBC. Leukopenia treatment with Zarxio initiated today. Anemia- drop in hemoglobin may be dilutional. Transfuse PRBC as needed.

## 2021-02-05 NOTE — PROGRESS NOTE ADULT - PROBLEM SELECTOR PROBLEM 2
Hypercoagulable state, secondary
Atrial flutter, unspecified type
Atrial flutter, unspecified type
Hypercoagulable state, secondary

## 2021-02-05 NOTE — PROGRESS NOTE ADULT - PROBLEM SELECTOR PROBLEM 1
Malignant neoplasm of pancreas, unspecified location of malignancy
Abnormal ECG
Abnormal ECG
Malignant neoplasm of pancreas, unspecified location of malignancy

## 2021-02-05 NOTE — DISCHARGE NOTE PROVIDER - NSDCFUSCHEDAPPT_GEN_ALL_CORE_FT
MADHAVI JOHNSON ; 02/12/2021 ; [facility]  MADHAVI JOHNSON ; 02/19/2021 ; [facility]  MADHAVI JOHNSON ; 02/26/2021 ; [facility]

## 2021-02-05 NOTE — PROGRESS NOTE ADULT - REASON FOR ADMISSION
Weakness, SOB with exertion
elizabeth
advanced pancreatic cancer
elizabeth

## 2021-02-05 NOTE — PROGRESS NOTE ADULT - SUBJECTIVE AND OBJECTIVE BOX
HPI: pt seen and examined with no family at bedside. She remains lethargicv and sleeping at the time of my visit. Currently stating she did not have breakfast as she does not have an appetite and is often nauseated receiving IV Zofran. Is requesting to transition to inpatient hospice house as discussed 2/3 GOC discussion.    PAIN: (X )Yes   ( )No  Level: mod - pt states starting to resolve, pt states mouth feeling better   Location: oral/throat  Intensity:   5 /10  Quality: sharp  Aggravating Factors: PO intake  Impact on ADLs: severe    DYSPNEA: ( ) Yes  (x ) No    Review of Systems:    Anxiety-denies  Depression-situational  Physical Discomfort-mod  Dyspnea-on exertion  Constipation-denies  Diarrhea-yes  Nausea-yes  Anorexia-mod-severe  Fatigue-mod-severe  Weakness-severe    All other systems reviewed and negative    PHYSICAL EXAM:  ICU Vital Signs Last 24 Hrs  T(C): 36.7 (05 Feb 2021 08:57), Max: 36.8 (04 Feb 2021 19:43)  T(F): 98 (05 Feb 2021 08:57), Max: 98.2 (04 Feb 2021 19:43)  HR: 103 (05 Feb 2021 08:57) (103 - 122)  BP: 97/50 (05 Feb 2021 12:20) (90/70 - 109/57)  RR: 18 (05 Feb 2021 08:57) (18 - 20)  SpO2: 95% (05 Feb 2021 08:57) (94% - 96%)      PPSV2: 40-50 %    General: Obese female in bed in NAD  Mental Status: A&O x3  HEENT: oral mucosa dry/+ mucostis improved  Lungs: clear to auscultation nusrat  Cardiac: S1S2+  GI: abd soft NT ND + BS  : voids  Ext: WARREN on bed  Neuro: no focal def    LABS:                              8.2    11.15 )-----------( 31       ( 05 Feb 2021 10:27 )             24.8                  02-05    139  |  105  |  62<H>  ----------------------------<  140<H>  3.7   |  25  |  3.41<H>    Ca    7.1<L>      05 Feb 2021 10:27  Mg     1.8     02-05     Albumin: Albumin, Serum: 1.2 g/dL (02-01 @ 08:23)      Allergies    No Known Allergies    Intolerances      MEDICATIONS  (STANDING):  MEDICATIONS  (PRN):    RADIOLOGY:      < from: Xray Chest 1 View- PORTABLE-Routine (Xray Chest 1 View- PORTABLE-Routine .) (01.30.21 @ 15:24) >  XAM:  XR CHEST PORTABLE ROUTINE 1V                            PROCEDURE DATE:  01/30/2021          INTERPRETATION:  CLINICAL HISTORY: 65-year-old with cough..    TECHNIQUE: Single AP radiograph of the chest was reviewed..    COMPARISON: Chest radiograph from 1/27/2021.    FINDINGS/  IMPRESSION:    Tubes/lines: Right chest wall port with catheter tip in the region of the right brachiocephalic vein.    Lungs: Bibasilar atelectasis. Low lung volumes..    Pleura: No pneumothorax or pleural effusions.    Cardiomediastinal silhouette: Within normal limits..    < end of copied text >

## 2021-02-05 NOTE — PROGRESS NOTE ADULT - SUBJECTIVE AND OBJECTIVE BOX
65F with history of atrial flutter, on chronic anticoagulation with Eliquis, CHF, cardiomyopathy, essential hypertension, vascular insufficiency, sciatica, obesity, osteoarthritis, NAHOMI on CPAP, diagnosed in December 2020 with carcinomatosis via paracentesis with removal of 7.4 L ascites and cytopathology positive for malignant cells, favoring adenocarcinoma.  CA 19–9 was >4,000.  Started on gemcitabine/Abraxane in ambulatory, last chemotherapy given 1/22/2021.  Admitted with fatigue, generalized weakness, nausea.  Started on IV antibiotics.  VQ scan negative for PE.  Presenting with chemotherapy induced pancytopenia; also in HANANE.Venous ultrasound consistent with extensive DVT involving the popliteal, posterior tibial, gastrocnemius and soleal veins in the right lower extremity.    2/2/21    S/p paracentesis 2/1  3 liters . Still with nausea- especially after she takes Vancomycin. Still with diarrhea. Weak. No appetite but trying to drink.   Oral discomfort little better.    2/3/21  Feels little better in AM. Oral discomfort improved. States- will try to eat more today. Upset about hospice option but open to discussion.   2/4/21  No major change. Ativan helped - slept well. Thinking about hospice option.   2/5/21  Seen early morning. Just woke up from sleep. States - I don not yet how I feel today. Had good sleep  States that ativan is helping.     MEDICATIONS  (STANDING):  benzocaine 15 mG/menthol 3.6 mG (Sugar-Free) Lozenge 1 Lozenge Oral four times a day  dextrose 5% 1000 milliLiter(s) (50 mL/Hr) IV Continuous <Continuous>  FIRST- Mouthwash  BLM 10 milliLiter(s) Swish and Swallow every 4 hours  fluconAZOLE   Tablet 100 milliGRAM(s) Oral daily  hydrocortisone 1% Cream 1 Application(s) Topical two times a day  lactobacillus acidophilus 1 Tablet(s) Oral two times a day with meals  metoprolol tartrate 12.5 milliGRAM(s) Oral every 8 hours  midodrine. 10 milliGRAM(s) Oral three times a day  pantoprazole    Tablet 40 milliGRAM(s) Oral two times a day  vancomycin    Solution 125 milliGRAM(s) Oral every 6 hours    MEDICATIONS  (PRN):  acetaminophen   Tablet .. 650 milliGRAM(s) Oral every 4 hours PRN Mild Pain (1 - 3)  LORazepam   Injectable 0.5 milliGRAM(s) IV Push every 6 hours PRN Anxiety  ondansetron Injectable 4 milliGRAM(s) IV Push every 6 hours PRN Nausea  zolpidem 5 milliGRAM(s) Oral at bedtime PRN Insomnia  zolpidem 5 milliGRAM(s) Oral at bedtime PRN Insomnia    Vital Signs Last 24 Hrs  T(C): 36.8 (04 Feb 2021 19:43), Max: 36.8 (04 Feb 2021 09:33)  T(F): 98.2 (04 Feb 2021 19:43), Max: 98.3 (04 Feb 2021 09:33)  HR: 106 (05 Feb 2021 05:11) (106 - 123)  BP: 90/70 (05 Feb 2021 05:11) (88/52 - 109/57)  BP(mean): --  RR: 18 (05 Feb 2021 05:11) (18 - 20)  SpO2: 96% (05 Feb 2021 05:11) (94% - 96%)    Patient seen in AM   PHYSICAL EXAM:  Constitutional: sleepy today morning alert, morbidly obese  looks fatigued   HEENT: dry mucosa, mucositis better    Respiratory: bilateral decreased breath sounds anteriorly  Cardiovascular : S1, S2 irregular, rhythmic, no murmurs, gallops or rubs  Abdomen: soft, distended,  No palpable masses.  Extremities: + bilateral lower extremity swelling  Neuro : non focal  Psych - normal affect                            8.4    10.40 )-----------( 25       ( 03 Feb 2021 07:16 )             25.6     02-03    136  |  107  |  69<H>  ----------------------------<  139<H>  4.1   |  19<L>  |  3.66<H>    Ca    7.4<L>      03 Feb 2021 07:16  Phos  3.0     02-03  Mg     2.2     02-03    TPro  x   /  Alb  1.2<L>  /  TBili  x   /  DBili  x   /  AST  x   /  ALT  x   /  AlkPhos  x   02-03

## 2021-02-05 NOTE — PROGRESS NOTE ADULT - PROBLEM SELECTOR PLAN 1
ECG is abnormal but presentation not suggestive of ACS; normal LV function on TTE
PVCs and ventricular couplets/triplets on telemetry; would like to continue beta blockade but BP has been low -- will decrease metoprolol dose to 12.5mg q8hr as BP allows.
Patient recently diagnosed with advanced malignancy, suspect pancreatic primary ( CA 19-9 > 4,000), and treated with only 2 cycles Nab-paclitaxel and Gemcitabine, last dose 1/22/21.  Performance status significantly declined after only 2 cycles of weekly therapy, making her an unlikely candidate for further systemic therapy . Oral mucositis treatment initiated with lidocaine- based mouthwash . Case discussed with patient's sister, Stacy, who seems to understand rapid progression of disease, but still considering future treatment.

## 2021-02-05 NOTE — DISCHARGE NOTE PROVIDER - NSDCCPCAREPLAN_GEN_ALL_CORE_FT
PRINCIPAL DISCHARGE DIAGNOSIS  Diagnosis: Dyspnea  Assessment and Plan of Treatment: due to malignant ascites, s/p paracenteisis, rec hospice      SECONDARY DISCHARGE DIAGNOSES  Diagnosis: Pancreatic cancer  Assessment and Plan of Treatment: rec pain managment, ativan for anxiety

## 2021-02-05 NOTE — PROGRESS NOTE ADULT - PROBLEM SELECTOR PROBLEM 3
Thrombocytopenia
Thrombocytopenia
DVT (deep venous thrombosis)
DVT (deep venous thrombosis)
Thrombocytopenia

## 2021-02-06 LAB
CULTURE RESULTS: SIGNIFICANT CHANGE UP
SPECIMEN SOURCE: SIGNIFICANT CHANGE UP

## 2021-02-10 DIAGNOSIS — I95.1 ORTHOSTATIC HYPOTENSION: ICD-10-CM

## 2021-02-10 DIAGNOSIS — D61.810 ANTINEOPLASTIC CHEMOTHERAPY INDUCED PANCYTOPENIA: ICD-10-CM

## 2021-02-10 DIAGNOSIS — N18.9 CHRONIC KIDNEY DISEASE, UNSPECIFIED: ICD-10-CM

## 2021-02-10 DIAGNOSIS — A04.72 ENTEROCOLITIS DUE TO CLOSTRIDIUM DIFFICILE, NOT SPECIFIED AS RECURRENT: ICD-10-CM

## 2021-02-10 DIAGNOSIS — E66.01 MORBID (SEVERE) OBESITY DUE TO EXCESS CALORIES: ICD-10-CM

## 2021-02-10 DIAGNOSIS — I13.0 HYPERTENSIVE HEART AND CHRONIC KIDNEY DISEASE WITH HEART FAILURE AND STAGE 1 THROUGH STAGE 4 CHRONIC KIDNEY DISEASE, OR UNSPECIFIED CHRONIC KIDNEY DISEASE: ICD-10-CM

## 2021-02-10 DIAGNOSIS — E43 UNSPECIFIED SEVERE PROTEIN-CALORIE MALNUTRITION: ICD-10-CM

## 2021-02-10 DIAGNOSIS — B37.49 OTHER UROGENITAL CANDIDIASIS: ICD-10-CM

## 2021-02-10 DIAGNOSIS — I42.9 CARDIOMYOPATHY, UNSPECIFIED: ICD-10-CM

## 2021-02-10 DIAGNOSIS — C25.9 MALIGNANT NEOPLASM OF PANCREAS, UNSPECIFIED: ICD-10-CM

## 2021-02-10 DIAGNOSIS — I50.42 CHRONIC COMBINED SYSTOLIC (CONGESTIVE) AND DIASTOLIC (CONGESTIVE) HEART FAILURE: ICD-10-CM

## 2021-02-10 DIAGNOSIS — D69.6 THROMBOCYTOPENIA, UNSPECIFIED: ICD-10-CM

## 2021-02-10 DIAGNOSIS — F41.9 ANXIETY DISORDER, UNSPECIFIED: ICD-10-CM

## 2021-02-10 DIAGNOSIS — E88.09 OTHER DISORDERS OF PLASMA-PROTEIN METABOLISM, NOT ELSEWHERE CLASSIFIED: ICD-10-CM

## 2021-02-10 DIAGNOSIS — D63.0 ANEMIA IN NEOPLASTIC DISEASE: ICD-10-CM

## 2021-02-10 DIAGNOSIS — R18.0 MALIGNANT ASCITES: ICD-10-CM

## 2021-02-10 DIAGNOSIS — Z66 DO NOT RESUSCITATE: ICD-10-CM

## 2021-02-10 DIAGNOSIS — D68.59 OTHER PRIMARY THROMBOPHILIA: ICD-10-CM

## 2021-02-10 DIAGNOSIS — R00.0 TACHYCARDIA, UNSPECIFIED: ICD-10-CM

## 2021-02-10 DIAGNOSIS — Z96.643 PRESENCE OF ARTIFICIAL HIP JOINT, BILATERAL: ICD-10-CM

## 2021-02-10 DIAGNOSIS — I82.4Z1 ACUTE EMBOLISM AND THROMBOSIS OF UNSPECIFIED DEEP VEINS OF RIGHT DISTAL LOWER EXTREMITY: ICD-10-CM

## 2021-02-10 DIAGNOSIS — I48.92 UNSPECIFIED ATRIAL FLUTTER: ICD-10-CM

## 2021-02-10 DIAGNOSIS — B37.0 CANDIDAL STOMATITIS: ICD-10-CM

## 2021-02-10 DIAGNOSIS — N17.9 ACUTE KIDNEY FAILURE, UNSPECIFIED: ICD-10-CM

## 2021-02-12 ENCOUNTER — APPOINTMENT (OUTPATIENT)
Age: 66
End: 2021-02-12

## 2021-02-19 ENCOUNTER — APPOINTMENT (OUTPATIENT)
Age: 66
End: 2021-02-19

## 2021-02-26 ENCOUNTER — APPOINTMENT (OUTPATIENT)
Age: 66
End: 2021-02-26

## 2021-08-02 NOTE — ED ADULT NURSE NOTE - ISOLATION TYPE:
Patient updated with reassurance given to wait time on CXR. Provider states he HAS seen patient and awaiting results. None

## 2021-08-12 NOTE — ED PROVIDER NOTE - GASTROINTESTINAL, MLM
+Abdomen distended, non-tender, no guarding. Orbicularis Oris Muscle Flap Text: The defect edges were debeveled with a #15 scalpel blade.  Given that the defect affected the competency of the oral sphincter an obicularis oris muscle flap was deemed most appropriate to restore this competency and normal muscle function.  Using a sterile surgical marker, an appropriate flap was drawn incorporating the defect. The area thus outlined was incised with a #15 scalpel blade.

## 2021-08-24 NOTE — H&P ADULT - HISTORY OF PRESENT ILLNESS
Nsaids Counseling: NSAID Counseling: I discussed with the patient that NSAIDs should be taken with food. Prolonged use of NSAIDs can result in the development of stomach ulcers.  Patient advised to stop taking NSAIDs if abdominal pain occurs.  The patient verbalized understanding of the proper use and possible adverse effects of NSAIDs.  All of the patient's questions and concerns were addressed. 65 yo female with PMH of venous stasis, arthritis s/p b/l hip replacements presents to ED with complaint SOB. Pt was seen in the ED one day ago for the same and called back to ED for a. rosas. Pt states for the past one week she has been having dyspnea on exertion which has been worsening. One day ago while at work she noted dyspnea worsening and felt fatigued. When coming to the ED she was unable to walk from the parking lot to front door without stopping to catch her breath. She denies any chest pain. No palpitations. States that she has always had a baseline sinus tachycardia for the past 45 yrs but never as elevated to 150s. she had a stress test about one year ago at [facility] prior to her hip replacement with Dr. Armenta which she states was normal and cleared for surgery. Today still with SOB. No chest pain. Does admit to a non productive cough which she has had since May and was treated for a bronchitis at the time. Denies any fevers, chills, headaches, CP, abd pain, N/V, diarrhea, dizziness, dysuria.     In the ED pt noted to be in a flutter 100-150s. She was started on a cardizem drip.

## 2021-11-04 NOTE — ED STATDOCS - NSTIMEPROVIDERCAREINITIATE_GEN_ER
normal appearance , without tenderness upon palpation , no deformities , trachea midline , Thyroid normal size , no thyroid nodules , no masses , no JVD , thyroid nontender
10-Sep-2019 10:02

## 2021-12-23 NOTE — H&P PST ADULT - SURGICAL SITE INCISION
no
Fall Prevention in the Home, Adult  Falls can cause injuries and can affect people from all age groups. There are many simple things that you can do to make your home safe and to help prevent falls. Ask for help when making these changes, if needed.    What actions can I take to prevent falls?  General instructions     Use good lighting in all rooms. Replace any light bulbs that burn out.  Turn on lights if it is dark. Use night-lights.  Place frequently used items in easy-to-reach places. Lower the shelves around your home if necessary.  Set up furniture so that there are clear paths around it. Avoid moving your furniture around.  Remove throw rugs and other tripping hazards from the floor.  Avoid walking on wet floors.  Fix any uneven floor surfaces.  Add color or contrast paint or tape to grab bars and handrails in your home. Place contrasting color strips on the first and last steps of stairways.  When you use a stepladder, make sure that it is completely opened and that the sides are firmly locked. Have someone hold the ladder while you are using it. Do not climb a closed stepladder.  Be aware of any and all pets.  What can I do in the bathroom?     Keep the floor dry. Immediately clean up any water that spills onto the floor.  Remove soap buildup in the tub or shower on a regular basis.  Use non-skid mats or decals on the floor of the tub or shower.  Attach bath mats securely with double-sided, non-slip rug tape.  If you need to sit down while you are in the shower, use a plastic, non-slip stool.  Image ImageInstall grab bars by the toilet and in the tub and shower. Do not use towel bars as grab bars.  What can I do in the bedroom?     Make sure that a bedside light is easy to reach.  Do not use oversized bedding that drapes onto the floor.  Have a firm chair that has side arms to use for getting dressed.  What can I do in the kitchen?     Clean up any spills right away.  If you need to reach for something above you, use a sturdy step stool that has a grab bar.  Keep electrical cables out of the way.  Do not use floor polish or wax that makes floors slippery. If you must use wax, make sure that it is non-skid floor wax.  What can I do in the stairways?     Do not leave any items on the stairs.  Make sure that you have a light switch at the top of the stairs and the bottom of the stairs. Have them installed if you do not have them.  Make sure that there are handrails on both sides of the stairs. Fix handrails that are broken or loose. Make sure that handrails are as long as the stairways.  Install non-slip stair treads on all stairs in your home.  Avoid having throw rugs at the top or bottom of stairways, or secure the rugs with carpet tape to prevent them from moving.  Choose a carpet design that does not hide the edge of steps on the stairway.  Check any carpeting to make sure that it is firmly attached to the stairs. Fix any carpet that is loose or worn.  What can I do on the outside of my home?     Use bright outdoor lighting.  Regularly repair the edges of walkways and driveways and fix any cracks.  Remove high doorway thresholds.  Trim any shrubbery on the main path into your home.  Regularly check that handrails are securely fastened and in good repair. Both sides of any steps should have handrails.  Install guardrails along the edges of any raised decks or porches.  Clear walkways of debris and clutter, including tools and rocks.  Have leaves, snow, and ice cleared regularly.  Use sand or salt on walkways during winter months.  In the garage, clean up any spills right away, including grease or oil spills.  What other actions can I take?     Wear closed-toe shoes that fit well and support your feet. Wear shoes that have rubber soles or low heels.  Use mobility aids as needed, such as canes, walkers, scooters, and crutches.  Review your medicines with your health care provider. Some medicines can cause dizziness or changes in blood pressure, which increase your risk of falling.  Talk with your health care provider about other ways that you can decrease your risk of falls. This may include working with a physical therapist or  to improve your strength, balance, and endurance.    Where to find more information  Centers for Disease Control and Prevention, [place]THA: https://www.cdc.gov  National [place] on Aging: https://yt3chyh.katherine.nih.gov  Contact a health care provider if:  You are afraid of falling at home.  You feel weak, drowsy, or dizzy at home.  You fall at home.  Summary  There are many simple things that you can do to make your home safe and to help prevent falls.  Ways to make your home safe include removing tripping hazards and installing grab bars in the bathroom.  Ask for help when making these changes in your home.  This information is not intended to replace advice given to you by your health care provider. Make sure you discuss any questions you have with your health care provider.

## 2022-03-09 NOTE — ED ADULT NURSE NOTE - CHIEF COMPLAINT QUOTE
From: Jose Cruz Rao [DPYUS] <[email or web address]>   Sent: Wednesday, March 09, 2022 1:42 PM  To: Aneta Gatica <[email or web address]>  Subject: [EXTERNAL] Re: Dr. Espinoza Change in Surgery Date      Ok. Got it. Thanks Aneta!!     Pt reports hx of ascites secondary to abdominal ca that has not been fully diagnosed at this time, but they "think" is pancreatic.  Pt reports prior paracentesis and reports returning abdominal distention.

## 2022-07-27 NOTE — PHYSICAL THERAPY INITIAL EVALUATION ADULT - WEIGHT-BEARING RESTRICTIONS: GAIT, REHAB EVAL
"recieved pt laying in bed with eyes closed areoused with verbal and tactile stimuli pt c/o \"feeling bad\" since being diagnosed with covid.  He denies any pain Nurse report pt has been very congested last few days no cough noted during my visit and pt o2 sats are 92-93 on RA.  His appitite is poor pt report he cant taste anything. He has decub to left toe has healed clifton intact with clear yellow urine noted in drainge bag.  Emotional support provided instructed staff to call hospice for any other changes in condition or questions."
weight-bearing as tolerated

## 2022-07-29 NOTE — H&P PST ADULT - GUM GEN PE MLT EXAM PC
Problem: Pain  Goal: # Acceptable pain/comfort level is achieved/maintained at rest using age and developmentally appropriate pediatric pain scale (NRS, FACES, FLACC, NPASS)  Outcome: Outcome Met, Complete Goal  Goal: # Patient and/or caregiver verbalizes understanding of pain management  Description: Documented in Patient Education Activity  Outcome: Outcome Met, Complete Goal     Problem: Postoperative Care  Goal: # Vital signs are maintained within parameters  Outcome: Outcome Met, Complete Goal  Goal: Achieve/maintain baseline nutrition and elimination status  Outcome: Outcome Met, Complete Goal  Goal: Activity level is resumed to level appropriate for d/c  Outcome: Outcome Met, Complete Goal  Goal: Demonstrates no postoperative complications  Outcome: Outcome Met, Complete Goal  Goal: # Patient/family/caregiver verbalizes understanding of postoperative care in the hospital and after d/c  Description: Document on Patient Education Activity  Outcome: Outcome Met, Complete Goal     Patient tolerated IV removal.  Discharge instructions reviewed with parents and all questions answered at this time.  Patient discharged home into care of parents.    patient refused

## 2022-12-20 NOTE — H&P PST ADULT - ENMT
Patient declined individual medication review by support staff because there are no changes per Saba.  Marianela Ortega VF   
negative

## 2023-04-19 NOTE — CONSULT NOTE ADULT - PROBLEM SELECTOR PROBLEM 1
Atrial flutter by electrocardiogram
SOB (shortness of breath) on exertion
Atrial flutter with rapid ventricular response
caffeine

## 2023-05-16 NOTE — ED ADULT NURSE NOTE - SEPSIS REFERENCE DATA CRITERIA 1
"Past Medical History:   Diagnosis Date    ADHD (attention deficit hyperactivity disorder)     Arthritis     Asthma     Bipolar 1 disorder     Cataract     Cigarette smoker     COPD (chronic obstructive pulmonary disease)     Coronary artery disease     A fib    Depression     bipolar manic depresson    Diabetes mellitus     Diabetic foot ulcers     Diabetic neuropathy     DVT of lower extremity, bilateral 07/2013    bilateral LE DVT. [place] filter placed.     Encounter for blood transfusion     History of blood clots 1. Left Leg=2003; 2.Bilateral Groin=Blood Clots= 5 or 6/ 2013 & 7/2013; 3. LLL of Lung=7/2013;  4. Lt. Lower Leg=7/2013.     Pt. had 1st Blood Clot after [id number], & Last=2013. [place] Filter= Rt.Lateral Neck.    HTN (hypertension) 06/06/2013    Pt states that she does not have hypertension    Hypercholesteremia     Irregular heartbeat     Neuromuscular disorder     neuropathy feet    Obese     PE (pulmonary embolism) 07/2013    bilat LE DVT.     Restless leg syndrome        Past Surgical History:   Procedure Laterality Date    ABDOMINAL SURGERY  2010    gastric sleeve    BELOW KNEE AMPUTATION OF LOWER EXTREMITY Left 4/19/2023    Procedure: AMPUTATION, BELOW KNEE;  Surgeon: Gabe Munoz MD;  Location: [facility] OR;  Service: General;  Laterality: Left;  RN PREOP 4/11/2023    BILATERAL OOPHORECTOMY Bilateral 1/12/2015    CHOLECYSTECTOMY      DEBRIDEMENT OF FOOT Bilateral 5/10/2022    Procedure: DEBRIDEMENT, FOOT;  Surgeon: Maira De Los Santos DPM;  Location: [facility] OR;  Service: Podiatry;  Laterality: Bilateral;    DEBRIDEMENT OF FOOT Left 2/28/2023    Procedure: DEBRIDEMENT, FOOT,biopsy;  Surgeon: Maira De Los Santos DPM;  Location: [facility] OR;  Service: Podiatry;  Laterality: Left;  request misonix, wound VAC, possible neoxx    [place] filter Right 7/4/2012    Right Neck & Tunneled Down.    HERNIA REPAIR      "[place] of Hernias Repaires around th [place].", pt. states    INCISION AND DRAINAGE " FOOT Left 12/24/2022    Procedure: INCISION AND DRAINAGE, FOOT;  Surgeon: Fahad Razo DPM;  Location: [facility] OR;  Service: Podiatry;  Laterality: Left;    LAPAROSCOPIC CHOLECYSTECTOMY N/A 9/10/2020    Procedure: CHOLECYSTECTOMY, LAPAROSCOPIC;  Surgeon: Montrell Gutierrez MD;  Location: [facility] OR;  Service: General;  Laterality: N/A;  RN PREOP 9/9----COVID Negative  9/9    OVARIAN CYST REMOVAL  3/13/2014    AL REMOVAL OF OVARY/TUBE(S)      SPLENECTOMY, TOTAL  July 2003    TONSILLECTOMY      as a child    TYMPANOSTOMY TUBE PLACEMENT  1976    VEIN SURGERY  2003    Lt leg       Review of patient's allergies indicates:   Allergen Reactions    Morphine Other (See Comments)     Patient had a psychotic episode after taking Morphine  Agitation, hallucinations  Other Reaction(s): Other (See Comments), Other (See Comments)    Patient had a psychotic episode after taking Morphine    Patient had a psychotic episode after taking Morphine Agitation, hallucinations    Penicillins Anaphylaxis     Tolerated cephalosporins in the past    Januvia [sitagliptin] Hives    Carbamazepine Other (See Comments)     hyponatremia  Other Reaction(s): Other (See Comments)    hyponatremia       Current Facility-Administered Medications on File Prior to Encounter   Medication    [DISCONTINUED] GENERIC EXTERNAL MEDICATION    [DISCONTINUED] GENERIC EXTERNAL MEDICATION     Current Outpatient Medications on File Prior to Encounter   Medication Sig    albuterol (PROVENTIL/VENTOLIN HFA) 90 mcg/actuation inhaler INHALE 2 PUFFS INTO THE LUNGS EVERY 6 HOURS AS NEEDED FOR WHEEZING. RESCUE    albuterol-ipratropium (DUO-NEB) 2.5 mg-0.5 mg/3 mL nebulizer solution Take 3 mLs by nebulization every 6 (six) hours as needed for Wheezing or Shortness of Breath. Rescue    ammonium lactate (LAC-HYDRIN) 12 % lotion APPL Y ONCE TOPICALLY TWICE DAILY FOR 30 DAYS    apixaban (ELIQUIS) 5 mg Tab Take 1 tablet (5 mg total) by mouth in the morning and 1 tablet (5 mg total) in the  evening. Indications: Thromboembolism secondary to Atrial Fibrillation.    aspirin 81 MG Chew Take 1 tablet (81 mg total) by mouth once daily.    bumetanide (BUMEX) 1 MG tablet Take 1 tablet (1 mg total) by mouth once daily.    cyanocobalamin (VITAMIN B-12) 1000 MCG tablet Take 100 mcg by mouth once daily.    divalproex (DEPAKOTE) 500 MG TbEC Take 1 tablet by mouth every morning    DUPIXENT  mg/2 mL PnIj Inject into the skin every 14 (fourteen) days.    fluticasone propionate (FLONASE) 50 mcg/actuation nasal spray 2 sprays (100 mcg total) by Each Nostril route daily as needed (Nasal congestion).    fluticasone-salmeterol diskus inhaler 250-50 mcg Inhale 1 puff into the lungs 2 (two) times daily. Controller    gabapentin (NEURONTIN) 300 MG capsule Take 2 capsules (600 mg total) by mouth 3 (three) times a day.    hydrOXYzine (ATARAX) 50 MG tablet Take 0.5 tablets (25 mg total) by mouth 4 (four) times daily as needed for Itching or Anxiety.    LIDOcaine (LIDODERM) 5 % Place 1 patch onto the skin once daily. Remove & Discard patch within 12 hours or as directed by MD    lisinopriL 10 MG tablet TAKE 1 TABLET(10 MG) BY MOUTH EVERY DAY    metFORMIN (GLUCOPHAGE) 1000 MG tablet Take 1 tablet (1,000 mg total) by mouth 2 (two) times daily with meals.    methocarbamoL (ROBAXIN) 500 MG Tab Take 1 tablet (500 mg total) by mouth 3 (three) times daily. Frequency could not be confirmed. for 15 days    oxyCODONE (ROXICODONE) 5 MG immediate release tablet Take 1 tablet (5 mg total) by mouth every 8 (eight) hours as needed for Pain.    BIOFREEZE, MENTHOL, TOP Apply topically.    divalproex (DEPAKOTE) 500 MG TbEC Take 1 tablet (500 mg total) by mouth once daily. PO QAM    ferrous sulfate 325 (65 FE) MG EC tablet Take 1 tablet (325 mg total) by mouth once daily. (Patient not taking: Reported on 5/16/2023)    hydrOXYzine HCL (ATARAX) 25 MG tablet Take 1 tablet (25 mg total) by mouth every 6 (six) hours as needed for itching or  "anxiety.    insulin glargine 100 units/mL SubQ pen Inject 10 Units under the skin every morning Indications: Type 2 Diabetes.    insulin lispro 100 unit/mL pen Inject 0-16 Units under the skin 4 (four) times a day before meals and nightly Indications: High Blood Sugar. 151-200 4 units 201-250 8 units 251-300 10 units 301-350 12 units 351-400 16 units >400 16 units & Call Medical Provider.    loratadine (CLARITIN) 10 mg tablet Take 1 tablet (10 mg total) by mouth once daily.    metoprolol tartrate (LOPRESSOR) 25 MG tablet Take 1 tablet (25 mg total) by mouth 2 (two) times daily.    multivitamin Tab Take 1 tablet by mouth once daily.    nicotine (NICODERM CQ) 14 mg/24 hr Place 1 patch onto the skin once daily. (Patient not taking: Reported on 5/12/2023)    nystatin (NYSTOP) powder APPLY TO ABDOMINAL AND BREAST SKIN FOLD TWICE DAILY.    ondansetron (ZOFRAN) 8 MG tablet Take 1 tablet (8 mg total) by mouth every 8 (eight) hours as needed for Nausea.    pantoprazole (PROTONIX) 40 MG tablet Take 1 tablet (40 mg total) by mouth once daily.    pantoprazole (PROTONIX) 40 MG tablet Take 1 tablet (40 mg total) by mouth Daily before breakfast.    pen needle, diabetic (BD ERIC 2ND GEN PEN NEEDLE) 32 gauge x 5/32" Ndle Use with insulin 5 times daily    polyethylene glycol (GLYCOLAX) 17 gram/dose powder Mix 1 capful (17 g) with fluids and drink by mouth once daily.    pravastatin (PRAVACHOL) 40 MG tablet Take 1 tablet (40 mg total) by mouth every evening.    risperiDONE (RISPERDAL) 3 MG Tab Take 1 tablet (3 mg total) by mouth in the morning and 1 tablet (3 mg total) in the evening. Indications: Mood.    semaglutide (OZEMPIC SUBQ) Inject into the skin.    senna-docusate 8.6-50 mg (PERICOLACE) 8.6-50 mg per tablet Take 1 tablet by mouth once daily.    sulfamethoxazole-trimethoprim 800-160mg (BACTRIM DS) 800-160 mg Tab Take 1 tablet by mouth 2 (two) times daily. for 7 days    traMADoL (ULTRAM) 50 mg tablet Take 1 tablet (50 mg total) " by mouth every 8 (eight) hours as needed for Pain (foot pain).    vitamin E 1000 UNIT capsule Take 1,000 Units by mouth once daily.    VYVANSE 40 mg Cap Take 40 mg by mouth once daily.    [DISCONTINUED] ASPERCREME, LIDOCAINE, 4 % PtMd Apply 1 patch topically.    [DISCONTINUED] bumetanide (BUMEX) 1 MG tablet Take 1 tablet (1 mg total) by mouth in the morning.    [DISCONTINUED] diclofenac sodium (VOLTAREN) 1 % Gel Apply 2 g topically 4 (four) times daily as needed (Apply to painful area up to 4 times a day as needed for pain). Apply to painful area 4 times a day as needed for pain    [DISCONTINUED] furosemide (LASIX) 20 MG tablet TAKE 1 TABLET(20 MG) BY MOUTH EVERY DAY    [DISCONTINUED] QUEtiapine (SEROQUEL) 200 MG Tab Take 1 tablet (200 mg total) by mouth before breakfast.     Family History       Problem Relation (Age of Onset)    Cataracts Father    Diabetes Father, Paternal Grandfather    Heart disease Father, Paternal Grandfather    Hypertension Father    No Known Problems Mother, Sister, Brother, Maternal Aunt, Maternal Uncle, Paternal Aunt, Paternal Uncle, Maternal Grandfather    Ovarian cancer Maternal Grandmother, Paternal Grandmother          Tobacco Use    Smoking status: Every Day     Packs/day: 0.50     Years: 37.00     Pack years: 18.50     Types: Cigarettes     Last attempt to quit: 2020     Years since quittin.4    Smokeless tobacco: Current    Tobacco comments:     Enrolled in the [employer] on 5/3/14 ([facility] Member ID # [id number]). Ambulatory referral to Smoking Cessation Program   Substance and Sexual Activity    Alcohol use: No     Alcohol/week: 0.0 standard drinks    Drug use: No    Sexual activity: Yes     Partners: Male     Review of Systems  Objective:     Vital Signs (Most Recent):  Temp: 98.2 °F (36.8 °C) (23)  Pulse: 70 (23)  Resp: 16 (23)  BP: 135/60 (2345)  SpO2: 95 % (23) Vital Signs (24h Range):  Temp:  [97.9 °F (36.6  °C)-98.5 °F (36.9 °C)] 98.2 °F (36.8 °C)  Pulse:  [65-84] 70  Resp:  [14-18] 16  SpO2:  [93 %-98 %] 95 %  BP: ()/(51-80) 135/60     Weight: 90.7 kg (200 lb)  Body mass index is 32.28 kg/m².     Physical Exam  Vitals reviewed.   Constitutional:       General: She is not in acute distress.     Appearance: She is not toxic-appearing.   HENT:      Head: Normocephalic and atraumatic.      Mouth/Throat:      Mouth: Mucous membranes are moist.      Pharynx: Oropharynx is clear.   Eyes:      General: No scleral icterus.     Extraocular Movements: Extraocular movements intact.   Cardiovascular:      Rate and Rhythm: Normal rate and regular rhythm.   Pulmonary:      Effort: Pulmonary effort is normal. No respiratory distress.   Abdominal:      General: There is no distension.      Palpations: Abdomen is soft.      Tenderness: There is no abdominal tenderness. There is no guarding or rebound.   Musculoskeletal:      Cervical back: Neck supple. No rigidity.      Comments: See pictures in surgery note for further imaging of stump, but grossly well healing left with staples in place. No obvious fluctuance or erythema noted. No drainage on palpitation. No pain with palpitation noted.     Skin:     General: Skin is warm and dry.   Neurological:      General: No focal deficit present.      Mental Status: She is alert and oriented to person, place, and time.   Psychiatric:         Mood and Affect: Mood normal.         Behavior: Behavior normal.              Significant Labs: All pertinent labs within the past 24 hours have been reviewed.    Significant Imaging: I have reviewed all pertinent imaging results/findings within the past 24 hours.   Abormal VS: Temp > 100F or < 96.8F; SBP < 90 mmHG; HR > 120bpm; Resp > 24/min

## 2023-05-23 NOTE — PHYSICAL THERAPY INITIAL EVALUATION ADULT - PRECAUTIONS/LIMITATIONS, REHAB EVAL
Patient needing breast MRI w/wo contrast per [facility]. Also need authorization for MRI breast, order placed. Zoie with MRI has been informed.  
on Tele/cardiac precautions/fall precautions

## 2023-07-24 NOTE — OCCUPATIONAL THERAPY INITIAL EVALUATION ADULT - VISUAL ACUITY
PATIENT NAME: Xu [id number]    PROCEDURE DATE: 7/24/2023    SURGEON: MIGUEL THOMAS MD         PREOPERATIVE DIAGNOSIS:   1. Right hip wound dehiscence and superficial wound infection    POSTOPERATIVE DIAGNOSIS:   1. Right hip wound dehiscence and superficial wound infection  2. Subcutaneous fat necrosis    PROCEDURE PERFORMED:   1. Right hip wound irrigation and debridement down to fascia  2. Scar revision  3. Placement of hemovac drain in subcutaneous layer    ANESTHESIA:  General    OR STAFF:  Circulator: Stephanie Whitley RN; Mireya Soto RN  Surgical Assistant: Monica Robison ST  Resident: Gelacio Simpson DO    COMPLICATIONS: None     IMPLANTS: none    SPECIMEN:  3 sets of aerobic and anaerobic cultures wound deep to fascia    COMPLICATIONS:  None    HISTORY AND INDICATIONS:  This patient is a 80 year old female with a right total hip replacement 6 weeks ago.  She had a superficial wound healing problem that has progressed and was not improving with non-op treatment.  She does not have pain in the groin any more and pre-op she had so much pain she was admitted to the hospital for intractable pain which lead to her hip replacement despite her multiple medical co-moridities and her high risk for medical complications.  Her and family were well aware of these increaesed risks pre-op however she felt she could no longer go on with the hip pain she was having and could not even get out of bed or a chair due to the right hip pain pre-op.    She had been very non-compliant with post-op instructions and follow ups.  She has missed several appointments an does not respond to our phone calls.  He  relates this to having many other things to take care of.  Examples are that she called us on a Monday with wound healing issues and we recommended she come in to the office the same day.  They said they could not because they had too many other things to do so they did not come in until Friday.  Today she was  supposed to come in to the office at 9:20 and did not show for the appointment.    Given the wound healing issues I recommended we do irrigation and debridement and scar revision.    ESR and CRP are a little elevated WBC normal.  No pus draining from wound just fibrinous exudate and necrotic wound edges     All the possible risks of operative and non-operative treatmentwere discussed with the patient including, but not limited to, infection, neurovascular injury, incomplete resolution of symptoms, the possibility of future surgeries,  as well as DVT, PE, myocardial infarction, and death.   The patient verbalized understanding of these possible risks and benefits and informed consent was obtained.    The right hip was marked by the surgeon in the preoperative holding area.  The patient was given IV antibiotics within one hour of incision.  A time out was performed in the operating room with the surgeon prior to making incision.    DESCRIPTION OF THE PROCEDURE:   The patient was taken to the operating room  and placed supine on the operating room table.  The patient was given general  anesthesia by the Anesthesia team.  After a time out was performed, the right lower extremity was prepped and draped in the usual sterile fashion.    After a time out was performed with the surgeon the incision was opened and explored for any pockets of pus.  Finger dissection was able to be used to reach down to the fascia layer and muscular layer that was intact.  There was a significant amount of fat necrosis but there was no pus encountered.  No deep pocket and no disruption of fascia communicating with the joint.  Cultures were taken deep down to fascia.    The wound edges were sharply revised with a knife to give nice bleeding edges.    The wound was scrubbed with betadine and peroxide.  Then 9 liters of normal saline pulsatile lavage.    A deep hemovac drain was placed    Deep tissue was closed with 0 vicryl, 2.0 vicryl and skin  was closed with 2.0 nylon and #2 nylon.    A sterile silver dressing was applied      The patient was transferred to the recovery room in stable condition.  There were no complications.  Sponge, needle, and instrument counts were  correct at the end of the case.    ID service was consulted and the patient was admitted for IV antibiotics        Josh Thibodeaux MD   not tested/no new c/o offered - pt wears reading & distance glasses as needed

## 2023-09-15 ASSESSMENT — HOOS JR
HOOS JR RAW SCORE: 5
HOOS JR RAW SCORE: 10
HOOS JR RAW SCORE: 14
WALKING ON UNEVEN SURFACE: SEVERE
RISING FROM SITTING: MODERATE
IMPORTED HOOS JR SCORE: 52.97
IMPORTED HOOS JR SCORE: 46.65
BENDING TO THE FLOOR TO PICK UP OBJECT: MILD
GOING UP OR DOWN STAIRS: MILD
GOING UP OR DOWN STAIRS: SEVERE
BENDING TO THE FLOOR TO PICK UP OBJECT: MODERATE
IMPORTED FORM: YES
WALKING ON UNEVEN SURFACE: MODERATE
HOOS JR RAW SCORE: 12
IMPORTED HOOS JR SCORE: 73.47
LYING IN BED (TURNING OVER, MAINTAINING HIP POSITION): MODERATE
GOING UP OR DOWN STAIRS: EXTREME
SITTING: MILD
WALKING ON UNEVEN SURFACE: MILD
LYING IN BED (TURNING OVER, MAINTAINING HIP POSITION): MILD
IMPORTED HOOS JR SCORE: 58.93
RISING FROM SITTING: MILD

## 2023-10-27 NOTE — PATIENT PROFILE ADULT. - NS PRO PT RIGHT SUPPORT PERSON
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 Per Dr. Tye Humphries procedure canceled today. same name as above

## 2024-03-23 NOTE — CONSULT NOTE ADULT - ASSESSMENT
home w/ parent 64 yo with ascites related undifferentiated adeno ca admitted with elective paracentesis and noted with HANANE;. Etiology likely due to pre-renal azothemia  in setting of low bp  will r/o HRS ( doubt with no liver dx defined on previous scans)     PLAN  - monitor on po intake  - urine electrolytes  - repeat labs tonight and monitor trend of the scr  - fu renal/bladder sono   - albumin post paracentesis

## 2025-04-21 NOTE — ED PROVIDER NOTE - CROS ED CARDIOVAS ALL NEG
Problem: Alteration in Thoughts and Perception  Goal: Treatment Goal: Gain control of psychotic behaviors/thinking, reduce/eliminate presenting symptoms and demonstrate improved reality functioning upon discharge  Outcome: Progressing     Problem: Depression  Goal: Treatment Goal: Demonstrate behavioral control of depressive symptoms, verbalize feelings of improved mood/affect, and adopt new coping skills prior to discharge  Outcome: Progressing     Problem: Anxiety  Goal: Anxiety is at manageable level  Description: Interventions:- Assess and monitor patient's anxiety level. - Monitor for signs and symptoms (heart palpitations, chest pain, shortness of breath, headaches, nausea, feeling jumpy, restlessness, irritable, apprehensive). - Collaborate with interdisciplinary team and initiate plan and interventions as ordered.- Cinebar patient to unit/surroundings- Explain treatment plan- Encourage participation in care- Encourage verbalization of concerns/fears- Identify coping mechanisms- Assist in developing anxiety-reducing skills- Administer/offer alternative therapies- Limit or eliminate stimulants  Outcome: Progressing     Problem: DISCHARGE PLANNING  Goal: Discharge to home or other facility with appropriate resources  Description: INTERVENTIONS:- Identify barriers to discharge w/patient and caregiver- Arrange for needed discharge resources and transportation as appropriate- Identify discharge learning needs (meds, wound care, etc.)- Arrange for interpretive services to assist at discharge as needed- Refer to Case Management Department for coordinating discharge planning if the patient needs post-hospital services based on physician/advanced practitioner order or complex needs related to functional status, cognitive ability, or social support system  Outcome: Progressing      negative...